# Patient Record
Sex: FEMALE | Race: BLACK OR AFRICAN AMERICAN | NOT HISPANIC OR LATINO | Employment: OTHER | RURAL
[De-identification: names, ages, dates, MRNs, and addresses within clinical notes are randomized per-mention and may not be internally consistent; named-entity substitution may affect disease eponyms.]

---

## 2020-06-06 ENCOUNTER — HISTORICAL (OUTPATIENT)
Dept: ADMINISTRATIVE | Facility: HOSPITAL | Age: 70
End: 2020-06-06

## 2020-11-11 ENCOUNTER — HISTORICAL (OUTPATIENT)
Dept: ADMINISTRATIVE | Facility: HOSPITAL | Age: 70
End: 2020-11-11

## 2020-11-11 LAB
IGA SER QL IFE: ABNORMAL
IGA SERPL-MCNC: 2010 MG/DL (ref 70–400)
IGG SER QL IFE: NORMAL
IGG SERPL-MCNC: 912 MG/DL (ref 700–1600)
IGM SER QL IFE: ABNORMAL
IGM SERPL-MCNC: 26 MG/DL (ref 40–230)
KAPPA LC SER QL ELPH: 227 (ref 170–370)
KAPPA LC SER QL IFE: NORMAL
KAPPA LC/LAMBDA SER: 0.55 % (ref 1.35–2.65)
LAMBDA LC SER QL ELPH: >415 (ref 90–210)
LAMBDA LC SER QL IFE: ABNORMAL
PATH INTERP BLD-IMP: ABNORMAL
PATH INTERP CSF-IMP: ABNORMAL

## 2020-11-16 ENCOUNTER — HISTORICAL (OUTPATIENT)
Dept: ADMINISTRATIVE | Facility: HOSPITAL | Age: 70
End: 2020-11-16

## 2020-12-10 ENCOUNTER — HISTORICAL (OUTPATIENT)
Dept: ADMINISTRATIVE | Facility: HOSPITAL | Age: 70
End: 2020-12-10

## 2021-05-07 RX ORDER — FUROSEMIDE 40 MG/1
40 TABLET ORAL DAILY PRN
Qty: 90 TABLET | Refills: 1 | Status: SHIPPED | OUTPATIENT
Start: 2021-05-07 | End: 2021-12-06

## 2021-05-07 RX ORDER — SAXAGLIPTIN 5 MG/1
5 TABLET, FILM COATED ORAL DAILY
Qty: 90 TABLET | Refills: 1 | Status: SHIPPED | OUTPATIENT
Start: 2021-05-07 | End: 2022-01-26

## 2021-05-21 ENCOUNTER — OFFICE VISIT (OUTPATIENT)
Dept: FAMILY MEDICINE | Facility: CLINIC | Age: 71
End: 2021-05-21
Payer: MEDICARE

## 2021-05-21 VITALS
DIASTOLIC BLOOD PRESSURE: 60 MMHG | BODY MASS INDEX: 26.49 KG/M2 | WEIGHT: 159 LBS | SYSTOLIC BLOOD PRESSURE: 140 MMHG | RESPIRATION RATE: 18 BRPM | HEIGHT: 65 IN | OXYGEN SATURATION: 99 % | HEART RATE: 75 BPM

## 2021-05-21 DIAGNOSIS — D64.9 ANEMIA, UNSPECIFIED TYPE: ICD-10-CM

## 2021-05-21 DIAGNOSIS — E78.5 HYPERLIPIDEMIA, UNSPECIFIED HYPERLIPIDEMIA TYPE: Primary | ICD-10-CM

## 2021-05-21 LAB
CHOLEST SERPL-MCNC: 160 MG/DL (ref 0–200)
CHOLEST/HDLC SERPL: 2.3 {RATIO}
FERRITIN SERPL-MCNC: 130 NG/ML (ref 8–252)
HDLC SERPL-MCNC: 70 MG/DL (ref 40–60)
IRON SATN MFR SERPL: 29 % (ref 14–50)
IRON SERPL-MCNC: 82 ΜG/DL (ref 50–170)
LDLC SERPL CALC-MCNC: 80 MG/DL
LDLC/HDLC SERPL: 1.1 {RATIO}
NONHDLC SERPL-MCNC: 90 MG/DL
TIBC SERPL-MCNC: 278 ΜG/DL (ref 250–450)
TRIGL SERPL-MCNC: 52 MG/DL (ref 35–150)
VLDLC SERPL-MCNC: 10 MG/DL

## 2021-05-21 PROCEDURE — 82728 ASSAY OF FERRITIN: CPT | Mod: ,,, | Performed by: CLINICAL MEDICAL LABORATORY

## 2021-05-21 PROCEDURE — 83540 IRON AND TIBC: ICD-10-PCS | Mod: ,,, | Performed by: CLINICAL MEDICAL LABORATORY

## 2021-05-21 PROCEDURE — 99213 OFFICE O/P EST LOW 20 MIN: CPT | Mod: ,,, | Performed by: FAMILY MEDICINE

## 2021-05-21 PROCEDURE — 83550 IRON AND TIBC: ICD-10-PCS | Mod: ,,, | Performed by: CLINICAL MEDICAL LABORATORY

## 2021-05-21 PROCEDURE — 83540 ASSAY OF IRON: CPT | Mod: ,,, | Performed by: CLINICAL MEDICAL LABORATORY

## 2021-05-21 PROCEDURE — 80061 LIPID PANEL: CPT | Mod: ,,, | Performed by: CLINICAL MEDICAL LABORATORY

## 2021-05-21 PROCEDURE — 82728 FERRITIN: ICD-10-PCS | Mod: ,,, | Performed by: CLINICAL MEDICAL LABORATORY

## 2021-05-21 PROCEDURE — 80061 LIPID PANEL: ICD-10-PCS | Mod: ,,, | Performed by: CLINICAL MEDICAL LABORATORY

## 2021-05-21 PROCEDURE — 99213 PR OFFICE/OUTPT VISIT, EST, LEVL III, 20-29 MIN: ICD-10-PCS | Mod: ,,, | Performed by: FAMILY MEDICINE

## 2021-05-21 PROCEDURE — 83550 IRON BINDING TEST: CPT | Mod: ,,, | Performed by: CLINICAL MEDICAL LABORATORY

## 2021-05-21 RX ORDER — HYDROCODONE BITARTRATE AND ACETAMINOPHEN 10; 325 MG/1; MG/1
1 TABLET ORAL EVERY 8 HOURS
COMMUNITY
End: 2021-06-08 | Stop reason: SDUPTHER

## 2021-05-21 RX ORDER — PANTOPRAZOLE SODIUM 40 MG/1
40 TABLET, DELAYED RELEASE ORAL 2 TIMES DAILY
COMMUNITY
End: 2022-04-07 | Stop reason: ALTCHOICE

## 2021-05-21 RX ORDER — FERROUS SULFATE 325(65) MG
325 TABLET ORAL
COMMUNITY

## 2021-05-21 RX ORDER — CHOLECALCIFEROL (VITAMIN D3) 25 MCG
5000 TABLET ORAL DAILY
COMMUNITY

## 2021-05-21 RX ORDER — FOLIC ACID 1 MG/1
1 TABLET ORAL DAILY
COMMUNITY

## 2021-05-21 RX ORDER — PREDNISONE 5 MG/1
5 TABLET ORAL 3 TIMES DAILY
COMMUNITY
End: 2022-03-03 | Stop reason: ALTCHOICE

## 2021-05-21 RX ORDER — AMLODIPINE BESYLATE 10 MG/1
10 TABLET ORAL DAILY
COMMUNITY
End: 2022-04-07 | Stop reason: SDUPTHER

## 2021-05-21 RX ORDER — TRAVOPROST OPHTHALMIC SOLUTION 0.04 MG/ML
1 SOLUTION OPHTHALMIC NIGHTLY
COMMUNITY
End: 2023-05-09

## 2021-05-21 RX ORDER — CLOPIDOGREL BISULFATE 75 MG/1
75 TABLET ORAL DAILY
COMMUNITY
End: 2022-04-07 | Stop reason: SDUPTHER

## 2021-05-21 RX ORDER — LORATADINE 10 MG/1
10 TABLET ORAL DAILY
COMMUNITY
End: 2022-03-03

## 2021-05-21 RX ORDER — METHOTREXATE 2.5 MG/1
2.5 TABLET ORAL
COMMUNITY

## 2021-05-21 RX ORDER — HYDROCHLOROTHIAZIDE 12.5 MG/1
12.5 TABLET ORAL DAILY
COMMUNITY
End: 2022-04-07 | Stop reason: SDUPTHER

## 2021-05-21 RX ORDER — FLUTICASONE PROPIONATE 50 MCG
2 SPRAY, SUSPENSION (ML) NASAL DAILY
COMMUNITY
End: 2022-04-07 | Stop reason: SDUPTHER

## 2021-05-21 RX ORDER — ASPIRIN 81 MG/1
81 TABLET ORAL DAILY
COMMUNITY

## 2021-05-21 RX ORDER — ROSUVASTATIN CALCIUM 20 MG/1
20 TABLET, COATED ORAL NIGHTLY
COMMUNITY
End: 2022-04-07 | Stop reason: SDUPTHER

## 2021-05-21 RX ORDER — DICLOFENAC SODIUM 10 MG/G
2 GEL TOPICAL 2 TIMES DAILY
COMMUNITY
End: 2021-07-29

## 2021-05-21 RX ORDER — GABAPENTIN 300 MG/1
300 CAPSULE ORAL DAILY
COMMUNITY
End: 2021-12-01 | Stop reason: SDUPTHER

## 2021-05-21 RX ORDER — CETIRIZINE HYDROCHLORIDE 10 MG/1
10 TABLET ORAL DAILY
COMMUNITY
End: 2022-04-07

## 2021-05-24 ENCOUNTER — TELEPHONE (OUTPATIENT)
Dept: FAMILY MEDICINE | Facility: CLINIC | Age: 71
End: 2021-05-24

## 2021-06-08 RX ORDER — HYDROCODONE BITARTRATE AND ACETAMINOPHEN 10; 325 MG/1; MG/1
1 TABLET ORAL EVERY 8 HOURS
COMMUNITY
End: 2021-06-08 | Stop reason: SDUPTHER

## 2021-06-09 ENCOUNTER — OFFICE VISIT (OUTPATIENT)
Dept: PAIN MEDICINE | Facility: CLINIC | Age: 71
End: 2021-06-09
Payer: MEDICARE

## 2021-06-09 VITALS
WEIGHT: 163 LBS | DIASTOLIC BLOOD PRESSURE: 49 MMHG | HEIGHT: 65 IN | HEART RATE: 83 BPM | SYSTOLIC BLOOD PRESSURE: 120 MMHG | BODY MASS INDEX: 27.16 KG/M2

## 2021-06-09 DIAGNOSIS — Z79.899 OTHER LONG TERM (CURRENT) DRUG THERAPY: Primary | ICD-10-CM

## 2021-06-09 DIAGNOSIS — G89.29 CHRONIC LOW BACK PAIN, UNSPECIFIED BACK PAIN LATERALITY, UNSPECIFIED WHETHER SCIATICA PRESENT: Chronic | ICD-10-CM

## 2021-06-09 DIAGNOSIS — M96.1 POSTLAMINECTOMY SYNDROME OF CERVICAL REGION: Chronic | ICD-10-CM

## 2021-06-09 DIAGNOSIS — M54.50 CHRONIC LOW BACK PAIN, UNSPECIFIED BACK PAIN LATERALITY, UNSPECIFIED WHETHER SCIATICA PRESENT: Chronic | ICD-10-CM

## 2021-06-09 DIAGNOSIS — M06.9 RHEUMATOID ARTHRITIS, INVOLVING UNSPECIFIED SITE, UNSPECIFIED WHETHER RHEUMATOID FACTOR PRESENT: Chronic | ICD-10-CM

## 2021-06-09 LAB
CTP QC/QA: YES
POC (AMP) AMPHETAMINE: NEGATIVE
POC (BAR) BARBITURATES: NEGATIVE
POC (BUP) BUPRENORPHINE: NEGATIVE
POC (BZO) BENZODIAZEPINES: NEGATIVE
POC (COC) COCAINE: NEGATIVE
POC (MDMA) METHYLENEDIOXYMETHAMPHETAMINE 3,4: NEGATIVE
POC (MET) METHAMPHETAMINE: NEGATIVE
POC (MOP) OPIATES: ABNORMAL
POC (MTD) METHADONE: NEGATIVE
POC (OXY) OXYCODONE: NEGATIVE
POC (PCP) PHENCYCLIDINE: NEGATIVE
POC (TCA) TRICYCLIC ANTIDEPRESSANTS: NEGATIVE
POC TEMPERATURE (URINE): 92
POC THC: NEGATIVE

## 2021-06-09 PROCEDURE — 99214 PR OFFICE/OUTPT VISIT, EST, LEVL IV, 30-39 MIN: ICD-10-PCS | Mod: S$PBB,,, | Performed by: PAIN MEDICINE

## 2021-06-09 PROCEDURE — 80305 DRUG TEST PRSMV DIR OPT OBS: CPT | Mod: PBBFAC | Performed by: PAIN MEDICINE

## 2021-06-09 PROCEDURE — 99214 OFFICE O/P EST MOD 30 MIN: CPT | Mod: S$PBB,,, | Performed by: PAIN MEDICINE

## 2021-06-09 PROCEDURE — 99215 OFFICE O/P EST HI 40 MIN: CPT | Mod: PBBFAC | Performed by: PAIN MEDICINE

## 2021-06-09 RX ORDER — HYDROCODONE BITARTRATE AND ACETAMINOPHEN 10; 325 MG/1; MG/1
1 TABLET ORAL EVERY 8 HOURS PRN
Qty: 90 TABLET | Refills: 0 | Status: SHIPPED | OUTPATIENT
Start: 2021-06-09 | End: 2021-07-09

## 2021-06-09 RX ORDER — HYDROCODONE BITARTRATE AND ACETAMINOPHEN 10; 325 MG/1; MG/1
1 TABLET ORAL EVERY 8 HOURS PRN
Qty: 90 TABLET | Refills: 0 | Status: SHIPPED | OUTPATIENT
Start: 2021-07-09 | End: 2021-09-20 | Stop reason: SDUPTHER

## 2021-09-13 PROBLEM — M06.9 RHEUMATOID ARTHRITIS: Status: ACTIVE | Noted: 2021-09-13

## 2021-09-13 PROBLEM — M06.9 RHEUMATOID ARTHRITIS: Chronic | Status: ACTIVE | Noted: 2021-09-13

## 2021-09-13 PROBLEM — M96.1 POSTLAMINECTOMY SYNDROME OF CERVICAL REGION: Status: ACTIVE | Noted: 2021-09-13

## 2021-09-13 PROBLEM — M96.1 POSTLAMINECTOMY SYNDROME OF CERVICAL REGION: Chronic | Status: ACTIVE | Noted: 2021-09-13

## 2021-09-21 ENCOUNTER — OFFICE VISIT (OUTPATIENT)
Dept: PAIN MEDICINE | Facility: CLINIC | Age: 71
End: 2021-09-21
Payer: MEDICARE

## 2021-09-21 VITALS
HEIGHT: 65 IN | HEART RATE: 91 BPM | SYSTOLIC BLOOD PRESSURE: 144 MMHG | BODY MASS INDEX: 25.33 KG/M2 | DIASTOLIC BLOOD PRESSURE: 71 MMHG | RESPIRATION RATE: 18 BRPM | WEIGHT: 152 LBS

## 2021-09-21 DIAGNOSIS — M47.817 SPONDYLOSIS OF LUMBOSACRAL REGION WITHOUT MYELOPATHY OR RADICULOPATHY: Primary | Chronic | ICD-10-CM

## 2021-09-21 DIAGNOSIS — M06.9 RHEUMATOID ARTHRITIS INVOLVING MULTIPLE SITES, UNSPECIFIED WHETHER RHEUMATOID FACTOR PRESENT: Chronic | ICD-10-CM

## 2021-09-21 DIAGNOSIS — Z79.899 OTHER LONG TERM (CURRENT) DRUG THERAPY: ICD-10-CM

## 2021-09-21 PROCEDURE — 99215 OFFICE O/P EST HI 40 MIN: CPT | Mod: PBBFAC | Performed by: PHYSICIAN ASSISTANT

## 2021-09-21 PROCEDURE — 99214 PR OFFICE/OUTPT VISIT, EST, LEVL IV, 30-39 MIN: ICD-10-PCS | Mod: S$PBB,,, | Performed by: PHYSICIAN ASSISTANT

## 2021-09-21 PROCEDURE — 99214 OFFICE O/P EST MOD 30 MIN: CPT | Mod: S$PBB,,, | Performed by: PHYSICIAN ASSISTANT

## 2021-09-21 PROCEDURE — 80305 DRUG TEST PRSMV DIR OPT OBS: CPT | Mod: PBBFAC | Performed by: PHYSICIAN ASSISTANT

## 2021-09-21 RX ORDER — HYDROCODONE BITARTRATE AND ACETAMINOPHEN 10; 325 MG/1; MG/1
1 TABLET ORAL EVERY 8 HOURS
Qty: 90 TABLET | Refills: 0 | Status: SHIPPED | OUTPATIENT
Start: 2021-11-20 | End: 2021-12-15 | Stop reason: SDUPTHER

## 2021-09-21 RX ORDER — HYDROCODONE BITARTRATE AND ACETAMINOPHEN 10; 325 MG/1; MG/1
1 TABLET ORAL EVERY 8 HOURS
Qty: 90 TABLET | Refills: 0 | Status: SHIPPED | OUTPATIENT
Start: 2021-09-21 | End: 2021-10-21

## 2021-09-21 RX ORDER — HYDROCODONE BITARTRATE AND ACETAMINOPHEN 10; 325 MG/1; MG/1
1 TABLET ORAL EVERY 8 HOURS
Qty: 90 TABLET | Refills: 0 | Status: SHIPPED | OUTPATIENT
Start: 2021-10-21 | End: 2021-11-20

## 2021-09-21 RX ORDER — HYDROCODONE BITARTRATE AND ACETAMINOPHEN 10; 325 MG/1; MG/1
1 TABLET ORAL EVERY 8 HOURS
COMMUNITY
End: 2021-12-15 | Stop reason: SDUPTHER

## 2021-11-04 ENCOUNTER — CLINICAL SUPPORT (OUTPATIENT)
Dept: FAMILY MEDICINE | Facility: CLINIC | Age: 71
End: 2021-11-04
Payer: MEDICARE

## 2021-11-04 VITALS — TEMPERATURE: 98 F

## 2021-11-04 DIAGNOSIS — Z23 NEED FOR PROPHYLACTIC VACCINATION AND INOCULATION AGAINST INFLUENZA: Primary | ICD-10-CM

## 2021-11-04 PROCEDURE — G0008 ADMIN INFLUENZA VIRUS VAC: HCPCS | Mod: ,,, | Performed by: NURSE PRACTITIONER

## 2021-11-04 PROCEDURE — 90662 FLU VACCINE - QUADRIVALENT - HIGH DOSE (65+) PRESERVATIVE FREE IM: ICD-10-PCS | Mod: ,,, | Performed by: NURSE PRACTITIONER

## 2021-11-04 PROCEDURE — G0008 FLU VACCINE - QUADRIVALENT - HIGH DOSE (65+) PRESERVATIVE FREE IM: ICD-10-PCS | Mod: ,,, | Performed by: NURSE PRACTITIONER

## 2021-11-04 PROCEDURE — 90662 IIV NO PRSV INCREASED AG IM: CPT | Mod: ,,, | Performed by: NURSE PRACTITIONER

## 2021-12-02 RX ORDER — GABAPENTIN 300 MG/1
300 CAPSULE ORAL DAILY
Qty: 90 CAPSULE | Refills: 1 | Status: SHIPPED | OUTPATIENT
Start: 2021-12-02 | End: 2021-12-06 | Stop reason: SDUPTHER

## 2021-12-06 ENCOUNTER — OFFICE VISIT (OUTPATIENT)
Dept: FAMILY MEDICINE | Facility: CLINIC | Age: 71
End: 2021-12-06
Payer: MEDICARE

## 2021-12-06 VITALS
HEIGHT: 65 IN | TEMPERATURE: 99 F | WEIGHT: 152 LBS | DIASTOLIC BLOOD PRESSURE: 58 MMHG | OXYGEN SATURATION: 98 % | BODY MASS INDEX: 25.33 KG/M2 | SYSTOLIC BLOOD PRESSURE: 120 MMHG | RESPIRATION RATE: 18 BRPM | HEART RATE: 80 BPM

## 2021-12-06 DIAGNOSIS — M47.817 SPONDYLOSIS OF LUMBOSACRAL REGION WITHOUT MYELOPATHY OR RADICULOPATHY: Primary | ICD-10-CM

## 2021-12-06 PROCEDURE — 99213 OFFICE O/P EST LOW 20 MIN: CPT | Mod: ,,, | Performed by: FAMILY MEDICINE

## 2021-12-06 PROCEDURE — 99213 PR OFFICE/OUTPT VISIT, EST, LEVL III, 20-29 MIN: ICD-10-PCS | Mod: ,,, | Performed by: FAMILY MEDICINE

## 2021-12-06 RX ORDER — DICLOFENAC SODIUM 10 MG/G
GEL TOPICAL
Qty: 300 G | Refills: 2 | Status: SHIPPED | OUTPATIENT
Start: 2021-12-06 | End: 2023-02-07 | Stop reason: SDUPTHER

## 2021-12-06 RX ORDER — GABAPENTIN 300 MG/1
300 CAPSULE ORAL DAILY
Qty: 90 CAPSULE | Refills: 1 | Status: SHIPPED | OUTPATIENT
Start: 2021-12-06 | End: 2022-06-17 | Stop reason: ALTCHOICE

## 2021-12-06 RX ORDER — OMEPRAZOLE 20 MG/1
1 CAPSULE, DELAYED RELEASE ORAL DAILY
COMMUNITY
Start: 2021-11-08 | End: 2022-05-06 | Stop reason: CLARIF

## 2021-12-15 ENCOUNTER — HOSPITAL ENCOUNTER (OUTPATIENT)
Dept: RADIOLOGY | Facility: HOSPITAL | Age: 71
Discharge: HOME | End: 2021-12-15
Payer: MEDICARE

## 2021-12-15 VITALS — WEIGHT: 152 LBS | BODY MASS INDEX: 25.33 KG/M2 | HEIGHT: 65 IN

## 2021-12-15 DIAGNOSIS — Z12.31 VISIT FOR SCREENING MAMMOGRAM: ICD-10-CM

## 2021-12-15 PROCEDURE — 77067 SCR MAMMO BI INCL CAD: CPT | Mod: TC

## 2021-12-20 ENCOUNTER — OFFICE VISIT (OUTPATIENT)
Dept: PAIN MEDICINE | Facility: CLINIC | Age: 71
End: 2021-12-20
Payer: MEDICARE

## 2021-12-20 VITALS
HEIGHT: 65 IN | SYSTOLIC BLOOD PRESSURE: 150 MMHG | HEART RATE: 91 BPM | WEIGHT: 151 LBS | BODY MASS INDEX: 25.16 KG/M2 | DIASTOLIC BLOOD PRESSURE: 66 MMHG | RESPIRATION RATE: 18 BRPM

## 2021-12-20 DIAGNOSIS — M47.817 SPONDYLOSIS OF LUMBOSACRAL REGION WITHOUT MYELOPATHY OR RADICULOPATHY: Chronic | ICD-10-CM

## 2021-12-20 DIAGNOSIS — M06.9 RHEUMATOID ARTHRITIS INVOLVING MULTIPLE SITES, UNSPECIFIED WHETHER RHEUMATOID FACTOR PRESENT: Primary | Chronic | ICD-10-CM

## 2021-12-20 DIAGNOSIS — Z79.899 OTHER LONG TERM (CURRENT) DRUG THERAPY: ICD-10-CM

## 2021-12-20 PROCEDURE — 99215 OFFICE O/P EST HI 40 MIN: CPT | Mod: PBBFAC | Performed by: PHYSICIAN ASSISTANT

## 2021-12-20 PROCEDURE — 99214 OFFICE O/P EST MOD 30 MIN: CPT | Mod: S$PBB,,, | Performed by: PHYSICIAN ASSISTANT

## 2021-12-20 PROCEDURE — 99214 PR OFFICE/OUTPT VISIT, EST, LEVL IV, 30-39 MIN: ICD-10-PCS | Mod: S$PBB,,, | Performed by: PHYSICIAN ASSISTANT

## 2021-12-20 PROCEDURE — 80305 DRUG TEST PRSMV DIR OPT OBS: CPT | Mod: PBBFAC | Performed by: PHYSICIAN ASSISTANT

## 2021-12-20 RX ORDER — HYDROCODONE BITARTRATE AND ACETAMINOPHEN 10; 325 MG/1; MG/1
1 TABLET ORAL EVERY 8 HOURS
Qty: 90 TABLET | Refills: 0 | Status: SHIPPED | OUTPATIENT
Start: 2021-12-30 | End: 2022-01-29

## 2021-12-20 RX ORDER — HYDROCODONE BITARTRATE AND ACETAMINOPHEN 10; 325 MG/1; MG/1
1 TABLET ORAL EVERY 8 HOURS
Qty: 90 TABLET | Refills: 0 | Status: SHIPPED | OUTPATIENT
Start: 2022-02-28 | End: 2022-02-28 | Stop reason: SDUPTHER

## 2021-12-20 RX ORDER — SULFAMETHOXAZOLE AND TRIMETHOPRIM 800; 160 MG/1; MG/1
1 TABLET ORAL 2 TIMES DAILY
Status: DISCONTINUED | OUTPATIENT
Start: 2021-12-20 | End: 2021-12-20

## 2021-12-20 RX ORDER — HYDROCODONE BITARTRATE AND ACETAMINOPHEN 10; 325 MG/1; MG/1
1 TABLET ORAL EVERY 8 HOURS
Qty: 90 TABLET | Refills: 0 | Status: SHIPPED | OUTPATIENT
Start: 2022-01-29 | End: 2022-02-28 | Stop reason: SDUPTHER

## 2021-12-20 RX ORDER — SULFAMETHOXAZOLE AND TRIMETHOPRIM 800; 160 MG/1; MG/1
1 TABLET ORAL 2 TIMES DAILY
Qty: 6 TABLET | Refills: 0 | Status: SHIPPED | OUTPATIENT
Start: 2021-12-20 | End: 2021-12-23

## 2021-12-22 ENCOUNTER — OFFICE VISIT (OUTPATIENT)
Dept: FAMILY MEDICINE | Facility: CLINIC | Age: 71
End: 2021-12-22
Payer: MEDICARE

## 2021-12-22 VITALS
BODY MASS INDEX: 25.33 KG/M2 | DIASTOLIC BLOOD PRESSURE: 60 MMHG | HEIGHT: 65 IN | OXYGEN SATURATION: 97 % | TEMPERATURE: 98 F | SYSTOLIC BLOOD PRESSURE: 112 MMHG | HEART RATE: 89 BPM | RESPIRATION RATE: 19 BRPM | WEIGHT: 152 LBS

## 2021-12-22 DIAGNOSIS — R39.9 UTI SYMPTOMS: Primary | ICD-10-CM

## 2021-12-22 DIAGNOSIS — M62.838 MUSCLE SPASM: ICD-10-CM

## 2021-12-22 DIAGNOSIS — N30.01 ACUTE CYSTITIS WITH HEMATURIA: ICD-10-CM

## 2021-12-22 LAB
BILIRUB SERPL-MCNC: NEGATIVE MG/DL
BLOOD URINE, POC: ABNORMAL
COLOR, POC UA: YELLOW
GLUCOSE UR QL STRIP: NEGATIVE
KETONES UR QL STRIP: NEGATIVE
LEUKOCYTE ESTERASE URINE, POC: NEGATIVE
NITRITE, POC UA: NEGATIVE
PH, POC UA: 5.5
PROTEIN, POC: NEGATIVE
SPECIFIC GRAVITY, POC UA: 1.01
UROBILINOGEN, POC UA: 0.2

## 2021-12-22 PROCEDURE — 96372 PR INJECTION,THERAP/PROPH/DIAG2ST, IM OR SUBCUT: ICD-10-PCS | Mod: ,,, | Performed by: FAMILY MEDICINE

## 2021-12-22 PROCEDURE — 96372 THER/PROPH/DIAG INJ SC/IM: CPT | Mod: ,,, | Performed by: FAMILY MEDICINE

## 2021-12-22 PROCEDURE — 99203 OFFICE O/P NEW LOW 30 MIN: CPT | Mod: 25,,, | Performed by: FAMILY MEDICINE

## 2021-12-22 PROCEDURE — 81003 URINALYSIS AUTO W/O SCOPE: CPT | Mod: RHCUB | Performed by: FAMILY MEDICINE

## 2021-12-22 PROCEDURE — 99203 PR OFFICE/OUTPT VISIT, NEW, LEVL III, 30-44 MIN: ICD-10-PCS | Mod: 25,,, | Performed by: FAMILY MEDICINE

## 2021-12-22 RX ORDER — IBUPROFEN 600 MG/1
600 TABLET ORAL EVERY 6 HOURS PRN
Qty: 20 TABLET | Refills: 0 | Status: SHIPPED | OUTPATIENT
Start: 2021-12-22 | End: 2022-03-03

## 2021-12-22 RX ORDER — TIZANIDINE 4 MG/1
4 TABLET ORAL 3 TIMES DAILY PRN
Qty: 20 TABLET | Refills: 0 | Status: SHIPPED | OUTPATIENT
Start: 2021-12-22 | End: 2022-01-01

## 2021-12-22 RX ORDER — CIPROFLOXACIN 500 MG/1
500 TABLET ORAL EVERY 12 HOURS
Qty: 14 TABLET | Refills: 0 | Status: SHIPPED | OUTPATIENT
Start: 2021-12-22 | End: 2021-12-29

## 2021-12-22 RX ORDER — KETOROLAC TROMETHAMINE 30 MG/ML
30 INJECTION, SOLUTION INTRAMUSCULAR; INTRAVENOUS
Status: COMPLETED | OUTPATIENT
Start: 2021-12-22 | End: 2021-12-22

## 2021-12-22 RX ADMIN — KETOROLAC TROMETHAMINE 30 MG: 30 INJECTION, SOLUTION INTRAMUSCULAR; INTRAVENOUS at 10:12

## 2022-01-04 ENCOUNTER — OFFICE VISIT (OUTPATIENT)
Dept: PAIN MEDICINE | Facility: CLINIC | Age: 72
End: 2022-01-04
Payer: MEDICARE

## 2022-01-04 VITALS
BODY MASS INDEX: 24.99 KG/M2 | SYSTOLIC BLOOD PRESSURE: 135 MMHG | DIASTOLIC BLOOD PRESSURE: 61 MMHG | HEIGHT: 65 IN | HEART RATE: 71 BPM | WEIGHT: 150 LBS

## 2022-01-04 DIAGNOSIS — M06.9 RHEUMATOID ARTHRITIS INVOLVING MULTIPLE SITES, UNSPECIFIED WHETHER RHEUMATOID FACTOR PRESENT: Primary | Chronic | ICD-10-CM

## 2022-01-04 DIAGNOSIS — M25.551 BILATERAL HIP PAIN: ICD-10-CM

## 2022-01-04 DIAGNOSIS — M25.552 BILATERAL HIP PAIN: ICD-10-CM

## 2022-01-04 DIAGNOSIS — R10.2 PELVIC PAIN IN FEMALE: ICD-10-CM

## 2022-01-04 DIAGNOSIS — M47.817 SPONDYLOSIS OF LUMBOSACRAL REGION WITHOUT MYELOPATHY OR RADICULOPATHY: Chronic | ICD-10-CM

## 2022-01-04 PROCEDURE — 99214 OFFICE O/P EST MOD 30 MIN: CPT | Mod: S$PBB,25,, | Performed by: PHYSICIAN ASSISTANT

## 2022-01-04 PROCEDURE — 96372 THER/PROPH/DIAG INJ SC/IM: CPT | Mod: PBBFAC | Performed by: PHYSICIAN ASSISTANT

## 2022-01-04 PROCEDURE — 99215 OFFICE O/P EST HI 40 MIN: CPT | Mod: PBBFAC | Performed by: PHYSICIAN ASSISTANT

## 2022-01-04 PROCEDURE — 99214 PR OFFICE/OUTPT VISIT, EST, LEVL IV, 30-39 MIN: ICD-10-PCS | Mod: S$PBB,25,, | Performed by: PHYSICIAN ASSISTANT

## 2022-01-04 RX ORDER — HYDROCODONE BITARTRATE AND ACETAMINOPHEN 10; 325 MG/1; MG/1
1 TABLET ORAL EVERY 8 HOURS
Qty: 90 TABLET | Refills: 0 | Status: CANCELLED | OUTPATIENT
Start: 2022-01-04 | End: 2022-02-03

## 2022-01-04 RX ORDER — TIZANIDINE HYDROCHLORIDE 4 MG/1
1 CAPSULE, GELATIN COATED ORAL 2 TIMES DAILY
COMMUNITY
End: 2022-03-03

## 2022-01-04 RX ORDER — KETOROLAC TROMETHAMINE 30 MG/ML
60 INJECTION, SOLUTION INTRAMUSCULAR; INTRAVENOUS
Status: COMPLETED | OUTPATIENT
Start: 2022-01-04 | End: 2022-01-04

## 2022-01-04 RX ADMIN — KETOROLAC TROMETHAMINE 60 MG: 30 INJECTION, SOLUTION INTRAMUSCULAR; INTRAVENOUS at 01:01

## 2022-01-04 NOTE — PATIENT INSTRUCTIONS
Patient Education       Hip Pain Discharge Instructions   About this topic   Your hips are ball and socket joints. Cartilage covers the ends of the bones inside your hip joint and allows them to move smoothly. Ligaments are strong bands of tissue that hold your bones together. Tendons are bands of tissue that attach muscles to bones. A group of muscles surround your hips and attach to bones in your hip, leg, and pelvis. These allow you to bend your hip and move your leg. Nerves and blood vessels travel near your hip as they enter your legs.  Most hip pain is caused by damage to the cartilage or an injury to a ligament, tendon, or muscle. You also have nerves and blood vessels in your hip. However, other more serious problems, such as infection or injury to a nerve or blood vessel, can also cause hip pain.         What care is needed at home?   · Ask your doctor what you need to do when you go home. Make sure you ask questions if you do not understand what the doctor says.  · Rest your hip. If needed, or if you were told to, use a cane, walker, or crutches.  · You may want to take medicines like ibuprofen or naproxen for swelling and pain. These are nonsteroidal anti-inflammatory drugs (NSAIDs).  · Place an ice pack or a bag of frozen vegetables wrapped in a towel over the painful part. Never put ice right on the skin. Do not leave the ice on more than 10 to 15 minutes at a time. Use for the first 24 to 48 hours after an injury.  · Once your pain decreases, talk with your regular doctor or a physical therapist to find out if there are exercises or stretches that may help with your problem.  What follow-up care is needed?   Your doctor may ask you to make visits to the office to check on your progress. Be sure to keep these visits. You doctor may send you to a specialist called an orthopedic doctor. Your doctor may also send you to physical therapy or a chiropractor for treatments and to learn exercises to help lessen  your pain.  What drugs may be needed?   The doctor may order drugs to:  · Help with pain and swelling.  The doctor may give you a shot of an anti-inflammatory drug called a corticosteroid. This will help with pain and swelling. Talk with your doctor about the risks of this shot.  Will physical activity be limited?   You may need to rest your hip for a while. You should not do physical activity that makes your health problem worse. If you run, work out, or play sports, you may not be able to do those things until your health problem gets better.  What problems could happen?   If you need surgery for a bone break or some other problem in your hip, these problems could happen:  · Infection  · Lung infection called pneumonia  · Blood clot or deep vein thrombosis (DVT)  · Osteonecrosis - lack of blood supply to the bone  Talk to your doctor about how to prevent these problems if you have had surgery.  What can be done to prevent this health problem?   · Stay active and work out to keep your muscles strong and flexible.  · Keep a healthy weight so there is not extra stress on your joints. This makes it more likely to be hurt.  · Stand with your weight equal on both legs.  · Bend your knees when you lift to avoid extra pressure on your hips.  · Wear the right equipment when playing sports. This includes protective equipment and padding.  · Warm up slowly and stretch before you work out. Use good ways to train, such as slowly adding to how far you run. Do not work out if you are overly tired. Take extra care if working out in cold weather.  · Wear supportive shoes. If your feet are flat, talk with your doctor about getting inserts or orthotics for your shoes.  · If your legs are different lengths, use a lift in your shoe to make them even.  When do I need to call the doctor?   · Your hip pain becomes severe and you are not able to put weight on the injured leg or move it without making the pain worse.  · You cannot move  your leg.  · You have a fever of 100.4°F (38°C) or higher, swelling, or redness with your hip pain.  · You become unable to control your bowels or bladder.  · You have very bad pain that is not helped by pain medicine.  · Your hip is swollen or bruised.  · Your toes are numb or turn blue, grey, or pale.  · Your leg or foot becomes much weaker than the other.  Helpful tips   If you have hip pain:  · Take breaks often when doing things that use repeated movements.  · Try to limit working in a stooped position.  · Do not sit or lie in one position for a long time. Do not lie on your painful hip. Use a pillow between your legs if you lie on your side.  · Avoid activities that put a lot of stress on your hip joints. These are ones with a lot of twisting, bending, running, and jumping. Also stay away from those with a lot of sudden stopping and starting. Sports like basketball and tennis can put stress on your hip joints. Better sports if you have hip pain are swimming or bicycling.  · Try to go up and down stairs as little as possible.  Teach Back: Helping You Understand   The Teach Back Method helps you understand the information we are giving you. After you talk with the staff, tell them in your own words what you learned. This helps to make sure the staff has described each thing clearly. It also helps to explain things that may have been confusing. Before going home, make sure you can do these:  · I can tell you about my pain.  · I can tell you what may help ease my pain.  · I can tell you what I will do to help prevent pain in the future.  Where can I learn more?   Better Health Channel  https://www.betterhealth.[place].gov.[place]/health/ConditionsAndTreatments/hip-disorders   [facility] Choices  http://www.nhs.uk/conditions/irritable-hip/Pages/Introduction.aspx   Last Reviewed Date   2021-06-16  Consumer Information Use and Disclaimer   This information is not specific medical advice and does not replace information you receive  from your health care provider. This is only a brief summary of general information. It does NOT include all information about conditions, illnesses, injuries, tests, procedures, treatments, therapies, discharge instructions or life-style choices that may apply to you. You must talk with your health care provider for complete information about your health and treatment options. This information should not be used to decide whether or not to accept your health care providers advice, instructions or recommendations. Only your health care provider has the knowledge and training to provide advice that is right for you.  Copyright   Copyright © 2021 [employer] and its affiliates and/or licensors. All rights reserved.

## 2022-01-04 NOTE — PROGRESS NOTES
Disclaimer:  This note has been generated using voice recognition software.  There may be type of graft focal areas that have been missed during a proof reading      Subjective:      Patient ID: Kathryn Marie is a 71 y.o. female.    Chief Complaint: Pelvic Pain (Left/), Joint Pain, and Back Pain      Pain  This is a chronic problem. The current episode started more than 1 year ago. The problem occurs daily. The problem has been unchanged. Associated symptoms include neck pain. Pertinent negatives include no change in bowel habit, chest pain, chills, coughing, fatigue, fever, rash, sore throat, vertigo or vomiting.     Review of Systems   Constitutional: Negative for activity change, appetite change, chills, fatigue, fever and unexpected weight change.   HENT: Negative for drooling, ear discharge, ear pain, facial swelling, nosebleeds, sore throat, trouble swallowing, voice change and goiter.    Eyes: Negative for photophobia, pain, discharge, redness and visual disturbance.   Respiratory: Negative for apnea, cough, choking, chest tightness, shortness of breath, wheezing and stridor.    Cardiovascular: Negative for chest pain, palpitations and leg swelling.   Gastrointestinal: Negative for abdominal distention, change in bowel habit, diarrhea, rectal pain, vomiting, fecal incontinence and change in bowel habit.   Endocrine: Negative for cold intolerance, heat intolerance, polydipsia, polyphagia and polyuria.   Genitourinary: Negative for bladder incontinence, dysuria, flank pain, frequency and hot flashes.   Musculoskeletal: Positive for back pain, leg pain, neck pain and neck stiffness.   Integumentary:  Negative for color change, pallor and rash.   Allergic/Immunologic: Negative for immunocompromised state.   Neurological: Negative for dizziness, vertigo, seizures, syncope, facial asymmetry, speech difficulty, light-headedness, disturbances in coordination, memory loss and coordination difficulties.  "  Hematological: Negative for adenopathy. Does not bruise/bleed easily.   Psychiatric/Behavioral: Negative for agitation, behavioral problems, confusion, decreased concentration, dysphoric mood, hallucinations, self-injury and suicidal ideas. The patient is not nervous/anxious and is not hyperactive.             Objective:  Vitals:    01/04/22 1319   BP: 135/61   Pulse: 71   Weight: 68 kg (150 lb)   Height: 5' 5" (1.651 m)   PainSc:   9   PainLoc: Groin         Physical Exam  Vitals and nursing note reviewed. Exam conducted with a chaperone present.   Constitutional:       General: She is awake.      Appearance: Normal appearance. She is not ill-appearing or diaphoretic.   HENT:      Head: Normocephalic and atraumatic.      Nose: Nose normal.      Mouth/Throat:      Mouth: Mucous membranes are moist.      Pharynx: Oropharynx is clear.   Eyes:      Conjunctiva/sclera: Conjunctivae normal.      Pupils: Pupils are equal, round, and reactive to light.   Cardiovascular:      Rate and Rhythm: Normal rate.   Pulmonary:      Effort: Pulmonary effort is normal. No respiratory distress.   Abdominal:      Palpations: Abdomen is soft.   Musculoskeletal:         General: No signs of injury. Normal range of motion.      Cervical back: Normal range of motion and neck supple.   Skin:     General: Skin is warm and dry.      Coloration: Skin is not jaundiced or pale.   Neurological:      General: No focal deficit present.      Mental Status: She is alert and oriented to person, place, and time. Mental status is at baseline.      Cranial Nerves: Cranial nerves are intact. No cranial nerve deficit (II-XII).   Psychiatric:         Mood and Affect: Mood normal.         Behavior: Behavior normal. Behavior is cooperative.         Thought Content: Thought content normal.           Orders Placed This Encounter   Procedures    X-Ray Hips Bilateral 2 View Inc AP Pelvis     Standing Status:   Future     Standing Expiration Date:   1/4/2023     " Order Specific Question:   Does the patient have a splint or a brace?     Answer:   No     Order Specific Question:   Does the patient have a cast?     Answer:   No     Order Specific Question:   May the Radiologist modify the order per protocol to meet the clinical needs of the patient?     Answer:   Yes     Order Specific Question:   Release to patient     Answer:   Immediate        Mammo Digital Screening Bilat  Narrative: Result:   Mammo Digital Screening Bilat     History:  Patient is 71 y.o. and is seen for a screening mammogram.    Films Compared:  Compared to: 12/10/2020 Mammo Digital Screening Bilat     Findings:     The breasts have scattered areas of fibroglandular density. There is no   evidence of suspicious masses, calcifications, or other abnormal findings.  Impression: Bilateral  There is no mammographic evidence of malignancy.    BI-RADS Category:   Overall: 2 - Benign     Recommendation:  Routine screening mammogram in 1 year is recommended.    Your estimated lifetime risk of breast cancer (to age 85) based on   Tyrer-Cuzick risk assessment model is Tyrer-Cuzick: 2.45 %. According to   the American Cancer Society, patients with a lifetime breast cancer risk   of 20% or higher might benefit from supplemental screening tests.       Office Visit on 12/22/2021   Component Date Value Ref Range Status    Color, UA 12/22/2021 Yellow   Final    Spec Grav UA 12/22/2021 1.015   Final    pH, UA 12/22/2021 5.5   Final    WBC, UA 12/22/2021 Negative   Final    Nitrite, UA 12/22/2021 Negative   Final    Protein, UA 12/22/2021 Negative   Final    Glucose, UA 12/22/2021 Negative   Final    Ketones, UA 12/22/2021 Negative   Final    Bilirubin, UA 12/22/2021 Negative   Final    Urobilinogen, UA 12/22/2021 0.2   Final    Blood, UA 12/22/2021 Trace *   Final   Office Visit on 12/20/2021   Component Date Value Ref Range Status    POC Amphetamines 12/20/2021 Negative  Negative, Inconclusive Final    POC  Barbiturates 12/20/2021 Negative  Negative, Inconclusive Final    POC Benzodiazepines 12/20/2021 Negative  Negative, Inconclusive Final    POC Cocaine 12/20/2021 Negative  Negative, Inconclusive Final    POC THC 12/20/2021 Negative  Negative, Inconclusive Final    POC Methadone 12/20/2021 Negative  Negative, Inconclusive Final    POC Methamphetamine 12/20/2021 Negative  Negative, Inconclusive Final    POC Opiates 12/20/2021 Presumptive Positive* Negative, Inconclusive Final    POC Oxycodone 12/20/2021 Negative  Negative, Inconclusive Final    POC Phencyclidine 12/20/2021 Negative  Negative, Inconclusive Final    POC Methylenedioxymethamphetamine * 12/20/2021 Negative  Negative, Inconclusive Final    POC Tricyclic Antidepressants 12/20/2021 Negative  Negative, Inconclusive Final    POC Buprenorphine 12/20/2021 Negative   Final     Acceptable 12/20/2021 Yes   Final    POC Temperature (Urine) 12/20/2021 92   Final   Lab Visit on 11/04/2021   Component Date Value Ref Range Status    Hemoglobin A1C 11/04/2021 6.6  4.5 - 6.6 % Final    Estimated Average Glucose 11/04/2021 134  mg/dL Final    Sodium 11/04/2021 140  136 - 145 mmol/L Final    Potassium 11/04/2021 5.0  3.5 - 5.1 mmol/L Final    Chloride 11/04/2021 108* 98 - 107 mmol/L Final    CO2 11/04/2021 29  21 - 32 mmol/L Final    Anion Gap 11/04/2021 8  7 - 16 mmol/L Final    Glucose 11/04/2021 84  74 - 106 mg/dL Final    BUN 11/04/2021 14  7 - 18 mg/dL Final    Creatinine 11/04/2021 1.04* 0.55 - 1.02 mg/dL Final    BUN/Creatinine Ratio 11/04/2021 13  6 - 20 Final    Calcium 11/04/2021 8.2* 8.5 - 10.1 mg/dL Final    Total Protein 11/04/2021 8.0  6.4 - 8.2 g/dL Final    Albumin 11/04/2021 3.3* 3.5 - 5.0 g/dL Final    Globulin 11/04/2021 4.7* 2.0 - 4.0 g/dL Final    A/G Ratio 11/04/2021 0.7   Final    Bilirubin, Total 11/04/2021 0.3  0.0 - 1.2 mg/dL Final    Alk Phos 11/04/2021 63  55 - 142 U/L Final    ALT 11/04/2021 32  13  - 56 U/L Final    AST 11/04/2021 34  15 - 37 U/L Final    eGFR  11/04/2021 67  >=60 mL/min/1.73m² Final    Triglycerides 11/04/2021 34* 35 - 150 mg/dL Final    Cholesterol 11/04/2021 139  0 - 200 mg/dL Final    HDL Cholesterol 11/04/2021 66* 40 - 60 mg/dL Final    Cholesterol/HDL Ratio (Risk Factor) 11/04/2021 2.1   Final    Non-HDL 11/04/2021 73  mg/dL Final    LDL Calculated 11/04/2021 66  mg/dL Final    LDL/HDL 11/04/2021 1.0   Final    VLDL 11/04/2021 7  mg/dL Final    WBC 11/04/2021 2.87* 4.50 - 11.00 K/uL Final    RBC 11/04/2021 3.17* 4.20 - 5.40 M/uL Final    Hemoglobin 11/04/2021 10.0* 12.0 - 16.0 g/dL Final    Hematocrit 11/04/2021 30.2* 38.0 - 47.0 % Final    MCV 11/04/2021 95.3  80.0 - 96.0 fL Final    MCH 11/04/2021 31.5* 27.0 - 31.0 pg Final    MCHC 11/04/2021 33.1  32.0 - 36.0 g/dL Final    RDW 11/04/2021 14.8* 11.5 - 14.5 % Final    Platelet Count 11/04/2021 146* 150 - 400 K/uL Final    MPV 11/04/2021 11.2  9.4 - 12.4 fL Final    Neutrophils % 11/04/2021 59.6  53.0 - 65.0 % Final    Lymphocytes % 11/04/2021 29.3  27.0 - 41.0 % Final    Monocytes % 11/04/2021 9.8* 2.0 - 6.0 % Final    Eosinophils % 11/04/2021 0.3* 1.0 - 4.0 % Final    Basophils % 11/04/2021 0.0  0.0 - 1.0 % Final    Immature Granulocytes % 11/04/2021 1.0* 0.0 - 0.4 % Final    nRBC, Auto 11/04/2021 0.0  <=0.0 % Final    Neutrophils, Abs 11/04/2021 1.71* 1.80 - 7.70 K/uL Final    Lymphocytes, Absolute 11/04/2021 0.84* 1.00 - 4.80 K/uL Final    Monocytes, Absolute 11/04/2021 0.28  0.00 - 0.80 K/uL Final    Eosinophils, Absolute 11/04/2021 0.01  0.00 - 0.50 K/uL Final    Basophils, Absolute 11/04/2021 0.00  0.00 - 0.20 K/uL Final    Immature Granulocytes, Absolute 11/04/2021 0.03  0.00 - 0.04 K/uL Final    nRBC, Absolute 11/04/2021 0.00  <=0.00 x10e3/uL Final    Diff Type 11/04/2021 Auto   Final   Office Visit on 09/21/2021   Component Date Value Ref Range Status    POC Amphetamines  09/21/2021 Negative  Negative, Inconclusive Final    POC Barbiturates 09/21/2021 Negative  Negative, Inconclusive Final    POC Benzodiazepines 09/21/2021 Negative  Negative, Inconclusive Final    POC Cocaine 09/21/2021 Negative  Negative, Inconclusive Final    POC THC 09/21/2021 Negative  Negative, Inconclusive Final    POC Methadone 09/21/2021 Negative  Negative, Inconclusive Final    POC Methamphetamine 09/21/2021 Negative  Negative, Inconclusive Final    POC Opiates 09/21/2021 Presumptive Positive* Negative, Inconclusive Final    POC Oxycodone 09/21/2021 Negative  Negative, Inconclusive Final    POC Phencyclidine 09/21/2021 Negative  Negative, Inconclusive Final    POC Methylenedioxymethamphetamine * 09/21/2021 Negative  Negative, Inconclusive Final    POC Tricyclic Antidepressants 09/21/2021 Negative  Negative, Inconclusive Final    POC Buprenorphine 09/21/2021 Negative   Final     Acceptable 09/21/2021 Yes   Final    POC Temperature (Urine) 09/21/2021 92   Final           Assessment:      1. Rheumatoid arthritis involving multiple sites, unspecified whether rheumatoid factor present    2. Spondylosis of lumbosacral region without myelopathy or radiculopathy    3. Bilateral hip pain    4. Pelvic pain in female            A's of Opioid Risk Assessment  Activity:Patient can perform ADL.   Analgesia:Patients pain is partially controlled by current medication. Patient has tried OTC medications such as Tylenol and Ibuprofen with out relief.   Adverse Effects: Patient denies constipation or sedation.  Aberrant Behavior:  reviewed with no aberrant drug seeking/taking behavior.  Overdose reversal drug naloxone discussed    Drug screen reviewed      Requested Prescriptions      No prescriptions requested or ordered in this encounter         Plan:    Rheumatoid arthritis    Complaint bilateral hip pain and pelvic pain history a pelvic fracture several years ago she states she is having  flare-up of her arthritis due to the cold weather she is requesting a Toradol injection    Neurologically intact no deficit noted    Denies loss of bowel or bladder function    Toradol 60 mg IM, tolerated well    X-ray hips and pelvis    Follow-up 3 months sooner if needed    Dr. Piña, June 2022    Bring original prescription medication bottles/container/box with labels to each visit

## 2022-01-05 ENCOUNTER — HOSPITAL ENCOUNTER (OUTPATIENT)
Dept: RADIOLOGY | Facility: HOSPITAL | Age: 72
Discharge: HOME | End: 2022-01-05
Attending: PHYSICIAN ASSISTANT
Payer: MEDICARE

## 2022-01-05 DIAGNOSIS — M25.552 BILATERAL HIP PAIN: ICD-10-CM

## 2022-01-05 DIAGNOSIS — M25.551 BILATERAL HIP PAIN: ICD-10-CM

## 2022-01-05 DIAGNOSIS — R10.2 PELVIC PAIN IN FEMALE: ICD-10-CM

## 2022-01-05 DIAGNOSIS — M47.817 SPONDYLOSIS OF LUMBOSACRAL REGION WITHOUT MYELOPATHY OR RADICULOPATHY: ICD-10-CM

## 2022-01-05 PROCEDURE — 73521 X-RAY EXAM HIPS BI 2 VIEWS: CPT | Mod: TC

## 2022-02-28 NOTE — PROGRESS NOTES
Disclaimer:  This note has been generated using voice recognition software.  There may be type of graft focal areas that have been missed during a proof reading      Subjective:      Patient ID: Kathryn Marie is a 71 y.o. female.    Chief Complaint: Low-back Pain      Pain  This is a chronic problem. The current episode started more than 1 year ago. The problem occurs daily. The problem has been waxing and waning. Associated symptoms include arthralgias and neck pain. Pertinent negatives include no change in bowel habit, chest pain, chills, coughing, fatigue, fever, rash, sore throat, vertigo or vomiting.     Review of Systems   Constitutional: Negative for appetite change, chills, fatigue, fever and unexpected weight change.   HENT: Negative for drooling, ear discharge, ear pain, facial swelling, nosebleeds, sore throat, trouble swallowing, voice change and goiter.    Eyes: Negative for photophobia, pain, discharge, redness and visual disturbance.   Respiratory: Negative for apnea, cough, choking, chest tightness, shortness of breath, wheezing and stridor.    Cardiovascular: Negative for chest pain, palpitations and leg swelling.   Gastrointestinal: Negative for abdominal distention, change in bowel habit, diarrhea, rectal pain, vomiting, fecal incontinence and change in bowel habit.   Endocrine: Negative for cold intolerance, heat intolerance, polydipsia, polyphagia and polyuria.   Genitourinary: Negative for bladder incontinence, dysuria, flank pain, frequency and hot flashes.   Musculoskeletal: Positive for arthralgias, back pain, leg pain, neck pain and neck stiffness.   Integumentary:  Negative for color change, pallor and rash.   Allergic/Immunologic: Negative for immunocompromised state.   Neurological: Negative for dizziness, vertigo, seizures, syncope, facial asymmetry, speech difficulty, light-headedness, disturbances in coordination, memory loss and coordination difficulties.   Hematological: Negative  "for adenopathy. Does not bruise/bleed easily.   Psychiatric/Behavioral: Negative for agitation, behavioral problems, confusion, decreased concentration, dysphoric mood, hallucinations, self-injury and suicidal ideas. The patient is not nervous/anxious and is not hyperactive.             Objective:  Vitals:    03/01/22 1122 03/01/22 1123   BP: (!) 145/50    Pulse: 79    Resp: 18    Weight: 68 kg (150 lb)    Height: 5' 5" (1.651 m)    PainSc:   6   6         Physical Exam  Vitals and nursing note reviewed. Exam conducted with a chaperone present.   Constitutional:       General: She is awake. She is not in acute distress.     Appearance: Normal appearance. She is not diaphoretic.   HENT:      Head: Normocephalic and atraumatic.      Nose: Nose normal.      Mouth/Throat:      Mouth: Mucous membranes are moist.      Pharynx: Oropharynx is clear.   Eyes:      Conjunctiva/sclera: Conjunctivae normal.      Pupils: Pupils are equal, round, and reactive to light.   Cardiovascular:      Rate and Rhythm: Normal rate.   Pulmonary:      Effort: Pulmonary effort is normal. No respiratory distress.   Abdominal:      Palpations: Abdomen is soft.   Musculoskeletal:      Cervical back: Normal range of motion and neck supple. Tenderness present.      Thoracic back: Tenderness present.      Lumbar back: Tenderness present. Decreased range of motion.   Skin:     General: Skin is warm and dry.      Coloration: Skin is not jaundiced or pale.   Neurological:      General: No focal deficit present.      Mental Status: She is alert and oriented to person, place, and time. Mental status is at baseline.      Cranial Nerves: Cranial nerves are intact. No cranial nerve deficit (II-XII).   Psychiatric:         Mood and Affect: Mood normal.         Behavior: Behavior normal. Behavior is cooperative.         Thought Content: Thought content normal.           Orders Placed This Encounter   Procedures    POCT Urine Drug Screen Presump     " Interpretive Information:     Negative:  No drug detected at the cut off level.   Positive:  This result represents presumptive positive for the   tested drug, other substances may yield a positive response other   than the analyte of interest. This result should be utilized for   diagnostic purpose only. Confirmation testing will be performed upon physician request only.           X-Ray Hips Bilateral 2 View Inc AP Pelvis  Narrative: EXAMINATION:  XR HIPS BILATERAL 2 VIEW INCL AP PELVIS    CLINICAL HISTORY:  Spondylosis without myelopathy or radiculopathy, lumbosacral region    COMPARISON:  6 June 2020    FINDINGS:  No evidence of acute fracture seen.  The alignment of the joints appears normal.  Mild bilateral hip degenerative change is present.  No soft tissue abnormality is seen.  Impression: Mild bilateral hip osteoarthrosis.    Electronically signed by: Ron Valentin  Date:    01/05/2022  Time:    10:46       Office Visit on 12/22/2021   Component Date Value Ref Range Status    Color, UA 12/22/2021 Yellow   Final    Spec Grav UA 12/22/2021 1.015   Final    pH, UA 12/22/2021 5.5   Final    WBC, UA 12/22/2021 Negative   Final    Nitrite, UA 12/22/2021 Negative   Final    Protein, POC 12/22/2021 Negative   Final    Glucose, UA 12/22/2021 Negative   Final    Ketones, UA 12/22/2021 Negative   Final    Bilirubin, POC 12/22/2021 Negative   Final    Urobilinogen, UA 12/22/2021 0.2   Final    Blood, UA 12/22/2021 Trace *   Final   Office Visit on 12/20/2021   Component Date Value Ref Range Status    POC Amphetamines 12/20/2021 Negative  Negative, Inconclusive Final    POC Barbiturates 12/20/2021 Negative  Negative, Inconclusive Final    POC Benzodiazepines 12/20/2021 Negative  Negative, Inconclusive Final    POC Cocaine 12/20/2021 Negative  Negative, Inconclusive Final    POC THC 12/20/2021 Negative  Negative, Inconclusive Final    POC Methadone 12/20/2021 Negative  Negative, Inconclusive Final     POC Methamphetamine 12/20/2021 Negative  Negative, Inconclusive Final    POC Opiates 12/20/2021 Presumptive Positive (A) Negative, Inconclusive Final    POC Oxycodone 12/20/2021 Negative  Negative, Inconclusive Final    POC Phencyclidine 12/20/2021 Negative  Negative, Inconclusive Final    POC Methylenedioxymethamphetamine * 12/20/2021 Negative  Negative, Inconclusive Final    POC Tricyclic Antidepressants 12/20/2021 Negative  Negative, Inconclusive Final    POC Buprenorphine 12/20/2021 Negative   Final     Acceptable 12/20/2021 Yes   Final    POC Temperature (Urine) 12/20/2021 92   Final   Lab Visit on 11/04/2021   Component Date Value Ref Range Status    Hemoglobin A1C 11/04/2021 6.6  4.5 - 6.6 % Final    Estimated Average Glucose 11/04/2021 134  mg/dL Final    Sodium 11/04/2021 140  136 - 145 mmol/L Final    Potassium 11/04/2021 5.0  3.5 - 5.1 mmol/L Final    Chloride 11/04/2021 108 (A) 98 - 107 mmol/L Final    CO2 11/04/2021 29  21 - 32 mmol/L Final    Anion Gap 11/04/2021 8  7 - 16 mmol/L Final    Glucose 11/04/2021 84  74 - 106 mg/dL Final    BUN 11/04/2021 14  7 - 18 mg/dL Final    Creatinine 11/04/2021 1.04 (A) 0.55 - 1.02 mg/dL Final    BUN/Creatinine Ratio 11/04/2021 13  6 - 20 Final    Calcium 11/04/2021 8.2 (A) 8.5 - 10.1 mg/dL Final    Total Protein 11/04/2021 8.0  6.4 - 8.2 g/dL Final    Albumin 11/04/2021 3.3 (A) 3.5 - 5.0 g/dL Final    Globulin 11/04/2021 4.7 (A) 2.0 - 4.0 g/dL Final    A/G Ratio 11/04/2021 0.7   Final    Bilirubin, Total 11/04/2021 0.3  0.0 - 1.2 mg/dL Final    Alk Phos 11/04/2021 63  55 - 142 U/L Final    ALT 11/04/2021 32  13 - 56 U/L Final    AST 11/04/2021 34  15 - 37 U/L Final    eGFR  11/04/2021 67  >=60 mL/min/1.73m² Final    Triglycerides 11/04/2021 34 (A) 35 - 150 mg/dL Final    Cholesterol 11/04/2021 139  0 - 200 mg/dL Final    HDL Cholesterol 11/04/2021 66 (A) 40 - 60 mg/dL Final    Cholesterol/HDL Ratio  (Risk Factor) 11/04/2021 2.1   Final    Non-HDL 11/04/2021 73  mg/dL Final    LDL Calculated 11/04/2021 66  mg/dL Final    LDL/HDL 11/04/2021 1.0   Final    VLDL 11/04/2021 7  mg/dL Final    WBC 11/04/2021 2.87 (A) 4.50 - 11.00 K/uL Final    RBC 11/04/2021 3.17 (A) 4.20 - 5.40 M/uL Final    Hemoglobin 11/04/2021 10.0 (A) 12.0 - 16.0 g/dL Final    Hematocrit 11/04/2021 30.2 (A) 38.0 - 47.0 % Final    MCV 11/04/2021 95.3  80.0 - 96.0 fL Final    MCH 11/04/2021 31.5 (A) 27.0 - 31.0 pg Final    MCHC 11/04/2021 33.1  32.0 - 36.0 g/dL Final    RDW 11/04/2021 14.8 (A) 11.5 - 14.5 % Final    Platelet Count 11/04/2021 146 (A) 150 - 400 K/uL Final    MPV 11/04/2021 11.2  9.4 - 12.4 fL Final    Neutrophils % 11/04/2021 59.6  53.0 - 65.0 % Final    Lymphocytes % 11/04/2021 29.3  27.0 - 41.0 % Final    Monocytes % 11/04/2021 9.8 (A) 2.0 - 6.0 % Final    Eosinophils % 11/04/2021 0.3 (A) 1.0 - 4.0 % Final    Basophils % 11/04/2021 0.0  0.0 - 1.0 % Final    Immature Granulocytes % 11/04/2021 1.0 (A) 0.0 - 0.4 % Final    nRBC, Auto 11/04/2021 0.0  <=0.0 % Final    Neutrophils, Abs 11/04/2021 1.71 (A) 1.80 - 7.70 K/uL Final    Lymphocytes, Absolute 11/04/2021 0.84 (A) 1.00 - 4.80 K/uL Final    Monocytes, Absolute 11/04/2021 0.28  0.00 - 0.80 K/uL Final    Eosinophils, Absolute 11/04/2021 0.01  0.00 - 0.50 K/uL Final    Basophils, Absolute 11/04/2021 0.00  0.00 - 0.20 K/uL Final    Immature Granulocytes, Absolute 11/04/2021 0.03  0.00 - 0.04 K/uL Final    nRBC, Absolute 11/04/2021 0.00  <=0.00 x10e3/uL Final    Diff Type 11/04/2021 Auto   Final   Office Visit on 09/21/2021   Component Date Value Ref Range Status    POC Amphetamines 09/21/2021 Negative  Negative, Inconclusive Final    POC Barbiturates 09/21/2021 Negative  Negative, Inconclusive Final    POC Benzodiazepines 09/21/2021 Negative  Negative, Inconclusive Final    POC Cocaine 09/21/2021 Negative  Negative, Inconclusive Final    POC THC  09/21/2021 Negative  Negative, Inconclusive Final    POC Methadone 09/21/2021 Negative  Negative, Inconclusive Final    POC Methamphetamine 09/21/2021 Negative  Negative, Inconclusive Final    POC Opiates 09/21/2021 Presumptive Positive (A) Negative, Inconclusive Final    POC Oxycodone 09/21/2021 Negative  Negative, Inconclusive Final    POC Phencyclidine 09/21/2021 Negative  Negative, Inconclusive Final    POC Methylenedioxymethamphetamine * 09/21/2021 Negative  Negative, Inconclusive Final    POC Tricyclic Antidepressants 09/21/2021 Negative  Negative, Inconclusive Final    POC Buprenorphine 09/21/2021 Negative   Final     Acceptable 09/21/2021 Yes   Final    POC Temperature (Urine) 09/21/2021 92   Final           Assessment:      1. Rheumatoid arthritis involving multiple sites, unspecified whether rheumatoid factor present    2. Spondylosis of lumbosacral region without myelopathy or radiculopathy    3. Other long term (current) drug therapy            A's of Opioid Risk Assessment  Activity:Patient can perform ADL.   Analgesia:Patients pain is partially controlled by current medication. Patient has tried OTC medications such as Tylenol and Ibuprofen with out relief.   Adverse Effects: Patient denies constipation or sedation.  Aberrant Behavior:  reviewed with no aberrant drug seeking/taking behavior.  Overdose reversal drug naloxone discussed    Drug screen reviewed      Requested Prescriptions     Signed Prescriptions Disp Refills    HYDROcodone-acetaminophen (NORCO)  mg per tablet 90 tablet 0     Sig: Take 1 tablet by mouth every 8 (eight) hours.    HYDROcodone-acetaminophen (NORCO)  mg per tablet 90 tablet 0     Sig: Take 1 tablet by mouth every 8 (eight) hours.    HYDROcodone-acetaminophen (NORCO)  mg per tablet 90 tablet 0     Sig: Take 1 tablet by mouth every 8 (eight) hours.         Plan:    Rheumatoid arthritis    No new complaints today    She states  current medications helping control her discomfort    She would like to continue with current medication conservative management    Continue home exercise program as directed    Follow-up 3 months     Dr. Piña, June 2022    Bring original prescription medication bottles/container/box with labels to each visit

## 2022-03-01 ENCOUNTER — OFFICE VISIT (OUTPATIENT)
Dept: NEUROLOGY | Facility: CLINIC | Age: 72
End: 2022-03-01
Payer: MEDICARE

## 2022-03-01 ENCOUNTER — OFFICE VISIT (OUTPATIENT)
Dept: PAIN MEDICINE | Facility: CLINIC | Age: 72
End: 2022-03-01
Payer: MEDICARE

## 2022-03-01 VITALS
HEIGHT: 65 IN | OXYGEN SATURATION: 99 % | DIASTOLIC BLOOD PRESSURE: 68 MMHG | BODY MASS INDEX: 24.99 KG/M2 | HEART RATE: 95 BPM | RESPIRATION RATE: 18 BRPM | WEIGHT: 150 LBS | SYSTOLIC BLOOD PRESSURE: 132 MMHG

## 2022-03-01 VITALS
BODY MASS INDEX: 24.99 KG/M2 | HEIGHT: 65 IN | HEART RATE: 79 BPM | SYSTOLIC BLOOD PRESSURE: 145 MMHG | DIASTOLIC BLOOD PRESSURE: 50 MMHG | WEIGHT: 150 LBS | RESPIRATION RATE: 18 BRPM

## 2022-03-01 DIAGNOSIS — M06.9 RHEUMATOID ARTHRITIS INVOLVING MULTIPLE SITES, UNSPECIFIED WHETHER RHEUMATOID FACTOR PRESENT: Primary | Chronic | ICD-10-CM

## 2022-03-01 DIAGNOSIS — Z79.899 OTHER LONG TERM (CURRENT) DRUG THERAPY: ICD-10-CM

## 2022-03-01 DIAGNOSIS — M54.12 CERVICAL RADICULOPATHY: Chronic | ICD-10-CM

## 2022-03-01 DIAGNOSIS — M47.817 SPONDYLOSIS OF LUMBOSACRAL REGION WITHOUT MYELOPATHY OR RADICULOPATHY: Chronic | ICD-10-CM

## 2022-03-01 DIAGNOSIS — I63.9 CEREBROVASCULAR ACCIDENT (CVA), UNSPECIFIED MECHANISM: Primary | ICD-10-CM

## 2022-03-01 LAB
CTP QC/QA: YES
POC (AMP) AMPHETAMINE: NEGATIVE
POC (BAR) BARBITURATES: NEGATIVE
POC (BUP) BUPRENORPHINE: NEGATIVE
POC (BZO) BENZODIAZEPINES: NEGATIVE
POC (COC) COCAINE: NEGATIVE
POC (MDMA) METHYLENEDIOXYMETHAMPHETAMINE 3,4: NEGATIVE
POC (MET) METHAMPHETAMINE: NEGATIVE
POC (MOP) OPIATES: ABNORMAL
POC (MTD) METHADONE: NEGATIVE
POC (OXY) OXYCODONE: NEGATIVE
POC (PCP) PHENCYCLIDINE: NEGATIVE
POC (TCA) TRICYCLIC ANTIDEPRESSANTS: NEGATIVE
POC TEMPERATURE (URINE): 90
POC THC: NEGATIVE

## 2022-03-01 PROCEDURE — 80305 DRUG TEST PRSMV DIR OPT OBS: CPT | Mod: PBBFAC | Performed by: PHYSICIAN ASSISTANT

## 2022-03-01 PROCEDURE — 99214 PR OFFICE/OUTPT VISIT, EST, LEVL IV, 30-39 MIN: ICD-10-PCS | Mod: S$PBB,,, | Performed by: PHYSICIAN ASSISTANT

## 2022-03-01 PROCEDURE — 99215 OFFICE O/P EST HI 40 MIN: CPT | Mod: PBBFAC | Performed by: PHYSICIAN ASSISTANT

## 2022-03-01 PROCEDURE — 99214 OFFICE O/P EST MOD 30 MIN: CPT | Mod: S$PBB,,, | Performed by: PHYSICIAN ASSISTANT

## 2022-03-01 PROCEDURE — 99215 OFFICE O/P EST HI 40 MIN: CPT | Mod: PBBFAC | Performed by: NURSE PRACTITIONER

## 2022-03-01 PROCEDURE — 99213 PR OFFICE/OUTPT VISIT, EST, LEVL III, 20-29 MIN: ICD-10-PCS | Mod: S$PBB,,, | Performed by: NURSE PRACTITIONER

## 2022-03-01 PROCEDURE — 99213 OFFICE O/P EST LOW 20 MIN: CPT | Mod: S$PBB,,, | Performed by: NURSE PRACTITIONER

## 2022-03-01 RX ORDER — HYDROCODONE BITARTRATE AND ACETAMINOPHEN 10; 325 MG/1; MG/1
1 TABLET ORAL EVERY 8 HOURS
Qty: 90 TABLET | Refills: 0 | Status: SHIPPED | OUTPATIENT
Start: 2022-03-25 | End: 2022-04-24

## 2022-03-01 RX ORDER — HYDROCODONE BITARTRATE AND ACETAMINOPHEN 10; 325 MG/1; MG/1
1 TABLET ORAL EVERY 8 HOURS
Qty: 90 TABLET | Refills: 0 | Status: SHIPPED | OUTPATIENT
Start: 2022-05-24 | End: 2022-03-03 | Stop reason: SDUPTHER

## 2022-03-01 RX ORDER — HYDROCODONE BITARTRATE AND ACETAMINOPHEN 10; 325 MG/1; MG/1
1 TABLET ORAL EVERY 8 HOURS
Qty: 90 TABLET | Refills: 0 | Status: SHIPPED | OUTPATIENT
Start: 2022-04-23 | End: 2022-03-03 | Stop reason: SDUPTHER

## 2022-03-01 NOTE — PROGRESS NOTES
Subjective:       Patient ID: Kathryn Marie is a 71 y.o. female     Chief Complaint:    Chief Complaint   Patient presents with    Follow-up    Stroke        Allergies:  Patient has no known allergies.    Current Medications:    Outpatient Encounter Medications as of 3/1/2022   Medication Sig Dispense Refill    amLODIPine (NORVASC) 10 MG tablet Take 10 mg by mouth once daily.      aspirin (ECOTRIN) 81 MG EC tablet Take 81 mg by mouth once daily.      blood sugar diagnostic (ONETOUCH VERIO TEST STRIPS MISC) by Misc.(Non-Drug; Combo Route) route. Use 1 strip to check blood glucose every morning, fasting for E11.9      blood-glucose meter (ONETOUCH VERIO METER MISC) by Misc.(Non-Drug; Combo Route) route. Use for glucose checks once daily, E11.9      cetirizine (ZYRTEC) 10 MG tablet Take 10 mg by mouth once daily.      clopidogreL (PLAVIX) 75 mg tablet Take 75 mg by mouth once daily.      diclofenac sodium (VOLTAREN) 1 % Gel APPLY 1 APPLICATION TO  AFFECTED AREA(S) TOPICALLY  TWICE DAILY 300 g 2    ferrous sulfate (FEOSOL) 325 mg (65 mg iron) Tab tablet Take 325 mg by mouth daily with breakfast.      fluticasone propionate (FLONASE) 50 mcg/actuation nasal spray 2 sprays by Each Nostril route once daily.      folic acid (FOLVITE) 1 MG tablet Take 1 mg by mouth once daily.      gabapentin (NEURONTIN) 300 MG capsule Take 1 capsule (300 mg total) by mouth once daily. 90 capsule 1    hydroCHLOROthiazide (HYDRODIURIL) 12.5 MG Tab Take 12.5 mg by mouth once daily.      [START ON 3/25/2022] HYDROcodone-acetaminophen (NORCO)  mg per tablet Take 1 tablet by mouth every 8 (eight) hours. 90 tablet 0    ibuprofen (ADVIL,MOTRIN) 600 MG tablet Take 1 tablet (600 mg total) by mouth every 6 (six) hours as needed for Pain. 20 tablet 0    loratadine (CLARITIN) 10 mg tablet Take 10 mg by mouth once daily.      methotrexate 2.5 MG Tab Take by mouth. Take 4 tablets by mouth every week      omeprazole (PRILOSEC)  20 MG capsule Take 1 capsule by mouth once daily.      ONGLYZA 5 mg Tab tablet TAKE 1 TABLET BY MOUTH  DAILY 90 tablet 3    pantoprazole (PROTONIX) 40 MG tablet Take 40 mg by mouth 2 (two) times daily.      predniSONE (DELTASONE) 5 MG tablet Take 5 mg by mouth 3 (three) times daily.      rosuvastatin (CRESTOR) 20 MG tablet Take 20 mg by mouth every evening.      tiZANidine 4 mg Cap Take 1 capsule by mouth 2 (two) times a day.      travoprost (TRAVATAN Z) 0.004 % ophthalmic solution 1 drop every evening.      vitamin D (VITAMIN D3) 1000 units Tab Take 1,000 Units by mouth once daily.      [START ON 4/23/2022] HYDROcodone-acetaminophen (NORCO)  mg per tablet Take 1 tablet by mouth every 8 (eight) hours. 90 tablet 0    [START ON 5/24/2022] HYDROcodone-acetaminophen (NORCO)  mg per tablet Take 1 tablet by mouth every 8 (eight) hours. 90 tablet 0    [DISCONTINUED] HYDROcodone-acetaminophen (NORCO)  mg per tablet Take 1 tablet by mouth every 8 (eight) hours. 90 tablet 0    [DISCONTINUED] HYDROcodone-acetaminophen (NORCO)  mg per tablet Take 1 tablet by mouth every 8 (eight) hours. 90 tablet 0     No facility-administered encounter medications on file as of 3/1/2022.       History of Present Illness  72 y/o AAF following in clinic for history of CVA and cervical radiculopathy.  Last seen in neurology in July of 2019.    She denies any new CVA symptoms since last visit.    She had cervical decompression per Dr. Contreras in January and reports neck pain is improved since that time.  She follows with Dr. Piña in pain treatment currently.    Denies any new neurological symptoms    She follows with DR[facility] Leonardo at [facility] for history of CAD         Review of Systems  Review of Systems   Constitutional: Negative for diaphoresis and fever.   HENT: Negative for congestion, hearing loss and tinnitus.    Eyes: Negative for blurred vision, double vision, photophobia, discharge and redness.    Respiratory: Negative for cough and shortness of breath.    Cardiovascular: Negative for chest pain.   Gastrointestinal: Negative for abdominal pain, nausea and vomiting.   Musculoskeletal: Positive for joint pain, myalgias and neck pain. Negative for back pain.   Skin: Negative for itching and rash.   Neurological: Negative for dizziness, tremors, sensory change, speech change, focal weakness, seizures, loss of consciousness, weakness and headaches.   Psychiatric/Behavioral: Negative for depression, hallucinations and memory loss. The patient does not have insomnia.    All other systems reviewed and are negative.     Objective:     NEUROLOGICAL EXAMINATION:     MENTAL STATUS   Oriented to person, place, and time.   Attention: normal. Concentration: normal.   Speech: speech is normal   Level of consciousness: alert  Knowledge: good and consistent with education.   Normal comprehension.     CRANIAL NERVES     CN II   Visual fields full to confrontation.   Visual acuity: normal  Right visual field deficit: none  Left visual field deficit: none     CN III, IV, VI   Pupils are equal, round, and reactive to light.  Extraocular motions are normal.   Right pupil: Size: 3 mm. Shape: regular. Reactivity: brisk. Consensual response: intact. Accommodation: intact.   Left pupil: Size: 3 mm. Shape: regular. Reactivity: brisk. Consensual response: intact. Accommodation: intact.   CN III: no CN III palsy  CN VI: no CN VI palsy  Nystagmus: none   Diplopia: none  Upgaze: normal  Downgaze: normal  Conjugate gaze: present  Vestibulo-ocular reflex: present    CN V   Facial sensation intact.   Right facial sensation deficit: none  Left facial sensation deficit: none  Right corneal reflex: normal  Left corneal reflex: normal    CN VII   Facial expression full, symmetric.   Right facial weakness: none  Left facial weakness: none  Right taste: normal  Left taste: normal    CN VIII   CN VIII normal.   Hearing: intact    CN IX, X   CN IX  normal.   CN X normal.   Palate: symmetric    CN XI   CN XI normal.   Right sternocleidomastoid strength: normal  Left sternocleidomastoid strength: normal  Right trapezius strength: normal  Left trapezius strength: normal    CN XII   CN XII normal.   Tongue: not atrophic  Fasciculations: absent  Tongue deviation: none    MOTOR EXAM   Muscle bulk: normal  Overall muscle tone: normal  Right arm tone: normal  Left arm tone: normal  Right arm pronator drift: absent  Left arm pronator drift: absent  Right leg tone: normal  Left leg tone: normal    Strength   Right neck flexion: 5/5  Left neck flexion: 5/5  Right neck extension: 5/5  Left neck extension: 5/5  Right deltoid: 5/5  Left deltoid: 5/5  Right biceps: 5/5  Left biceps: 5/5  Right triceps: 5/5  Left triceps: 5/5  Right wrist flexion: 5/5  Left wrist flexion: 5/5  Right wrist extension: 5/5  Left wrist extension: 5/5  Right interossei: 5/5  Left interossei: 5/5  Right iliopsoas: 5/5  Left iliopsoas: 5/5  Right quadriceps: 5/5  Left quadriceps: 5/5  Right hamstrin/5  Left hamstrin/5  Right anterior tibial: 5/5  Left anterior tibial: 5/5  Right posterior tibial: 5/5  Left posterior tibial: 5/5  Right gastroc: 5/5  Left gastroc: 5/5    REFLEXES     Reflexes   Right brachioradialis: 2+  Left brachioradialis: 2+  Right biceps: 2+  Left biceps: 2+  Right triceps: 2+  Left triceps: 2+  Right patellar: 2+  Left patellar: 2+  Right achilles: 2+  Left achilles: 2+  Right : 2+  Left : 2+    SENSORY EXAM   Light touch normal.   Right arm light touch: normal  Left arm light touch: normal  Right leg light touch: normal  Left leg light touch: normal  Vibration normal.   Right arm vibration: normal  Left arm vibration: normal  Right leg vibration: normal  Left leg vibration: normal  Proprioception normal.   Right arm proprioception: normal  Left arm proprioception: normal  Right leg proprioception: normal  Left leg proprioception: normal  Pinprick normal.   Right  arm pinprick: normal  Left arm pinprick: normal  Right leg pinprick: normal  Left leg pinprick: normal    GAIT AND COORDINATION     Gait  Gait: wide-based     Coordination   Finger to nose coordination: normal  Heel to shin coordination: abnormal  Tandem walking coordination: abnormal    Tremor   Resting tremor: absent  Intention tremor: absent  Action tremor: absent       Uses cane for ambulation in the clinic        Physical Exam  Eyes:      Extraocular Movements: EOM normal.      Pupils: Pupils are equal, round, and reactive to light.   Neurological:      Mental Status: She is oriented to person, place, and time.      Coordination: Heel to Shin Test abnormal. Finger-Nose-Finger Test normal.      Gait: Tandem walk abnormal.      Deep Tendon Reflexes:      Reflex Scores:       Tricep reflexes are 2+ on the right side and 2+ on the left side.       Bicep reflexes are 2+ on the right side and 2+ on the left side.       Brachioradialis reflexes are 2+ on the right side and 2+ on the left side.       Patellar reflexes are 2+ on the right side and 2+ on the left side.       Achilles reflexes are 2+ on the right side and 2+ on the left side.  Psychiatric:         Speech: Speech normal.          Assessment:     Problem List Items Addressed This Visit        Neuro    Cervical radiculopathy (Chronic)    Cerebrovascular accident (CVA) - Primary           Primary Diagnosis and ICD10  Cerebrovascular accident (CVA), unspecified mechanism [I63.9]    Plan:     There are no Patient Instructions on file for this visit.    There are no discontinued medications.    Requested Prescriptions      No prescriptions requested or ordered in this encounter       No orders of the defined types were placed in this encounter.

## 2022-03-03 ENCOUNTER — OFFICE VISIT (OUTPATIENT)
Dept: FAMILY MEDICINE | Facility: CLINIC | Age: 72
End: 2022-03-03
Payer: MEDICARE

## 2022-03-03 VITALS
WEIGHT: 150 LBS | HEART RATE: 56 BPM | OXYGEN SATURATION: 96 % | DIASTOLIC BLOOD PRESSURE: 62 MMHG | RESPIRATION RATE: 18 BRPM | BODY MASS INDEX: 24.99 KG/M2 | HEIGHT: 65 IN | SYSTOLIC BLOOD PRESSURE: 128 MMHG

## 2022-03-03 DIAGNOSIS — I10 PRIMARY HYPERTENSION: ICD-10-CM

## 2022-03-03 DIAGNOSIS — E11.9 TYPE 2 DIABETES MELLITUS WITHOUT COMPLICATION, WITHOUT LONG-TERM CURRENT USE OF INSULIN: Primary | ICD-10-CM

## 2022-03-03 DIAGNOSIS — Z78.0 ASYMPTOMATIC MENOPAUSE: ICD-10-CM

## 2022-03-03 DIAGNOSIS — E78.5 HYPERLIPIDEMIA, UNSPECIFIED HYPERLIPIDEMIA TYPE: ICD-10-CM

## 2022-03-03 PROCEDURE — G0439 PR MEDICARE ANNUAL WELLNESS SUBSEQUENT VISIT: ICD-10-PCS | Mod: ,,, | Performed by: NURSE PRACTITIONER

## 2022-03-03 PROCEDURE — G0439 PPPS, SUBSEQ VISIT: HCPCS | Mod: ,,, | Performed by: NURSE PRACTITIONER

## 2022-03-03 NOTE — PATIENT INSTRUCTIONS
Counseling and Referral of Other Preventative  (Italic type indicates deductible and co-insurance are waived)    Patient Name: Kathryn Marie  Today's Date: 3/3/2022    Health Maintenance         Date Due Completion Date    Diabetes Urine Screening Never done ---    Foot Exam Never done ---    Sign Pain Contract Never done ---    Hepatitis C Screening 03/03/2022 (Originally 1950) ---    DEXA Scan 03/03/2022 (Originally 9/11/1988) ---    Eye Exam 03/10/2022 (Originally 9/11/1960) ---    TETANUS VACCINE 03/03/2023 (Originally 9/11/1966) ---    Shingles Vaccine (2 of 2) 03/03/2023 (Originally 9/24/2013) 7/30/2013    Colorectal Cancer Screening 03/03/2023 (Originally 1950) ---    Hemoglobin A1c 05/04/2022 11/4/2021    Lipid Panel 11/04/2022 11/4/2021    Mammogram 12/15/2022 12/15/2021    Low Dose Statin 01/04/2023 1/4/2022          Orders Placed This Encounter   Procedures    Microalbumin/Creatinine Ratio, Urine

## 2022-03-03 NOTE — PROGRESS NOTES
[facility]   [facility]      PATIENT NAME: Kathryn Marie   : 1950    AGE: 71 y.o. DATE: 2022   MRN: [id number]        Reason for Visit / Chief Complaint: Medicare AWV (MEDICARE AWV SUB )        Kathryn Marie presents for an  Medicare AWV today.     The following components were reviewed and updated:    Medical/Social/Family History:  Past Medical History:   Diagnosis Date    Anemia     Atherosclerotic heart disease of native coronary artery without angina pectoris     Carotid artery stenosis 01/15/2014    CHARO  LICA stenosis    Causalgia of lower limb     Cervical radiculopathy     Chronic low back pain     Chronic pain syndrome     CVA (cerebral vascular accident)     right cerebellar    Degenerative joint disease involving multiple joints     Disorder of parathyroid gland, unspecified     Disorder of sacrum     Essential (primary) hypertension     Gammopathy     GERD (gastroesophageal reflux disease)     Heart murmur     Hyperlipidemia     Hyperparathyroidism     Lumbosacral radiculopathy     Lumbosacral spondylosis     Other long term (current) drug therapy     Pernicious anemia     Sciatica     Spinal stenosis of lumbar region     L4-5    Type 2 diabetes mellitus         Family History   Problem Relation Age of Onset    Diabetes Mother     Heart disease Mother     Hypertension Mother     Heart attack Mother     Hypertension Father     Stroke Father     Stroke Sister     Hypertension Sister     Cancer Brother         Social History     Tobacco Use   Smoking Status Never Smoker   Smokeless Tobacco Never Used      Social History     Substance and Sexual Activity   Alcohol Use Not Currently       Family History   Problem Relation Age of Onset    Diabetes Mother     Heart disease Mother     Hypertension Mother     Heart attack Mother     Hypertension Father     Stroke Father     Stroke Sister     Hypertension Sister     Cancer Brother         Past Surgical History:   Procedure Laterality Date    CARPAL TUNNEL RELEASE Right     caudal MOIZ  07/03/2018    CHOLECYSTECTOMY  1975    CORONARY ARTERY BYPASS GRAFT      CORONARY ARTERY BYPASS GRAFT (CABG)      triple    HIP SURGERY Right     L4-S1 Caudal cath guided MOIZ  07/03/2018    Dr Orta    L5-S1 Caudal cath guided MOIZ  09/26/2014 6/26/2014 4/11/2014    Dr Orta    left shoulder repair Left     NECK SURGERY  2018    does not know what kind    right sacral RF Right 12/26/2013 1/24/2012    Dr Orta    right SIJI Right 10/24/2013    Dr Orta         · Allergies and Current Medications   Review of patient's allergies indicates:  No Known Allergies    Current Outpatient Medications:     amLODIPine (NORVASC) 10 MG tablet, Take 10 mg by mouth once daily., Disp: , Rfl:     aspirin (ECOTRIN) 81 MG EC tablet, Take 81 mg by mouth once daily., Disp: , Rfl:     blood sugar diagnostic (ONETOUCH VERIO TEST STRIPS MISC), by Misc.(Non-Drug; Combo Route) route. Use 1 strip to check blood glucose every morning, fasting for E11.9, Disp: , Rfl:     blood-glucose meter (ONETOUCH VERIO METER MISC), by Misc.(Non-Drug; Combo Route) route. Use for glucose checks once daily, E11.9, Disp: , Rfl:     cetirizine (ZYRTEC) 10 MG tablet, Take 10 mg by mouth once daily., Disp: , Rfl:     clopidogreL (PLAVIX) 75 mg tablet, Take 75 mg by mouth once daily., Disp: , Rfl:     diclofenac sodium (VOLTAREN) 1 % Gel, APPLY 1 APPLICATION TO  AFFECTED AREA(S) TOPICALLY  TWICE DAILY, Disp: 300 g, Rfl: 2    ferrous sulfate (FEOSOL) 325 mg (65 mg iron) Tab tablet, Take 325 mg by mouth daily with breakfast., Disp: , Rfl:     folic acid (FOLVITE) 1 MG tablet, Take 1 mg by mouth once daily., Disp: , Rfl:     gabapentin (NEURONTIN) 300 MG capsule, Take 1 capsule (300 mg total) by mouth once daily., Disp: 90 capsule, Rfl: 1    hydroCHLOROthiazide (HYDRODIURIL) 12.5 MG Tab, Take 12.5 mg by mouth once daily., Disp: , Rfl:     [START ON  3/25/2022] HYDROcodone-acetaminophen (NORCO)  mg per tablet, Take 1 tablet by mouth every 8 (eight) hours., Disp: 90 tablet, Rfl: 0    methotrexate 2.5 MG Tab, Take by mouth. Take 4 tablets by mouth every week, Disp: , Rfl:     omeprazole (PRILOSEC) 20 MG capsule, Take 1 capsule by mouth once daily., Disp: , Rfl:     ONGLYZA 5 mg Tab tablet, TAKE 1 TABLET BY MOUTH  DAILY, Disp: 90 tablet, Rfl: 3    pantoprazole (PROTONIX) 40 MG tablet, Take 40 mg by mouth 2 (two) times daily., Disp: , Rfl:     rosuvastatin (CRESTOR) 20 MG tablet, Take 20 mg by mouth every evening., Disp: , Rfl:     travoprost (TRAVATAN Z) 0.004 % ophthalmic solution, 1 drop every evening., Disp: , Rfl:     vitamin D (VITAMIN D3) 1000 units Tab, Take 1,000 Units by mouth once daily., Disp: , Rfl:     fluticasone propionate (FLONASE) 50 mcg/actuation nasal spray, 2 sprays by Each Nostril route once daily., Disp: , Rfl:     · Health Risk Assessment   Fall Risk: No   Advance Directive: Does not have an advanced directive. Verbal education and written education included in today's AVS.   Depression: PHQ9-0    HTN:  DASH diet, exercise, weight management, med compliance, home BP monitoring, and follow-up discussed.   T2DM:  diabetic diet, glucose monitoring, activity level, weight management, med compliance, and follow-up discussed.   Tobacco use: Denies tobacco use  STI: NA    Alcohol misuse: Denies alcohol use   Statin Use: Yes, encouraged patient to continue taking prescribed medication as directed      · Health Risk Assessment  [age] 70-79  Are you male or female?: Female  During the past four weeks, how much have you been bothered by emotional problems such as feeling anxious, depressed, irritable, sad, or downhearted and blue?: Not at all  During the past five weeks, has your physical and/or emotional health limited your social activities with family, friends, neighbors, or groups?: Not at all  During the past four weeks,  how much bodily pain have you generally had?: Severe pain  During the past four weeks, was someone available to help if you needed and wanted help?: Yes, quite a bit  During the past four weeks, what was the hardest physical activity you could do for at least two minutes?: Moderate  Can you get to places out of walking distance without help?  (For example, can you travel alone on buses or taxis, or drive your own car?): Yes  Can you go shopping for groceries or clothes without someone's help?: Yes  Can you prepare your own meals?: Yes  Can you do your own housework without help?: Yes  Because of any health problems, do you need the help of another person with your personal care needs such as eating, bathing, dressing, or getting around the house?: No  Can you handle your own money without help?: Yes  During the past four weeks, how would you rate your health in general?: Poor  How have things been going for you during the past four weeks?: Pretty well  Are you having difficulties driving your car?: No  Do you always fasten your seat belt when you are in a car?: Yes, usually  How often in the past four weeks have you been bothered by falling or dizzy when standing up?: Never  How often in the past four weeks have you been bothered by sexual problems?: Never  How often in the past four weeks have you been bothered by trouble eating well?: Never  How often in the past four weeks have you been bothered by teeth or denture problems?: Never  How often in the past four weeks have you been bothered with problems using the telephone?: Never  How often in the past four weeks have you been bothered by tiredness or fatigue?: Never  Have you fallen two or more times in the past year?: No  Are you afraid of falling?: Yes  Are you a smoker?: No  During the past four weeks, how many drinks of wine, beer, or other alcoholic beverages did you have?: No alcohol at all  Do you exercise for about 20 minutes three or more days a week?:  No, I usually do not exercise this much  Have you been given any information to help you with hazards in your house that might hurt you?: Yes  Have you been given any information to help you with keeping track of your medications?: Yes  How often do you have trouble taking medicines the way you've been told to take them?: Sometimes I take them as prescribed  How confident are you that you can control and manage most of your health problems?: Somewhat confident  What is your race? (Check all that apply.):     · Health Maintenance       Health Maintenance Topics with due status: Not Due       Topic Last Completion Date    Lipid Panel 11/04/2021    Hemoglobin A1c 11/04/2021    Mammogram 12/15/2021    Low Dose Statin 01/04/2022     Health Maintenance Due   Topic Date Due    Diabetes Urine Screening  Never done    Foot Exam  Never done    Sign Pain Contract  Never done        Incontinence  Bowel: No  Bladder: No    Lab results available in Epic or see dates from [facility] above:   Lab Results   Component Value Date    CHOL 139 11/04/2021    CHOL 160 05/21/2021     Lab Results   Component Value Date    HDL 66 (H) 11/04/2021    HDL 70 (H) 05/21/2021     Lab Results   Component Value Date    LDLCALC 66 11/04/2021    LDLCALC 80 05/21/2021     Lab Results   Component Value Date    TRIG 34 (L) 11/04/2021    TRIG 52 05/21/2021     Lab Results   Component Value Date    CHOLHDL 2.1 11/04/2021    CHOLHDL 2.3 05/21/2021       Lab Results   Component Value Date    HGBA1C 6.6 11/04/2021       Sodium   Date Value Ref Range Status   11/04/2021 140 136 - 145 mmol/L Final     Potassium   Date Value Ref Range Status   11/04/2021 5.0 3.5 - 5.1 mmol/L Final     Chloride   Date Value Ref Range Status   11/04/2021 108 (H) 98 - 107 mmol/L Final     CO2   Date Value Ref Range Status   11/04/2021 29 21 - 32 mmol/L Final     Glucose   Date Value Ref Range Status   11/04/2021 84 74 - 106 mg/dL Final     BUN   Date Value Ref Range  "Status   11/04/2021 14 7 - 18 mg/dL Final     Creatinine   Date Value Ref Range Status   11/04/2021 1.04 (H) 0.55 - 1.02 mg/dL Final     Calcium   Date Value Ref Range Status   11/04/2021 8.2 (L) 8.5 - 10.1 mg/dL Final     Total Protein   Date Value Ref Range Status   11/04/2021 8.0 6.4 - 8.2 g/dL Final     Albumin   Date Value Ref Range Status   11/04/2021 3.3 (L) 3.5 - 5.0 g/dL Final     Bilirubin, Total   Date Value Ref Range Status   11/04/2021 0.3 0.0 - 1.2 mg/dL Final     Alk Phos   Date Value Ref Range Status   11/04/2021 63 55 - 142 U/L Final     AST   Date Value Ref Range Status   11/04/2021 34 15 - 37 U/L Final     ALT   Date Value Ref Range Status   11/04/2021 32 13 - 56 U/L Final     Anion Gap   Date Value Ref Range Status   11/04/2021 8 7 - 16 mmol/L Final     eGFR    Date Value Ref Range Status   11/04/2021 67 >=60 mL/min/1.73m² Final          **See Completed Assessments for Annual Wellness visit within the encounter summary    The following assessments were completed & reviewed:  · Depression Screening  · Cognitive function Screening  · Timed Get Up Test  · Whisper Test  · Vision Screen  · Health Risk Assessment  · Checklist of ADLs and IADLs      Objective  Vitals:    03/03/22 1343   BP: 128/62   Pulse: (!) 56   Resp: 18   SpO2: 96%   Weight: 68 kg (150 lb)   Height: 5' 5" (1.651 m)   PainSc: 0-No pain      Body mass index is 24.96 kg/m².  Ideal body weight: 57 kg (125 lb 10.6 oz)         Assessment:     1. Type 2 diabetes mellitus without complication, without long-term current use of insulin  - Microalbumin/Creatinine Ratio, Urine; Future  -controlled, continue meds, reviewed diabetic diet, exercise.    2. Asymptomatic menopause  - DXA Bone Density Spine And Hip; Future    3. Hyperlipidemia, unspecified hyperlipidemia type  -controlled, continue medications, reviewed low fat/low chol diet.    4. Primary hypertension  -controlled, continue meds, reviewed DASH diet. "       Plan:    Referrals:   NA     Advised to call office if does not hear from anyone with referral appt within 2-3 weeks to check on status of referral. Voiced understanding.        Discussed and provided with a screening schedule and personal prevention plan in accordance with USPSTF age appropriate recommendations and Medicare screening guidelines.   Education, counseling, and referrals were provided as needed.  After Visit Summary printed and given to patient which includes written education and a list of any referrals if indicated.     F/u plan for yearly AWV.    Signature: KANDY Maki

## 2022-03-11 DIAGNOSIS — Z71.89 COMPLEX CARE COORDINATION: ICD-10-CM

## 2022-03-17 ENCOUNTER — TELEPHONE (OUTPATIENT)
Dept: FAMILY MEDICINE | Facility: CLINIC | Age: 72
End: 2022-03-17
Payer: MEDICARE

## 2022-03-17 NOTE — TELEPHONE ENCOUNTER
----- Message from Betty Ji sent at 3/17/2022  8:06 AM CDT -----  PATIENT HAS QUESTIONS ABOUT HER MEDICATION.  PHONE NUMBER    [phone]

## 2022-04-01 DIAGNOSIS — M35.3 POLYMYALGIA RHEUMATICA: Primary | ICD-10-CM

## 2022-04-04 ENCOUNTER — OFFICE VISIT (OUTPATIENT)
Dept: FAMILY MEDICINE | Facility: CLINIC | Age: 72
End: 2022-04-04
Payer: MEDICARE

## 2022-04-04 VITALS
OXYGEN SATURATION: 99 % | BODY MASS INDEX: 23.99 KG/M2 | SYSTOLIC BLOOD PRESSURE: 130 MMHG | DIASTOLIC BLOOD PRESSURE: 58 MMHG | RESPIRATION RATE: 18 BRPM | HEART RATE: 66 BPM | WEIGHT: 144 LBS | TEMPERATURE: 98 F | HEIGHT: 65 IN

## 2022-04-04 DIAGNOSIS — M25.552 LEFT HIP PAIN: Primary | ICD-10-CM

## 2022-04-04 DIAGNOSIS — M54.40 CHRONIC LOW BACK PAIN WITH SCIATICA, SCIATICA LATERALITY UNSPECIFIED, UNSPECIFIED BACK PAIN LATERALITY: ICD-10-CM

## 2022-04-04 DIAGNOSIS — G89.29 CHRONIC LOW BACK PAIN WITH SCIATICA, SCIATICA LATERALITY UNSPECIFIED, UNSPECIFIED BACK PAIN LATERALITY: ICD-10-CM

## 2022-04-04 PROCEDURE — 96372 PR INJECTION,THERAP/PROPH/DIAG2ST, IM OR SUBCUT: ICD-10-PCS | Mod: ,,, | Performed by: NURSE PRACTITIONER

## 2022-04-04 PROCEDURE — 99213 PR OFFICE/OUTPT VISIT, EST, LEVL III, 20-29 MIN: ICD-10-PCS | Mod: ,,, | Performed by: NURSE PRACTITIONER

## 2022-04-04 PROCEDURE — 99213 OFFICE O/P EST LOW 20 MIN: CPT | Mod: ,,, | Performed by: NURSE PRACTITIONER

## 2022-04-04 PROCEDURE — 96372 THER/PROPH/DIAG INJ SC/IM: CPT | Mod: ,,, | Performed by: NURSE PRACTITIONER

## 2022-04-04 RX ORDER — KETOROLAC TROMETHAMINE 30 MG/ML
30 INJECTION, SOLUTION INTRAMUSCULAR; INTRAVENOUS
Status: COMPLETED | OUTPATIENT
Start: 2022-04-04 | End: 2022-04-04

## 2022-04-04 RX ADMIN — KETOROLAC TROMETHAMINE 30 MG: 30 INJECTION, SOLUTION INTRAMUSCULAR; INTRAVENOUS at 10:04

## 2022-04-04 NOTE — PROGRESS NOTES
New clinic note    Kathryn Marie is a 71 y.o. female     Chief Complaint:   Chief Complaint   Patient presents with    Arthritis     Pain in left hip, back and left leg x 3 days         Subjective:    Patient complains of pain to left lower back, left hip, and left leg. Pain radiates down to knee. Pain X 3 days. Denies a direct injury. Patient does admit to helping someone clean end of last week. Patient states she has had chronic pain for years. Pain states she goes to pain treatment and rheumatologist. Patient reports she had xray of hips per pain treatment not too long ago. Patient states she takes norco but it isn't helping the pain today.          Current Outpatient Medications:     amLODIPine (NORVASC) 10 MG tablet, Take 10 mg by mouth once daily., Disp: , Rfl:     aspirin (ECOTRIN) 81 MG EC tablet, Take 81 mg by mouth once daily., Disp: , Rfl:     blood sugar diagnostic (ONETOUCH VERIO TEST STRIPS MISC), by Misc.(Non-Drug; Combo Route) route. Use 1 strip to check blood glucose every morning, fasting for E11.9, Disp: , Rfl:     blood-glucose meter (ONETOUCH VERIO METER MISC), by Misc.(Non-Drug; Combo Route) route. Use for glucose checks once daily, E11.9, Disp: , Rfl:     cetirizine (ZYRTEC) 10 MG tablet, Take 10 mg by mouth once daily., Disp: , Rfl:     clopidogreL (PLAVIX) 75 mg tablet, Take 75 mg by mouth once daily., Disp: , Rfl:     diclofenac sodium (VOLTAREN) 1 % Gel, APPLY 1 APPLICATION TO  AFFECTED AREA(S) TOPICALLY  TWICE DAILY, Disp: 300 g, Rfl: 2    ferrous sulfate (FEOSOL) 325 mg (65 mg iron) Tab tablet, Take 325 mg by mouth daily with breakfast., Disp: , Rfl:     fluticasone propionate (FLONASE) 50 mcg/actuation nasal spray, 2 sprays by Each Nostril route once daily., Disp: , Rfl:     folic acid (FOLVITE) 1 MG tablet, Take 1 mg by mouth once daily., Disp: , Rfl:     gabapentin (NEURONTIN) 300 MG capsule, Take 1 capsule (300 mg total) by mouth once daily., Disp: 90 capsule, Rfl: 1     hydroCHLOROthiazide (HYDRODIURIL) 12.5 MG Tab, Take 12.5 mg by mouth once daily., Disp: , Rfl:     HYDROcodone-acetaminophen (NORCO)  mg per tablet, Take 1 tablet by mouth every 8 (eight) hours., Disp: 90 tablet, Rfl: 0    methotrexate 2.5 MG Tab, Take by mouth. Take 4 tablets by mouth every week, Disp: , Rfl:     omeprazole (PRILOSEC) 20 MG capsule, Take 1 capsule by mouth once daily., Disp: , Rfl:     ONGLYZA 5 mg Tab tablet, TAKE 1 TABLET BY MOUTH  DAILY, Disp: 90 tablet, Rfl: 3    pantoprazole (PROTONIX) 40 MG tablet, Take 40 mg by mouth 2 (two) times daily., Disp: , Rfl:     rosuvastatin (CRESTOR) 20 MG tablet, Take 20 mg by mouth every evening., Disp: , Rfl:     travoprost (TRAVATAN Z) 0.004 % ophthalmic solution, 1 drop every evening., Disp: , Rfl:     vitamin D (VITAMIN D3) 1000 units Tab, Take 1,000 Units by mouth once daily., Disp: , Rfl:   No current facility-administered medications for this visit.   Past Medical History:   Diagnosis Date    Anemia     Atherosclerotic heart disease of native coronary artery without angina pectoris     Carotid artery stenosis 01/15/2014    CHARO  LICA stenosis    Causalgia of lower limb     Cervical radiculopathy     Chronic low back pain     Chronic pain syndrome     CVA (cerebral vascular accident)     right cerebellar    Degenerative joint disease involving multiple joints     Disorder of parathyroid gland, unspecified     Disorder of sacrum     Essential (primary) hypertension     Gammopathy     GERD (gastroesophageal reflux disease)     Heart murmur     Hyperlipidemia     Hyperparathyroidism     Lumbosacral radiculopathy     Lumbosacral spondylosis     Other long term (current) drug therapy     Pernicious anemia     Sciatica     Spinal stenosis of lumbar region     L4-5    Type 2 diabetes mellitus           Review of Systems   Constitutional: Negative for fever.   Musculoskeletal: Positive for arthralgias, back pain, leg pain  "and myalgias.        Objective:    BP (!) 130/58 (BP Location: Left arm, Patient Position: Sitting, BP Method: Large (Manual))   Pulse 66   Temp 97.5 °F (36.4 °C) (Oral)   Resp 18   Ht 5' 5" (1.651 m)   Wt 65.3 kg (144 lb)   SpO2 99%   BMI 23.96 kg/m²      Physical Exam  Constitutional:       Appearance: Normal appearance.   Cardiovascular:      Rate and Rhythm: Normal rate and regular rhythm.      Pulses: Normal pulses.      Heart sounds: Normal heart sounds.   Pulmonary:      Effort: Pulmonary effort is normal.      Breath sounds: Normal breath sounds.   Musculoskeletal:      Lumbar back: Tenderness present. Decreased range of motion.      Left hip: Tenderness present. Decreased range of motion.      Right lower leg: No edema.      Left lower leg: No edema.      Comments: cane   Neurological:      Mental Status: She is alert and oriented to person, place, and time.      Gait: Gait abnormal.          Assessment and Plan:  1. Left hip pain    2. Chronic low back pain with sciatica, sciatica laterality unspecified, unspecified back pain laterality         Problem List Items Addressed This Visit    None     Visit Diagnoses     Left hip pain    -  Primary    Relevant Medications    ketorolac injection 30 mg (Completed)    Chronic low back pain with sciatica, sciatica laterality unspecified, unspecified back pain laterality        Relevant Medications    ketorolac injection 30 mg (Completed)       left hip/back pain--toradol injection given. May alternate heat and ice therapy prn. Continue meds as directed. Follow up with pain management.     There are no Patient Instructions on file for this visit.   Follow up if symptoms worsen or fail to improve.     "

## 2022-04-07 ENCOUNTER — OFFICE VISIT (OUTPATIENT)
Dept: FAMILY MEDICINE | Facility: CLINIC | Age: 72
End: 2022-04-07
Payer: MEDICARE

## 2022-04-07 VITALS
RESPIRATION RATE: 18 BRPM | HEIGHT: 65 IN | OXYGEN SATURATION: 98 % | BODY MASS INDEX: 23.49 KG/M2 | DIASTOLIC BLOOD PRESSURE: 48 MMHG | WEIGHT: 141 LBS | SYSTOLIC BLOOD PRESSURE: 124 MMHG | HEART RATE: 70 BPM | TEMPERATURE: 99 F

## 2022-04-07 DIAGNOSIS — J06.9 UPPER RESPIRATORY TRACT INFECTION, UNSPECIFIED TYPE: ICD-10-CM

## 2022-04-07 DIAGNOSIS — E78.5 HYPERLIPIDEMIA, UNSPECIFIED HYPERLIPIDEMIA TYPE: Primary | ICD-10-CM

## 2022-04-07 DIAGNOSIS — J30.9 ALLERGIC RHINITIS, UNSPECIFIED SEASONALITY, UNSPECIFIED TRIGGER: ICD-10-CM

## 2022-04-07 DIAGNOSIS — I10 HYPERTENSION, UNSPECIFIED TYPE: ICD-10-CM

## 2022-04-07 DIAGNOSIS — E11.9 DIABETES MELLITUS WITHOUT COMPLICATION: ICD-10-CM

## 2022-04-07 LAB
ALBUMIN SERPL BCP-MCNC: 3.4 G/DL (ref 3.5–5)
ALBUMIN/GLOB SERPL: 0.9 {RATIO}
ALP SERPL-CCNC: 55 U/L (ref 55–142)
ALT SERPL W P-5'-P-CCNC: 36 U/L (ref 13–56)
ANION GAP SERPL CALCULATED.3IONS-SCNC: 11 MMOL/L (ref 7–16)
ANISOCYTOSIS BLD QL SMEAR: ABNORMAL
AST SERPL W P-5'-P-CCNC: 34 U/L (ref 15–37)
ATYPICAL LYMPHOCYTES: ABNORMAL
BASOPHILS # BLD AUTO: 0 K/UL (ref 0–0.2)
BASOPHILS NFR BLD AUTO: 0 % (ref 0–1)
BILIRUB SERPL-MCNC: 0.4 MG/DL (ref 0–1.2)
BUN SERPL-MCNC: 16 MG/DL (ref 7–18)
BUN/CREAT SERPL: 13 (ref 6–20)
CALCIUM SERPL-MCNC: 8.9 MG/DL (ref 8.5–10.1)
CHLORIDE SERPL-SCNC: 108 MMOL/L (ref 98–107)
CO2 SERPL-SCNC: 25 MMOL/L (ref 21–32)
CREAT SERPL-MCNC: 1.25 MG/DL (ref 0.55–1.02)
DIFFERENTIAL METHOD BLD: ABNORMAL
EOSINOPHIL # BLD AUTO: 0.01 K/UL (ref 0–0.5)
EOSINOPHIL NFR BLD AUTO: 0.3 % (ref 1–4)
ERYTHROCYTE [DISTWIDTH] IN BLOOD BY AUTOMATED COUNT: 14.2 % (ref 11.5–14.5)
EST. AVERAGE GLUCOSE BLD GHB EST-MCNC: 120 MG/DL
GLOBULIN SER-MCNC: 3.7 G/DL (ref 2–4)
GLUCOSE SERPL-MCNC: 133 MG/DL (ref 74–106)
HBA1C MFR BLD HPLC: 6.2 % (ref 4.5–6.6)
HCT VFR BLD AUTO: 29 % (ref 38–47)
HGB BLD-MCNC: 9.4 G/DL (ref 12–16)
HYPOCHROMIA BLD QL SMEAR: ABNORMAL
IMM GRANULOCYTES # BLD AUTO: 0.08 K/UL (ref 0–0.04)
IMM GRANULOCYTES NFR BLD: 2.6 % (ref 0–0.4)
LYMPHOCYTES # BLD AUTO: 0.62 K/UL (ref 1–4.8)
LYMPHOCYTES NFR BLD AUTO: 19.8 % (ref 27–41)
LYMPHOCYTES NFR BLD MANUAL: 22 % (ref 27–41)
MCH RBC QN AUTO: 31.1 PG (ref 27–31)
MCHC RBC AUTO-ENTMCNC: 32.4 G/DL (ref 32–36)
MCV RBC AUTO: 96 FL (ref 80–96)
MONOCYTES # BLD AUTO: 0.2 K/UL (ref 0–0.8)
MONOCYTES NFR BLD AUTO: 6.4 % (ref 2–6)
MONOCYTES NFR BLD MANUAL: 7 % (ref 2–6)
MPC BLD CALC-MCNC: 11.4 FL (ref 9.4–12.4)
NEUTROPHILS # BLD AUTO: 2.22 K/UL (ref 1.8–7.7)
NEUTROPHILS NFR BLD AUTO: 70.9 % (ref 53–65)
NEUTS SEG NFR BLD MANUAL: 71 % (ref 50–62)
NRBC # BLD AUTO: 0 X10E3/UL
NRBC, AUTO (.00): 0 %
OVALOCYTES BLD QL SMEAR: ABNORMAL
PLATELET # BLD AUTO: 144 K/UL (ref 150–400)
PLATELET MORPHOLOGY: ABNORMAL
POTASSIUM SERPL-SCNC: 4 MMOL/L (ref 3.5–5.1)
PROT SERPL-MCNC: 7.1 G/DL (ref 6.4–8.2)
RBC # BLD AUTO: 3.02 M/UL (ref 4.2–5.4)
SODIUM SERPL-SCNC: 140 MMOL/L (ref 136–145)
TARGETS BLD QL SMEAR: ABNORMAL
WBC # BLD AUTO: 3.13 K/UL (ref 4.5–11)

## 2022-04-07 PROCEDURE — 99214 OFFICE O/P EST MOD 30 MIN: CPT | Mod: ,,, | Performed by: FAMILY MEDICINE

## 2022-04-07 PROCEDURE — 85025 CBC WITH DIFFERENTIAL: ICD-10-PCS | Mod: ,,, | Performed by: CLINICAL MEDICAL LABORATORY

## 2022-04-07 PROCEDURE — 85025 COMPLETE CBC W/AUTO DIFF WBC: CPT | Mod: ,,, | Performed by: CLINICAL MEDICAL LABORATORY

## 2022-04-07 PROCEDURE — 80053 COMPREHENSIVE METABOLIC PANEL: ICD-10-PCS | Mod: ,,, | Performed by: CLINICAL MEDICAL LABORATORY

## 2022-04-07 PROCEDURE — 96372 THER/PROPH/DIAG INJ SC/IM: CPT | Mod: ,,, | Performed by: FAMILY MEDICINE

## 2022-04-07 PROCEDURE — 99214 PR OFFICE/OUTPT VISIT, EST, LEVL IV, 30-39 MIN: ICD-10-PCS | Mod: ,,, | Performed by: FAMILY MEDICINE

## 2022-04-07 PROCEDURE — 96372 PR INJECTION,THERAP/PROPH/DIAG2ST, IM OR SUBCUT: ICD-10-PCS | Mod: ,,, | Performed by: FAMILY MEDICINE

## 2022-04-07 PROCEDURE — 80053 COMPREHEN METABOLIC PANEL: CPT | Mod: ,,, | Performed by: CLINICAL MEDICAL LABORATORY

## 2022-04-07 PROCEDURE — 83036 HEMOGLOBIN GLYCOSYLATED A1C: CPT | Mod: ,,, | Performed by: CLINICAL MEDICAL LABORATORY

## 2022-04-07 PROCEDURE — 83036 HEMOGLOBIN A1C: ICD-10-PCS | Mod: ,,, | Performed by: CLINICAL MEDICAL LABORATORY

## 2022-04-07 RX ORDER — CLOPIDOGREL BISULFATE 75 MG/1
75 TABLET ORAL DAILY
Qty: 90 TABLET | Refills: 1 | Status: SHIPPED | OUTPATIENT
Start: 2022-04-07 | End: 2022-11-21

## 2022-04-07 RX ORDER — FLUTICASONE PROPIONATE 50 MCG
2 SPRAY, SUSPENSION (ML) NASAL DAILY
Qty: 48 G | Refills: 1 | Status: SHIPPED | OUTPATIENT
Start: 2022-04-07

## 2022-04-07 RX ORDER — AMLODIPINE BESYLATE 10 MG/1
10 TABLET ORAL DAILY
Qty: 90 TABLET | Refills: 1 | Status: SHIPPED | OUTPATIENT
Start: 2022-04-07 | End: 2022-11-21

## 2022-04-07 RX ORDER — CEFUROXIME AXETIL 500 MG/1
500 TABLET ORAL EVERY 12 HOURS
Qty: 20 TABLET | Refills: 0 | Status: SHIPPED | OUTPATIENT
Start: 2022-04-07 | End: 2022-05-05

## 2022-04-07 RX ORDER — ROSUVASTATIN CALCIUM 20 MG/1
20 TABLET, COATED ORAL NIGHTLY
Qty: 90 TABLET | Refills: 1 | Status: SHIPPED | OUTPATIENT
Start: 2022-04-07 | End: 2023-09-06 | Stop reason: SDUPTHER

## 2022-04-07 RX ORDER — HYDROCHLOROTHIAZIDE 12.5 MG/1
12.5 TABLET ORAL DAILY
Qty: 90 TABLET | Refills: 1 | Status: SHIPPED | OUTPATIENT
Start: 2022-04-07 | End: 2022-06-17

## 2022-04-07 RX ORDER — CEFTRIAXONE 1 G/1
1 INJECTION, POWDER, FOR SOLUTION INTRAMUSCULAR; INTRAVENOUS
Status: COMPLETED | OUTPATIENT
Start: 2022-04-07 | End: 2022-04-07

## 2022-04-07 RX ADMIN — CEFTRIAXONE 1 G: 1 INJECTION, POWDER, FOR SOLUTION INTRAMUSCULAR; INTRAVENOUS at 03:04

## 2022-04-07 NOTE — PROGRESS NOTES
Kathryn Marie is a 71 y.o. female seen today for follow-up on her diabetes.  He is complaining of a one-week history of worsening nasal congestion postnasal drainage and sinus pain and pressure.  She has not been using her Flonase or her antihistamine.  Her lipids were checked in November and they were excellent.  She is up-to-date on her diabetic eye exam and mammogram.  Patient defers a DEXA scan at this time.      Past Medical History:   Diagnosis Date    Anemia     Atherosclerotic heart disease of native coronary artery without angina pectoris     Carotid artery stenosis 01/15/2014    CHARO  LICA stenosis    Causalgia of lower limb     Cervical radiculopathy     Chronic low back pain     Chronic pain syndrome     CVA (cerebral vascular accident)     right cerebellar    Degenerative joint disease involving multiple joints     Disorder of parathyroid gland, unspecified     Disorder of sacrum     Essential (primary) hypertension     Gammopathy     GERD (gastroesophageal reflux disease)     Heart murmur     Hyperlipidemia     Hyperparathyroidism     Lumbosacral radiculopathy     Lumbosacral spondylosis     Other long term (current) drug therapy     Pernicious anemia     Sciatica     Spinal stenosis of lumbar region     L4-5    Type 2 diabetes mellitus      Family History   Problem Relation Age of Onset    Diabetes Mother     Heart disease Mother     Hypertension Mother     Heart attack Mother     Hypertension Father     Stroke Father     Stroke Sister     Hypertension Sister     Cancer Brother      Current Outpatient Medications on File Prior to Visit   Medication Sig Dispense Refill    aspirin (ECOTRIN) 81 MG EC tablet Take 81 mg by mouth once daily.      blood sugar diagnostic (ONETOUCH VERIO TEST STRIPS MISC) by Misc.(Non-Drug; Combo Route) route. Use 1 strip to check blood glucose every morning, fasting for E11.9      blood-glucose meter (ONETOUCH VERIO METER MISC) by  Misc.(Non-Drug; Combo Route) route. Use for glucose checks once daily, E11.9      diclofenac sodium (VOLTAREN) 1 % Gel APPLY 1 APPLICATION TO  AFFECTED AREA(S) TOPICALLY  TWICE DAILY 300 g 2    ferrous sulfate (FEOSOL) 325 mg (65 mg iron) Tab tablet Take 325 mg by mouth daily with breakfast.      folic acid (FOLVITE) 1 MG tablet Take 1 mg by mouth once daily.      gabapentin (NEURONTIN) 300 MG capsule Take 1 capsule (300 mg total) by mouth once daily. 90 capsule 1    HYDROcodone-acetaminophen (NORCO)  mg per tablet Take 1 tablet by mouth every 8 (eight) hours. 90 tablet 0    methotrexate 2.5 MG Tab Take by mouth. Take 4 tablets by mouth every week      omeprazole (PRILOSEC) 20 MG capsule Take 1 capsule by mouth once daily.      ONGLYZA 5 mg Tab tablet TAKE 1 TABLET BY MOUTH  DAILY 90 tablet 3    travoprost (TRAVATAN Z) 0.004 % ophthalmic solution 1 drop every evening.      vitamin D (VITAMIN D3) 1000 units Tab Take 1,000 Units by mouth once daily.      [DISCONTINUED] amLODIPine (NORVASC) 10 MG tablet Take 10 mg by mouth once daily.      [DISCONTINUED] clopidogreL (PLAVIX) 75 mg tablet Take 75 mg by mouth once daily.      [DISCONTINUED] fluticasone propionate (FLONASE) 50 mcg/actuation nasal spray 2 sprays by Each Nostril route once daily.      [DISCONTINUED] hydroCHLOROthiazide (HYDRODIURIL) 12.5 MG Tab Take 12.5 mg by mouth once daily.      [DISCONTINUED] rosuvastatin (CRESTOR) 20 MG tablet Take 20 mg by mouth every evening.      [DISCONTINUED] cetirizine (ZYRTEC) 10 MG tablet Take 10 mg by mouth once daily.      [DISCONTINUED] pantoprazole (PROTONIX) 40 MG tablet Take 40 mg by mouth 2 (two) times daily.       No current facility-administered medications on file prior to visit.     Immunization History   Administered Date(s) Administered    COVID-19, MRNA, LN-S, PF (Pfizer) (Purple Cap) 01/28/2021, 02/27/2021, 08/27/2021    Influenza - Quadrivalent - High Dose - PF (65 years and older)  11/04/2021    Influenza - Trivalent (ADULT) 02/09/2016    Pneumococcal Conjugate - 13 Valent 11/13/2017    Pneumococcal Polysaccharide - 23 Valent 12/01/2014, 02/09/2016    Zoster Recombinant 07/30/2013       Review of Systems   Constitutional: Negative for fever, malaise/fatigue and weight loss.   HENT: Positive for congestion.    Respiratory: Negative for shortness of breath.    Cardiovascular: Negative for chest pain and palpitations.   Gastrointestinal: Negative for nausea and vomiting.   Musculoskeletal: Positive for back pain, joint pain and myalgias. Negative for falls.   Psychiatric/Behavioral: Negative for depression.        Vitals:    04/07/22 1327   BP: (!) 124/48   Pulse: 70   Resp: 18   Temp: 98.9 °F (37.2 °C)       Physical Exam  Vitals reviewed.   Constitutional:       Appearance: Normal appearance.   HENT:      Head: Normocephalic.      Nose:      Right Turbinates: Swollen.      Left Turbinates: Swollen.      Comments: She has thick copious postnasal drainage.  Eyes:      Extraocular Movements: Extraocular movements intact.      Conjunctiva/sclera: Conjunctivae normal.      Pupils: Pupils are equal, round, and reactive to light.   Neck:      Thyroid: No thyroid mass or thyromegaly.   Cardiovascular:      Rate and Rhythm: Normal rate and regular rhythm.      Heart sounds: Murmur heard.     No gallop.   Pulmonary:      Effort: Pulmonary effort is normal. No respiratory distress.      Breath sounds: Normal breath sounds. No wheezing or rales.   Skin:     General: Skin is warm and dry.      Coloration: Skin is not jaundiced or pale.   Neurological:      Mental Status: She is alert.   Psychiatric:         Mood and Affect: Mood normal.         Behavior: Behavior normal.         Thought Content: Thought content normal.         Judgment: Judgment normal.          Assessment and Plan  Hyperlipidemia, unspecified hyperlipidemia type  -     rosuvastatin (CRESTOR) 20 MG tablet; Take 1 tablet (20 mg total) by  mouth every evening.  Dispense: 90 tablet; Refill: 1    Hypertension, unspecified type  -     hydroCHLOROthiazide (HYDRODIURIL) 12.5 MG Tab; Take 1 tablet (12.5 mg total) by mouth once daily.  Dispense: 90 tablet; Refill: 1  -     amLODIPine (NORVASC) 10 MG tablet; Take 1 tablet (10 mg total) by mouth once daily.  Dispense: 90 tablet; Refill: 1  -     CBC Auto Differential; Future; Expected date: 04/07/2022  -     Comprehensive Metabolic Panel; Future; Expected date: 04/07/2022    Allergic rhinitis, unspecified seasonality, unspecified trigger  -     fluticasone propionate (FLONASE) 50 mcg/actuation nasal spray; 2 sprays (100 mcg total) by Each Nostril route once daily.  Dispense: 48 g; Refill: 1    Upper respiratory tract infection, unspecified type  -     cefUROXime (CEFTIN) 500 MG tablet; Take 1 tablet (500 mg total) by mouth every 12 (twelve) hours.  Dispense: 20 tablet; Refill: 0    Diabetes mellitus without complication  -     Hemoglobin A1C; Future; Expected date: 04/07/2022    Other orders  -     clopidogreL (PLAVIX) 75 mg tablet; Take 1 tablet (75 mg total) by mouth once daily.  Dispense: 90 tablet; Refill: 1  -     cefTRIAXone injection 1 g            Return to clinic in 3 months or as needed once her lab work is in.    Health Maintenance Topics with due status: Not Due       Topic Last Completion Date    Lipid Panel 11/04/2021    Hemoglobin A1c 11/04/2021    Mammogram 12/15/2021    High Dose Statin 04/07/2022

## 2022-04-11 DIAGNOSIS — M35.3 POLYMYALGIA RHEUMATICA: Primary | ICD-10-CM

## 2022-04-12 ENCOUNTER — CLINICAL SUPPORT (OUTPATIENT)
Dept: REHABILITATION | Facility: HOSPITAL | Age: 72
End: 2022-04-12
Payer: MEDICARE

## 2022-04-12 ENCOUNTER — TELEPHONE (OUTPATIENT)
Dept: FAMILY MEDICINE | Facility: CLINIC | Age: 72
End: 2022-04-12
Payer: MEDICARE

## 2022-04-12 DIAGNOSIS — M35.3 POLYMYALGIA RHEUMATICA: Primary | ICD-10-CM

## 2022-04-12 DIAGNOSIS — D64.9 ANEMIA, UNSPECIFIED TYPE: Primary | ICD-10-CM

## 2022-04-12 DIAGNOSIS — M54.2 NECK PAIN: ICD-10-CM

## 2022-04-12 PROCEDURE — 97162 PT EVAL MOD COMPLEX 30 MIN: CPT

## 2022-04-12 NOTE — PLAN OF CARE
[facility]  Physical Therapy Initial Evaluation    Name: Kathryn Marie  Clinic Number: [id number]    Therapy Diagnosis:   Encounter Diagnoses   Name Primary?    Polymyalgia rheumatica Yes    Neck pain      Physician: Sera Dow MD    Physician Orders: PT Eval and Treat   Medical Diagnosis from Referral: Polymyalgia rheumatica  Evaluation Date: 4/12/2022  Authorization Period Expiration: 5/31/22  Plan of Care Expiration: 5/31/22  Visit # / Visits authorized: 1/ 12    Time In: 1055  Time Out: 1125  Total Appointment Time (timed & untimed codes): 30 minutes    Precautions: Standard    Subjective   Date of onset: Unknown  History of current condition - Kathryn reports: right side neck pain that radiates to right shoulder. Symptoms have been occurring for about 3 to 4 months and seem to be getting worse. Pt reports difficulty lifting right arm.     Medical History:   Past Medical History:   Diagnosis Date    Anemia     Atherosclerotic heart disease of native coronary artery without angina pectoris     Carotid artery stenosis 01/15/2014    CHARO  LICA stenosis    Causalgia of lower limb     Cervical radiculopathy     Chronic low back pain     Chronic pain syndrome     CVA (cerebral vascular accident)     right cerebellar    Degenerative joint disease involving multiple joints     Disorder of parathyroid gland, unspecified     Disorder of sacrum     Essential (primary) hypertension     Gammopathy     GERD (gastroesophageal reflux disease)     Heart murmur     Hyperlipidemia     Hyperparathyroidism     Lumbosacral radiculopathy     Lumbosacral spondylosis     Other long term (current) drug therapy     Pernicious anemia     Sciatica     Spinal stenosis of lumbar region     L4-5    Type 2 diabetes mellitus        Surgical History:   Kathryn Marie  has a past surgical history that includes Carpal tunnel release (Right); caudal MOIZ (07/03/2018); left shoulder repair (Left); L4-S1 Caudal  cath guided MOIZ (07/03/2018); L5-S1 Caudal cath guided MOIZ (09/26/2014 6/26/2014 4/11/2014); right sacral RF (Right, 12/26/2013 1/24/2012); right SIJI (Right, 10/24/2013); Hip surgery (Right); Cholecystectomy (1975); Coronary Artery Bypass Graft (CABG); Neck surgery (2018); and Coronary artery bypass graft.    Medications:   Kathryn has a current medication list which includes the following prescription(s): amlodipine, aspirin, blood sugar diagnostic, blood-glucose meter, cefuroxime, clopidogrel, diclofenac sodium, ferrous sulfate, fluticasone propionate, folic acid, gabapentin, hydrochlorothiazide, hydrocodone-acetaminophen, methotrexate, omeprazole, onglyza, rosuvastatin, travoprost, and vitamin d.    Allergies:   Review of patient's allergies indicates:  No Known Allergies     Imaging, : none availabe    Prior Therapy: yes  Social History:  lives with their family  Occupation: retired  Prior Level of Function: independent  Current Level of Function: independent    Pain:  Current 7/10, worst 10/10, best 3/10   Location: right neck   Description: Dull, Grabbing and Sharp  Aggravating Factors: Standing, Night Time and Lifting  Easing Factors: pain medication and heating pad    Pts goals: to improve neck and shoulder motion and decrease pain.    Objective     Posture:   Forward head: increased  Thoracic curve: WFL  Cervical curve: decreased  Laterally flexed: NO  Rotated: left  Rounded shoulders: yes  Scoliosis: no  Shoulder height: right decreased  Clavical height: right decreased  Comments:    Cervical AROM:  FDB: 32  BB: 25  SBR: 7  SBL: 18  RR: 35  LR: 37    MMT Right  Left    C1-C2 Chin up MMT strength: 3-/5 MMT strength: 3-/5   C1-C2 Chin in MMT shoulder: 3-/5 MMT strength: 3-/5   C3 Lateral flexion MMT strength: 2+/5 MMT strength: 2+/5   C4 Shoulder Shrug MMT strength: 3-/5 MMT strength: 3-/5   C5 Biceps MMT strength: 3-/5 MMT strength: 3-/5   C6 Wrist extension MMT strength: 3-/5 MMT strength: 3-/5   C7 Triceps  MMT strength: 3-/5 MMT strength: 3-/5   Other RIGHT  10 LB LEFT  40     Shoulder AROM/MMT Right  Left    Flexion (180) 75/2+ 80/2+   Internal rotation (45) 35/2+ 70/3-   External rotation (45) 27/2+ 45/3-   Abduction (180) 65/2+ 60/2+     Segment/Mobility:     Special test:    Compression test Negative   Thoracic outlet  Negative   Distraction Positive   Vertebral artery Negative     Palpation:    Other tests/information:          Assessment     Therapy classification:  Pt prognosis is Good.   Pt will benefit from skilled outpatient Physical Therapy to address the deficits stated above and in the chart below, provide pt/family education, and to maximize pt's level of independence.     Plan of care discussed with patient: Yes  Pt's spiritual, cultural and educational needs considered and patient is agreeable to the plan of care and goals as stated below:     Anticipated Barriers for therapy: none    Goals:  STGs  1. Patient will demonstrate independent performance of home exercise program.  2. Patient will report decreased neck pain from 7 to 4 with activity or work to improve quality of life.  3. Patient will increase AROM Cervical spine by 10 degrees to improve functional mobility.  4. Patient will increase cervical strength to 3+/5 to improve functional activity.    LTGs  1. Patient will increase bilateral shoulder strength to 3-/5 to improve functional activity.  2. Patient will increase right  strength to 20 lb or greater to improve functional activity.  3. Patient will increase bilateral shoulder flexion and abduction by 10 degrees to improve functional mobility.    Plan   Plan of care Certification: 4/12/2022 to 5/31/22.    Outpatient Physical Therapy 2 times weekly for 6 weeks to include the following interventions: Electrical Stimulation IFC, Manual Therapy, Moist Heat/ Ice, Patient Education, Therapeutic Exercise and Ultrasound.     Supervised visit: Case conference with Taina Swain PTA and POC  reviewed.    Cachorro P Syner, PT      Physician Signature:________________________________________________      Date:____________________________________________________________

## 2022-04-19 ENCOUNTER — CLINICAL SUPPORT (OUTPATIENT)
Dept: REHABILITATION | Facility: HOSPITAL | Age: 72
End: 2022-04-19
Payer: MEDICARE

## 2022-04-19 DIAGNOSIS — M54.2 NECK PAIN: Primary | ICD-10-CM

## 2022-04-19 DIAGNOSIS — M35.3 POLYMYALGIA RHEUMATICA: ICD-10-CM

## 2022-04-19 PROCEDURE — 97035 APP MDLTY 1+ULTRASOUND EA 15: CPT

## 2022-04-19 PROCEDURE — 97110 THERAPEUTIC EXERCISES: CPT

## 2022-04-19 NOTE — PROGRESS NOTES
Physical Therapy Daily Note     Name: Kathryn Marie  Clinic Number: [id number]  Diagnosis:   Encounter Diagnoses   Name Primary?    Neck pain Yes    Polymyalgia rheumatica      Physician: Sera Dow MD  Precautions: standard  Visit #: 2 of 12  PTA Visit #:   Time In: 1436  Time Out: 1515    Subjective     Pt reports: bilateral neck pain.  Pain Scale: Kathryn rates pain on a scale of 0-10 to be 8 currently.    Objective     Kathryn received individual therapeutic exercises to develop strength, ROM and posture for 30 minutes including:  Supine AAROM cervical rotation and flexion 2 x 10 each.  Seated scapular retraction 2 x 10  Seated shoulder shrugs 2 x 10        Written Home Exercises Provided: yes with printed handout.  Pt demo good understanding of the education provided. Kathryn demonstrated good return demonstration of activities.     Education provided re:  Kathryn verbalized good understanding of education provided.   No spiritual or educational barriers to learning provided    Assessment     Patient tolerated Fair, has chronic neck pain during therapy session.    This is a 71 y.o. female referred to outpatient physical therapy and presents with a medical diagnosis of Polymyalgia rheumatica with neck pain and demonstrates limitations as described in the problem list. Pt prognosis is Good. Pt will continue to benefit from skilled outpatient physical therapy to address the deficits listed in the problem list, provide pt/family education and to maximize pt's level of independence in the home and community environment.     Goals as follows:  STGs  1. Patient will demonstrate independent performance of home exercise program.  2. Patient will report decreased neck pain from 7 to 4 with activity or work to improve quality of life.  3. Patient will increase AROM Cervical spine by 10 degrees to improve functional mobility.  4. Patient will increase cervical strength to 3+/5 to  improve functional activity.     LTGs  1. Patient will increase bilateral shoulder strength to 3-/5 to improve functional activity.  2. Patient will increase right  strength to 20 lb or greater to improve functional activity.  3. Patient will increase bilateral shoulder flexion and abduction by 10 degrees to improve functional mobility.          Plan     Continue with established Plan of Care towards PT goals.    Therapist: Cachorro Bowser, PT  4/19/2022

## 2022-04-21 ENCOUNTER — CLINICAL SUPPORT (OUTPATIENT)
Dept: REHABILITATION | Facility: HOSPITAL | Age: 72
End: 2022-04-21
Payer: MEDICARE

## 2022-04-21 DIAGNOSIS — M54.2 NECK PAIN: Primary | ICD-10-CM

## 2022-04-21 PROCEDURE — 97110 THERAPEUTIC EXERCISES: CPT | Mod: CQ

## 2022-04-21 PROCEDURE — 97140 MANUAL THERAPY 1/> REGIONS: CPT | Mod: CQ

## 2022-04-21 NOTE — PROGRESS NOTES
Physical Therapy Daily Note     Name: Kathryn Marie  Clinic Number: [id number]  Diagnosis:   Encounter Diagnosis   Name Primary?    Neck pain Yes     Physician: Sera Dow MD  Precautions: pt has a pacemaker  Visit #: 3 of 12  PTA Visit #:   Time In: 1449  Time Out: 1530    Subjective     Pt reports: bilateral neck pain.  Pain Scale: Kathryn rates pain on a scale of 0-10 to be 8 currently.    Objective     Kathryn received individual therapeutic exercises to develop strength, ROM and posture for 33 minutes including:  UBE bike 5 min at 4#  Supine AAROM cervical rotation and flexion 2 x 10 each.  Seated scapular retraction 2 x 10  Seated shoulder shrugs 2 x 10  Supine cervical /upper trap stretch   side bending 1x10    Manual: 8 min  Gentle cervical stretch and massage performed to Bilateral cervical musculature and upper trap for decreased pain.  Pt in seated positioning.          Written Home Exercises Provided: yes with printed handout.  Pt demo good understanding of the education provided. Kathryn demonstrated good return demonstration of activities.     Education provided re:  Kathryn verbalized good understanding of education provided.   No spiritual or educational barriers to learning provided    Assessment     Patient tolerated Fair, has chronic neck pain during therapy session. Therapist provided cues for proper posture and positioning with all Tx.     This is a 71 y.o. female referred to outpatient physical therapy and presents with a medical diagnosis of Polymyalgia rheumatica with neck pain and demonstrates limitations as described in the problem list. Pt prognosis is Good. Pt will continue to benefit from skilled outpatient physical therapy to address the deficits listed in the problem list, provide pt/family education and to maximize pt's level of independence in the home and community environment.     Goals as follows:  STGs  1. Patient will demonstrate  independent performance of home exercise program.  2. Patient will report decreased neck pain from 7 to 4 with activity or work to improve quality of life.  3. Patient will increase AROM Cervical spine by 10 degrees to improve functional mobility.  4. Patient will increase cervical strength to 3+/5 to improve functional activity.     LTGs  1. Patient will increase bilateral shoulder strength to 3-/5 to improve functional activity.  2. Patient will increase right  strength to 20 lb or greater to improve functional activity.  3. Patient will increase bilateral shoulder flexion and abduction by 10 degrees to improve functional mobility.          Plan     Continue with established Plan of Care towards PT goals.    Therapist: Taina Swain, PTA  4/21/2022

## 2022-04-26 ENCOUNTER — CLINICAL SUPPORT (OUTPATIENT)
Dept: REHABILITATION | Facility: HOSPITAL | Age: 72
End: 2022-04-26
Payer: MEDICARE

## 2022-04-26 DIAGNOSIS — M35.3 POLYMYALGIA RHEUMATICA: ICD-10-CM

## 2022-04-26 DIAGNOSIS — M54.2 NECK PAIN: Primary | ICD-10-CM

## 2022-04-26 PROCEDURE — 97110 THERAPEUTIC EXERCISES: CPT

## 2022-04-26 PROCEDURE — 97140 MANUAL THERAPY 1/> REGIONS: CPT

## 2022-04-26 NOTE — PROGRESS NOTES
Physical Therapy Daily Note     Name: Kathryn Marie  Clinic Number: [id number]  Diagnosis:   Encounter Diagnoses   Name Primary?    Neck pain Yes    Polymyalgia rheumatica      Physician: Sera Dow MD  Precautions: pt has a pacemaker  Visit #: 4 of 12  PTA Visit #:   Time In: 1442  Time Out: 1522    Subjective     Pt reports: neck is a little better.  Pain Scale: Kathryn rates pain on a scale of 0-10 to be 7 currently.    Objective     Kathryn received individual therapeutic exercises to develop strength, ROM and posture for 30 minutes including:  UBE bike 5 min at 4#  Supine AROM cervical rotation and flexion 2 x 10 each.  Seated cervical /upper trap stretch  Standing scapular retraction arms straight and bent red TB 2 x 10  Standing shoulder shrugs 2 lb DB 2 x 10  Standing shoulder flexion red TB with assist 1 x 10      Manual:   Suboccipital release x 5 minutes; cervical rotation and lateral flexion manual stretches x 5 minutes.    Written Home Exercises Provided: completed.    Education provided re:  Kathryn received verbal and tactile cues to facilitate proper execution of exercises and body mechanics.  No spiritual or educational barriers to learning.    Assessment     Patient tolerated well. Neck pain has improved from 8 to 7/10. Therapist added shoulder flexion exercises to protocol and for HEP.    This is a 71 y.o. female referred to outpatient physical therapy and presents with a medical diagnosis of Polymyalgia rheumatica with neck pain and demonstrates limitations as described in the problem list. Pt prognosis is Good. Pt will continue to benefit from skilled outpatient physical therapy to address the deficits listed in the problem list, provide pt/family education and to maximize pt's level of independence in the home and community environment.     Goals as follows:  STGs  1. Patient will demonstrate independent performance of home exercise program. Goal  Met.  2. Patient will report decreased neck pain from 7 to 4 with activity or work to improve quality of life.  3. Patient will increase AROM Cervical spine by 10 degrees to improve functional mobility.  4. Patient will increase cervical strength to 3+/5 to improve functional activity.     LTGs  1. Patient will increase bilateral shoulder strength to 3-/5 to improve functional activity.  2. Patient will increase right  strength to 20 lb or greater to improve functional activity.  3. Patient will increase bilateral shoulder flexion and abduction by 10 degrees to improve functional mobility.          Plan     Continue with established Plan of Care towards PT goals.    Therapist: Cachorro Bowser, PT  4/26/2022

## 2022-04-28 ENCOUNTER — CLINICAL SUPPORT (OUTPATIENT)
Dept: REHABILITATION | Facility: HOSPITAL | Age: 72
End: 2022-04-28
Payer: MEDICARE

## 2022-04-28 DIAGNOSIS — M54.2 NECK PAIN: Primary | ICD-10-CM

## 2022-04-28 DIAGNOSIS — M35.3 POLYMYALGIA RHEUMATICA: ICD-10-CM

## 2022-04-28 PROCEDURE — 97110 THERAPEUTIC EXERCISES: CPT

## 2022-04-28 PROCEDURE — 97140 MANUAL THERAPY 1/> REGIONS: CPT

## 2022-04-28 NOTE — PROGRESS NOTES
Physical Therapy Daily Note     Name: Kathryn Marie  Clinic Number: [id number]  Diagnosis:   Encounter Diagnoses   Name Primary?    Neck pain Yes    Polymyalgia rheumatica      Physician: Sera Dow MD  Precautions: pt has a pacemaker  Visit #: 5 of 12  PTA Visit #:   Time In: 1445  Time Out: 1525    Subjective     Pt reports: neck is a little better.  Pain Scale: Kathryn rates pain on a scale of 0-10 to be 7 currently.    Objective     Kathryn received individual therapeutic exercises to develop strength, ROM and posture for 30 minutes including:    Supine AROM cervical rotation and flexion 2 x 10 each.  Seated cervical /upper trap stretch  Standing scapular retraction arms straight and bent red TB 2 x 10  Standing shoulder shrugs 2 lb DB 2 x 10  Standing shoulder flexion red TB with assist 1 x 10      Manual:   Suboccipital release x 5 minutes; cervical rotation and lateral flexion manual stretches x 5 minutes.    Cervical AROM:  FDB: 40  BB: 25  SBR: 15  SBL: 20  RR: 39  LR: 40    Written Home Exercises Provided: completed.    Education provided re:  Kathryn received verbal and tactile cues to facilitate proper execution of exercises and body mechanics.  No spiritual or educational barriers to learning.    Assessment     Patient tolerated treatment well and able to demonstrate improved cervical AROM.    This is a 71 y.o. female referred to outpatient physical therapy and presents with a medical diagnosis of Polymyalgia rheumatica with neck pain and demonstrates limitations as described in the problem list. Pt prognosis is Good. Pt will continue to benefit from skilled outpatient physical therapy to address the deficits listed in the problem list, provide pt/family education and to maximize pt's level of independence in the home and community environment.     Goals as follows:  STGs  1. Patient will demonstrate independent performance of home exercise program. Goal  Met.  2. Patient will report decreased neck pain from 7 to 4 with activity or work to improve quality of life.  3. Patient will increase AROM Cervical spine by 10 degrees to improve functional mobility. Goal Met.  4. Patient will increase cervical strength to 3+/5 to improve functional activity.     LTGs  1. Patient will increase bilateral shoulder strength to 3-/5 to improve functional activity.  2. Patient will increase right  strength to 20 lb or greater to improve functional activity.  3. Patient will increase bilateral shoulder flexion and abduction by 10 degrees to improve functional mobility.          Plan     Continue with established Plan of Care towards PT goals.    Therapist: Cachorro Bowser, PT  4/28/2022

## 2022-05-03 ENCOUNTER — CLINICAL SUPPORT (OUTPATIENT)
Dept: REHABILITATION | Facility: HOSPITAL | Age: 72
End: 2022-05-03
Payer: MEDICARE

## 2022-05-03 DIAGNOSIS — M35.3 POLYMYALGIA RHEUMATICA: ICD-10-CM

## 2022-05-03 DIAGNOSIS — M54.2 NECK PAIN: Primary | ICD-10-CM

## 2022-05-03 PROCEDURE — 97110 THERAPEUTIC EXERCISES: CPT

## 2022-05-03 NOTE — PROGRESS NOTES
Physical Therapy Daily Note     Name: Kathryn Marie  Clinic Number: [id number]  Diagnosis:   Encounter Diagnoses   Name Primary?    Neck pain Yes    Polymyalgia rheumatica      Physician: Sera Dow MD  Precautions: pt has a pacemaker  Visit #: 6 of 12  PTA Visit #:   Time In: 1445  Time Out: 1520    Subjective     Pt reports: neck doesn't hurt right now but my right shoulder does.  Pain Scale: Kathryn rates pain on a scale of 0-10 to be 5 currently.    Objective     Kathryn received individual therapeutic exercises to develop strength, ROM and posture for 35 minutes including:    UE bike x 5 minutes  Seated cervical rotation, flexion and extension 2 x 10 each.  Standing scapular retraction arms bent red TB 3 x 10  Standing shoulder shrugs 2 lb DB 3 x 10  Standing shoulder flexion and abduction 1 lb 2 x 10      Cervical AROM:  FDB: 40  BB: 25  SBR: 15  SBL: 20  RR: 39  LR: 40    Written Home Exercises Provided: completed.    Education provided re:  Kathryn received verbal and tactile cues to facilitate proper execution of exercises and body mechanics.  No spiritual or educational barriers to learning.    Assessment     Patient tolerated treatment well with decreased neck pain. Pt does have pain in right shoulder but able to complete therapeutic exercises.    This is a 71 y.o. female referred to outpatient physical therapy and presents with a medical diagnosis of Polymyalgia rheumatica with neck pain and demonstrates limitations as described in the problem list. Pt prognosis is Good. Pt will continue to benefit from skilled outpatient physical therapy to address the deficits listed in the problem list, provide pt/family education and to maximize pt's level of independence in the home and community environment.     Goals as follows:  STGs  1. Patient will demonstrate independent performance of home exercise program. Goal Met.  2. Patient will report decreased neck pain from  7 to 4 with activity or work to improve quality of life.  3. Patient will increase AROM Cervical spine by 10 degrees to improve functional mobility. Goal Met.  4. Patient will increase cervical strength to 3+/5 to improve functional activity.     LTGs  1. Patient will increase bilateral shoulder strength to 3-/5 to improve functional activity.  2. Patient will increase right  strength to 20 lb or greater to improve functional activity.  3. Patient will increase bilateral shoulder flexion and abduction by 10 degrees to improve functional mobility.          Plan     Continue with established Plan of Care towards PT goals.    Therapist: Cachorro Bowser, PT  5/3/2022

## 2022-05-05 ENCOUNTER — OFFICE VISIT (OUTPATIENT)
Dept: FAMILY MEDICINE | Facility: CLINIC | Age: 72
End: 2022-05-05
Payer: MEDICARE

## 2022-05-05 VITALS
WEIGHT: 149.38 LBS | DIASTOLIC BLOOD PRESSURE: 70 MMHG | HEART RATE: 61 BPM | OXYGEN SATURATION: 97 % | BODY MASS INDEX: 24.89 KG/M2 | TEMPERATURE: 97 F | HEIGHT: 65 IN | RESPIRATION RATE: 20 BRPM | SYSTOLIC BLOOD PRESSURE: 130 MMHG

## 2022-05-05 DIAGNOSIS — G89.29 CHRONIC LOW BACK PAIN WITH SCIATICA, SCIATICA LATERALITY UNSPECIFIED, UNSPECIFIED BACK PAIN LATERALITY: Primary | ICD-10-CM

## 2022-05-05 DIAGNOSIS — D64.9 ANEMIA, UNSPECIFIED TYPE: ICD-10-CM

## 2022-05-05 DIAGNOSIS — M54.40 CHRONIC LOW BACK PAIN WITH SCIATICA, SCIATICA LATERALITY UNSPECIFIED, UNSPECIFIED BACK PAIN LATERALITY: Primary | ICD-10-CM

## 2022-05-05 DIAGNOSIS — M19.90 ARTHRITIS: ICD-10-CM

## 2022-05-05 LAB
FERRITIN SERPL-MCNC: 164 NG/ML (ref 8–252)
IRON SATN MFR SERPL: 14 % (ref 14–50)
IRON SERPL-MCNC: 35 ΜG/DL (ref 50–170)
TIBC SERPL-MCNC: 248 ΜG/DL (ref 250–450)
URATE SERPL-MCNC: 5.2 MG/DL (ref 2.6–6)

## 2022-05-05 PROCEDURE — 84550 ASSAY OF BLOOD/URIC ACID: CPT | Mod: ,,, | Performed by: CLINICAL MEDICAL LABORATORY

## 2022-05-05 PROCEDURE — 3078F DIAST BP <80 MM HG: CPT | Mod: ,,, | Performed by: NURSE PRACTITIONER

## 2022-05-05 PROCEDURE — 99213 PR OFFICE/OUTPT VISIT, EST, LEVL III, 20-29 MIN: ICD-10-PCS | Mod: 25,,, | Performed by: NURSE PRACTITIONER

## 2022-05-05 PROCEDURE — 96372 THER/PROPH/DIAG INJ SC/IM: CPT | Mod: ,,, | Performed by: NURSE PRACTITIONER

## 2022-05-05 PROCEDURE — 3008F BODY MASS INDEX DOCD: CPT | Mod: ,,, | Performed by: NURSE PRACTITIONER

## 2022-05-05 PROCEDURE — 1160F RVW MEDS BY RX/DR IN RCRD: CPT | Mod: ,,, | Performed by: NURSE PRACTITIONER

## 2022-05-05 PROCEDURE — 84550 URIC ACID: ICD-10-PCS | Mod: ,,, | Performed by: CLINICAL MEDICAL LABORATORY

## 2022-05-05 PROCEDURE — 83550 IRON AND TIBC: ICD-10-PCS | Mod: ,,, | Performed by: CLINICAL MEDICAL LABORATORY

## 2022-05-05 PROCEDURE — 3008F PR BODY MASS INDEX (BMI) DOCUMENTED: ICD-10-PCS | Mod: ,,, | Performed by: NURSE PRACTITIONER

## 2022-05-05 PROCEDURE — 1159F MED LIST DOCD IN RCRD: CPT | Mod: ,,, | Performed by: NURSE PRACTITIONER

## 2022-05-05 PROCEDURE — 3044F PR MOST RECENT HEMOGLOBIN A1C LEVEL <7.0%: ICD-10-PCS | Mod: ,,, | Performed by: NURSE PRACTITIONER

## 2022-05-05 PROCEDURE — 3288F PR FALLS RISK ASSESSMENT DOCUMENTED: ICD-10-PCS | Mod: ,,, | Performed by: NURSE PRACTITIONER

## 2022-05-05 PROCEDURE — 3078F PR MOST RECENT DIASTOLIC BLOOD PRESSURE < 80 MM HG: ICD-10-PCS | Mod: ,,, | Performed by: NURSE PRACTITIONER

## 2022-05-05 PROCEDURE — 1101F PR PT FALLS ASSESS DOC 0-1 FALLS W/OUT INJ PAST YR: ICD-10-PCS | Mod: ,,, | Performed by: NURSE PRACTITIONER

## 2022-05-05 PROCEDURE — 96372 PR INJECTION,THERAP/PROPH/DIAG2ST, IM OR SUBCUT: ICD-10-PCS | Mod: ,,, | Performed by: NURSE PRACTITIONER

## 2022-05-05 PROCEDURE — 83540 IRON AND TIBC: ICD-10-PCS | Mod: ,,, | Performed by: CLINICAL MEDICAL LABORATORY

## 2022-05-05 PROCEDURE — 83550 IRON BINDING TEST: CPT | Mod: ,,, | Performed by: CLINICAL MEDICAL LABORATORY

## 2022-05-05 PROCEDURE — 1160F PR REVIEW ALL MEDS BY PRESCRIBER/CLIN PHARMACIST DOCUMENTED: ICD-10-PCS | Mod: ,,, | Performed by: NURSE PRACTITIONER

## 2022-05-05 PROCEDURE — 3044F HG A1C LEVEL LT 7.0%: CPT | Mod: ,,, | Performed by: NURSE PRACTITIONER

## 2022-05-05 PROCEDURE — 1125F AMNT PAIN NOTED PAIN PRSNT: CPT | Mod: ,,, | Performed by: NURSE PRACTITIONER

## 2022-05-05 PROCEDURE — 3288F FALL RISK ASSESSMENT DOCD: CPT | Mod: ,,, | Performed by: NURSE PRACTITIONER

## 2022-05-05 PROCEDURE — 3075F PR MOST RECENT SYSTOLIC BLOOD PRESS GE 130-139MM HG: ICD-10-PCS | Mod: ,,, | Performed by: NURSE PRACTITIONER

## 2022-05-05 PROCEDURE — 1125F PR PAIN SEVERITY QUANTIFIED, PAIN PRESENT: ICD-10-PCS | Mod: ,,, | Performed by: NURSE PRACTITIONER

## 2022-05-05 PROCEDURE — 3075F SYST BP GE 130 - 139MM HG: CPT | Mod: ,,, | Performed by: NURSE PRACTITIONER

## 2022-05-05 PROCEDURE — 99213 OFFICE O/P EST LOW 20 MIN: CPT | Mod: 25,,, | Performed by: NURSE PRACTITIONER

## 2022-05-05 PROCEDURE — 82728 ASSAY OF FERRITIN: CPT | Mod: ,,, | Performed by: CLINICAL MEDICAL LABORATORY

## 2022-05-05 PROCEDURE — 82728 FERRITIN: ICD-10-PCS | Mod: ,,, | Performed by: CLINICAL MEDICAL LABORATORY

## 2022-05-05 PROCEDURE — 83540 ASSAY OF IRON: CPT | Mod: ,,, | Performed by: CLINICAL MEDICAL LABORATORY

## 2022-05-05 PROCEDURE — 1101F PT FALLS ASSESS-DOCD LE1/YR: CPT | Mod: ,,, | Performed by: NURSE PRACTITIONER

## 2022-05-05 PROCEDURE — 1159F PR MEDICATION LIST DOCUMENTED IN MEDICAL RECORD: ICD-10-PCS | Mod: ,,, | Performed by: NURSE PRACTITIONER

## 2022-05-05 RX ORDER — HYDROCODONE BITARTRATE AND ACETAMINOPHEN 10; 325 MG/1; MG/1
1 TABLET ORAL EVERY 8 HOURS PRN
COMMUNITY
End: 2022-05-31 | Stop reason: SDUPTHER

## 2022-05-05 RX ORDER — PANTOPRAZOLE SODIUM 40 MG/1
1 TABLET, DELAYED RELEASE ORAL DAILY
COMMUNITY
Start: 2022-04-22 | End: 2023-04-10 | Stop reason: SDUPTHER

## 2022-05-05 RX ORDER — KETOROLAC TROMETHAMINE 30 MG/ML
30 INJECTION, SOLUTION INTRAMUSCULAR; INTRAVENOUS
Status: COMPLETED | OUTPATIENT
Start: 2022-05-05 | End: 2022-05-05

## 2022-05-05 RX ADMIN — KETOROLAC TROMETHAMINE 30 MG: 30 INJECTION, SOLUTION INTRAMUSCULAR; INTRAVENOUS at 02:05

## 2022-05-05 NOTE — PROGRESS NOTES
New clinic note    Kathryn Marie is a 71 y.o. female     Chief Complaint:   Chief Complaint   Patient presents with    Leg Pain     Starting on right side in her big toe going up her leg and hip. Started hurting bad on Monday        Subjective:    Patient complains of arthritis pain. Pain to neck, legs, and back. Denies injury. Patient reports she has been going to rehab therapy for 2-3 weeks. Pain worse past 4 days. Patient states she goes to rheumatologist and pain treatment for chronic pain and arthritis. Denies known hx of gout but states she has had pain in big toe.          Allergies:   Review of patient's allergies indicates:  No Known Allergies     Past Medical History:  Past Medical History:   Diagnosis Date    Anemia     Atherosclerotic heart disease of native coronary artery without angina pectoris     Carotid artery stenosis 01/15/2014    CHARO  LICA stenosis    Causalgia of lower limb     Cervical radiculopathy     Chronic low back pain     Chronic pain syndrome     CVA (cerebral vascular accident)     right cerebellar    Degenerative joint disease involving multiple joints     Disorder of parathyroid gland, unspecified     Disorder of sacrum     Essential (primary) hypertension     Gammopathy     GERD (gastroesophageal reflux disease)     Heart murmur     Hyperlipidemia     Hyperparathyroidism     Lumbosacral radiculopathy     Lumbosacral spondylosis     Other long term (current) drug therapy     Pernicious anemia     Sciatica     Spinal stenosis of lumbar region     L4-5    Type 2 diabetes mellitus         Current Medications:    Current Outpatient Medications:     amLODIPine (NORVASC) 10 MG tablet, Take 1 tablet (10 mg total) by mouth once daily., Disp: 90 tablet, Rfl: 1    aspirin (ECOTRIN) 81 MG EC tablet, Take 81 mg by mouth once daily., Disp: , Rfl:     blood sugar diagnostic (ONETOUCH VERIO TEST STRIPS MISC), by Misc.(Non-Drug; Combo Route) route. Use 1 strip to  check blood glucose every morning, fasting for E11.9, Disp: , Rfl:     blood-glucose meter (ONETOUCH VERIO METER MISC), by Misc.(Non-Drug; Combo Route) route. Use for glucose checks once daily, E11.9, Disp: , Rfl:     clopidogreL (PLAVIX) 75 mg tablet, Take 1 tablet (75 mg total) by mouth once daily., Disp: 90 tablet, Rfl: 1    diclofenac sodium (VOLTAREN) 1 % Gel, APPLY 1 APPLICATION TO  AFFECTED AREA(S) TOPICALLY  TWICE DAILY, Disp: 300 g, Rfl: 2    ferrous sulfate (FEOSOL) 325 mg (65 mg iron) Tab tablet, Take 325 mg by mouth daily with breakfast., Disp: , Rfl:     fluticasone propionate (FLONASE) 50 mcg/actuation nasal spray, 2 sprays (100 mcg total) by Each Nostril route once daily., Disp: 48 g, Rfl: 1    folic acid (FOLVITE) 1 MG tablet, Take 1 mg by mouth once daily., Disp: , Rfl:     gabapentin (NEURONTIN) 300 MG capsule, Take 1 capsule (300 mg total) by mouth once daily., Disp: 90 capsule, Rfl: 1    hydroCHLOROthiazide (HYDRODIURIL) 12.5 MG Tab, Take 1 tablet (12.5 mg total) by mouth once daily., Disp: 90 tablet, Rfl: 1    HYDROcodone-acetaminophen (NORCO)  mg per tablet, Take 1 tablet by mouth every 8 (eight) hours as needed., Disp: , Rfl:     methotrexate 2.5 MG Tab, Take by mouth. Take 4 tablets by mouth every week, Disp: , Rfl:     omeprazole (PRILOSEC) 20 MG capsule, Take 1 capsule by mouth once daily., Disp: , Rfl:     pantoprazole (PROTONIX) 40 MG tablet, Take 1 tablet by mouth once daily at 6am., Disp: , Rfl:     rosuvastatin (CRESTOR) 20 MG tablet, Take 1 tablet (20 mg total) by mouth every evening., Disp: 90 tablet, Rfl: 1    travoprost (TRAVATAN Z) 0.004 % ophthalmic solution, 1 drop every evening., Disp: , Rfl:     vitamin D (VITAMIN D3) 1000 units Tab, Take 1,000 Units by mouth once daily., Disp: , Rfl:   No current facility-administered medications for this visit.       Review of Systems   Constitutional: Negative for fever.   Musculoskeletal: Positive for arthralgias, back  "pain and neck pain.          Objective:    /70 (BP Location: Left arm, Patient Position: Sitting, BP Method: Large (Manual))   Pulse 61   Temp 97 °F (36.1 °C) (Skin)   Resp 20   Ht 5' 5" (1.651 m)   Wt 67.8 kg (149 lb 6.4 oz)   SpO2 97%   BMI 24.86 kg/m²      Physical Exam  Constitutional:       Appearance: Normal appearance.   Eyes:      Extraocular Movements: Extraocular movements intact.   Cardiovascular:      Rate and Rhythm: Normal rate and regular rhythm.      Pulses: Normal pulses.      Heart sounds: Normal heart sounds.   Pulmonary:      Effort: Pulmonary effort is normal.      Breath sounds: Normal breath sounds.   Musculoskeletal:      Right lower leg: No edema.      Left lower leg: No edema.   Neurological:      Mental Status: She is alert and oriented to person, place, and time.          Assessment and Plan:    1. Chronic low back pain with sciatica, sciatica laterality unspecified, unspecified back pain laterality    2. Arthritis    3. Anemia, unspecified type         Chronic low back pain with sciatica, sciatica laterality unspecified, unspecified back pain laterality  -     ketorolac injection 30 mg    Arthritis  -     ketorolac injection 30 mg  -     Uric Acid; Future; Expected date: 05/05/2022    Anemia, unspecified type  -     Iron and TIBC  -     Ferritin       Arthritis/chronic pain--toradol 30mg IM. Discussed side effects and precaution. Instructed patient to follow up with specialists and make sure they are aware of break through pain. Could not continue toradol. Patient states glucose not well controlled to try steroid treatment. Uric acid to r/o gout    I did not order iron, tibc, or ferritin. This was encounter in error into my encounter by another facility.     There are no Patient Instructions on file for this visit.   Follow up if symptoms worsen or fail to improve.     "

## 2022-05-06 ENCOUNTER — OFFICE VISIT (OUTPATIENT)
Dept: FAMILY MEDICINE | Facility: CLINIC | Age: 72
End: 2022-05-06
Payer: MEDICARE

## 2022-05-06 VITALS
BODY MASS INDEX: 24.83 KG/M2 | HEIGHT: 65 IN | WEIGHT: 149 LBS | TEMPERATURE: 99 F | SYSTOLIC BLOOD PRESSURE: 128 MMHG | RESPIRATION RATE: 18 BRPM | OXYGEN SATURATION: 98 % | HEART RATE: 65 BPM | DIASTOLIC BLOOD PRESSURE: 40 MMHG

## 2022-05-06 DIAGNOSIS — Z11.59 NEED FOR HEPATITIS C SCREENING TEST: Primary | ICD-10-CM

## 2022-05-06 DIAGNOSIS — R60.9 EDEMA, UNSPECIFIED TYPE: ICD-10-CM

## 2022-05-06 PROBLEM — G89.29 CHRONIC LOW BACK PAIN WITH SCIATICA: Status: ACTIVE | Noted: 2022-05-06

## 2022-05-06 PROBLEM — M19.90 ARTHRITIS: Status: ACTIVE | Noted: 2021-09-13

## 2022-05-06 PROBLEM — M54.40 CHRONIC LOW BACK PAIN WITH SCIATICA: Status: ACTIVE | Noted: 2022-05-06

## 2022-05-06 LAB
EKG 12-LEAD: ABNORMAL
PR INTERVAL: ABNORMAL
PRT AXES: ABNORMAL
QRS DURATION: ABNORMAL
QT/QTC: ABNORMAL
VENTRICULAR RATE: ABNORMAL

## 2022-05-06 PROCEDURE — 85025 CBC WITH DIFFERENTIAL: ICD-10-PCS | Mod: ,,, | Performed by: CLINICAL MEDICAL LABORATORY

## 2022-05-06 PROCEDURE — 84443 ASSAY THYROID STIM HORMONE: CPT | Mod: ,,, | Performed by: CLINICAL MEDICAL LABORATORY

## 2022-05-06 PROCEDURE — 86803 HEPATITIS C AB TEST: CPT | Mod: ,,, | Performed by: CLINICAL MEDICAL LABORATORY

## 2022-05-06 PROCEDURE — 85025 COMPLETE CBC W/AUTO DIFF WBC: CPT | Mod: ,,, | Performed by: CLINICAL MEDICAL LABORATORY

## 2022-05-06 PROCEDURE — 93005 ELECTROCARDIOGRAM TRACING: CPT | Mod: RHCUB | Performed by: FAMILY MEDICINE

## 2022-05-06 PROCEDURE — 84439 T4, FREE: ICD-10-PCS | Mod: ,,, | Performed by: CLINICAL MEDICAL LABORATORY

## 2022-05-06 PROCEDURE — 99213 PR OFFICE/OUTPT VISIT, EST, LEVL III, 20-29 MIN: ICD-10-PCS | Mod: ,,, | Performed by: FAMILY MEDICINE

## 2022-05-06 PROCEDURE — 93010 ELECTROCARDIOGRAM REPORT: CPT | Mod: ,,, | Performed by: FAMILY MEDICINE

## 2022-05-06 PROCEDURE — 93010 PR ELECTROCARDIOGRAM REPORT: ICD-10-PCS | Mod: ,,, | Performed by: FAMILY MEDICINE

## 2022-05-06 PROCEDURE — 84439 ASSAY OF FREE THYROXINE: CPT | Mod: ,,, | Performed by: CLINICAL MEDICAL LABORATORY

## 2022-05-06 PROCEDURE — 84443 TSH: ICD-10-PCS | Mod: ,,, | Performed by: CLINICAL MEDICAL LABORATORY

## 2022-05-06 PROCEDURE — 99213 OFFICE O/P EST LOW 20 MIN: CPT | Mod: ,,, | Performed by: FAMILY MEDICINE

## 2022-05-06 PROCEDURE — 86803 HEPATITIS C ANTIBODY: ICD-10-PCS | Mod: ,,, | Performed by: CLINICAL MEDICAL LABORATORY

## 2022-05-06 NOTE — PROGRESS NOTES
Kathryn Marie is a 71 y.o. female seen today for symptoms of increased peripheral edema over the last 2 weeks.  She denies any chest pain or palpitations or worsening shortness of breath.  She reports her symptoms began in the left lower extremity initially and now involve the right lower extremity.      Past Medical History:   Diagnosis Date    Anemia     Atherosclerotic heart disease of native coronary artery without angina pectoris     Carotid artery stenosis 01/15/2014    CHARO  LICA stenosis    Causalgia of lower limb     Cervical radiculopathy     Chronic low back pain     Chronic pain syndrome     CVA (cerebral vascular accident)     right cerebellar    Degenerative joint disease involving multiple joints     Disorder of parathyroid gland, unspecified     Disorder of sacrum     Essential (primary) hypertension     Gammopathy     GERD (gastroesophageal reflux disease)     Heart murmur     Hyperlipidemia     Hyperparathyroidism     Lumbosacral radiculopathy     Lumbosacral spondylosis     Other long term (current) drug therapy     Pernicious anemia     Sciatica     Spinal stenosis of lumbar region     L4-5    Type 2 diabetes mellitus      Family History   Problem Relation Age of Onset    Diabetes Mother     Heart disease Mother     Hypertension Mother     Heart attack Mother     Hypertension Father     Stroke Father     Stroke Sister     Hypertension Sister     Cancer Brother      Current Outpatient Medications on File Prior to Visit   Medication Sig Dispense Refill    amLODIPine (NORVASC) 10 MG tablet Take 1 tablet (10 mg total) by mouth once daily. 90 tablet 1    aspirin (ECOTRIN) 81 MG EC tablet Take 81 mg by mouth once daily.      blood sugar diagnostic (ONETOUCH VERIO TEST STRIPS MISC) by Misc.(Non-Drug; Combo Route) route. Use 1 strip to check blood glucose every morning, fasting for E11.9      blood-glucose meter (ONETOUCH VERIO METER MISC) by Misc.(Non-Drug; Combo  Route) route. Use for glucose checks once daily, E11.9      clopidogreL (PLAVIX) 75 mg tablet Take 1 tablet (75 mg total) by mouth once daily. 90 tablet 1    diclofenac sodium (VOLTAREN) 1 % Gel APPLY 1 APPLICATION TO  AFFECTED AREA(S) TOPICALLY  TWICE DAILY 300 g 2    ferrous sulfate (FEOSOL) 325 mg (65 mg iron) Tab tablet Take 325 mg by mouth daily with breakfast.      fluticasone propionate (FLONASE) 50 mcg/actuation nasal spray 2 sprays (100 mcg total) by Each Nostril route once daily. 48 g 1    folic acid (FOLVITE) 1 MG tablet Take 1 mg by mouth once daily.      gabapentin (NEURONTIN) 300 MG capsule Take 1 capsule (300 mg total) by mouth once daily. 90 capsule 1    hydroCHLOROthiazide (HYDRODIURIL) 12.5 MG Tab Take 1 tablet (12.5 mg total) by mouth once daily. 90 tablet 1    HYDROcodone-acetaminophen (NORCO)  mg per tablet Take 1 tablet by mouth every 8 (eight) hours as needed.      methotrexate 2.5 MG Tab Take by mouth. Take 4 tablets by mouth every week      pantoprazole (PROTONIX) 40 MG tablet Take 1 tablet by mouth once daily at 6am.      rosuvastatin (CRESTOR) 20 MG tablet Take 1 tablet (20 mg total) by mouth every evening. 90 tablet 1    travoprost (TRAVATAN Z) 0.004 % ophthalmic solution 1 drop every evening.      vitamin D (VITAMIN D3) 1000 units Tab Take 1,000 Units by mouth once daily.      [DISCONTINUED] omeprazole (PRILOSEC) 20 MG capsule Take 1 capsule by mouth once daily.       No current facility-administered medications on file prior to visit.     Immunization History   Administered Date(s) Administered    COVID-19, MRNA, LN-S, PF (Pfizer) (Purple Cap) 01/28/2021, 02/27/2021, 08/27/2021, 04/22/2022    Influenza - Quadrivalent - High Dose - PF (65 years and older) 11/04/2021    Influenza - Trivalent (ADULT) 02/09/2016    Pneumococcal Conjugate - 13 Valent 11/13/2017    Pneumococcal Polysaccharide - 23 Valent 12/01/2014, 02/09/2016    Zoster Recombinant 07/30/2013        Review of Systems   Constitutional: Negative for fever, malaise/fatigue and weight loss.   Respiratory: Negative for shortness of breath.    Cardiovascular: Positive for leg swelling. Negative for chest pain and palpitations.   Gastrointestinal: Negative for nausea and vomiting.   Psychiatric/Behavioral: Negative for depression.        Vitals:    05/06/22 1419   BP: (!) 128/40   Pulse: 65   Resp: 18   Temp: 98.7 °F (37.1 °C)       Physical Exam  Vitals reviewed.   Constitutional:       Appearance: Normal appearance.   HENT:      Head: Normocephalic.   Eyes:      Extraocular Movements: Extraocular movements intact.      Conjunctiva/sclera: Conjunctivae normal.      Pupils: Pupils are equal, round, and reactive to light.   Neck:      Thyroid: No thyroid mass or thyromegaly.   Cardiovascular:      Rate and Rhythm: Normal rate and regular rhythm.      Heart sounds: Murmur heard.    Crescendo systolic murmur is present with a grade of 4/6.    No gallop.   Pulmonary:      Effort: Pulmonary effort is normal. No respiratory distress.      Breath sounds: Normal breath sounds. No wheezing or rales.   Musculoskeletal:      Right lower leg: 3+ Edema present.      Left lower leg: 3+ Edema present.   Skin:     General: Skin is warm and dry.      Coloration: Skin is not jaundiced or pale.   Neurological:      Mental Status: She is alert.   Psychiatric:         Mood and Affect: Mood normal.         Behavior: Behavior normal.         Thought Content: Thought content normal.         Judgment: Judgment normal.          Assessment and Plan  Need for hepatitis C screening test  -     Hepatitis C Antibody; Future; Expected date: 05/06/2022    Edema, unspecified type  -     POCT EKG 12-LEAD (NOT FOR [facility] USE)  -     TSH; Future; Expected date: 05/06/2022  -     T4, Free; Future; Expected date: 05/06/2022  -     CBC Auto Differential; Future; Expected date: 05/06/2022  -     Comprehensive Metabolic Panel; Future; Expected date:  05/06/2022            Return to clinic in 6 weeks or as needed once her blood work is in patient is instructed in the meantime to her lower extremities elevated and if she develops any chest pain, chest discomfort worsening shortness of breath to call [phone] for ER evaluation.  Her EKG here in the office today was abnormal with evidence of hypertrophy but no acute ST changes.  Patient will be set up for follow-up with her cardiologist as soon as possible.    Health Maintenance Topics with due status: Not Due       Topic Last Completion Date    Lipid Panel 11/04/2021    Mammogram 12/15/2021    Eye Exam 03/07/2022    Hemoglobin A1c 04/07/2022    High Dose Statin 05/06/2022

## 2022-05-09 LAB
ANISOCYTOSIS BLD QL SMEAR: ABNORMAL
ATYPICAL LYMPHOCYTES: ABNORMAL
BASOPHILS # BLD AUTO: 0.01 K/UL (ref 0–0.2)
BASOPHILS NFR BLD AUTO: 0.3 % (ref 0–1)
BASOPHILS NFR BLD MANUAL: 1 % (ref 0–1)
CRENATED CELLS: ABNORMAL
DIFFERENTIAL METHOD BLD: ABNORMAL
EOSINOPHIL # BLD AUTO: 0.04 K/UL (ref 0–0.5)
EOSINOPHIL NFR BLD AUTO: 1.3 % (ref 1–4)
EOSINOPHIL NFR BLD MANUAL: 4 % (ref 1–4)
ERYTHROCYTE [DISTWIDTH] IN BLOOD BY AUTOMATED COUNT: 14.5 % (ref 11.5–14.5)
HCT VFR BLD AUTO: 29.9 % (ref 38–47)
HCV AB SER QL: NORMAL
HGB BLD-MCNC: 9.7 G/DL (ref 12–16)
HYPOCHROMIA BLD QL SMEAR: ABNORMAL
IMM GRANULOCYTES # BLD AUTO: 0.03 K/UL (ref 0–0.04)
IMM GRANULOCYTES NFR BLD: 1 % (ref 0–0.4)
LYMPHOCYTES # BLD AUTO: 1.04 K/UL (ref 1–4.8)
LYMPHOCYTES NFR BLD AUTO: 34.7 % (ref 27–41)
LYMPHOCYTES NFR BLD MANUAL: 38 % (ref 27–41)
MCH RBC QN AUTO: 31.4 PG (ref 27–31)
MCHC RBC AUTO-ENTMCNC: 32.4 G/DL (ref 32–36)
MCV RBC AUTO: 96.8 FL (ref 80–96)
MONOCYTES # BLD AUTO: 0.33 K/UL (ref 0–0.8)
MONOCYTES NFR BLD AUTO: 11 % (ref 2–6)
MONOCYTES NFR BLD MANUAL: 13 % (ref 2–6)
MPC BLD CALC-MCNC: 12.1 FL (ref 9.4–12.4)
NEUTROPHILS # BLD AUTO: 1.55 K/UL (ref 1.8–7.7)
NEUTROPHILS NFR BLD AUTO: 51.7 % (ref 53–65)
NEUTS SEG NFR BLD MANUAL: 44 % (ref 50–62)
NRBC # BLD AUTO: 0 X10E3/UL
NRBC, AUTO (.00): 0 %
OVALOCYTES BLD QL SMEAR: ABNORMAL
PLATELET # BLD AUTO: 142 K/UL (ref 150–400)
PLATELET MORPHOLOGY: ABNORMAL
POLYCHROMASIA BLD QL SMEAR: ABNORMAL
RBC # BLD AUTO: 3.09 M/UL (ref 4.2–5.4)
T4 FREE SERPL-MCNC: 1.37 NG/DL (ref 0.76–1.46)
TARGETS BLD QL SMEAR: ABNORMAL
TSH SERPL DL<=0.005 MIU/L-ACNC: 1.15 UIU/ML (ref 0.36–3.74)
WBC # BLD AUTO: 3 K/UL (ref 4.5–11)

## 2022-05-10 ENCOUNTER — TELEPHONE (OUTPATIENT)
Dept: FAMILY MEDICINE | Facility: CLINIC | Age: 72
End: 2022-05-10
Payer: MEDICARE

## 2022-05-10 ENCOUNTER — CLINICAL SUPPORT (OUTPATIENT)
Dept: REHABILITATION | Facility: HOSPITAL | Age: 72
End: 2022-05-10
Payer: MEDICARE

## 2022-05-10 DIAGNOSIS — M35.3 POLYMYALGIA RHEUMATICA: ICD-10-CM

## 2022-05-10 DIAGNOSIS — M54.2 NECK PAIN: Primary | ICD-10-CM

## 2022-05-10 PROCEDURE — 97110 THERAPEUTIC EXERCISES: CPT

## 2022-05-10 NOTE — TELEPHONE ENCOUNTER
Attempted to reach pt about getting labs redrawn. Pt Potassium level was high and needed to be redrawn . Pt did not answer left voicemail to return call.

## 2022-05-10 NOTE — PROGRESS NOTES
Physical Therapy Daily Note     Name: Kathryn Marie  Clinic Number: [id number]  Diagnosis:   Encounter Diagnoses   Name Primary?    Neck pain Yes    Polymyalgia rheumatica      Physician: Sera Dow MD  Precautions: pt has a pacemaker  Visit #: 7 of 12  PTA Visit #:   Time In: 1428  Time Out: 1500    Subjective     Pt reports: I took my pain pills today so I'm not hurting right now..  Pain Scale: Ktahryn rates pain on a scale of 0-10 to be 0 currently.    Objective     Kathryn received individual therapeutic exercises to develop strength, ROM and posture for 30 minutes including:    UE bike x 6 minutes  Seated cervical rotation, flexion and extension 2 x 10 each.  Standing scapular retraction arms straight red TB 3 x 10  Standing shoulder shrugs 2 lb DB 3 x 10  Standing shoulder flexion and abduction 1 lb 2 x 10  Seated dumbbell rows 2 x 10      Cervical AROM:  FDB: 40  BB: 25  SBR: 15  SBL: 20  RR: 39  LR: 40    Cervical Strength: 3-/5      Shoulder AROM/MMT Right  Left    Flexion (180) 70/2+ 85/2+   Internal rotation (45) 35/2+ 70/3-   External rotation (45) 27/2+ 45/3-   Abduction (180) 50/2+ 55/2+          Written Home Exercises Provided: completed.    Education provided re:  Kathryn received verbal and tactile cues to facilitate proper execution of exercises and body mechanics.  No spiritual or educational barriers to learning.    Assessment     Patient tolerated treatment fair but having difficulty improving strength and ROM of shoulders due to polymyalgia rheumatica and arthritis.    This is a 71 y.o. female referred to outpatient physical therapy and presents with a medical diagnosis of Polymyalgia rheumatica with neck pain and demonstrates limitations as described in the problem list. Pt prognosis is Good. Pt will continue to benefit from skilled outpatient physical therapy to address the deficits listed in the problem list, provide pt/family education and to  maximize pt's level of independence in the home and community environment.     Goals as follows:  STGs  1. Patient will demonstrate independent performance of home exercise program. Goal Met.  2. Patient will report decreased neck pain from 7 to 4 with activity or work to improve quality of life.  3. Patient will increase AROM Cervical spine by 10 degrees to improve functional mobility. Goal Met.  4. Patient will increase cervical strength to 3+/5 to improve functional activity.     LTGs  1. Patient will increase bilateral shoulder strength to 3-/5 to improve functional activity.  2. Patient will increase right  strength to 20 lb or greater to improve functional activity.  3. Patient will increase bilateral shoulder flexion and abduction by 10 degrees to improve functional mobility.          Plan     Continue with established Plan of Care towards PT goals.    Therapist: Cachorro Bowser, PT  5/10/2022

## 2022-05-12 ENCOUNTER — CLINICAL SUPPORT (OUTPATIENT)
Dept: REHABILITATION | Facility: HOSPITAL | Age: 72
End: 2022-05-12
Payer: MEDICARE

## 2022-05-12 ENCOUNTER — TELEPHONE (OUTPATIENT)
Dept: FAMILY MEDICINE | Facility: CLINIC | Age: 72
End: 2022-05-12
Payer: MEDICARE

## 2022-05-12 DIAGNOSIS — M54.2 NECK PAIN: Primary | ICD-10-CM

## 2022-05-12 DIAGNOSIS — D63.8 ANEMIA OF CHRONIC DISEASE: Primary | ICD-10-CM

## 2022-05-12 DIAGNOSIS — M35.3 POLYMYALGIA RHEUMATICA: ICD-10-CM

## 2022-05-12 PROCEDURE — 97110 THERAPEUTIC EXERCISES: CPT

## 2022-05-12 NOTE — PROGRESS NOTES
Physical Therapy Daily Note     Name: Kathryn Marie  Clinic Number: [id number]  Diagnosis:   Encounter Diagnoses   Name Primary?    Neck pain Yes    Polymyalgia rheumatica      Physician: Sera Dow MD  Precautions: pt has a pacemaker  Visit #: 8 of 12  PTA Visit #:   Time In: 1414  Time Out: 1545    Subjective     Pt reports: no neck pain at this time.  Pain Scale: Kathryn rates pain on a scale of 0-10 to be 0 currently.    Objective     Kathryn received individual therapeutic exercises to develop strength, ROM and posture for 30 minutes including:    UE bike x 6 minutes  Seated cervical rotation, flexion and extenasion 2 x 10 each.  Standing scapular retraction arms straight red TB 3 x 10  Standing shoulder shrugs 2 lb DB 3 x 10  Standing shoulder flexion and abduction 1 lb 2 x 10  Seated dumbbell rows 2 x 10 2 lbs    Objective Measurements:    Cervical AROM:  FDB: 40  BB: 25  SBR: 15  SBL: 20  RR: 39  LR: 40      Cervical Strength: 3-/5 to 3+/5      Shoulder AROM/MMT Right  Left    Flexion (180) 70/2+ 85/2+   Internal rotation (45) 35/2+ 70/3-   External rotation (45) 27/2+ 45/3-   Abduction (180) 50/2+ 55/2+          Written Home Exercises Provided: completed.    Education provided re:  Kathryn received verbal and tactile cues to facilitate proper execution of exercises and body mechanics.  No spiritual or educational barriers to learning.    Assessment     Patient tolerated treatment well. Progressing with STG's and has met 3 of 4 goals.    This is a 71 y.o. female referred to outpatient physical therapy and presents with a medical diagnosis of Polymyalgia rheumatica with neck pain and demonstrates limitations as described in the problem list. Pt prognosis is Good. Pt will continue to benefit from skilled outpatient physical therapy to address the deficits listed in the problem list, provide pt/family education and to maximize pt's level of independence in the home  and community environment.     Goals as follows:  STGs  1. Patient will demonstrate independent performance of home exercise program. Goal Met.  2. Patient will report decreased neck pain from 7 to 4 with activity or work to improve quality of life. Goal Met.  3. Patient will increase AROM Cervical spine by 10 degrees to improve functional mobility. Goal Met.  4. Patient will increase cervical strength to 3+/5 to improve functional activity.     LTGs  1. Patient will increase bilateral shoulder strength to 3-/5 to improve functional activity.  2. Patient will increase right  strength to 20 lb or greater to improve functional activity.  3. Patient will increase bilateral shoulder flexion and abduction by 10 degrees to improve functional mobility.          Plan     Continue with established Plan of Care towards PT goals.    Therapist: Cachorro Bowser, PT  5/12/2022

## 2022-05-12 NOTE — TELEPHONE ENCOUNTER
Attempted to reach pt about getting potassium labs redrawn pt did not answer phone left voicemail to return call

## 2022-05-12 NOTE — TELEPHONE ENCOUNTER
----- Message from Dequan Bar MD sent at 5/6/2022  7:58 AM CDT -----  This appears to be anemia of chronic disease.

## 2022-05-12 NOTE — PLAN OF CARE
Name: Kathryn Marie  Clinic Number: [id number]  Diagnosis:   Encounter Diagnoses   Name Primary?    Neck pain Yes    Polymyalgia rheumatica      Physician: Sera Dow MD  Precautions: pt has a pacemaker  Visit #: 8 of 12      Subjective: Kathryn is participating well in therapy and receiving therapeutic exercise to cervical and bilateral shoulders.      Objective Measurements:    Cervical AROM:  FDB: 40  BB: 25  SBR: 15  SBL: 20  RR: 39  LR: 40      Cervical Strength: 3-/5 to 3+/5      Shoulder AROM/MMT Right  Left    Flexion (180) 70/2+ 85/2+   Internal rotation (45) 35/2+ 70/3-   External rotation (45) 27/2+ 45/3-   Abduction (180) 50/2+ 55/2+        Assessment:  Patient tolerated treatment well. Progressing with STG's and has met 3 of 4 goals.      Goals  Goals as follows:  STGs  1. Patient will demonstrate independent performance of home exercise program. Goal Met.  2. Patient will report decreased neck pain from 7 to 4 with activity or work to improve quality of life. Goal Met.  3. Patient will increase AROM Cervical spine by 10 degrees to improve functional mobility. Goal Met.  4. Patient will increase cervical strength to 3+/5 to improve functional activity.     LTGs  1. Patient will increase bilateral shoulder strength to 3-/5 to improve functional activity.  2. Patient will increase right  strength to 20 lb or greater to improve functional activity.  3. Patient will increase bilateral shoulder flexion and abduction by 10 degrees to improve functional mobility.    Plan: Continue with present POC for 2 more weeks then discharge to home program.    Cachorro Bowser, PT

## 2022-05-19 ENCOUNTER — CLINICAL SUPPORT (OUTPATIENT)
Dept: REHABILITATION | Facility: HOSPITAL | Age: 72
End: 2022-05-19
Payer: MEDICARE

## 2022-05-19 DIAGNOSIS — M54.2 NECK PAIN: Primary | ICD-10-CM

## 2022-05-19 DIAGNOSIS — M35.3 POLYMYALGIA RHEUMATICA: ICD-10-CM

## 2022-05-19 PROCEDURE — 97110 THERAPEUTIC EXERCISES: CPT

## 2022-05-19 NOTE — PROGRESS NOTES
Physical Therapy Daily Note     Name: Kathryn Marie  Clinic Number: [id number]  Diagnosis:   Encounter Diagnoses   Name Primary?    Neck pain Yes    Polymyalgia rheumatica      Physician: Sera Dow MD  Precautions: pt has a pacemaker  Visit #: 9 of 12  PTA Visit #:   Time In: 1432  Time Out: 1502    Subjective     Pt reports: no complaints at this time.  Pain Scale: Kathryn rates pain on a scale of 0-10 to be 0 currently.    Objective     Kathryn received individual therapeutic exercises to develop strength, ROM and posture for 30 minutes including:    UE bike x 6 minutes  Seated cervical rotation, flexion and extenasion 3 x 10 each.  Seated scapular retraction elbows bent green TB 3 x 10  Standing shoulder shrugs 3 lb DB 3 x 10   Standing shoulder flexion and abduction 1 lb 3 x 10  Seated dumbbell rows 2 x 10 2 lbs    Objective Measurements:    Cervical AROM:  FDB: 40  BB: 25  SBR: 15  SBL: 20  RR: 39  LR: 40      Cervical Strength: 3-/5 to 3+/5      Shoulder AROM/MMT Right  Left    Flexion (180) 70/2+ 85/2+   Internal rotation (45) 35/2+ 70/3-   External rotation (45) 27/2+ 45/3-   Abduction (180) 50/2+ 55/2+          Written Home Exercises Provided: completed.    Education provided re:  Kathryn received verbal and tactile cues to facilitate proper execution of exercises and body mechanics.  No spiritual or educational barriers to learning.    Assessment     Patient tolerated treatment well. Pt able to progress with increased weights for strengthening exercises.    This is a 71 y.o. female referred to outpatient physical therapy and presents with a medical diagnosis of Polymyalgia rheumatica with neck pain and demonstrates limitations as described in the problem list. Pt prognosis is Good. Pt will continue to benefit from skilled outpatient physical therapy to address the deficits listed in the problem list, provide pt/family education and to maximize pt's level of  independence in the home and community environment.     Goals as follows:  STGs  1. Patient will demonstrate independent performance of home exercise program. Goal Met.  2. Patient will report decreased neck pain from 7 to 4 with activity or work to improve quality of life. Goal Met.  3. Patient will increase AROM Cervical spine by 10 degrees to improve functional mobility. Goal Met.  4. Patient will increase cervical strength to 3+/5 to improve functional activity.     LTGs  1. Patient will increase bilateral shoulder strength to 3-/5 to improve functional activity.  2. Patient will increase right  strength to 20 lb or greater to improve functional activity.  3. Patient will increase bilateral shoulder flexion and abduction by 10 degrees to improve functional mobility.          Plan     Continue with established Plan of Care towards PT goals.    Therapist: Cachorro Bowser, PT  5/19/2022

## 2022-05-24 ENCOUNTER — CLINICAL SUPPORT (OUTPATIENT)
Dept: REHABILITATION | Facility: HOSPITAL | Age: 72
End: 2022-05-24
Payer: MEDICARE

## 2022-05-24 DIAGNOSIS — M54.2 NECK PAIN: Primary | ICD-10-CM

## 2022-05-24 PROCEDURE — 97110 THERAPEUTIC EXERCISES: CPT | Mod: CQ

## 2022-05-24 NOTE — PROGRESS NOTES
"                                                    Physical Therapy Daily Note     Name: Kathryn Marie  Clinic Number: [id number]  Diagnosis:   Encounter Diagnosis   Name Primary?    Neck pain Yes     Physician: Sera Dow MD  Precautions: pt has a pacemaker  Visit #: 10 of 12  PTA Visit #: 1  Time In: 1448  Time Out: 1515    Subjective     Pt reports: "its cloudy outside" my pain is about an 8.    Pain Scale: Kathryn rates pain on a scale of 0-10 to be 8 currently.    Objective     Kathryn received individual therapeutic exercises to develop strength, ROM and posture for 27 minutes including:    UE bike x 6 minutes  Seated cervical rotation, flexion and extension 3 x 10 each   Seated scapular retraction elbows bent green TB 3 x 10  Standing shoulder shrugs 3 lb DB 3 x 10   Standing shoulder flexion and abduction 1 lb 3 x 10  Seated dumbbell rows 3 x 10 2 lbs    Objective Measurements:    Cervical AROM:  FDB: 40  BB: 25  SBR: 15  SBL: 20  RR: 39  LR: 40      Cervical Strength: 3-/5 to 3+/5      Shoulder AROM/MMT Right  Left    Flexion (180) 70/2+ 85/2+   Internal rotation (45) 35/2+ 70/3-   External rotation (45) 27/2+ 45/3-   Abduction (180) 50/2+ 55/2+          Written Home Exercises Provided: completed.    Education provided re:  Kathryn received verbal and tactile cues to facilitate proper execution of exercises and body mechanics.  No spiritual or educational barriers to learning.    Assessment     Patient tolerated treatment well.  Therapist provided verbal and tactile cues for proper posture and positioning with all exercises.     This is a 71 y.o. female referred to outpatient physical therapy and presents with a medical diagnosis of Polymyalgia rheumatica with neck pain and demonstrates limitations as described in the problem list. Pt prognosis is Good. Pt will continue to benefit from skilled outpatient physical therapy to address the deficits listed in the problem list, provide pt/family education and to " maximize pt's level of independence in the home and community environment.     Goals as follows:  STGs  1. Patient will demonstrate independent performance of home exercise program. Goal Met.  2. Patient will report decreased neck pain from 7 to 4 with activity or work to improve quality of life. Goal Met.  3. Patient will increase AROM Cervical spine by 10 degrees to improve functional mobility. Goal Met.  4. Patient will increase cervical strength to 3+/5 to improve functional activity.     LTGs  1. Patient will increase bilateral shoulder strength to 3-/5 to improve functional activity.  2. Patient will increase right  strength to 20 lb or greater to improve functional activity.  3. Patient will increase bilateral shoulder flexion and abduction by 10 degrees to improve functional mobility.          Plan     Continue with established Plan of Care towards PT goals.    Therapist: Taina Swain, NICOLE  5/24/2022

## 2022-05-29 ENCOUNTER — HOSPITAL ENCOUNTER (EMERGENCY)
Facility: HOSPITAL | Age: 72
Discharge: HOME | End: 2022-05-29
Payer: MEDICARE

## 2022-05-29 VITALS
TEMPERATURE: 99 F | SYSTOLIC BLOOD PRESSURE: 150 MMHG | RESPIRATION RATE: 18 BRPM | OXYGEN SATURATION: 98 % | WEIGHT: 134 LBS | HEART RATE: 78 BPM | HEIGHT: 65 IN | DIASTOLIC BLOOD PRESSURE: 56 MMHG | BODY MASS INDEX: 22.33 KG/M2

## 2022-05-29 DIAGNOSIS — M16.12 ARTHRITIS OF LEFT HIP: Primary | ICD-10-CM

## 2022-05-29 PROCEDURE — 63600175 PHARM REV CODE 636 W HCPCS: Performed by: NURSE PRACTITIONER

## 2022-05-29 PROCEDURE — 99283 EMERGENCY DEPT VISIT LOW MDM: CPT | Mod: GF | Performed by: NURSE PRACTITIONER

## 2022-05-29 PROCEDURE — 96372 THER/PROPH/DIAG INJ SC/IM: CPT

## 2022-05-29 PROCEDURE — 99284 EMERGENCY DEPT VISIT MOD MDM: CPT

## 2022-05-29 RX ORDER — KETOROLAC TROMETHAMINE 30 MG/ML
60 INJECTION, SOLUTION INTRAMUSCULAR; INTRAVENOUS
Status: COMPLETED | OUTPATIENT
Start: 2022-05-29 | End: 2022-05-29

## 2022-05-29 RX ADMIN — KETOROLAC TROMETHAMINE 60 MG: 30 INJECTION, SOLUTION INTRAMUSCULAR at 01:05

## 2022-05-29 NOTE — ED TRIAGE NOTES
Patient reports left hip pain that has been going on for a long time. Patient sees pain treatment specialist for management of chronic arthritis. Hx of HTN, CAD CABG, pacemaker placement. Patient further reports that she saw multiple providers last week in attempt to resolve left hip pain with no improvement. Patient ambulatory to room with a cane. Left hip guarding noted.

## 2022-05-29 NOTE — DISCHARGE INSTRUCTIONS
Follow up with PCP next week and ask for a left hip joint injection. Take home medication as prescribed. Return to the ED for any increase in symptoms or any worrisome of symptoms.

## 2022-05-29 NOTE — ED PROVIDER NOTES
Encounter Date: 5/29/2022       History     Chief Complaint   Patient presents with    Left hip pain     72 y/o BF with a PMH of arthritis presents to the ED with complaints of chronic left hip pain. Pt states she has trouble for a long time with this hip and actually sees pain management and get medication for her pain. Pt states her pain medicine has not been helping and she has sought help from other places over the last couple if days for her hip pain. Pt denies any recent injury.         Review of patient's allergies indicates:  No Known Allergies  Past Medical History:   Diagnosis Date    Anemia     Atherosclerotic heart disease of native coronary artery without angina pectoris     Carotid artery stenosis 01/15/2014    CHARO  LICA stenosis    Causalgia of lower limb     Cervical radiculopathy     Chronic low back pain     Chronic pain syndrome     CVA (cerebral vascular accident)     right cerebellar    Degenerative joint disease involving multiple joints     Disorder of parathyroid gland, unspecified     Disorder of sacrum     Essential (primary) hypertension     Gammopathy     GERD (gastroesophageal reflux disease)     Heart murmur     Hyperlipidemia     Hyperparathyroidism     Lumbosacral radiculopathy     Lumbosacral spondylosis     Other long term (current) drug therapy     Pernicious anemia     Sciatica     Spinal stenosis of lumbar region     L4-5    Type 2 diabetes mellitus      Past Surgical History:   Procedure Laterality Date    CARPAL TUNNEL RELEASE Right     caudal MOIZ  07/03/2018    CHOLECYSTECTOMY  1975    CORONARY ARTERY BYPASS GRAFT      CORONARY ARTERY BYPASS GRAFT (CABG)      triple    HIP SURGERY Right     L4-S1 Caudal cath guided MOIZ  07/03/2018    Dr Orta    L5-S1 Caudal cath guided MOIZ  09/26/2014 6/26/2014 4/11/2014    Dr Orta    left shoulder repair Left     NECK SURGERY  2018    does not know what kind    right sacral RF Right 12/26/2013 1/24/2012     Dr Orta    right FAITH Right 10/24/2013    Dr Orta     Family History   Problem Relation Age of Onset    Diabetes Mother     Heart disease Mother     Hypertension Mother     Heart attack Mother     Hypertension Father     Stroke Father     Stroke Sister     Hypertension Sister     Cancer Brother      Social History     Tobacco Use    Smoking status: Never Smoker    Smokeless tobacco: Never Used   Substance Use Topics    Alcohol use: Not Currently    Drug use: Yes     Types: Hydrocodone     Comment: pain medication with pain treatment      Review of Systems   Constitutional: Negative.    HENT: Negative.    Eyes: Negative.    Respiratory: Negative.  Negative for shortness of breath.    Cardiovascular: Negative.  Negative for chest pain, palpitations and leg swelling.   Gastrointestinal: Negative.  Negative for diarrhea, nausea and vomiting.   Endocrine: Negative.    Genitourinary: Negative.    Musculoskeletal: Positive for arthralgias (left hip pain). Negative for back pain and neck pain.   Skin: Negative.    Neurological: Negative.    Psychiatric/Behavioral: Negative.    All other systems reviewed and are negative.      Physical Exam     Initial Vitals [05/29/22 1258]   BP Pulse Resp Temp SpO2   (!) 150/56 78 17 98.6 °F (37 °C) 98 %      MAP       --         Physical Exam    Constitutional: Vital signs are normal. She appears well-developed and well-nourished.   HENT:   Head: Normocephalic and atraumatic.   Eyes: Conjunctivae, EOM and lids are normal. Pupils are equal, round, and reactive to light.   Neck: Trachea normal. Neck supple.   Normal range of motion.  Cardiovascular: Normal rate, regular rhythm, S1 normal, S2 normal and normal heart sounds.   Pulmonary/Chest: Breath sounds normal. No respiratory distress. She has no wheezes. She has no rales.   Abdominal: Abdomen is soft. Bowel sounds are normal.   Musculoskeletal:         General: Tenderness (left hip) present. Normal range of motion.       Cervical back: Normal range of motion and neck supple.      Comments: FROM     Neurological: She is alert and oriented to person, place, and time. She has normal strength.   Skin: Skin is warm and dry.   Psychiatric: She has a normal mood and affect.         Medical Screening Exam   See Full Note    ED Course   Procedures  Labs Reviewed - No data to display       Imaging Results    None          Medications   ketorolac injection 60 mg (has no administration in time range)                       Clinical Impression:   Final diagnoses:  [M16.12] Arthritis of left hip (Primary)          ED Disposition Condition    Discharge Stable        ED Prescriptions     None        Follow-up Information     Follow up With Specialties Details Why Contact Info    Dequan Bar MD Family Medicine In 2 days  [address] [facility].  [facility] [address] [phone]             KANDY Villela  05/29/22 1322

## 2022-05-30 ENCOUNTER — TELEPHONE (OUTPATIENT)
Dept: EMERGENCY MEDICINE | Facility: HOSPITAL | Age: 72
End: 2022-05-30
Payer: MEDICARE

## 2022-05-31 ENCOUNTER — OFFICE VISIT (OUTPATIENT)
Dept: PAIN MEDICINE | Facility: CLINIC | Age: 72
End: 2022-05-31
Payer: MEDICARE

## 2022-05-31 ENCOUNTER — EXTERNAL CHRONIC CARE MANAGEMENT (OUTPATIENT)
Dept: FAMILY MEDICINE | Facility: CLINIC | Age: 72
End: 2022-05-31
Payer: MEDICARE

## 2022-05-31 VITALS
WEIGHT: 138 LBS | HEART RATE: 61 BPM | DIASTOLIC BLOOD PRESSURE: 58 MMHG | HEIGHT: 65 IN | SYSTOLIC BLOOD PRESSURE: 155 MMHG | BODY MASS INDEX: 22.99 KG/M2

## 2022-05-31 DIAGNOSIS — M53.3 DISORDER OF SACRUM: Chronic | ICD-10-CM

## 2022-05-31 DIAGNOSIS — M96.1 POSTLAMINECTOMY SYNDROME OF CERVICAL REGION: Chronic | ICD-10-CM

## 2022-05-31 DIAGNOSIS — M35.3 POLYMYALGIA RHEUMATICA: Chronic | ICD-10-CM

## 2022-05-31 DIAGNOSIS — M47.817 LUMBOSACRAL SPONDYLOSIS WITHOUT MYELOPATHY: Chronic | ICD-10-CM

## 2022-05-31 DIAGNOSIS — M70.62 TROCHANTERIC BURSITIS, LEFT HIP: Primary | Chronic | ICD-10-CM

## 2022-05-31 PROCEDURE — G0511 CCM/BHI BY RHC/FQHC 20MIN MO: HCPCS | Mod: ,,, | Performed by: FAMILY MEDICINE

## 2022-05-31 PROCEDURE — G0511 PR CHRONIC CARE MGMT, RHC OR FQHC ONLY, 20 MINS OR MORE: ICD-10-PCS | Mod: ,,, | Performed by: FAMILY MEDICINE

## 2022-05-31 PROCEDURE — 20610 DRAIN/INJ JOINT/BURSA W/O US: CPT | Mod: S$PBB,LT,, | Performed by: PHYSICIAN ASSISTANT

## 2022-05-31 PROCEDURE — 20610 DRAIN/INJ JOINT/BURSA W/O US: CPT | Mod: PBBFAC | Performed by: PHYSICIAN ASSISTANT

## 2022-05-31 PROCEDURE — 20610 PR DRAIN/INJECT LARGE JOINT/BURSA: ICD-10-PCS | Mod: S$PBB,LT,, | Performed by: PHYSICIAN ASSISTANT

## 2022-05-31 PROCEDURE — 99214 OFFICE O/P EST MOD 30 MIN: CPT | Mod: 25,S$PBB,, | Performed by: PHYSICIAN ASSISTANT

## 2022-05-31 PROCEDURE — 99214 PR OFFICE/OUTPT VISIT, EST, LEVL IV, 30-39 MIN: ICD-10-PCS | Mod: 25,S$PBB,, | Performed by: PHYSICIAN ASSISTANT

## 2022-05-31 PROCEDURE — 99215 OFFICE O/P EST HI 40 MIN: CPT | Mod: PBBFAC | Performed by: PHYSICIAN ASSISTANT

## 2022-05-31 RX ORDER — HYDROCODONE BITARTRATE AND ACETAMINOPHEN 10; 325 MG/1; MG/1
1 TABLET ORAL EVERY 8 HOURS PRN
Qty: 90 TABLET | Refills: 0 | Status: SHIPPED | OUTPATIENT
Start: 2022-06-03 | End: 2022-07-03

## 2022-05-31 RX ORDER — BUPIVACAINE HYDROCHLORIDE 2.5 MG/ML
2 INJECTION, SOLUTION EPIDURAL; INFILTRATION; INTRACAUDAL
Status: DISCONTINUED | OUTPATIENT
Start: 2022-05-31 | End: 2022-05-31 | Stop reason: HOSPADM

## 2022-05-31 RX ORDER — HYDROCODONE BITARTRATE AND ACETAMINOPHEN 10; 325 MG/1; MG/1
1 TABLET ORAL EVERY 8 HOURS PRN
Qty: 90 TABLET | Refills: 0 | Status: SHIPPED | OUTPATIENT
Start: 2022-08-02 | End: 2022-09-01

## 2022-05-31 RX ORDER — HYDROCODONE BITARTRATE AND ACETAMINOPHEN 10; 325 MG/1; MG/1
1 TABLET ORAL EVERY 8 HOURS PRN
Qty: 90 TABLET | Refills: 0 | Status: SHIPPED | OUTPATIENT
Start: 2022-07-02 | End: 2022-08-01

## 2022-05-31 RX ORDER — TRIAMCINOLONE ACETONIDE 40 MG/ML
40 INJECTION, SUSPENSION INTRA-ARTICULAR; INTRAMUSCULAR
Status: DISCONTINUED | OUTPATIENT
Start: 2022-05-31 | End: 2022-05-31 | Stop reason: HOSPADM

## 2022-05-31 RX ADMIN — BUPIVACAINE HYDROCHLORIDE 2 ML: 2.5 INJECTION, SOLUTION EPIDURAL; INFILTRATION; INTRACAUDAL; PERINEURAL at 12:05

## 2022-05-31 RX ADMIN — TRIAMCINOLONE ACETONIDE 40 MG: 40 INJECTION, SUSPENSION INTRA-ARTICULAR; INTRAMUSCULAR at 12:05

## 2022-05-31 NOTE — PROCEDURES
Large Joint Aspiration/Injection: L greater trochanteric bursa    Date/Time: 5/31/2022 12:45 PM  Performed by: AMANDA Aguilar  Authorized by: AMANDA Aguilar     Consent Done?:  Yes (Written)  Indications:  Arthritis and pain  Site marked: the procedure site was marked    Timeout: prior to procedure the correct patient, procedure, and site was verified    Prep: patient was prepped and draped in usual sterile fashion      Details:  Needle Size:  22 G  Approach:  Lateral  Location:  Hip  Site:  L greater trochanteric bursa  Medications:  2 mL BUPivacaine (PF) 0.25% (2.5 mg/ml) 0.25 % (2.5 mg/mL); 40 mg triamcinolone acetonide 40 mg/mL  Aspirate amount (mL):  0  Patient tolerance:  Patient tolerated the procedure well with no immediate complications     Bupivacaine 0.25% 25 mg/ 10 ml , 2 cc     Tolerated procedure well    No complication noted    Band-Aid was applied

## 2022-05-31 NOTE — PROGRESS NOTES
Subjective:      Patient ID: Kathryn Marie is a 71 y.o. female.    Chief Complaint: Hip Pain (Left hip)      Pain  This is a chronic problem. The current episode started more than 1 year ago. The problem occurs daily. The problem has been unchanged. Associated symptoms include arthralgias and neck pain. Pertinent negatives include no change in bowel habit, chest pain, chills, coughing, fatigue, fever, rash, sore throat, vertigo or vomiting.     Review of Systems   Constitutional: Negative for appetite change, chills, fatigue, fever and unexpected weight change.   HENT: Negative for drooling, ear discharge, ear pain, facial swelling, nosebleeds, sore throat, trouble swallowing, voice change and goiter.    Eyes: Negative for photophobia, pain, discharge, redness and visual disturbance.   Respiratory: Negative for apnea, cough, choking, chest tightness, shortness of breath, wheezing and stridor.    Cardiovascular: Negative for chest pain, palpitations and leg swelling.   Gastrointestinal: Negative for abdominal distention, change in bowel habit, diarrhea, rectal pain, vomiting, fecal incontinence and change in bowel habit.   Endocrine: Negative for cold intolerance, heat intolerance, polydipsia, polyphagia and polyuria.   Genitourinary: Negative for bladder incontinence, dysuria, flank pain, frequency and hot flashes.   Musculoskeletal: Positive for arthralgias, back pain, leg pain, neck pain and neck stiffness.   Integumentary:  Negative for color change, pallor and rash.   Allergic/Immunologic: Negative for immunocompromised state.   Neurological: Negative for dizziness, vertigo, seizures, syncope, facial asymmetry, speech difficulty, light-headedness, disturbances in coordination, memory loss and coordination difficulties.   Hematological: Negative for adenopathy. Does not bruise/bleed easily.   Psychiatric/Behavioral: Negative for agitation, behavioral problems, confusion, decreased concentration, dysphoric mood,  "hallucinations, self-injury and suicidal ideas. The patient is not nervous/anxious and is not hyperactive.             Objective:  Vitals:    05/31/22 1307 05/31/22 1349   BP: (!) 140/66 (!) 155/58   Pulse: 88 61   Weight: 62.6 kg (138 lb)    Height: 5' 5" (1.651 m)    PainSc: 10-Worst pain ever   5   PainLoc:  Hip         Physical Exam  Vitals and nursing note reviewed. Exam conducted with a chaperone present.   Constitutional:       General: She is awake. She is not in acute distress.     Appearance: Normal appearance. She is not ill-appearing or diaphoretic.   HENT:      Head: Normocephalic and atraumatic.      Nose: Nose normal.      Mouth/Throat:      Mouth: Mucous membranes are moist.      Pharynx: Oropharynx is clear.   Eyes:      Conjunctiva/sclera: Conjunctivae normal.      Pupils: Pupils are equal, round, and reactive to light.   Cardiovascular:      Rate and Rhythm: Normal rate.   Pulmonary:      Effort: Pulmonary effort is normal. No respiratory distress.   Abdominal:      Palpations: Abdomen is soft.   Musculoskeletal:      Cervical back: Normal range of motion and neck supple. Tenderness present.      Thoracic back: Tenderness present.      Lumbar back: Tenderness present. Decreased range of motion.   Skin:     General: Skin is warm and dry.      Coloration: Skin is not jaundiced or pale.   Neurological:      General: No focal deficit present.      Mental Status: She is alert and oriented to person, place, and time. Mental status is at baseline.      Cranial Nerves: Cranial nerves are intact. No cranial nerve deficit (II-XII).   Psychiatric:         Mood and Affect: Mood normal.         Behavior: Behavior normal. Behavior is cooperative.         Thought Content: Thought content normal.           Orders Placed This Encounter   Procedures    Large Joint Aspiration/Injection: L greater trochanteric bursa     This order was created via procedure documentation        X-Ray Hips Bilateral 2 View Inc AP " Pelvis  Narrative: EXAMINATION:  XR HIPS BILATERAL 2 VIEW INCL AP PELVIS    CLINICAL HISTORY:  Spondylosis without myelopathy or radiculopathy, lumbosacral region    COMPARISON:  6 June 2020    FINDINGS:  No evidence of acute fracture seen.  The alignment of the joints appears normal.  Mild bilateral hip degenerative change is present.  No soft tissue abnormality is seen.  Impression: Mild bilateral hip osteoarthrosis.    Electronically signed by: Ron Valentin  Date:    01/05/2022  Time:    10:46       Office Visit on 05/06/2022   Component Date Value Ref Range Status    EKG 12-Lead 05/06/2022    Final    Ventricular Rate 05/06/2022 72 bmp   Final    SC Interval 05/06/2022 828 ms   Final    QRS Duration 05/06/2022 164 ms   Final    QT/QTc 05/06/2022 436 457 ms   Final    PRT Axes 05/06/2022 64 /177 8   Final    Free T4 05/06/2022 1.37  0.76 - 1.46 ng/dL Final    TSH 05/06/2022 1.150  0.358 - 3.740 uIU/mL Final    Hepatitis C Ab 05/06/2022 Non-Reactive  Non-Reactive Final    WBC 05/06/2022 3.00 (A) 4.50 - 11.00 K/uL Corrected    RBC 05/06/2022 3.09 (A) 4.20 - 5.40 M/uL Corrected    Hemoglobin 05/06/2022 9.7 (A) 12.0 - 16.0 g/dL Final    Hematocrit 05/06/2022 29.9 (A) 38.0 - 47.0 % Corrected    MCV 05/06/2022 96.8 (A) 80.0 - 96.0 fL Corrected    MCH 05/06/2022 31.4 (A) 27.0 - 31.0 pg Corrected    MCHC 05/06/2022 32.4  32.0 - 36.0 g/dL Corrected    RDW 05/06/2022 14.5  11.5 - 14.5 % Corrected    Platelet Count 05/06/2022 142 (A) 150 - 400 K/uL Corrected    MPV 05/06/2022 12.1  9.4 - 12.4 fL Final    Neutrophils % 05/06/2022 51.7 (A) 53.0 - 65.0 % Corrected    Lymphocytes % 05/06/2022 34.7  27.0 - 41.0 % Corrected    Monocytes % 05/06/2022 11.0 (A) 2.0 - 6.0 % Corrected    Eosinophils % 05/06/2022 1.3  1.0 - 4.0 % Corrected    Basophils % 05/06/2022 0.3  0.0 - 1.0 % Final    Immature Granulocytes % 05/06/2022 1.0 (A) 0.0 - 0.4 % Corrected    nRBC, Auto 05/06/2022 0.0  <=0.0 % Final     Neutrophils, Abs 05/06/2022 1.55 (A) 1.80 - 7.70 K/uL Corrected    Lymphocytes, Absolute 05/06/2022 1.04  1.00 - 4.80 K/uL Corrected    Monocytes, Absolute 05/06/2022 0.33  0.00 - 0.80 K/uL Corrected    Eosinophils, Absolute 05/06/2022 0.04  0.00 - 0.50 K/uL Corrected    Basophils, Absolute 05/06/2022 0.01  0.00 - 0.20 K/uL Final    Immature Granulocytes, Absolute 05/06/2022 0.03  0.00 - 0.04 K/uL Final    nRBC, Absolute 05/06/2022 0.00  <=0.00 x10e3/uL Final    Diff Type 05/06/2022 Manual   Corrected    Segmented Neutrophils, Man % 05/06/2022 44 (A) 50 - 62 % Final    Lymphocytes, Man % 05/06/2022 38  27 - 41 % Final    Monocytes, Man % 05/06/2022 13 (A) 2 - 6 % Final    Eosinophils, Man % 05/06/2022 4  1 - 4 % Final    Basophils, Man % 05/06/2022 1  0 - 1 % Final    Platelet Morphology 05/06/2022 Few Large Platelets (A) Normal Final    Anisocytosis 05/06/2022 1+   Final    Target Cells 05/06/2022 Few   Final    Crenated Cells 05/06/2022 Few   Final    Ovalocytes 05/06/2022 Few   Final    Polychromasia 05/06/2022 Few   Final    Hypochromic 05/06/2022 Few   Final    Atypical Lymphocytes 05/06/2022 Few   Final   Office Visit on 05/05/2022   Component Date Value Ref Range Status    Iron 05/05/2022 35 (A) 50 - 170 µg/dL Final    Iron Saturation 05/05/2022 14  14 - 50 % Final    TIBC 05/05/2022 248 (A) 250 - 450 µg/dL Final    Ferritin 05/05/2022 164  8 - 252 ng/mL Final    Uric Acid 05/05/2022 5.2  2.6 - 6.0 mg/dL Final   Office Visit on 04/07/2022   Component Date Value Ref Range Status    Hemoglobin A1C 04/07/2022 6.2  4.5 - 6.6 % Final    Estimated Average Glucose 04/07/2022 120  mg/dL Final    Sodium 04/07/2022 140  136 - 145 mmol/L Final    Potassium 04/07/2022 4.0  3.5 - 5.1 mmol/L Final    Chloride 04/07/2022 108 (A) 98 - 107 mmol/L Final    CO2 04/07/2022 25  21 - 32 mmol/L Final    Anion Gap 04/07/2022 11  7 - 16 mmol/L Final    Glucose 04/07/2022 133 (A) 74 - 106 mg/dL Final     BUN 04/07/2022 16  7 - 18 mg/dL Final    Creatinine 04/07/2022 1.25 (A) 0.55 - 1.02 mg/dL Final    BUN/Creatinine Ratio 04/07/2022 13  6 - 20 Final    Calcium 04/07/2022 8.9  8.5 - 10.1 mg/dL Final    Total Protein 04/07/2022 7.1  6.4 - 8.2 g/dL Final    Albumin 04/07/2022 3.4 (A) 3.5 - 5.0 g/dL Final    Globulin 04/07/2022 3.7  2.0 - 4.0 g/dL Final    A/G Ratio 04/07/2022 0.9   Final    Bilirubin, Total 04/07/2022 0.4  0.0 - 1.2 mg/dL Final    Alk Phos 04/07/2022 55  55 - 142 U/L Final    ALT 04/07/2022 36  13 - 56 U/L Final    AST 04/07/2022 34  15 - 37 U/L Final    eGFR 04/07/2022 45 (A) >=60 mL/min/1.73m² Final    WBC 04/07/2022 3.13 (A) 4.50 - 11.00 K/uL Final    RBC 04/07/2022 3.02 (A) 4.20 - 5.40 M/uL Final    Hemoglobin 04/07/2022 9.4 (A) 12.0 - 16.0 g/dL Final    Hematocrit 04/07/2022 29.0 (A) 38.0 - 47.0 % Final    MCV 04/07/2022 96.0  80.0 - 96.0 fL Final    MCH 04/07/2022 31.1 (A) 27.0 - 31.0 pg Final    MCHC 04/07/2022 32.4  32.0 - 36.0 g/dL Final    RDW 04/07/2022 14.2  11.5 - 14.5 % Final    Platelet Count 04/07/2022 144 (A) 150 - 400 K/uL Final    MPV 04/07/2022 11.4  9.4 - 12.4 fL Final    Neutrophils % 04/07/2022 70.9 (A) 53.0 - 65.0 % Final    Lymphocytes % 04/07/2022 19.8 (A) 27.0 - 41.0 % Final    Monocytes % 04/07/2022 6.4 (A) 2.0 - 6.0 % Final    Eosinophils % 04/07/2022 0.3 (A) 1.0 - 4.0 % Final    Basophils % 04/07/2022 0.0  0.0 - 1.0 % Final    Immature Granulocytes % 04/07/2022 2.6 (A) 0.0 - 0.4 % Final    nRBC, Auto 04/07/2022 0.0  <=0.0 % Final    Neutrophils, Abs 04/07/2022 2.22  1.80 - 7.70 K/uL Final    Lymphocytes, Absolute 04/07/2022 0.62 (A) 1.00 - 4.80 K/uL Final    Monocytes, Absolute 04/07/2022 0.20  0.00 - 0.80 K/uL Final    Eosinophils, Absolute 04/07/2022 0.01  0.00 - 0.50 K/uL Final    Basophils, Absolute 04/07/2022 0.00  0.00 - 0.20 K/uL Final    Immature Granulocytes, Absolute 04/07/2022 0.08 (A) 0.00 - 0.04 K/uL Final    nRBC,  Absolute 04/07/2022 0.00  <=0.00 x10e3/uL Final    Diff Type 04/07/2022 Manual   Final    Segmented Neutrophils, Man % 04/07/2022 71 (A) 50 - 62 % Final    Lymphocytes, Man % 04/07/2022 22 (A) 27 - 41 % Final    Monocytes, Man % 04/07/2022 7 (A) 2 - 6 % Final    Platelet Morphology 04/07/2022 Few Large Platelets (A) Normal Final    Anisocytosis 04/07/2022 1+   Final    Target Cells 04/07/2022 Few   Final    Ovalocytes 04/07/2022 Few   Final    Hypochromic 04/07/2022 Few   Final    Atypical Lymphocytes 04/07/2022 Rare   Final   Office Visit on 03/01/2022   Component Date Value Ref Range Status    POC Amphetamines 03/01/2022 Negative  Negative, Inconclusive Final    POC Barbiturates 03/01/2022 Negative  Negative, Inconclusive Final    POC Benzodiazepines 03/01/2022 Negative  Negative, Inconclusive Final    POC Cocaine 03/01/2022 Negative  Negative, Inconclusive Final    POC THC 03/01/2022 Negative  Negative, Inconclusive Final    POC Methadone 03/01/2022 Negative  Negative, Inconclusive Final    POC Methamphetamine 03/01/2022 Negative  Negative, Inconclusive Final    POC Opiates 03/01/2022 Presumptive Positive (A) Negative, Inconclusive Final    POC Oxycodone 03/01/2022 Negative  Negative, Inconclusive Final    POC Phencyclidine 03/01/2022 Negative  Negative, Inconclusive Final    POC Methylenedioxymethamphetamine * 03/01/2022 Negative  Negative, Inconclusive Final    POC Tricyclic Antidepressants 03/01/2022 Negative  Negative, Inconclusive Final    POC Buprenorphine 03/01/2022 Negative   Final     Acceptable 03/01/2022 Yes   Final    POC Temperature (Urine) 03/01/2022 90   Final   Office Visit on 12/22/2021   Component Date Value Ref Range Status    Color, UA 12/22/2021 Yellow   Final    Spec Grav UA 12/22/2021 1.015   Final    pH, UA 12/22/2021 5.5   Final    WBC, UA 12/22/2021 Negative   Final    Nitrite, UA 12/22/2021 Negative   Final    Protein, POC 12/22/2021  Negative   Final    Glucose, UA 12/22/2021 Negative   Final    Ketones, UA 12/22/2021 Negative   Final    Bilirubin, POC 12/22/2021 Negative   Final    Urobilinogen, UA 12/22/2021 0.2   Final    Blood, UA 12/22/2021 Trace *   Final   Office Visit on 12/20/2021   Component Date Value Ref Range Status    POC Amphetamines 12/20/2021 Negative  Negative, Inconclusive Final    POC Barbiturates 12/20/2021 Negative  Negative, Inconclusive Final    POC Benzodiazepines 12/20/2021 Negative  Negative, Inconclusive Final    POC Cocaine 12/20/2021 Negative  Negative, Inconclusive Final    POC THC 12/20/2021 Negative  Negative, Inconclusive Final    POC Methadone 12/20/2021 Negative  Negative, Inconclusive Final    POC Methamphetamine 12/20/2021 Negative  Negative, Inconclusive Final    POC Opiates 12/20/2021 Presumptive Positive (A) Negative, Inconclusive Final    POC Oxycodone 12/20/2021 Negative  Negative, Inconclusive Final    POC Phencyclidine 12/20/2021 Negative  Negative, Inconclusive Final    POC Methylenedioxymethamphetamine * 12/20/2021 Negative  Negative, Inconclusive Final    POC Tricyclic Antidepressants 12/20/2021 Negative  Negative, Inconclusive Final    POC Buprenorphine 12/20/2021 Negative   Final     Acceptable 12/20/2021 Yes   Final    POC Temperature (Urine) 12/20/2021 92   Final           Assessment:      1. Trochanteric bursitis, left hip    2. Disorder of sacrum    3. Postlaminectomy syndrome of cervical region    4. Lumbosacral spondylosis without myelopathy    5. Polymyalgia rheumatica            A's of Opioid Risk Assessment  Activity:Patient can perform ADL.   Analgesia:Patients pain is partially controlled by current medication. Patient has tried OTC medications such as Tylenol and Ibuprofen with out relief.   Adverse Effects: Patient denies constipation or sedation.  Aberrant Behavior:  reviewed with no aberrant drug seeking/taking behavior.  Overdose reversal drug  naloxone discussed    Drug screen reviewed      Requested Prescriptions     Signed Prescriptions Disp Refills    HYDROcodone-acetaminophen (NORCO)  mg per tablet 90 tablet 0     Sig: Take 1 tablet by mouth every 8 (eight) hours as needed for Pain.    HYDROcodone-acetaminophen (NORCO)  mg per tablet 90 tablet 0     Sig: Take 1 tablet by mouth every 8 (eight) hours as needed for Pain.    HYDROcodone-acetaminophen (NORCO)  mg per tablet 90 tablet 0     Sig: Take 1 tablet by mouth every 8 (eight) hours as needed for Pain.         Plan:    Rheumatoid arthritis [facility]    Complaint left trochanteric pain pointing to left trochanteric area ongoing for several months now getting worse with time    She is requesting an injection for left trochanteric bursitis she states she has had these injections in the past it does help control her discomfort    Left trochanteric injection today    She states current medications helping control her discomfort    She would like to continue with current medication conservative management    Continue home exercise program as directed    Follow-up 3 months     Dr. Piña, June 2022    Bring original prescription medication bottles/container/box with labels to each visit    Large Joint Aspiration/Injection: L greater trochanteric bursa    Date/Time: 5/31/2022 12:45 PM  Performed by: AMANDA Aguilar  Authorized by: AMANDA Aguilar     Consent Done?:  Yes (Written)  Indications:  Arthritis and pain  Site marked: the procedure site was marked    Timeout: prior to procedure the correct patient, procedure, and site was verified    Prep: patient was prepped and draped in usual sterile fashion      Details:  Needle Size:  22 G  Approach:  Lateral  Location:  Hip  Site:  L greater trochanteric bursa  Medications:  2 mL BUPivacaine (PF) 0.25% (2.5 mg/ml) 0.25 % (2.5 mg/mL); 40 mg triamcinolone acetonide 40 mg/mL  Aspirate amount (mL):  0  Patient tolerance:  Patient tolerated  the procedure well with no immediate complications     Bupivacaine 0.25% 25 mg/ 10 ml , 2 cc     Tolerated procedure well    No complication noted    Band-Aid was applied

## 2022-06-02 ENCOUNTER — CLINICAL SUPPORT (OUTPATIENT)
Dept: REHABILITATION | Facility: HOSPITAL | Age: 72
End: 2022-06-02
Payer: MEDICARE

## 2022-06-02 DIAGNOSIS — M54.2 NECK PAIN: Primary | ICD-10-CM

## 2022-06-02 PROCEDURE — 97110 THERAPEUTIC EXERCISES: CPT | Mod: CQ

## 2022-06-02 NOTE — PROGRESS NOTES
"                                                    Physical Therapy Daily Note     Name: Kathryn Marie  Clinic Number: [id number]  Diagnosis:   Encounter Diagnosis   Name Primary?    Neck pain Yes     Physician: Sera Dow MD  Precautions: pt has a pacemaker  Visit #: 11 of 12  PTA Visit #: 2  Time In: 1445  Time Out: 1515    Subjective     Pt reports: "its still cloudy outside" my pain is about an 7.    Pain Scale: Kathryn rates pain on a scale of 0-10 to be 7 currently.    Objective     Kathryn received individual therapeutic exercises to develop strength, ROM and posture for 35 minutes including:    UE bike x 6 minutes  Seated cervical rotation, flexion and extension 3 x 10 each   Seated scapular retraction elbows bent green TB 3 x 10  Standing shoulder shrugs 3 lb DB 3 x 10   Standing shoulder flexion and abduction 2 lb 3 x 10  Seated dumbbell rows 3 x 10 3  lbs    Objective Measurements:    Cervical AROM:  FDB: 40  BB: 25  SBR: 15  SBL: 20  RR: 39  LR: 40      Cervical Strength: 3-/5 to 3+/5      Shoulder AROM/MMT Right  Left    Flexion (180) 70/2+ 85/2+   Internal rotation (45) 35/2+ 70/3-   External rotation (45) 27/2+ 45/3-   Abduction (180) 50/2+ 55/2+          Written Home Exercises Provided: completed.    Education provided re:  Kathryn received verbal and tactile cues to facilitate proper execution of exercises and body mechanics.  No spiritual or educational barriers to learning.    Assessment     Patient tolerated treatment well.  Therapist provided verbal and tactile cues for proper posture and positioning with all exercises.     This is a 71 y.o. female referred to outpatient physical therapy and presents with a medical diagnosis of Polymyalgia rheumatica with neck pain and demonstrates limitations as described in the problem list. Pt prognosis is Good. Pt will continue to benefit from skilled outpatient physical therapy to address the deficits listed in the problem list, provide pt/family education and to " maximize pt's level of independence in the home and community environment.     Goals as follows:  STGs  1. Patient will demonstrate independent performance of home exercise program. Goal Met.  2. Patient will report decreased neck pain from 7 to 4 with activity or work to improve quality of life. Goal Met.  3. Patient will increase AROM Cervical spine by 10 degrees to improve functional mobility. Goal Met.  4. Patient will increase cervical strength to 3+/5 to improve functional activity.     LTGs  1. Patient will increase bilateral shoulder strength to 3-/5 to improve functional activity.  2. Patient will increase right  strength to 20 lb or greater to improve functional activity.  3. Patient will increase bilateral shoulder flexion and abduction by 10 degrees to improve functional mobility.          Plan     Continue with established Plan of Care towards PT goals.    Therapist: Taina Swain, NICOLE  6/2/2022

## 2022-06-07 ENCOUNTER — CLINICAL SUPPORT (OUTPATIENT)
Dept: REHABILITATION | Facility: HOSPITAL | Age: 72
End: 2022-06-07
Payer: MEDICARE

## 2022-06-07 DIAGNOSIS — M54.2 NECK PAIN: Primary | ICD-10-CM

## 2022-06-07 PROCEDURE — 97110 THERAPEUTIC EXERCISES: CPT | Mod: CQ

## 2022-06-07 NOTE — PROGRESS NOTES
Physical Therapy Daily Note     Name: Kathryn Marie  Clinic Number: [id number]  Diagnosis:   Encounter Diagnosis   Name Primary?    Neck pain Yes     Physician: Sera Dow MD  Precautions: pt has a pacemaker  Visit #: 12 of 12  PTA Visit #: 3  Time In: 1446  Time Out: 1511    Subjective     Pt reports:  Decreased pain today.  Pain Scale: Kathryn rates pain on a scale of 0-10 to be 2 currently.    Objective     Kathryn received individual therapeutic exercises to develop strength, ROM and posture for 25 minutes including:    UE bike x 6 minutes  Seated cervical rotation, flexion and extension 3 x 10 each   Seated scapular retraction elbows bent green TB 3 x 10  Standing shoulder shrugs 3 lb DB 3 x 10   Standing shoulder flexion and abduction 2 lb 3 x 10  Seated dumbbell rows 3 x 10 3  lbs    Objective Measurements:    Cervical AROM:  FDB: 40  BB: 25  SBR: 15  SBL: 20  RR: 39  LR: 40      Cervical Strength: 3-/5 to 3+/5      Shoulder AROM/MMT Right  Left    Flexion (180) 70/2+ 85/2+   Internal rotation (45) 35/2+ 70/3-   External rotation (45) 27/2+ 45/3-   Abduction (180) 50/2+ 55/2+          Written Home Exercises Provided: completed.    Education provided re:  Kathryn received verbal and tactile cues to facilitate proper execution of exercises and body mechanics.  No spiritual or educational barriers to learning.    Assessment     Patient tolerated treatment well.  Therapist provided verbal and tactile cues for proper posture and positioning with all exercises.     This is a 71 y.o. female referred to outpatient physical therapy and presents with a medical diagnosis of Polymyalgia rheumatica with neck pain and demonstrates limitations as described in the problem list. Pt prognosis is Good. Pt will continue to benefit from skilled outpatient physical therapy to address the deficits listed in the problem list, provide pt/family education and to maximize pt's level of  independence in the home and community environment.     Goals as follows:  STGs  1. Patient will demonstrate independent performance of home exercise program. Goal Met.  2. Patient will report decreased neck pain from 7 to 4 with activity or work to improve quality of life. Goal Met.  3. Patient will increase AROM Cervical spine by 10 degrees to improve functional mobility. Goal Met.  4. Patient will increase cervical strength to 3+/5 to improve functional activity.     LTGs  1. Patient will increase bilateral shoulder strength to 3-/5 to improve functional activity.  2. Patient will increase right  strength to 20 lb or greater to improve functional activity.  3. Patient will increase bilateral shoulder flexion and abduction by 10 degrees to improve functional mobility.          Plan     Continue with established Plan of Care towards PT goals.    Therapist: Taina Swain, PTA  6/7/2022

## 2022-06-08 ENCOUNTER — DOCUMENTATION ONLY (OUTPATIENT)
Dept: REHABILITATION | Facility: HOSPITAL | Age: 72
End: 2022-06-08
Payer: MEDICAID

## 2022-06-08 PROBLEM — M54.2 NECK PAIN: Status: RESOLVED | Noted: 2022-04-12 | Resolved: 2022-06-08

## 2022-06-08 NOTE — PROGRESS NOTES
Outpatient Therapy Discharge Summary     Name: Kathryn Marie  Clinic Number: [id number]  Diagnosis:   Encounter Diagnoses   Name Primary?    Neck pain Yes    Polymyalgia rheumatica      Physician: Sera Dow MD  Precautions: pt has a pacemaker  Visit #: 12 of 12  Evaluation Date: 4/12/22  Date of Last visit: 6/7/22  Total Visits Received: 12    Objective Measurements:    Cervical AROM:  FDB: 40  BB: 25  SBR: 15  SBL: 20  RR: 39  LR: 40      Cervical Strength: 3-/5 to 3+/5      Shoulder AROM/MMT Right  Left    Flexion (180) 70/2+ 85/2+   Internal rotation (45) 35/2+ 70/3-   External rotation (45) 27/2+ 45/3-   Abduction (180) 50/2+ 55/2+            Assessment    Kathryn Marie participated well in therapy, however, due to her polymyalgia rheumatica she is limited with shoulder ROM and Strength. Max benefit of therapy achieved.    Goals:  Goals as follows:  STGs  1. Patient will demonstrate independent performance of home exercise program. Goal Met.  2. Patient will report decreased neck pain from 7 to 4 with activity or work to improve quality of life. Goal Met.  3. Patient will increase AROM Cervical spine by 10 degrees to improve functional mobility. Goal Met.  4. Patient will increase cervical strength to 3+/5 to improve functional activity.     LTGs  1. Patient will increase bilateral shoulder strength to 3-/5 to improve functional activity. Partially met.  2. Patient will increase right  strength to 20 lb or greater to improve functional activity. Unable to meet goal.  3. Patient will increase bilateral shoulder flexion and abduction by 10 degrees to improve functional mobility. Partially met.      Discharge reason: Patient has completed the physician's prescription and Patient has reached the maximum rehab potential for the present time    Plan   This patient is discharged from Physical Therapy.      Cachorro Bowser, PT

## 2022-06-17 ENCOUNTER — OFFICE VISIT (OUTPATIENT)
Dept: FAMILY MEDICINE | Facility: CLINIC | Age: 72
End: 2022-06-17
Payer: MEDICARE

## 2022-06-17 VITALS
SYSTOLIC BLOOD PRESSURE: 128 MMHG | OXYGEN SATURATION: 99 % | HEIGHT: 65 IN | BODY MASS INDEX: 22.99 KG/M2 | DIASTOLIC BLOOD PRESSURE: 54 MMHG | WEIGHT: 138 LBS | RESPIRATION RATE: 18 BRPM | HEART RATE: 83 BPM | TEMPERATURE: 99 F

## 2022-06-17 DIAGNOSIS — D53.9 MACROCYTIC ANEMIA: ICD-10-CM

## 2022-06-17 DIAGNOSIS — E11.9 DIABETES MELLITUS WITHOUT COMPLICATION: Primary | ICD-10-CM

## 2022-06-17 DIAGNOSIS — E11.9 TYPE 2 DIABETES MELLITUS WITHOUT COMPLICATION, WITHOUT LONG-TERM CURRENT USE OF INSULIN: ICD-10-CM

## 2022-06-17 DIAGNOSIS — J30.9 ALLERGIC RHINITIS, UNSPECIFIED SEASONALITY, UNSPECIFIED TRIGGER: ICD-10-CM

## 2022-06-17 LAB
CREAT UR-MCNC: 74 MG/DL (ref 28–219)
FOLATE SERPL-MCNC: 17.6 NG/ML (ref 3.1–17.5)
MICROALBUMIN UR-MCNC: 0.6 MG/DL (ref 0–2.8)
MICROALBUMIN/CREAT RATIO PNL UR: 8.1 MG/G (ref 0–30)
VIT B12 SERPL-MCNC: 301 PG/ML (ref 193–986)

## 2022-06-17 PROCEDURE — 82607 VITAMIN B-12: CPT | Mod: ,,, | Performed by: CLINICAL MEDICAL LABORATORY

## 2022-06-17 PROCEDURE — 82570 ASSAY OF URINE CREATININE: CPT | Mod: ,,, | Performed by: CLINICAL MEDICAL LABORATORY

## 2022-06-17 PROCEDURE — 82043 UR ALBUMIN QUANTITATIVE: CPT | Mod: ,,, | Performed by: CLINICAL MEDICAL LABORATORY

## 2022-06-17 PROCEDURE — 82607 VITAMIN B12: ICD-10-PCS | Mod: ,,, | Performed by: CLINICAL MEDICAL LABORATORY

## 2022-06-17 PROCEDURE — 99213 PR OFFICE/OUTPT VISIT, EST, LEVL III, 20-29 MIN: ICD-10-PCS | Mod: ,,, | Performed by: FAMILY MEDICINE

## 2022-06-17 PROCEDURE — 82746 FOLATE: ICD-10-PCS | Mod: ,,, | Performed by: CLINICAL MEDICAL LABORATORY

## 2022-06-17 PROCEDURE — 82570 MICROALBUMIN / CREATININE RATIO URINE: ICD-10-PCS | Mod: ,,, | Performed by: CLINICAL MEDICAL LABORATORY

## 2022-06-17 PROCEDURE — 82043 MICROALBUMIN / CREATININE RATIO URINE: ICD-10-PCS | Mod: ,,, | Performed by: CLINICAL MEDICAL LABORATORY

## 2022-06-17 PROCEDURE — 82746 ASSAY OF FOLIC ACID SERUM: CPT | Mod: ,,, | Performed by: CLINICAL MEDICAL LABORATORY

## 2022-06-17 PROCEDURE — 99213 OFFICE O/P EST LOW 20 MIN: CPT | Mod: ,,, | Performed by: FAMILY MEDICINE

## 2022-06-17 RX ORDER — LATANOPROST 50 UG/ML
SOLUTION/ DROPS OPHTHALMIC
COMMUNITY
Start: 2022-05-16 | End: 2023-04-10

## 2022-06-17 RX ORDER — SAXAGLIPTIN 5 MG/1
5 TABLET, FILM COATED ORAL DAILY
COMMUNITY
Start: 2022-05-16 | End: 2022-12-08

## 2022-06-17 RX ORDER — IPRATROPIUM BROMIDE 21 UG/1
2 SPRAY, METERED NASAL
COMMUNITY
End: 2022-06-17 | Stop reason: SDUPTHER

## 2022-06-17 RX ORDER — DAPAGLIFLOZIN 5 MG/1
5 TABLET, FILM COATED ORAL DAILY
Qty: 90 TABLET | Refills: 1 | Status: SHIPPED | OUTPATIENT
Start: 2022-06-17 | End: 2022-11-21

## 2022-06-17 RX ORDER — IPRATROPIUM BROMIDE 21 UG/1
2 SPRAY, METERED NASAL EVERY 12 HOURS
Qty: 30 ML | Refills: 3 | Status: SHIPPED | OUTPATIENT
Start: 2022-06-17 | End: 2022-08-19

## 2022-06-17 NOTE — PROGRESS NOTES
Kathryn Marie is a 71 y.o. female seen today for follow-up on her diabetes.  On recent lab work her A1c was to goal but her creatinine has continued to decline.  We discussed starting the patient on Farxiga and holding her hydrochlorothiazide.  We discussed the side effects of this medication and I encouraged strict hygiene and increased intake of water daily.  She has a chronic anemia and I noticed that her MCV has risen.  Patient does have a past medical history B12 deficiency and is currently not on replacement.     Past Medical History:   Diagnosis Date    Anemia     Atherosclerotic heart disease of native coronary artery without angina pectoris     Carotid artery stenosis 01/15/2014    CHARO  LICA stenosis    Causalgia of lower limb     Cervical radiculopathy     Chronic low back pain     Chronic pain syndrome     CVA (cerebral vascular accident)     right cerebellar    Degenerative joint disease involving multiple joints     Disorder of parathyroid gland, unspecified     Disorder of sacrum     Essential (primary) hypertension     Gammopathy     GERD (gastroesophageal reflux disease)     Heart murmur     Hyperlipidemia     Hyperparathyroidism     Lumbosacral radiculopathy     Lumbosacral spondylosis     Other long term (current) drug therapy     Pernicious anemia     Sciatica     Spinal stenosis of lumbar region     L4-5    Type 2 diabetes mellitus      Family History   Problem Relation Age of Onset    Diabetes Mother     Heart disease Mother     Hypertension Mother     Heart attack Mother     Hypertension Father     Stroke Father     Stroke Sister     Hypertension Sister     Cancer Brother      Current Outpatient Medications on File Prior to Visit   Medication Sig Dispense Refill    amLODIPine (NORVASC) 10 MG tablet Take 1 tablet (10 mg total) by mouth once daily. 90 tablet 1    aspirin (ECOTRIN) 81 MG EC tablet Take 81 mg by mouth once daily.      blood sugar diagnostic  (ONETOUCH VERIO TEST STRIPS MISC) by Misc.(Non-Drug; Combo Route) route. Use 1 strip to check blood glucose every morning, fasting for E11.9      blood-glucose meter (ONETOUCH VERIO METER MISC) by Misc.(Non-Drug; Combo Route) route. Use for glucose checks once daily, E11.9      clopidogreL (PLAVIX) 75 mg tablet Take 1 tablet (75 mg total) by mouth once daily. 90 tablet 1    diclofenac sodium (VOLTAREN) 1 % Gel APPLY 1 APPLICATION TO  AFFECTED AREA(S) TOPICALLY  TWICE DAILY 300 g 2    ferrous sulfate (FEOSOL) 325 mg (65 mg iron) Tab tablet Take 325 mg by mouth daily with breakfast.      fluticasone propionate (FLONASE) 50 mcg/actuation nasal spray 2 sprays (100 mcg total) by Each Nostril route once daily. 48 g 1    folic acid (FOLVITE) 1 MG tablet Take 1 mg by mouth once daily.      HYDROcodone-acetaminophen (NORCO)  mg per tablet Take 1 tablet by mouth every 8 (eight) hours as needed for Pain. 90 tablet 0    [START ON 7/2/2022] HYDROcodone-acetaminophen (NORCO)  mg per tablet Take 1 tablet by mouth every 8 (eight) hours as needed for Pain. 90 tablet 0    [START ON 8/2/2022] HYDROcodone-acetaminophen (NORCO)  mg per tablet Take 1 tablet by mouth every 8 (eight) hours as needed for Pain. 90 tablet 0    latanoprost 0.005 % ophthalmic solution Place into both eyes.      methotrexate 2.5 MG Tab Take by mouth. Take 4 tablets by mouth every week      ONGLYZA 5 mg Tab tablet Take 5 mg by mouth once daily.      pantoprazole (PROTONIX) 40 MG tablet Take 1 tablet by mouth once daily at 6am.      rosuvastatin (CRESTOR) 20 MG tablet Take 1 tablet (20 mg total) by mouth every evening. 90 tablet 1    travoprost (TRAVATAN Z) 0.004 % ophthalmic solution 1 drop every evening.      vitamin D (VITAMIN D3) 1000 units Tab Take 1,000 Units by mouth once daily.      [DISCONTINUED] hydroCHLOROthiazide (HYDRODIURIL) 12.5 MG Tab Take 1 tablet (12.5 mg total) by mouth once daily. 90 tablet 1     [DISCONTINUED] ipratropium (ATROVENT) 21 mcg (0.03 %) nasal spray 2 sprays by Nasal route every 12 (twelve) hours.      [DISCONTINUED] gabapentin (NEURONTIN) 300 MG capsule Take 1 capsule (300 mg total) by mouth once daily. (Patient not taking: Reported on 6/17/2022) 90 capsule 1     No current facility-administered medications on file prior to visit.     Immunization History   Administered Date(s) Administered    COVID-19, MRNA, LN-S, PF (Pfizer) (Purple Cap) 01/28/2021, 02/27/2021, 08/27/2021, 04/22/2022    Influenza - Quadrivalent - High Dose - PF (65 years and older) 11/04/2021    Influenza - Trivalent (ADULT) 02/09/2016    Pneumococcal Conjugate - 13 Valent 11/13/2017    Pneumococcal Polysaccharide - 23 Valent 12/01/2014, 02/09/2016    Zoster Recombinant 07/30/2013       Review of Systems   Constitutional: Negative for fever, malaise/fatigue and weight loss.   Respiratory: Negative for shortness of breath.    Cardiovascular: Negative for chest pain and palpitations.   Gastrointestinal: Negative for nausea and vomiting.   Psychiatric/Behavioral: Negative for depression.        Vitals:    06/17/22 1353   BP: (!) 128/54   Pulse: 83   Resp: 18   Temp: 98.7 °F (37.1 °C)       Physical Exam  Vitals reviewed.   Constitutional:       Appearance: Normal appearance.   HENT:      Head: Normocephalic.   Eyes:      Extraocular Movements: Extraocular movements intact.      Conjunctiva/sclera: Conjunctivae normal.      Pupils: Pupils are equal, round, and reactive to light.   Neck:      Thyroid: No thyroid mass or thyromegaly.   Cardiovascular:      Rate and Rhythm: Normal rate and regular rhythm.      Heart sounds: Normal heart sounds. No murmur heard.    No gallop.   Pulmonary:      Effort: Pulmonary effort is normal. No respiratory distress.      Breath sounds: Normal breath sounds. No wheezing or rales.   Skin:     General: Skin is warm and dry.      Coloration: Skin is not jaundiced or pale.   Neurological:       Mental Status: She is alert.   Psychiatric:         Mood and Affect: Mood normal.         Behavior: Behavior normal.         Thought Content: Thought content normal.         Judgment: Judgment normal.          Assessment and Plan  Diabetes mellitus without complication    Allergic rhinitis, unspecified seasonality, unspecified trigger  -     ipratropium (ATROVENT) 21 mcg (0.03 %) nasal spray; 2 sprays by Nasal route every 12 (twelve) hours.  Dispense: 30 mL; Refill: 3    Macrocytic anemia  -     Folate; Future; Expected date: 06/17/2022  -     Vitamin B12; Future; Expected date: 06/17/2022    Other orders  -     dapagliflozin (FARXIGA) 5 mg Tab tablet; Take 1 tablet (5 mg total) by mouth once daily.  Dispense: 90 tablet; Refill: 1            Return to clinic in 1 month with blood sugar readings are as needed.    Health Maintenance Topics with due status: Not Due       Topic Last Completion Date    Lipid Panel 11/04/2021    Mammogram 12/15/2021    Eye Exam 03/07/2022    Hemoglobin A1c 04/07/2022    High Dose Statin 06/17/2022    Foot Exam 06/17/2022

## 2022-06-20 ENCOUNTER — TELEPHONE (OUTPATIENT)
Dept: FAMILY MEDICINE | Facility: CLINIC | Age: 72
End: 2022-06-20
Payer: MEDICAID

## 2022-06-20 NOTE — TELEPHONE ENCOUNTER
----- Message from Dequan Bar MD sent at 6/20/2022  8:21 AM CDT -----  Patient has a negative microalbumin but her B12 is low.  I recommend IM B12 every 2 weeks for 3 months and then recheck B12 level.

## 2022-06-21 NOTE — PROGRESS NOTES
Subjective:      Patient ID: Kathryn Marie is a 71 y.o. female.    Chief Complaint: Hip Pain (left)      Pain  This is a chronic problem. The current episode started more than 1 year ago. The problem occurs daily. The problem has been unchanged. Associated symptoms include arthralgias and neck pain. Pertinent negatives include no change in bowel habit, chest pain, chills, coughing, fatigue, fever, rash, sore throat, vertigo or vomiting.     Review of Systems   Constitutional: Negative for appetite change, chills, fatigue, fever and unexpected weight change.   HENT: Negative for drooling, ear discharge, ear pain, facial swelling, nosebleeds, sore throat, trouble swallowing, voice change and goiter.    Eyes: Negative for photophobia, pain, discharge, redness and visual disturbance.   Respiratory: Negative for apnea, cough, choking, chest tightness, shortness of breath, wheezing and stridor.    Cardiovascular: Negative for chest pain, palpitations and leg swelling.   Gastrointestinal: Negative for abdominal distention, change in bowel habit, diarrhea, rectal pain, vomiting, fecal incontinence and change in bowel habit.   Endocrine: Negative for cold intolerance, heat intolerance, polydipsia, polyphagia and polyuria.   Genitourinary: Negative for bladder incontinence, dysuria, flank pain, frequency and hot flashes.   Musculoskeletal: Positive for arthralgias, back pain, leg pain, neck pain and neck stiffness.   Integumentary:  Negative for color change, pallor and rash.   Allergic/Immunologic: Negative for immunocompromised state.   Neurological: Negative for dizziness, vertigo, seizures, syncope, facial asymmetry, speech difficulty, light-headedness, disturbances in coordination, memory loss and coordination difficulties.   Hematological: Negative for adenopathy. Does not bruise/bleed easily.   Psychiatric/Behavioral: Negative for agitation, behavioral problems, confusion, decreased concentration, dysphoric mood,  "hallucinations, self-injury and suicidal ideas. The patient is not nervous/anxious and is not hyperactive.             Objective:  Vitals:    06/22/22 1329   BP: (!) 153/53   Pulse: 83   Resp: 18   Weight: 60.8 kg (134 lb)   Height: 5' 5" (1.651 m)   PainSc:   5         Physical Exam  Vitals and nursing note reviewed. Exam conducted with a chaperone present.   Constitutional:       General: She is awake. She is not in acute distress.     Appearance: Normal appearance. She is not ill-appearing or diaphoretic.   HENT:      Head: Normocephalic and atraumatic.      Nose: Nose normal.      Mouth/Throat:      Mouth: Mucous membranes are moist.      Pharynx: Oropharynx is clear.   Eyes:      Conjunctiva/sclera: Conjunctivae normal.      Pupils: Pupils are equal, round, and reactive to light.   Cardiovascular:      Rate and Rhythm: Normal rate.   Pulmonary:      Effort: Pulmonary effort is normal. No respiratory distress.   Abdominal:      Palpations: Abdomen is soft.   Musculoskeletal:      Cervical back: Normal range of motion and neck supple. Tenderness present.      Thoracic back: Tenderness present.      Lumbar back: Tenderness present. Decreased range of motion.   Skin:     General: Skin is warm and dry.      Coloration: Skin is not jaundiced or pale.   Neurological:      General: No focal deficit present.      Mental Status: She is alert and oriented to person, place, and time. Mental status is at baseline.      Cranial Nerves: No cranial nerve deficit (II-XII).   Psychiatric:         Mood and Affect: Mood normal.         Behavior: Behavior normal. Behavior is cooperative.         Thought Content: Thought content normal.           Orders Placed This Encounter   Procedures    POCT Urine Drug Screen Presump     Interpretive Information:     Negative:  No drug detected at the cut off level.   Positive:  This result represents presumptive positive for the   tested drug, other substances may yield a positive response " other   than the analyte of interest. This result should be utilized for   diagnostic purpose only. Confirmation testing will be performed upon physician request only.           X-Ray Hips Bilateral 2 View Inc AP Pelvis  Narrative: EXAMINATION:  XR HIPS BILATERAL 2 VIEW INCL AP PELVIS    CLINICAL HISTORY:  Spondylosis without myelopathy or radiculopathy, lumbosacral region    COMPARISON:  6 June 2020    FINDINGS:  No evidence of acute fracture seen.  The alignment of the joints appears normal.  Mild bilateral hip degenerative change is present.  No soft tissue abnormality is seen.  Impression: Mild bilateral hip osteoarthrosis.    Electronically signed by: Ron Valentin  Date:    01/05/2022  Time:    10:46       Office Visit on 06/17/2022   Component Date Value Ref Range Status    Vitamin B12 06/17/2022 301  193 - 986 pg/mL Final    Folate 06/17/2022 17.6 (A) 3.1 - 17.5 ng/mL Final    Creatinine, Urine 06/17/2022 74  28 - 219 mg/dL Final    Microalbumin 06/17/2022 0.6  0.0 - 2.8 mg/dL Final    Microalbumin/Creatinine Ratio 06/17/2022 8.1  0.0 - 30.0 mg/g Final   Office Visit on 05/06/2022   Component Date Value Ref Range Status    EKG 12-Lead 05/06/2022    Final    Ventricular Rate 05/06/2022 72 bmp   Final    OR Interval 05/06/2022 828 ms   Final    QRS Duration 05/06/2022 164 ms   Final    QT/QTc 05/06/2022 436 457 ms   Final    PRT Axes 05/06/2022 64 /177 8   Final    Free T4 05/06/2022 1.37  0.76 - 1.46 ng/dL Final    TSH 05/06/2022 1.150  0.358 - 3.740 uIU/mL Final    Hepatitis C Ab 05/06/2022 Non-Reactive  Non-Reactive Final    WBC 05/06/2022 3.00 (A) 4.50 - 11.00 K/uL Corrected    RBC 05/06/2022 3.09 (A) 4.20 - 5.40 M/uL Corrected    Hemoglobin 05/06/2022 9.7 (A) 12.0 - 16.0 g/dL Final    Hematocrit 05/06/2022 29.9 (A) 38.0 - 47.0 % Corrected    MCV 05/06/2022 96.8 (A) 80.0 - 96.0 fL Corrected    MCH 05/06/2022 31.4 (A) 27.0 - 31.0 pg Corrected    MCHC 05/06/2022 32.4  32.0 - 36.0 g/dL  Corrected    RDW 05/06/2022 14.5  11.5 - 14.5 % Corrected    Platelet Count 05/06/2022 142 (A) 150 - 400 K/uL Corrected    MPV 05/06/2022 12.1  9.4 - 12.4 fL Final    Neutrophils % 05/06/2022 51.7 (A) 53.0 - 65.0 % Corrected    Lymphocytes % 05/06/2022 34.7  27.0 - 41.0 % Corrected    Monocytes % 05/06/2022 11.0 (A) 2.0 - 6.0 % Corrected    Eosinophils % 05/06/2022 1.3  1.0 - 4.0 % Corrected    Basophils % 05/06/2022 0.3  0.0 - 1.0 % Final    Immature Granulocytes % 05/06/2022 1.0 (A) 0.0 - 0.4 % Corrected    nRBC, Auto 05/06/2022 0.0  <=0.0 % Final    Neutrophils, Abs 05/06/2022 1.55 (A) 1.80 - 7.70 K/uL Corrected    Lymphocytes, Absolute 05/06/2022 1.04  1.00 - 4.80 K/uL Corrected    Monocytes, Absolute 05/06/2022 0.33  0.00 - 0.80 K/uL Corrected    Eosinophils, Absolute 05/06/2022 0.04  0.00 - 0.50 K/uL Corrected    Basophils, Absolute 05/06/2022 0.01  0.00 - 0.20 K/uL Final    Immature Granulocytes, Absolute 05/06/2022 0.03  0.00 - 0.04 K/uL Final    nRBC, Absolute 05/06/2022 0.00  <=0.00 x10e3/uL Final    Diff Type 05/06/2022 Manual   Corrected    Segmented Neutrophils, Man % 05/06/2022 44 (A) 50 - 62 % Final    Lymphocytes, Man % 05/06/2022 38  27 - 41 % Final    Monocytes, Man % 05/06/2022 13 (A) 2 - 6 % Final    Eosinophils, Man % 05/06/2022 4  1 - 4 % Final    Basophils, Man % 05/06/2022 1  0 - 1 % Final    Platelet Morphology 05/06/2022 Few Large Platelets (A) Normal Final    Anisocytosis 05/06/2022 1+   Final    Target Cells 05/06/2022 Few   Final    Crenated Cells 05/06/2022 Few   Final    Ovalocytes 05/06/2022 Few   Final    Polychromasia 05/06/2022 Few   Final    Hypochromic 05/06/2022 Few   Final    Atypical Lymphocytes 05/06/2022 Few   Final   Office Visit on 05/05/2022   Component Date Value Ref Range Status    Iron 05/05/2022 35 (A) 50 - 170 µg/dL Final    Iron Saturation 05/05/2022 14  14 - 50 % Final    TIBC 05/05/2022 248 (A) 250 - 450 µg/dL Final    Ferritin  05/05/2022 164  8 - 252 ng/mL Final    Uric Acid 05/05/2022 5.2  2.6 - 6.0 mg/dL Final   Office Visit on 04/07/2022   Component Date Value Ref Range Status    Hemoglobin A1C 04/07/2022 6.2  4.5 - 6.6 % Final    Estimated Average Glucose 04/07/2022 120  mg/dL Final    Sodium 04/07/2022 140  136 - 145 mmol/L Final    Potassium 04/07/2022 4.0  3.5 - 5.1 mmol/L Final    Chloride 04/07/2022 108 (A) 98 - 107 mmol/L Final    CO2 04/07/2022 25  21 - 32 mmol/L Final    Anion Gap 04/07/2022 11  7 - 16 mmol/L Final    Glucose 04/07/2022 133 (A) 74 - 106 mg/dL Final    BUN 04/07/2022 16  7 - 18 mg/dL Final    Creatinine 04/07/2022 1.25 (A) 0.55 - 1.02 mg/dL Final    BUN/Creatinine Ratio 04/07/2022 13  6 - 20 Final    Calcium 04/07/2022 8.9  8.5 - 10.1 mg/dL Final    Total Protein 04/07/2022 7.1  6.4 - 8.2 g/dL Final    Albumin 04/07/2022 3.4 (A) 3.5 - 5.0 g/dL Final    Globulin 04/07/2022 3.7  2.0 - 4.0 g/dL Final    A/G Ratio 04/07/2022 0.9   Final    Bilirubin, Total 04/07/2022 0.4  0.0 - 1.2 mg/dL Final    Alk Phos 04/07/2022 55  55 - 142 U/L Final    ALT 04/07/2022 36  13 - 56 U/L Final    AST 04/07/2022 34  15 - 37 U/L Final    eGFR 04/07/2022 45 (A) >=60 mL/min/1.73m² Final    WBC 04/07/2022 3.13 (A) 4.50 - 11.00 K/uL Final    RBC 04/07/2022 3.02 (A) 4.20 - 5.40 M/uL Final    Hemoglobin 04/07/2022 9.4 (A) 12.0 - 16.0 g/dL Final    Hematocrit 04/07/2022 29.0 (A) 38.0 - 47.0 % Final    MCV 04/07/2022 96.0  80.0 - 96.0 fL Final    MCH 04/07/2022 31.1 (A) 27.0 - 31.0 pg Final    MCHC 04/07/2022 32.4  32.0 - 36.0 g/dL Final    RDW 04/07/2022 14.2  11.5 - 14.5 % Final    Platelet Count 04/07/2022 144 (A) 150 - 400 K/uL Final    MPV 04/07/2022 11.4  9.4 - 12.4 fL Final    Neutrophils % 04/07/2022 70.9 (A) 53.0 - 65.0 % Final    Lymphocytes % 04/07/2022 19.8 (A) 27.0 - 41.0 % Final    Monocytes % 04/07/2022 6.4 (A) 2.0 - 6.0 % Final    Eosinophils % 04/07/2022 0.3 (A) 1.0 - 4.0 % Final     Basophils % 04/07/2022 0.0  0.0 - 1.0 % Final    Immature Granulocytes % 04/07/2022 2.6 (A) 0.0 - 0.4 % Final    nRBC, Auto 04/07/2022 0.0  <=0.0 % Final    Neutrophils, Abs 04/07/2022 2.22  1.80 - 7.70 K/uL Final    Lymphocytes, Absolute 04/07/2022 0.62 (A) 1.00 - 4.80 K/uL Final    Monocytes, Absolute 04/07/2022 0.20  0.00 - 0.80 K/uL Final    Eosinophils, Absolute 04/07/2022 0.01  0.00 - 0.50 K/uL Final    Basophils, Absolute 04/07/2022 0.00  0.00 - 0.20 K/uL Final    Immature Granulocytes, Absolute 04/07/2022 0.08 (A) 0.00 - 0.04 K/uL Final    nRBC, Absolute 04/07/2022 0.00  <=0.00 x10e3/uL Final    Diff Type 04/07/2022 Manual   Final    Segmented Neutrophils, Man % 04/07/2022 71 (A) 50 - 62 % Final    Lymphocytes, Man % 04/07/2022 22 (A) 27 - 41 % Final    Monocytes, Man % 04/07/2022 7 (A) 2 - 6 % Final    Platelet Morphology 04/07/2022 Few Large Platelets (A) Normal Final    Anisocytosis 04/07/2022 1+   Final    Target Cells 04/07/2022 Few   Final    Ovalocytes 04/07/2022 Few   Final    Hypochromic 04/07/2022 Few   Final    Atypical Lymphocytes 04/07/2022 Rare   Final   Office Visit on 03/01/2022   Component Date Value Ref Range Status    POC Amphetamines 03/01/2022 Negative  Negative, Inconclusive Final    POC Barbiturates 03/01/2022 Negative  Negative, Inconclusive Final    POC Benzodiazepines 03/01/2022 Negative  Negative, Inconclusive Final    POC Cocaine 03/01/2022 Negative  Negative, Inconclusive Final    POC THC 03/01/2022 Negative  Negative, Inconclusive Final    POC Methadone 03/01/2022 Negative  Negative, Inconclusive Final    POC Methamphetamine 03/01/2022 Negative  Negative, Inconclusive Final    POC Opiates 03/01/2022 Presumptive Positive (A) Negative, Inconclusive Final    POC Oxycodone 03/01/2022 Negative  Negative, Inconclusive Final    POC Phencyclidine 03/01/2022 Negative  Negative, Inconclusive Final    POC Methylenedioxymethamphetamine * 03/01/2022 Negative   Negative, Inconclusive Final    POC Tricyclic Antidepressants 03/01/2022 Negative  Negative, Inconclusive Final    POC Buprenorphine 03/01/2022 Negative   Final     Acceptable 03/01/2022 Yes   Final    POC Temperature (Urine) 03/01/2022 90   Final           Assessment:      1. Lumbosacral spondylosis without myelopathy    2. Disorder of sacrum    3. Postlaminectomy syndrome of cervical region    4. Other long term (current) drug therapy            A's of Opioid Risk Assessment  Activity:Patient can perform ADL.   Analgesia:Patients pain is partially controlled by current medication. Patient has tried OTC medications such as Tylenol and Ibuprofen with out relief.   Adverse Effects: Patient denies constipation or sedation.  Aberrant Behavior:  reviewed with no aberrant drug seeking/taking behavior.  Overdose reversal drug naloxone discussed    Drug screen reviewed      Requested Prescriptions      No prescriptions requested or ordered in this encounter         Plan:    Rheumatoid arthritis [facility]    Has [place] prescription up to August 2, 2022    Complaint back and joint pain requesting Toradol injection    She states her trochanteric injection she received several weeks ago did help control  Her trochanteric bursitis    Toradol 60 mg IM, tolerated well    She states current medications helping control her discomfort    She would like to continue with current medication conservative management    Continue home exercise program as directed    Follow-up 3 months     Dr. Piña, June 2022    Bring original prescription medication bottles/container/box with labels to each visit

## 2022-06-22 ENCOUNTER — OFFICE VISIT (OUTPATIENT)
Dept: PAIN MEDICINE | Facility: CLINIC | Age: 72
End: 2022-06-22
Payer: MEDICARE

## 2022-06-22 VITALS
DIASTOLIC BLOOD PRESSURE: 53 MMHG | RESPIRATION RATE: 18 BRPM | BODY MASS INDEX: 22.33 KG/M2 | HEIGHT: 65 IN | WEIGHT: 134 LBS | SYSTOLIC BLOOD PRESSURE: 153 MMHG | HEART RATE: 83 BPM

## 2022-06-22 DIAGNOSIS — M47.817 LUMBOSACRAL SPONDYLOSIS WITHOUT MYELOPATHY: Primary | Chronic | ICD-10-CM

## 2022-06-22 DIAGNOSIS — M53.3 DISORDER OF SACRUM: Chronic | ICD-10-CM

## 2022-06-22 DIAGNOSIS — M96.1 POSTLAMINECTOMY SYNDROME OF CERVICAL REGION: Chronic | ICD-10-CM

## 2022-06-22 DIAGNOSIS — Z79.899 OTHER LONG TERM (CURRENT) DRUG THERAPY: ICD-10-CM

## 2022-06-22 PROCEDURE — 96372 THER/PROPH/DIAG INJ SC/IM: CPT | Mod: PBBFAC | Performed by: PHYSICIAN ASSISTANT

## 2022-06-22 PROCEDURE — 99215 OFFICE O/P EST HI 40 MIN: CPT | Mod: PBBFAC | Performed by: PHYSICIAN ASSISTANT

## 2022-06-22 PROCEDURE — 99214 PR OFFICE/OUTPT VISIT, EST, LEVL IV, 30-39 MIN: ICD-10-PCS | Mod: S$PBB,25,, | Performed by: PHYSICIAN ASSISTANT

## 2022-06-22 PROCEDURE — 99214 OFFICE O/P EST MOD 30 MIN: CPT | Mod: S$PBB,25,, | Performed by: PHYSICIAN ASSISTANT

## 2022-06-22 PROCEDURE — 80305 DRUG TEST PRSMV DIR OPT OBS: CPT | Mod: PBBFAC | Performed by: PHYSICIAN ASSISTANT

## 2022-06-22 RX ORDER — HYDROCODONE BITARTRATE AND ACETAMINOPHEN 10; 325 MG/1; MG/1
1 TABLET ORAL EVERY 8 HOURS PRN
Qty: 90 TABLET | Refills: 0 | Status: CANCELLED | OUTPATIENT
Start: 2022-06-22 | End: 2022-07-22

## 2022-06-22 RX ORDER — KETOROLAC TROMETHAMINE 30 MG/ML
60 INJECTION, SOLUTION INTRAMUSCULAR; INTRAVENOUS
Status: COMPLETED | OUTPATIENT
Start: 2022-06-22 | End: 2022-06-22

## 2022-06-22 RX ADMIN — KETOROLAC TROMETHAMINE 60 MG: 30 INJECTION, SOLUTION INTRAMUSCULAR at 03:06

## 2022-06-30 ENCOUNTER — EXTERNAL CHRONIC CARE MANAGEMENT (OUTPATIENT)
Dept: FAMILY MEDICINE | Facility: CLINIC | Age: 72
End: 2022-06-30
Payer: MEDICARE

## 2022-06-30 PROCEDURE — G0511 PR CHRONIC CARE MGMT, RHC OR FQHC ONLY, 20 MINS OR MORE: ICD-10-PCS | Mod: ,,, | Performed by: FAMILY MEDICINE

## 2022-06-30 PROCEDURE — G0511 CCM/BHI BY RHC/FQHC 20MIN MO: HCPCS | Mod: ,,, | Performed by: FAMILY MEDICINE

## 2022-07-15 ENCOUNTER — OFFICE VISIT (OUTPATIENT)
Dept: FAMILY MEDICINE | Facility: CLINIC | Age: 72
End: 2022-07-15
Payer: MEDICARE

## 2022-07-15 VITALS
SYSTOLIC BLOOD PRESSURE: 117 MMHG | TEMPERATURE: 99 F | RESPIRATION RATE: 18 BRPM | OXYGEN SATURATION: 98 % | HEIGHT: 65 IN | DIASTOLIC BLOOD PRESSURE: 63 MMHG | BODY MASS INDEX: 22.16 KG/M2 | HEART RATE: 61 BPM | WEIGHT: 133 LBS

## 2022-07-15 DIAGNOSIS — R74.8 ELEVATED LIVER ENZYMES: ICD-10-CM

## 2022-07-15 DIAGNOSIS — I10 HYPERTENSION, UNSPECIFIED TYPE: ICD-10-CM

## 2022-07-15 DIAGNOSIS — E53.8 B12 DEFICIENCY: ICD-10-CM

## 2022-07-15 DIAGNOSIS — E11.9 DIABETES MELLITUS WITHOUT COMPLICATION: Primary | ICD-10-CM

## 2022-07-15 LAB
ALBUMIN SERPL BCP-MCNC: 3.6 G/DL (ref 3.5–5)
ALBUMIN/GLOB SERPL: 0.9 {RATIO}
ALP SERPL-CCNC: 57 U/L (ref 55–142)
ALT SERPL W P-5'-P-CCNC: 34 U/L (ref 13–56)
ANION GAP SERPL CALCULATED.3IONS-SCNC: 10 MMOL/L (ref 7–16)
AST SERPL W P-5'-P-CCNC: 46 U/L (ref 15–37)
BASOPHILS # BLD AUTO: 0 K/UL (ref 0–0.2)
BASOPHILS NFR BLD AUTO: 0 % (ref 0–1)
BILIRUB SERPL-MCNC: 0.4 MG/DL (ref 0–1.2)
BUN SERPL-MCNC: 16 MG/DL (ref 7–18)
BUN/CREAT SERPL: 16 (ref 6–20)
CALCIUM SERPL-MCNC: 9 MG/DL (ref 8.5–10.1)
CHLORIDE SERPL-SCNC: 104 MMOL/L (ref 98–107)
CO2 SERPL-SCNC: 28 MMOL/L (ref 21–32)
CREAT SERPL-MCNC: 1.02 MG/DL (ref 0.55–1.02)
DIFFERENTIAL METHOD BLD: ABNORMAL
EOSINOPHIL # BLD AUTO: 0.02 K/UL (ref 0–0.5)
EOSINOPHIL NFR BLD AUTO: 0.7 % (ref 1–4)
ERYTHROCYTE [DISTWIDTH] IN BLOOD BY AUTOMATED COUNT: 14.8 % (ref 11.5–14.5)
EST. AVERAGE GLUCOSE BLD GHB EST-MCNC: 117 MG/DL
GLOBULIN SER-MCNC: 4 G/DL (ref 2–4)
GLUCOSE SERPL-MCNC: 73 MG/DL (ref 74–106)
HBA1C MFR BLD HPLC: 6.1 % (ref 4.5–6.6)
HCT VFR BLD AUTO: 28.9 % (ref 38–47)
HGB BLD-MCNC: 9.8 G/DL (ref 12–16)
IMM GRANULOCYTES # BLD AUTO: 0.01 K/UL (ref 0–0.04)
IMM GRANULOCYTES NFR BLD: 0.3 % (ref 0–0.4)
LYMPHOCYTES # BLD AUTO: 0.94 K/UL (ref 1–4.8)
LYMPHOCYTES NFR BLD AUTO: 32.2 % (ref 27–41)
MCH RBC QN AUTO: 32.3 PG (ref 27–31)
MCHC RBC AUTO-ENTMCNC: 33.9 G/DL (ref 32–36)
MCV RBC AUTO: 95.4 FL (ref 80–96)
MONOCYTES # BLD AUTO: 0.34 K/UL (ref 0–0.8)
MONOCYTES NFR BLD AUTO: 11.6 % (ref 2–6)
MPC BLD CALC-MCNC: 10.3 FL (ref 9.4–12.4)
NEUTROPHILS # BLD AUTO: 1.61 K/UL (ref 1.8–7.7)
NEUTROPHILS NFR BLD AUTO: 55.2 % (ref 53–65)
NRBC # BLD AUTO: 0 X10E3/UL
NRBC, AUTO (.00): 0 %
PLATELET # BLD AUTO: 144 K/UL (ref 150–400)
POTASSIUM SERPL-SCNC: 4.9 MMOL/L (ref 3.5–5.1)
PROT SERPL-MCNC: 7.6 G/DL (ref 6.4–8.2)
RBC # BLD AUTO: 3.03 M/UL (ref 4.2–5.4)
SODIUM SERPL-SCNC: 137 MMOL/L (ref 136–145)
WBC # BLD AUTO: 2.92 K/UL (ref 4.5–11)

## 2022-07-15 PROCEDURE — 99214 OFFICE O/P EST MOD 30 MIN: CPT | Mod: ,,, | Performed by: FAMILY MEDICINE

## 2022-07-15 PROCEDURE — 85025 CBC WITH DIFFERENTIAL: ICD-10-PCS | Mod: ,,, | Performed by: CLINICAL MEDICAL LABORATORY

## 2022-07-15 PROCEDURE — 85025 COMPLETE CBC W/AUTO DIFF WBC: CPT | Mod: ,,, | Performed by: CLINICAL MEDICAL LABORATORY

## 2022-07-15 PROCEDURE — 80053 COMPREHEN METABOLIC PANEL: CPT | Mod: ,,, | Performed by: CLINICAL MEDICAL LABORATORY

## 2022-07-15 PROCEDURE — 96372 PR INJECTION,THERAP/PROPH/DIAG2ST, IM OR SUBCUT: ICD-10-PCS | Mod: ,,, | Performed by: FAMILY MEDICINE

## 2022-07-15 PROCEDURE — 96372 THER/PROPH/DIAG INJ SC/IM: CPT | Mod: ,,, | Performed by: FAMILY MEDICINE

## 2022-07-15 PROCEDURE — 80053 COMPREHENSIVE METABOLIC PANEL: ICD-10-PCS | Mod: ,,, | Performed by: CLINICAL MEDICAL LABORATORY

## 2022-07-15 PROCEDURE — 99214 PR OFFICE/OUTPT VISIT, EST, LEVL IV, 30-39 MIN: ICD-10-PCS | Mod: ,,, | Performed by: FAMILY MEDICINE

## 2022-07-15 PROCEDURE — 83036 HEMOGLOBIN A1C: ICD-10-PCS | Mod: ,,, | Performed by: CLINICAL MEDICAL LABORATORY

## 2022-07-15 PROCEDURE — 83036 HEMOGLOBIN GLYCOSYLATED A1C: CPT | Mod: ,,, | Performed by: CLINICAL MEDICAL LABORATORY

## 2022-07-15 RX ORDER — CYANOCOBALAMIN 1000 UG/ML
1000 INJECTION, SOLUTION INTRAMUSCULAR; SUBCUTANEOUS ONCE
Status: COMPLETED | OUTPATIENT
Start: 2022-07-15 | End: 2022-07-15

## 2022-07-15 RX ADMIN — CYANOCOBALAMIN 1000 MCG: 1000 INJECTION, SOLUTION INTRAMUSCULAR; SUBCUTANEOUS at 04:07

## 2022-07-15 NOTE — PROGRESS NOTES
Kathryn Marie is a 71 y.o. female seen today for follow-up for her diabetes and B12 deficiency.  Patient reports she has not been using her Xigduo and once her impaired renal function I do asked the patient to take the medication.  Patient also has had Toradol injections and I have asked her to limit those due to her impaired renal function.  Patient has a great deal of arthritis and difficulty ambulating and is at high risk for falling.  Patient also has B12 deficiency but has been unable to attend the clinic to have her B12 injections done.  We discussed a home health evaluation for PT OT evaluation as well as B12 injections every 2 weeks.  She may also benefit from a social work evaluation.    Past Medical History:   Diagnosis Date    Anemia     Atherosclerotic heart disease of native coronary artery without angina pectoris     Carotid artery stenosis 01/15/2014    CHARO  LICA stenosis    Causalgia of lower limb     Cervical radiculopathy     Chronic low back pain     Chronic pain syndrome     CVA (cerebral vascular accident)     right cerebellar    Degenerative joint disease involving multiple joints     Disorder of parathyroid gland, unspecified     Disorder of sacrum     Essential (primary) hypertension     Gammopathy     GERD (gastroesophageal reflux disease)     Heart murmur     Hyperlipidemia     Hyperparathyroidism     Lumbosacral radiculopathy     Lumbosacral spondylosis     Other long term (current) drug therapy     Pernicious anemia     Sciatica     Spinal stenosis of lumbar region     L4-5    Type 2 diabetes mellitus      Family History   Problem Relation Age of Onset    Diabetes Mother     Heart disease Mother     Hypertension Mother     Heart attack Mother     Hypertension Father     Stroke Father     Stroke Sister     Hypertension Sister     Cancer Brother      Current Outpatient Medications on File Prior to Visit   Medication Sig Dispense Refill    amLODIPine  (NORVASC) 10 MG tablet Take 1 tablet (10 mg total) by mouth once daily. 90 tablet 1    aspirin (ECOTRIN) 81 MG EC tablet Take 81 mg by mouth once daily.      blood sugar diagnostic (ONETOUCH VERIO TEST STRIPS MISC) by Misc.(Non-Drug; Combo Route) route. Use 1 strip to check blood glucose every morning, fasting for E11.9      blood-glucose meter (ONETOUCH VERIO METER MISC) by Misc.(Non-Drug; Combo Route) route. Use for glucose checks once daily, E11.9      clopidogreL (PLAVIX) 75 mg tablet Take 1 tablet (75 mg total) by mouth once daily. 90 tablet 1    dapagliflozin (FARXIGA) 5 mg Tab tablet Take 1 tablet (5 mg total) by mouth once daily. 90 tablet 1    diclofenac sodium (VOLTAREN) 1 % Gel APPLY 1 APPLICATION TO  AFFECTED AREA(S) TOPICALLY  TWICE DAILY 300 g 2    ferrous sulfate (FEOSOL) 325 mg (65 mg iron) Tab tablet Take 325 mg by mouth daily with breakfast.      fluticasone propionate (FLONASE) 50 mcg/actuation nasal spray 2 sprays (100 mcg total) by Each Nostril route once daily. 48 g 1    folic acid (FOLVITE) 1 MG tablet Take 1 mg by mouth once daily.      HYDROcodone-acetaminophen (NORCO)  mg per tablet Take 1 tablet by mouth every 8 (eight) hours as needed for Pain. 90 tablet 0    [START ON 8/2/2022] HYDROcodone-acetaminophen (NORCO)  mg per tablet Take 1 tablet by mouth every 8 (eight) hours as needed for Pain. 90 tablet 0    ipratropium (ATROVENT) 21 mcg (0.03 %) nasal spray 2 sprays by Nasal route every 12 (twelve) hours. 30 mL 3    latanoprost 0.005 % ophthalmic solution Place into both eyes.      methotrexate 2.5 MG Tab Take by mouth. Take 4 tablets by mouth every week      ONGLYZA 5 mg Tab tablet Take 5 mg by mouth once daily.      pantoprazole (PROTONIX) 40 MG tablet Take 1 tablet by mouth once daily at 6am.      rosuvastatin (CRESTOR) 20 MG tablet Take 1 tablet (20 mg total) by mouth every evening. 90 tablet 1    travoprost (TRAVATAN Z) 0.004 % ophthalmic solution 1 drop  every evening.      vitamin D (VITAMIN D3) 1000 units Tab Take 1,000 Units by mouth once daily.       No current facility-administered medications on file prior to visit.     Immunization History   Administered Date(s) Administered    COVID-19, MRNA, LN-S, PF (Pfizer) (Purple Cap) 01/28/2021, 02/27/2021, 08/27/2021, 04/22/2022    Influenza - Quadrivalent - High Dose - PF (65 years and older) 11/04/2021    Influenza - Trivalent (ADULT) 02/09/2016    Pneumococcal Conjugate - 13 Valent 11/13/2017    Pneumococcal Polysaccharide - 23 Valent 12/01/2014, 02/09/2016    Zoster Recombinant 07/30/2013       Review of Systems   Constitutional: Positive for malaise/fatigue. Negative for fever and weight loss.   Respiratory: Negative for shortness of breath.    Cardiovascular: Negative for chest pain and palpitations.   Gastrointestinal: Negative for nausea and vomiting.   Musculoskeletal: Positive for back pain, joint pain and myalgias. Negative for falls.   Psychiatric/Behavioral: Negative for depression.        Vitals:    07/15/22 1348   BP: 117/63   Pulse: 61   Resp: 18   Temp: 98.6 °F (37 °C)       Physical Exam  Vitals reviewed.   Constitutional:       Appearance: Normal appearance.   HENT:      Head: Normocephalic.   Eyes:      Extraocular Movements: Extraocular movements intact.      Conjunctiva/sclera: Conjunctivae normal.      Pupils: Pupils are equal, round, and reactive to light.   Neck:      Thyroid: No thyroid mass or thyromegaly.   Cardiovascular:      Rate and Rhythm: Normal rate and regular rhythm.      Heart sounds: Murmur heard.    Systolic murmur is present with a grade of 3/6.    No gallop.   Pulmonary:      Effort: Pulmonary effort is normal. No respiratory distress.      Breath sounds: Normal breath sounds. No wheezing or rales.   Musculoskeletal:      Lumbar back: Spasms and tenderness present. Decreased range of motion.      Comments: Patient has a stooped antalgic Rollator depending gait.    Skin:     General: Skin is warm and dry.      Coloration: Skin is not jaundiced or pale.   Neurological:      Mental Status: She is alert.   Psychiatric:         Mood and Affect: Mood normal.         Behavior: Behavior normal.         Thought Content: Thought content normal.         Judgment: Judgment normal.          Assessment and Plan  Diabetes mellitus without complication  -     Hemoglobin A1C; Future; Expected date: 07/15/2022    Hypertension, unspecified type  -     CBC Auto Differential; Future; Expected date: 07/15/2022  -     Comprehensive Metabolic Panel; Future; Expected date: 07/15/2022    B12 deficiency  -     cyanocobalamin injection 1,000 mcg            Return to clinic in 3 months or as needed once her blood work is in.  We will set her up for home health evaluation.    Health Maintenance Topics with due status: Not Due       Topic Last Completion Date    Influenza Vaccine 11/04/2021    Lipid Panel 11/04/2021    Mammogram 12/15/2021    Eye Exam 03/07/2022    Hemoglobin A1c 04/07/2022    Diabetes Urine Screening 06/17/2022    Foot Exam 06/17/2022    High Dose Statin 07/15/2022

## 2022-07-19 ENCOUNTER — TELEPHONE (OUTPATIENT)
Dept: FAMILY MEDICINE | Facility: CLINIC | Age: 72
End: 2022-07-19
Payer: MEDICAID

## 2022-07-19 DIAGNOSIS — R74.8 ELEVATED LIVER ENZYMES: Primary | ICD-10-CM

## 2022-07-19 NOTE — TELEPHONE ENCOUNTER
----- Message from Dequan Bar MD sent at 7/18/2022  7:56 AM CDT -----  Stable chronic anemia with good diabetic control.  Please add a GGT.

## 2022-07-28 ENCOUNTER — OFFICE VISIT (OUTPATIENT)
Dept: FAMILY MEDICINE | Facility: CLINIC | Age: 72
End: 2022-07-28
Payer: MEDICARE

## 2022-07-28 VITALS
HEART RATE: 79 BPM | OXYGEN SATURATION: 99 % | SYSTOLIC BLOOD PRESSURE: 138 MMHG | HEIGHT: 65 IN | WEIGHT: 133.38 LBS | RESPIRATION RATE: 18 BRPM | BODY MASS INDEX: 22.22 KG/M2 | TEMPERATURE: 97 F | DIASTOLIC BLOOD PRESSURE: 54 MMHG

## 2022-07-28 DIAGNOSIS — M19.90 ARTHRITIS: Primary | ICD-10-CM

## 2022-07-28 PROBLEM — D64.9 ANEMIA: Status: ACTIVE | Noted: 2022-07-28

## 2022-07-28 PROBLEM — E11.9 DIABETES MELLITUS: Status: ACTIVE | Noted: 2022-07-28

## 2022-07-28 PROBLEM — I10 HYPERTENSION: Status: ACTIVE | Noted: 2022-07-28

## 2022-07-28 PROCEDURE — 96372 PR INJECTION,THERAP/PROPH/DIAG2ST, IM OR SUBCUT: ICD-10-PCS | Mod: ,,, | Performed by: FAMILY MEDICINE

## 2022-07-28 PROCEDURE — 96372 THER/PROPH/DIAG INJ SC/IM: CPT | Mod: ,,, | Performed by: FAMILY MEDICINE

## 2022-07-28 PROCEDURE — 99213 PR OFFICE/OUTPT VISIT, EST, LEVL III, 20-29 MIN: ICD-10-PCS | Mod: ,,, | Performed by: FAMILY MEDICINE

## 2022-07-28 PROCEDURE — 99213 OFFICE O/P EST LOW 20 MIN: CPT | Mod: ,,, | Performed by: FAMILY MEDICINE

## 2022-07-28 RX ORDER — METHYLPREDNISOLONE ACETATE 40 MG/ML
40 INJECTION, SUSPENSION INTRA-ARTICULAR; INTRALESIONAL; INTRAMUSCULAR; SOFT TISSUE
Status: COMPLETED | OUTPATIENT
Start: 2022-07-28 | End: 2022-07-28

## 2022-07-28 RX ORDER — DEXAMETHASONE SODIUM PHOSPHATE 4 MG/ML
4 INJECTION, SOLUTION INTRA-ARTICULAR; INTRALESIONAL; INTRAMUSCULAR; INTRAVENOUS; SOFT TISSUE
Status: COMPLETED | OUTPATIENT
Start: 2022-07-28 | End: 2022-07-28

## 2022-07-28 RX ADMIN — DEXAMETHASONE SODIUM PHOSPHATE 4 MG: 4 INJECTION, SOLUTION INTRA-ARTICULAR; INTRALESIONAL; INTRAMUSCULAR; INTRAVENOUS; SOFT TISSUE at 11:07

## 2022-07-28 RX ADMIN — METHYLPREDNISOLONE ACETATE 40 MG: 40 INJECTION, SUSPENSION INTRA-ARTICULAR; INTRALESIONAL; INTRAMUSCULAR; SOFT TISSUE at 11:07

## 2022-07-28 NOTE — PROGRESS NOTES
Clinic Note    Patient Name: Kathryn Marie  : 1950  MRN: [id number]    Chief Complaint   Patient presents with    Leg Pain     Left leg pain x 2-3 months        HPI:    Ms. Kathryn Marie is a 71 y.o. female who presents to clinic today with CC of LLE pain X 2-3 months.  Patient reports pain is in L hip/back and radiates down leg to level of knee. Describes pain as aching. Reports a known h/o arthritis. Reports she has a h/o fractured pelvis several years ago. Denies any recent falls or injury. Reports she is currently having a flare with chronic pain.   She is, otherwise, without complaints.     Medications:  Medication List with Changes/Refills   Current Medications    AMLODIPINE (NORVASC) 10 MG TABLET    Take 1 tablet (10 mg total) by mouth once daily.    ASPIRIN (ECOTRIN) 81 MG EC TABLET    Take 81 mg by mouth once daily.    BLOOD SUGAR DIAGNOSTIC (ONETOUCH VERIO TEST STRIPS MISC)    by Misc.(Non-Drug; Combo Route) route. Use 1 strip to check blood glucose every morning, fasting for E11.9    BLOOD-GLUCOSE METER (ONETOUCH VERIO METER MISC)    by Misc.(Non-Drug; Combo Route) route. Use for glucose checks once daily, E11.9    CLOPIDOGREL (PLAVIX) 75 MG TABLET    Take 1 tablet (75 mg total) by mouth once daily.    DAPAGLIFLOZIN (FARXIGA) 5 MG TAB TABLET    Take 1 tablet (5 mg total) by mouth once daily.    DICLOFENAC SODIUM (VOLTAREN) 1 % GEL    APPLY 1 APPLICATION TO  AFFECTED AREA(S) TOPICALLY  TWICE DAILY    FERROUS SULFATE (FEOSOL) 325 MG (65 MG IRON) TAB TABLET    Take 325 mg by mouth daily with breakfast.    FLUTICASONE PROPIONATE (FLONASE) 50 MCG/ACTUATION NASAL SPRAY    2 sprays (100 mcg total) by Each Nostril route once daily.    FOLIC ACID (FOLVITE) 1 MG TABLET    Take 1 mg by mouth once daily.    HYDROCODONE-ACETAMINOPHEN (NORCO)  MG PER TABLET    Take 1 tablet by mouth every 8 (eight) hours as needed for Pain.    HYDROCODONE-ACETAMINOPHEN (NORCO)  MG PER TABLET    Take 1 tablet by mouth  every 8 (eight) hours as needed for Pain.    IPRATROPIUM (ATROVENT) 21 MCG (0.03 %) NASAL SPRAY    2 sprays by Nasal route every 12 (twelve) hours.    LATANOPROST 0.005 % OPHTHALMIC SOLUTION    Place into both eyes.    METHOTREXATE 2.5 MG TAB    Take by mouth. Take 4 tablets by mouth every week    ONGLYZA 5 MG TAB TABLET    Take 5 mg by mouth once daily.    PANTOPRAZOLE (PROTONIX) 40 MG TABLET    Take 1 tablet by mouth once daily at 6am.    ROSUVASTATIN (CRESTOR) 20 MG TABLET    Take 1 tablet (20 mg total) by mouth every evening.    TRAVOPROST (TRAVATAN Z) 0.004 % OPHTHALMIC SOLUTION    1 drop every evening.    VITAMIN D (VITAMIN D3) 1000 UNITS TAB    Take 1,000 Units by mouth once daily.        Allergies: Patient has no known allergies.      Past Medical History:    Past Medical History:   Diagnosis Date    Anemia     Atherosclerotic heart disease of native coronary artery without angina pectoris     Carotid artery stenosis 01/15/2014    CHARO  LICA stenosis    Causalgia of lower limb     Cervical radiculopathy     Chronic low back pain     Chronic pain syndrome     CVA (cerebral vascular accident)     right cerebellar    Degenerative joint disease involving multiple joints     Disorder of parathyroid gland, unspecified     Disorder of sacrum     Essential (primary) hypertension     Gammopathy     GERD (gastroesophageal reflux disease)     Heart murmur     Hyperlipidemia     Hyperparathyroidism     Lumbosacral radiculopathy     Lumbosacral spondylosis     Other long term (current) drug therapy     Pernicious anemia     Sciatica     Spinal stenosis of lumbar region     L4-5    Type 2 diabetes mellitus        Past Surgical History:    Past Surgical History:   Procedure Laterality Date    CARPAL TUNNEL RELEASE Right     caudal MOIZ  07/03/2018    CHOLECYSTECTOMY  1975    CORONARY ARTERY BYPASS GRAFT      CORONARY ARTERY BYPASS GRAFT (CABG)      triple    HIP SURGERY Right     L4-S1  "Caudal cath guided MOIZ  07/03/2018    Dr Orta    L5-S1 Caudal cath guided MOIZ  09/26/2014 6/26/2014 4/11/2014    Dr Orta    left shoulder repair Left     NECK SURGERY  2018    does not know what kind    right sacral RF Right 12/26/2013 1/24/2012    Dr Orta    right SIJI Right 10/24/2013    Dr Orta         Social History:    Social History     Tobacco Use   Smoking Status Never Smoker   Smokeless Tobacco Never Used     Social History     Substance and Sexual Activity   Alcohol Use Not Currently     Social History     Substance and Sexual Activity   Drug Use Yes    Types: Hydrocodone    Comment: pain medication with pain treatment          Family History:    Family History   Problem Relation Age of Onset    Diabetes Mother     Heart disease Mother     Hypertension Mother     Heart attack Mother     Hypertension Father     Stroke Father     Stroke Sister     Hypertension Sister     Cancer Brother        Review of Systems:    Review of Systems   Constitutional: Negative for appetite change, chills, fatigue, fever and unexpected weight change.   Eyes: Negative for visual disturbance.   Respiratory: Negative for cough and shortness of breath.    Cardiovascular: Negative for chest pain and leg swelling.   Gastrointestinal: Negative for abdominal pain, change in bowel habit, constipation, diarrhea, nausea, vomiting and change in bowel habit.   Musculoskeletal: Positive for arthralgias.   Integumentary:  Negative for rash.   Neurological: Negative for dizziness and headaches.   Psychiatric/Behavioral: The patient is not nervous/anxious.         Vitals:    Vitals:    07/28/22 1056   BP: (!) 138/54   BP Location: Left arm   Patient Position: Sitting   BP Method: Large (Manual)   Pulse: 79   Resp: 18   Temp: 97.4 °F (36.3 °C)   TempSrc: Oral   SpO2: 99%   Weight: 60.5 kg (133 lb 6.4 oz)   Height: 5' 5" (1.651 m)       Body mass index is 22.2 kg/m².    Wt Readings from Last 3 Encounters:   07/28/22 1056 60.5 kg (133 " lb 6.4 oz)   07/15/22 1348 60.3 kg (133 lb)   06/22/22 1329 60.8 kg (134 lb)        Physical Exam:    Physical Exam  Constitutional:       General: She is not in acute distress.     Appearance: Normal appearance.   HENT:      Nose: Nose normal.      Mouth/Throat:      Mouth: Mucous membranes are moist.      Pharynx: Oropharynx is clear.   Eyes:      Conjunctiva/sclera: Conjunctivae normal.   Cardiovascular:      Rate and Rhythm: Normal rate and regular rhythm.      Heart sounds: Normal heart sounds. No murmur heard.  Pulmonary:      Effort: Pulmonary effort is normal. No respiratory distress.      Breath sounds: Normal breath sounds. No wheezing, rhonchi or rales.   Abdominal:      General: Bowel sounds are normal.      Palpations: Abdomen is soft.      Tenderness: There is no abdominal tenderness.   Musculoskeletal:         General: Tenderness present. No swelling, deformity or signs of injury. Normal range of motion.      Cervical back: Neck supple.      Right lower leg: No edema.      Left lower leg: No edema.      Comments: + TTP lower back and L hip   Skin:     Findings: No rash.   Neurological:      Mental Status: She is alert. Mental status is at baseline.      Comments: + h/o prior CVA - ambulating with a cane; at baseline   Psychiatric:         Mood and Affect: Mood normal.       Assessment/Plan:   Arthritis  -     dexamethasone injection 4 mg  -     methylPREDNISolone acetate injection 40 mg  - Advised to monitor blood glucose levels closely as steroid injection may elevated blood glucose values  - Follow up with pain management as scheduled.    Active Problem List with Overview Notes    Diagnosis Date Noted    Hypertension 07/28/2022    Diabetes mellitus 07/28/2022     Formatting of this note might be different from the original.  type ii      Anemia 07/28/2022    Trochanteric bursitis, left hip 05/31/2022    Chronic low back pain with sciatica 05/06/2022    Polymyalgia rheumatica 04/12/2022     Cerebrovascular accident (CVA) 03/01/2022    Arthritis 09/13/2021    Postlaminectomy syndrome of cervical region 09/13/2021    Cervical radiculopathy     Disorder of sacrum     Lumbosacral spondylosis without myelopathy     Hyperlipidemia 05/21/2021    Intracranial aneurysm 03/05/2014    Carotid stenosis, left 03/05/2014        Health Maintenance:  Health Maintenance   Topic Date Due    DEXA Scan  Never done    TETANUS VACCINE  03/03/2023 (Originally 9/11/1968)    Lipid Panel  11/04/2022    Mammogram  12/15/2022    Hemoglobin A1c  01/15/2023    Eye Exam  03/07/2023    Foot Exam  06/17/2023    High Dose Statin  07/28/2023    Hepatitis C Screening  Completed       RTC prn if symptoms worsen or fail to resolve.  Patient voiced understanding and is agreeable to plan.      Stacy Booth MD    Family Medicine

## 2022-07-31 ENCOUNTER — EXTERNAL CHRONIC CARE MANAGEMENT (OUTPATIENT)
Dept: FAMILY MEDICINE | Facility: CLINIC | Age: 72
End: 2022-07-31
Payer: MEDICARE

## 2022-07-31 PROCEDURE — G0511 PR CHRONIC CARE MGMT, RHC OR FQHC ONLY, 20 MINS OR MORE: ICD-10-PCS | Mod: ,,, | Performed by: FAMILY MEDICINE

## 2022-07-31 PROCEDURE — G0511 CCM/BHI BY RHC/FQHC 20MIN MO: HCPCS | Mod: ,,, | Performed by: FAMILY MEDICINE

## 2022-08-31 ENCOUNTER — EXTERNAL CHRONIC CARE MANAGEMENT (OUTPATIENT)
Dept: FAMILY MEDICINE | Facility: CLINIC | Age: 72
End: 2022-08-31
Payer: MEDICARE

## 2022-08-31 ENCOUNTER — OFFICE VISIT (OUTPATIENT)
Dept: PAIN MEDICINE | Facility: CLINIC | Age: 72
End: 2022-08-31
Payer: MEDICARE

## 2022-08-31 VITALS
SYSTOLIC BLOOD PRESSURE: 131 MMHG | WEIGHT: 133 LBS | HEIGHT: 66 IN | HEART RATE: 87 BPM | DIASTOLIC BLOOD PRESSURE: 53 MMHG | BODY MASS INDEX: 21.38 KG/M2

## 2022-08-31 DIAGNOSIS — M96.1 POSTLAMINECTOMY SYNDROME OF CERVICAL REGION: Primary | ICD-10-CM

## 2022-08-31 DIAGNOSIS — M47.817 LUMBOSACRAL SPONDYLOSIS WITHOUT MYELOPATHY: ICD-10-CM

## 2022-08-31 DIAGNOSIS — M06.9 RHEUMATOID ARTHRITIS INVOLVING MULTIPLE SITES, UNSPECIFIED WHETHER RHEUMATOID FACTOR PRESENT: ICD-10-CM

## 2022-08-31 DIAGNOSIS — M46.1 SACROILIITIS: ICD-10-CM

## 2022-08-31 PROCEDURE — 99215 OFFICE O/P EST HI 40 MIN: CPT | Mod: PBBFAC | Performed by: PAIN MEDICINE

## 2022-08-31 PROCEDURE — G0511 PR CHRONIC CARE MGMT, RHC OR FQHC ONLY, 20 MINS OR MORE: ICD-10-PCS | Mod: ,,, | Performed by: FAMILY MEDICINE

## 2022-08-31 PROCEDURE — 99213 PR OFFICE/OUTPT VISIT, EST, LEVL III, 20-29 MIN: ICD-10-PCS | Mod: S$PBB,,, | Performed by: PAIN MEDICINE

## 2022-08-31 PROCEDURE — G0511 CCM/BHI BY RHC/FQHC 20MIN MO: HCPCS | Mod: ,,, | Performed by: FAMILY MEDICINE

## 2022-08-31 PROCEDURE — 99213 OFFICE O/P EST LOW 20 MIN: CPT | Mod: S$PBB,,, | Performed by: PAIN MEDICINE

## 2022-08-31 RX ORDER — HYDROCODONE BITARTRATE AND ACETAMINOPHEN 10; 325 MG/1; MG/1
1 TABLET ORAL EVERY 8 HOURS PRN
Qty: 90 TABLET | Refills: 0 | Status: SHIPPED | OUTPATIENT
Start: 2022-10-28 | End: 2022-10-25

## 2022-08-31 RX ORDER — HYDROCODONE BITARTRATE AND ACETAMINOPHEN 10; 325 MG/1; MG/1
1 TABLET ORAL EVERY 8 HOURS PRN
Qty: 90 TABLET | Refills: 0 | Status: SHIPPED | OUTPATIENT
Start: 2022-09-28 | End: 2022-10-25

## 2022-08-31 NOTE — PROGRESS NOTES
She Disclaimer: This note has been generated using voice-recognition software. There may be typographical errors that have been missed during proof-reading        Patient ID: Kathryn Marie is a 71 y.o. female.      Chief Complaint: Leg Pain and Low-back Pain      71-year-old female returns for re-evaluation of chronic lower back and left thigh pain.  She has rheumatoid arthritis and remains on methotrexate from Dr. Otto.   Prednisone was discontinued and her pain  exacerbated.  Her last x-rays were in 2019.  She has a long history of post-laminectomy pain syndrome, status post L5-S1 fusion in [place] around 2015.  She can not recall her last physical therapy and has deferred nerve block injections.            Pain Assessment  Pain Assessment: 0-10  Pain Score:   7  Pain Location: Other (Comment) (lower back and left thigh)  Pain Descriptors: Aching, Constant, Dull  Pain Frequency: Continuous  Pain Onset: Awakened from sleep  Clinical Progression: Not changed  Aggravating Factors: Standing, Walking, Other (Comment) (lying and sitting)  Pain Intervention(s): Medication (See eMAR)      A's of Opioid Risk Assessment  Activity:Patient can not perform ADL.   Analgesia:Patients pain is  controlled by current medication.   Adverse Effects: Patient denies constipation or sedation.  Aberrant Behavior:  reviewed with no aberrant drug seeking/taking behavior.      Patient denies any suicidal or homicidal ideations    Physical Therapy/Home Exercise: no      X-Ray Hips Bilateral 2 View Inc AP Pelvis  Narrative: EXAMINATION:  XR HIPS BILATERAL 2 VIEW INCL AP PELVIS    CLINICAL HISTORY:  Spondylosis without myelopathy or radiculopathy, lumbosacral region    COMPARISON:  6 June 2020    FINDINGS:  No evidence of acute fracture seen.  The alignment of the joints appears normal.  Mild bilateral hip degenerative change is present.  No soft tissue abnormality is seen.  Impression: Mild bilateral hip osteoarthrosis.    Electronically  signed by: Ron Valentin  Date:    01/05/2022  Time:    10:46      Review of Systems   Constitutional: Negative.    HENT: Negative.     Eyes: Negative.    Respiratory: Negative.     Cardiovascular: Negative.    Gastrointestinal: Negative.    Endocrine: Negative.    Genitourinary: Negative.    Musculoskeletal:  Positive for arthralgias, back pain and leg pain (left thigh).   Integumentary:  Negative.   Neurological: Negative.    Hematological: Negative.    Psychiatric/Behavioral: Negative.             Past Medical History:   Diagnosis Date    Anemia     Atherosclerotic heart disease of native coronary artery without angina pectoris     Carotid artery stenosis 01/15/2014    CHARO  LICA stenosis    Causalgia of lower limb     Cervical radiculopathy     Chronic low back pain     Chronic pain syndrome     CVA (cerebral vascular accident)     right cerebellar    Degenerative joint disease involving multiple joints     Disorder of parathyroid gland, unspecified     Disorder of sacrum     Essential (primary) hypertension     Gammopathy     GERD (gastroesophageal reflux disease)     Heart murmur     Hyperlipidemia     Hyperparathyroidism     Lumbosacral radiculopathy     Lumbosacral spondylosis     Other long term (current) drug therapy     Pernicious anemia     Sciatica     Spinal stenosis of lumbar region     L4-5    Type 2 diabetes mellitus      Past Surgical History:   Procedure Laterality Date    CARPAL TUNNEL RELEASE Right     caudal MOIZ  07/03/2018    CHOLECYSTECTOMY  1975    CORONARY ARTERY BYPASS GRAFT      CORONARY ARTERY BYPASS GRAFT (CABG)      triple    HIP SURGERY Right     L4-S1 Caudal cath guided MOIZ  07/03/2018    Dr Orta    L5-S1 Caudal cath guided MOIZ  09/26/2014 6/26/2014 4/11/2014    Dr Orta    left shoulder repair Left     NECK SURGERY  2018    does not know what kind    right sacral RF Right 12/26/2013 1/24/2012    Dr Orta    right SIJI Right 10/24/2013    Dr Orta     Social History     Socioeconomic  History    Marital status:    Occupational History    Occupation: RETIRED   Tobacco Use    Smoking status: Never    Smokeless tobacco: Never   Substance and Sexual Activity    Alcohol use: Not Currently    Drug use: Yes     Types: Hydrocodone     Comment: pain medication with pain treatment     Sexual activity: Not Currently     Family History   Problem Relation Age of Onset    Diabetes Mother     Heart disease Mother     Hypertension Mother     Heart attack Mother     Hypertension Father     Stroke Father     Stroke Sister     Hypertension Sister     Cancer Brother      Review of patient's allergies indicates:  No Known Allergies  has a current medication list which includes the following prescription(s): amlodipine, aspirin, blood sugar diagnostic, blood-glucose meter, clopidogrel, dapagliflozin, diclofenac sodium, ferrous sulfate, fluticasone propionate, folic acid, hydrocodone-acetaminophen, ipratropium, latanoprost, methotrexate, onglyza, pantoprazole, rosuvastatin, travoprost, vitamin d, [START ON 9/28/2022] hydrocodone-acetaminophen, and [START ON 10/28/2022] hydrocodone-acetaminophen.      Objective:  Vitals:    08/31/22 1414   BP: (!) 131/53   Pulse: 87        Physical Exam  Vitals and nursing note reviewed.   Constitutional:       General: She is not in acute distress.     Appearance: Normal appearance. She is not ill-appearing, toxic-appearing or diaphoretic.   HENT:      Head: Normocephalic and atraumatic.      Nose: Nose normal.      Mouth/Throat:      Mouth: Mucous membranes are moist.   Eyes:      Extraocular Movements: Extraocular movements intact.      Pupils: Pupils are equal, round, and reactive to light.   Cardiovascular:      Rate and Rhythm: Normal rate and regular rhythm.      Heart sounds: Normal heart sounds.   Pulmonary:      Effort: Pulmonary effort is normal. No respiratory distress.      Breath sounds: Normal breath sounds. No stridor. No wheezing or rhonchi.   Abdominal:       General: Bowel sounds are normal.      Palpations: Abdomen is soft.   Musculoskeletal:         General: No swelling or deformity.      Cervical back: Normal and normal range of motion. No spasms or tenderness. No pain with movement. Normal range of motion.      Thoracic back: Normal.      Lumbar back: Tenderness and bony tenderness present. No spasms. Decreased range of motion. Negative right straight leg raise test and negative left straight leg raise test. No scoliosis.      Right lower leg: No edema.      Left lower leg: No edema.   Skin:     General: Skin is warm.   Neurological:      General: No focal deficit present.      Mental Status: She is alert and oriented to person, place, and time. Mental status is at baseline.      Cranial Nerves: No cranial nerve deficit.      Sensory: Sensation is intact. No sensory deficit.      Motor: No weakness.      Coordination: Coordination normal.      Gait: Gait abnormal.      Deep Tendon Reflexes: Reflexes are normal and symmetric.   Psychiatric:         Mood and Affect: Mood normal.         Behavior: Behavior normal.         Assessment:      1. Postlaminectomy syndrome of cervical region    2. Rheumatoid arthritis involving multiple sites, unspecified whether rheumatoid factor present    3. Lumbosacral spondylosis without myelopathy    4. Sacroiliitis            Plan:  1. reviewed  2.Addiction, Dependency, Tolerance, Opioid abuse-misuse, Death, Diversion Discussed. Overdose reversal drug Naloxone discussed.  3.Refill/Continue medications for pain control and function       Requested Prescriptions     Signed Prescriptions Disp Refills    HYDROcodone-acetaminophen (NORCO)  mg per tablet 90 tablet 0     Sig: Take 1 tablet by mouth every 8 (eight) hours as needed for Pain.    HYDROcodone-acetaminophen (NORCO)  mg per tablet 90 tablet 0     Sig: Take 1 tablet by mouth every 8 (eight) hours as needed for Pain.     4. X-ray of the lumbar spine and SI  joints  Orders Placed This Encounter   Procedures    X-Ray Lumbar Complete Including Flex And Ext     Standing Status:   Future     Standing Expiration Date:   8/31/2023     Order Specific Question:   May the Radiologist modify the order per protocol to meet the clinical needs of the patient?     Answer:   Yes    X-Ray Sacroiliac Joints Complete     Standing Status:   Future     Standing Expiration Date:   8/31/2023     Order Specific Question:   May the Radiologist modify the order per protocol to meet the clinical needs of the patient?     Answer:   Yes     Order Specific Question:   Release to patient     Answer:   Immediate      5. Consider physical therapy versus lumbar medial branch blocks for lower back pain and spondylosis  5.Follow with AMANDA Rodriguez in 2 months for re-evaluation and medication refill       report:  Reviewed and consistent with medication use as prescribed.      The total time spent for evaluation and management on 08/31/2022 including reviewing separately obtained history, performing a medically appropriate exam and evaluation, documenting clinical information in the health record, independently interpreting results and communicating them to the patient/family/caregiver, and ordering medications/tests/procedures was between 15-29 minutes.    The above plan and management options were discussed at length with patient. Patient is in agreement with the above and verbalized understanding. It will be communicated with the referring physician via electronic record, fax, or mail.

## 2022-09-28 ENCOUNTER — OFFICE VISIT (OUTPATIENT)
Dept: OBSTETRICS AND GYNECOLOGY | Facility: CLINIC | Age: 72
End: 2022-09-28
Payer: MEDICARE

## 2022-09-28 VITALS
BODY MASS INDEX: 22.3 KG/M2 | HEIGHT: 62 IN | HEART RATE: 63 BPM | WEIGHT: 121.19 LBS | DIASTOLIC BLOOD PRESSURE: 65 MMHG | OXYGEN SATURATION: 99 % | SYSTOLIC BLOOD PRESSURE: 98 MMHG | RESPIRATION RATE: 17 BRPM

## 2022-09-28 DIAGNOSIS — N95.1 POSTMENOPAUSAL DISORDER: ICD-10-CM

## 2022-09-28 DIAGNOSIS — N81.10 PELVIC ORGAN PROLAPSE QUANTIFICATION STAGE 2 CYSTOCELE: Primary | ICD-10-CM

## 2022-09-28 PROCEDURE — 99212 PR OFFICE/OUTPT VISIT, EST, LEVL II, 10-19 MIN: ICD-10-PCS | Mod: ,,, | Performed by: ADVANCED PRACTICE MIDWIFE

## 2022-09-28 PROCEDURE — 99212 OFFICE O/P EST SF 10 MIN: CPT | Mod: ,,, | Performed by: ADVANCED PRACTICE MIDWIFE

## 2022-09-28 NOTE — PROGRESS NOTES
Subjective:       Patient ID: Kathryn Marie is a 72 y.o. female.    Chief Complaint:  Prolapsed uterus      History of Present Illness  Ms Marie is a 73 y/o female,  c/o feeling her bladder at the vaginal opening.  She has bladder accidents also and wears a pad or towel.  She has an intact uterus.  Cystocele/Rectocele  Patient complains of a cystocele. Problem started 3 month ago. Symptoms include: prolapse of tissue with straining, urinary incontinence: severe, and discomfort: moderate. Symptoms have gradually worsened.    GYN & OB History  No LMP recorded. Patient is postmenopausal.   Date of Last Pap: No result found  age 63 and was normal with Dr. Higgins    OB History    Para Term  AB Living   4 4 4         SAB IAB Ectopic Multiple Live Births                  # Outcome Date GA Lbr Juan Jose/2nd Weight Sex Delivery Anes PTL Lv   4 Term            3 Term            2 Term            1 Term                Review of Systems  Review of Systems   Constitutional:  Positive for fatigue.   HENT: Negative.     Respiratory: Negative.     Cardiovascular: Negative.    Gastrointestinal: Negative.    Endocrine: Positive for cold intolerance.   Genitourinary:  Positive for urgency.   Psychiatric/Behavioral: Negative.      She feels her bladder at the vaginal opening and has incontinence or urine     Objective:     Physical Exam   Constitutional: She is oriented to person, place, and time. No distress.   HENT:   Head: Normocephalic.   Eyes: EOM are normal.   Cardiovascular: Normal rate.   Pulmonary/Chest: Effort normal.   Genitourinary:    Genitourinary Comments: Cystocele noted, vaginal tissue is atropic.  No discharge noted.     Neurological: She is alert and oriented to person, place, and time.   Skin: Skin is warm and dry.   Psychiatric: She has a normal mood and affect.        Assessment:        1. Pelvic organ prolapse quantification stage 2 cystocele    2. Postmenopausal disorder               Plan:     Refer to Dr. Ferrer for evaluation and treatment recommendations asap

## 2022-09-30 ENCOUNTER — EXTERNAL CHRONIC CARE MANAGEMENT (OUTPATIENT)
Dept: FAMILY MEDICINE | Facility: CLINIC | Age: 72
End: 2022-09-30
Payer: MEDICARE

## 2022-09-30 PROCEDURE — G0511 CCM/BHI BY RHC/FQHC 20MIN MO: HCPCS | Mod: ,,, | Performed by: FAMILY MEDICINE

## 2022-09-30 PROCEDURE — G0511 PR CHRONIC CARE MGMT, RHC OR FQHC ONLY, 20 MINS OR MORE: ICD-10-PCS | Mod: ,,, | Performed by: FAMILY MEDICINE

## 2022-10-18 ENCOUNTER — OFFICE VISIT (OUTPATIENT)
Dept: OBSTETRICS AND GYNECOLOGY | Facility: CLINIC | Age: 72
End: 2022-10-18
Payer: MEDICARE

## 2022-10-18 VITALS — SYSTOLIC BLOOD PRESSURE: 128 MMHG | DIASTOLIC BLOOD PRESSURE: 72 MMHG

## 2022-10-18 DIAGNOSIS — R35.0 URINARY FREQUENCY: Primary | ICD-10-CM

## 2022-10-18 DIAGNOSIS — N39.41 URGE INCONTINENCE OF URINE: ICD-10-CM

## 2022-10-18 DIAGNOSIS — Z12.4 SCREENING FOR MALIGNANT NEOPLASM OF THE CERVIX: ICD-10-CM

## 2022-10-18 DIAGNOSIS — N81.10 PELVIC ORGAN PROLAPSE QUANTIFICATION STAGE 2 CYSTOCELE: ICD-10-CM

## 2022-10-18 LAB
BILIRUB UR QL STRIP: NEGATIVE
CLARITY UR: CLEAR
COLOR UR: NORMAL
GLUCOSE UR STRIP-MCNC: NORMAL MG/DL
KETONES UR STRIP-SCNC: NEGATIVE MG/DL
LEUKOCYTE ESTERASE UR QL STRIP: NEGATIVE
MUCOUS, UA: ABNORMAL /LPF
NITRITE UR QL STRIP: NEGATIVE
PH UR STRIP: 6 PH UNITS
PROT UR QL STRIP: NEGATIVE
RBC # UR STRIP: NEGATIVE /UL
RBC #/AREA URNS HPF: 1 /HPF
SP GR UR STRIP: 1.01
SQUAMOUS #/AREA URNS LPF: ABNORMAL /HPF
UROBILINOGEN UR STRIP-ACNC: NORMAL MG/DL
WBC #/AREA URNS HPF: 1 /HPF

## 2022-10-18 PROCEDURE — 81001 URINALYSIS AUTO W/SCOPE: CPT | Mod: ,,, | Performed by: CLINICAL MEDICAL LABORATORY

## 2022-10-18 PROCEDURE — 81001 URINALYSIS, REFLEX TO URINE CULTURE: ICD-10-PCS | Mod: ,,, | Performed by: CLINICAL MEDICAL LABORATORY

## 2022-10-18 PROCEDURE — 99213 PR OFFICE/OUTPT VISIT, EST, LEVL III, 20-29 MIN: ICD-10-PCS | Mod: S$PBB,,, | Performed by: OBSTETRICS & GYNECOLOGY

## 2022-10-18 PROCEDURE — 99213 OFFICE O/P EST LOW 20 MIN: CPT | Mod: PBBFAC | Performed by: OBSTETRICS & GYNECOLOGY

## 2022-10-18 PROCEDURE — 99213 OFFICE O/P EST LOW 20 MIN: CPT | Mod: S$PBB,,, | Performed by: OBSTETRICS & GYNECOLOGY

## 2022-10-18 PROCEDURE — G0123 SCREEN CERV/VAG THIN LAYER: HCPCS | Mod: GCY | Performed by: OBSTETRICS & GYNECOLOGY

## 2022-10-18 NOTE — PROGRESS NOTES
Subjective:       Patient ID: Kathryn Marie is a 72 y.o. female.    Chief Complaint: Vaginal Prolapse (Here with c/o of bladder/utereus is hanging out vaginal vault x 3 months. Some urinary incontinence noted also, wears pads daily.)    Presents on referral from Danii Arizmendi    History of uterine prolapse.  Protrusion has been at the vaginal introitus for approximately 3 months    History of urinary incontinence.    Review of Systems      Objective:      Physical Exam  Genitourinary:     Comments: On examination cervix protrudes approximately 1 cm beyond the vaginal introitus with Valsalva.  Cervical os is atrophied and has closed nevertheless Pap was taken Bulging 4th degree cystocele also noted.  Bimanual exam revealed uterus normal size there were no adnexal masses noted on vaginal or rectovaginal examination.        She has had colonoscopy screening within the last year.      After examination patient was fitted with pessary.  She will require a number 3 pessary.  However we do not have these in stock today.  Will order pessary and have patient return.      Assessment:       1. Urinary frequency    2. Pelvic organ prolapse quantification stage 2 cystocele    3. Urge incontinence of urine    4. Screening for malignant neoplasm of the cervix          Plan:       Patient Instructions    discussed continue follow-up with primary care provider regarding routine yearly examinations and testing such as mammography screening bone density screening glucose and cholesterol screening.      Discussed I will order pessary.  We discussed pessary management.      We discussed the possibility of hysterectomy with anterior and posterior vaginal colporrhaphy in the future however patient has had triple bypass at [facility] in the past and the potential risk of surgery were discussed.    Follow-up appointment given in 2 weeks.

## 2022-10-18 NOTE — PATIENT INSTRUCTIONS
discussed continue follow-up with primary care provider regarding routine yearly examinations and testing such as mammography screening bone density screening glucose and cholesterol screening.      Discussed I will order pessary.  We discussed pessary management.      We discussed the possibility of hysterectomy with anterior and posterior vaginal colporrhaphy in the future however patient has had triple bypass at [facility] in the past and the potential risk of surgery were discussed.    Follow-up appointment given in 3 weeks.

## 2022-10-21 LAB
GH SERPL-MCNC: NORMAL NG/ML
INSULIN SERPL-ACNC: NORMAL U[IU]/ML
LAB AP CLINICAL INFORMATION: NORMAL
LAB AP GYN INTERPRETATION: NEGATIVE
LAB AP PAP DISCLAIMER COMMENTS: NORMAL
RENIN PLAS-CCNC: NORMAL NG/ML/H

## 2022-10-25 ENCOUNTER — OFFICE VISIT (OUTPATIENT)
Dept: PAIN MEDICINE | Facility: CLINIC | Age: 72
End: 2022-10-25
Payer: MEDICARE

## 2022-10-25 VITALS
HEIGHT: 62 IN | HEART RATE: 66 BPM | SYSTOLIC BLOOD PRESSURE: 138 MMHG | BODY MASS INDEX: 22.08 KG/M2 | RESPIRATION RATE: 18 BRPM | DIASTOLIC BLOOD PRESSURE: 48 MMHG | WEIGHT: 120 LBS

## 2022-10-25 DIAGNOSIS — M96.1 POSTLAMINECTOMY SYNDROME OF CERVICAL REGION: Chronic | ICD-10-CM

## 2022-10-25 DIAGNOSIS — M47.817 LUMBOSACRAL SPONDYLOSIS WITHOUT MYELOPATHY: Chronic | ICD-10-CM

## 2022-10-25 DIAGNOSIS — Z79.899 OTHER LONG TERM (CURRENT) DRUG THERAPY: ICD-10-CM

## 2022-10-25 DIAGNOSIS — M54.12 CERVICAL RADICULOPATHY: Primary | Chronic | ICD-10-CM

## 2022-10-25 PROCEDURE — 99214 OFFICE O/P EST MOD 30 MIN: CPT | Mod: S$PBB,,, | Performed by: PHYSICIAN ASSISTANT

## 2022-10-25 PROCEDURE — 99214 OFFICE O/P EST MOD 30 MIN: CPT | Mod: PBBFAC | Performed by: PHYSICIAN ASSISTANT

## 2022-10-25 PROCEDURE — 80305 DRUG TEST PRSMV DIR OPT OBS: CPT | Mod: PBBFAC | Performed by: PHYSICIAN ASSISTANT

## 2022-10-25 PROCEDURE — 99214 PR OFFICE/OUTPT VISIT, EST, LEVL IV, 30-39 MIN: ICD-10-PCS | Mod: S$PBB,,, | Performed by: PHYSICIAN ASSISTANT

## 2022-10-25 RX ORDER — BUPRENORPHINE 15 UG/H
1 PATCH TRANSDERMAL
Qty: 4 PATCH | Refills: 2 | Status: SHIPPED | OUTPATIENT
Start: 2022-10-25 | End: 2022-10-26

## 2022-10-25 RX ORDER — HYDROCODONE BITARTRATE AND ACETAMINOPHEN 10; 325 MG/1; MG/1
1 TABLET ORAL EVERY 8 HOURS PRN
Qty: 90 TABLET | Refills: 0 | Status: CANCELLED | OUTPATIENT
Start: 2022-10-25 | End: 2022-11-24

## 2022-10-25 RX ORDER — HYDROCODONE BITARTRATE AND ACETAMINOPHEN 10; 325 MG/1; MG/1
1 TABLET ORAL EVERY 8 HOURS PRN
Qty: 90 TABLET | Refills: 0 | Status: CANCELLED | OUTPATIENT
Start: 2022-10-28 | End: 2022-11-27

## 2022-10-25 NOTE — PROGRESS NOTES
Subjective:         Patient ID: Kathryn Marie is a 72 y.o. female.    Chief Complaint: Back Pain, Low-back Pain, Neck Pain, and Knee Pain      Pain  This is a chronic problem. The current episode started more than 1 year ago. The problem occurs daily. The problem has been waxing and waning. Associated symptoms include arthralgias and neck pain. Pertinent negatives include no anorexia, change in bowel habit, chest pain, chills, coughing, diaphoresis, fatigue, fever, rash, sore throat, vertigo or vomiting.   Review of Systems   Constitutional:  Negative for appetite change, chills, diaphoresis, fatigue, fever and unexpected weight change.   HENT:  Negative for drooling, ear discharge, ear pain, facial swelling, nosebleeds, sore throat, trouble swallowing, voice change and goiter.    Eyes:  Negative for photophobia, pain, discharge, redness and visual disturbance.   Respiratory:  Negative for apnea, cough, choking, chest tightness, shortness of breath, wheezing and stridor.    Cardiovascular:  Negative for chest pain, palpitations and leg swelling.   Gastrointestinal:  Negative for abdominal distention, anorexia, change in bowel habit, diarrhea, rectal pain, vomiting, fecal incontinence and change in bowel habit.   Endocrine: Negative for cold intolerance, heat intolerance, polydipsia, polyphagia and polyuria.   Genitourinary:  Negative for bladder incontinence, dysuria, flank pain, frequency and hot flashes.   Musculoskeletal:  Positive for arthralgias, back pain, leg pain, neck pain and neck stiffness.   Integumentary:  Negative for color change, pallor and rash.   Allergic/Immunologic: Negative for immunocompromised state.   Neurological:  Negative for dizziness, vertigo, seizures, syncope, facial asymmetry, speech difficulty, light-headedness, coordination difficulties, memory loss and coordination difficulties.   Hematological:  Negative for adenopathy. Does not bruise/bleed easily.   Psychiatric/Behavioral:   Negative for agitation, behavioral problems, confusion, decreased concentration, dysphoric mood, hallucinations, self-injury and suicidal ideas. The patient is not nervous/anxious and is not hyperactive.          Past Medical History:   Diagnosis Date    Anemia     Atherosclerotic heart disease of native coronary artery without angina pectoris     Carotid artery stenosis 01/15/2014    CHARO  LICA stenosis    Causalgia of lower limb     Cervical radiculopathy     Chronic low back pain     Chronic pain syndrome     CVA (cerebral vascular accident)     right cerebellar    Degenerative joint disease involving multiple joints     Disorder of parathyroid gland, unspecified     Disorder of sacrum     Essential (primary) hypertension     Gammopathy     GERD (gastroesophageal reflux disease)     Heart murmur     Hyperlipidemia     Hyperparathyroidism     Lumbosacral radiculopathy     Lumbosacral spondylosis     Other long term (current) drug therapy     Pernicious anemia     Sciatica     Spinal stenosis of lumbar region     L4-5    Type 2 diabetes mellitus      Past Surgical History:   Procedure Laterality Date    CARPAL TUNNEL RELEASE Right     caudal MOIZ  07/03/2018    CHOLECYSTECTOMY  1975    CORONARY ARTERY BYPASS GRAFT      CORONARY ARTERY BYPASS GRAFT (CABG)      triple    HIP SURGERY Right     L4-S1 Caudal cath guided MOIZ  07/03/2018    Dr Orta    L5-S1 Caudal cath guided MOIZ  09/26/2014 6/26/2014 4/11/2014    Dr Orta    left shoulder repair Left     NECK SURGERY  2018    does not know what kind    right sacral RF Right 12/26/2013 1/24/2012    Dr Orta    right SIJI Right 10/24/2013    Dr Orta     Social History     Socioeconomic History    Marital status:    Occupational History    Occupation: RETIRED   Tobacco Use    Smoking status: Never    Smokeless tobacco: Never   Substance and Sexual Activity    Alcohol use: Not Currently    Drug use: Yes     Types: Hydrocodone     Comment: pain medication with pain  "treatment     Sexual activity: Not Currently     Family History   Problem Relation Age of Onset    Diabetes Mother     Heart disease Mother     Hypertension Mother     Heart attack Mother     Hypertension Father     Stroke Father     Stroke Sister     Hypertension Sister     Cancer Brother      Review of patient's allergies indicates:  No Known Allergies     Objective:  Vitals:    10/25/22 1418 10/25/22 1419   BP: (!) 138/48    Pulse: 66    Resp: 18    Weight: 54.4 kg (120 lb)    Height: 5' 2" (1.575 m)    PainSc:   9   9         Physical Exam  Vitals and nursing note reviewed. Exam conducted with a chaperone present.   Constitutional:       General: She is awake. She is not in acute distress.     Appearance: Normal appearance. She is not ill-appearing, toxic-appearing or diaphoretic.   HENT:      Head: Normocephalic and atraumatic.      Nose: Nose normal.      Mouth/Throat:      Mouth: Mucous membranes are moist.      Pharynx: Oropharynx is clear.   Eyes:      Conjunctiva/sclera: Conjunctivae normal.      Pupils: Pupils are equal, round, and reactive to light.   Cardiovascular:      Rate and Rhythm: Normal rate.   Pulmonary:      Effort: Pulmonary effort is normal. No respiratory distress.   Abdominal:      Palpations: Abdomen is soft.   Musculoskeletal:      Cervical back: Normal range of motion and neck supple. Tenderness present.      Thoracic back: Tenderness present.      Lumbar back: Tenderness present. Decreased range of motion.   Skin:     General: Skin is warm and dry.      Coloration: Skin is not jaundiced or pale.   Neurological:      General: No focal deficit present.      Mental Status: She is alert and oriented to person, place, and time. Mental status is at baseline.      Cranial Nerves: No cranial nerve deficit (II-XII).   Psychiatric:         Mood and Affect: Mood normal.         Behavior: Behavior normal. Behavior is cooperative.         Thought Content: Thought content normal.         X-Ray Hips " Bilateral 2 View Inc AP Pelvis  Narrative: EXAMINATION:  XR HIPS BILATERAL 2 VIEW INCL AP PELVIS    CLINICAL HISTORY:  Spondylosis without myelopathy or radiculopathy, lumbosacral region    COMPARISON:  6 June 2020    FINDINGS:  No evidence of acute fracture seen.  The alignment of the joints appears normal.  Mild bilateral hip degenerative change is present.  No soft tissue abnormality is seen.  Impression: Mild bilateral hip osteoarthrosis.    Electronically signed by: Ron Valentin  Date:    01/05/2022  Time:    10:46       Office Visit on 10/18/2022   Component Date Value Ref Range Status    Color, UA 10/18/2022 Light-Yellow  Colorless, Straw, Yellow, Light Yellow, Dark Yellow Final    Clarity, UA 10/18/2022 Clear  Clear Final    pH, UA 10/18/2022 6.0  5.0 to 8.0 pH Units Final    Leukocytes, UA 10/18/2022 Negative  Negative Final    Nitrites, UA 10/18/2022 Negative  Negative Final    Protein, UA 10/18/2022 Negative  Negative Final    Glucose, UA 10/18/2022 Normal  Normal mg/dL Final    Ketones, UA 10/18/2022 Negative  Negative mg/dL Final    Urobilinogen, UA 10/18/2022 Normal  0.2, 1.0, Normal mg/dL Final    Bilirubin, UA 10/18/2022 Negative  Negative Final    Blood, UA 10/18/2022 Negative  Negative Final    Specific Gravity, UA 10/18/2022 1.015  <=1.030 Final    Case Report 10/18/2022    Final                    Value:Pap Cytology                                      Case: [id number]                                   Authorizing Provider:  Cong Ferrer MD      Collected:           10/18/2022 03:45 PM          Ordering Location:     [facility] Received:            10/19/2022 09:25 AM                                 - Obstetrics And                                                                                    Gynecology                                                                   First Screen:          CHRISTIANO Mahmood(ASCP)                                                     Specimen:    Liquid-Based Pap Test, Screening, Cervix                                                   Interpretation 10/18/2022 Negative              Final    General Categorization 10/18/2022 Negative for intraepithelial lesion or malignancy   Final    Specimen Adequacy 10/18/2022 Satisfactory for evaluation  No endocervical component   Final    Clinical Information 10/18/2022    Final                    Value:This result contains rich text formatting which cannot be displayed here.    Disclaimer 10/18/2022    Final                    Value:This result contains rich text formatting which cannot be displayed here.    WBC, UA 10/18/2022 1  <=5 /hpf Final    RBC, UA 10/18/2022 1  <=3 /hpf Final    Squamous Epithelial Cells, UA 10/18/2022 Occasional (A)  None Seen /HPF Final    Mucous 10/18/2022 Occasional (A)  None Seen /LPF Final   Office Visit on 07/15/2022   Component Date Value Ref Range Status    Hemoglobin A1C 07/15/2022 6.1  4.5 - 6.6 % Final    Estimated Average Glucose 07/15/2022 117  mg/dL Final    Sodium 07/15/2022 137  136 - 145 mmol/L Final    Potassium 07/15/2022 4.9  3.5 - 5.1 mmol/L Final    Chloride 07/15/2022 104  98 - 107 mmol/L Final    CO2 07/15/2022 28  21 - 32 mmol/L Final    Anion Gap 07/15/2022 10  7 - 16 mmol/L Final    Glucose 07/15/2022 73 (L)  74 - 106 mg/dL Final    BUN 07/15/2022 16  7 - 18 mg/dL Final    Creatinine 07/15/2022 1.02  0.55 - 1.02 mg/dL Final    BUN/Creatinine Ratio 07/15/2022 16  6 - 20 Final    Calcium 07/15/2022 9.0  8.5 - 10.1 mg/dL Final    Total Protein 07/15/2022 7.6  6.4 - 8.2 g/dL Final    Albumin 07/15/2022 3.6  3.5 - 5.0 g/dL Final    Globulin 07/15/2022 4.0  2.0 - 4.0 g/dL Final    A/G Ratio 07/15/2022 0.9   Final    Bilirubin, Total 07/15/2022 0.4  0.0 - 1.2 mg/dL Final    Alk Phos 07/15/2022 57  55 - 142 U/L Final    ALT 07/15/2022 34  13 - 56 U/L Final    AST 07/15/2022 46 (H)  15 - 37 U/L Final    eGFR 07/15/2022 56 (L)  >=60 mL/min/1.73m² Final    WBC  07/15/2022 2.92 (L)  4.50 - 11.00 K/uL Final    RBC 07/15/2022 3.03 (L)  4.20 - 5.40 M/uL Final    Hemoglobin 07/15/2022 9.8 (L)  12.0 - 16.0 g/dL Final    Hematocrit 07/15/2022 28.9 (L)  38.0 - 47.0 % Final    MCV 07/15/2022 95.4  80.0 - 96.0 fL Final    MCH 07/15/2022 32.3 (H)  27.0 - 31.0 pg Final    MCHC 07/15/2022 33.9  32.0 - 36.0 g/dL Final    RDW 07/15/2022 14.8 (H)  11.5 - 14.5 % Final    Platelet Count 07/15/2022 144 (L)  150 - 400 K/uL Final    MPV 07/15/2022 10.3  9.4 - 12.4 fL Final    Neutrophils % 07/15/2022 55.2  53.0 - 65.0 % Final    Lymphocytes % 07/15/2022 32.2  27.0 - 41.0 % Final    Monocytes % 07/15/2022 11.6 (H)  2.0 - 6.0 % Final    Eosinophils % 07/15/2022 0.7 (L)  1.0 - 4.0 % Final    Basophils % 07/15/2022 0.0  0.0 - 1.0 % Final    Immature Granulocytes % 07/15/2022 0.3  0.0 - 0.4 % Final    nRBC, Auto 07/15/2022 0.0  <=0.0 % Final    Neutrophils, Abs 07/15/2022 1.61 (L)  1.80 - 7.70 K/uL Final    Lymphocytes, Absolute 07/15/2022 0.94 (L)  1.00 - 4.80 K/uL Final    Monocytes, Absolute 07/15/2022 0.34  0.00 - 0.80 K/uL Final    Eosinophils, Absolute 07/15/2022 0.02  0.00 - 0.50 K/uL Final    Basophils, Absolute 07/15/2022 0.00  0.00 - 0.20 K/uL Final    Immature Granulocytes, Absolute 07/15/2022 0.01  0.00 - 0.04 K/uL Final    nRBC, Absolute 07/15/2022 0.00  <=0.00 x10e3/uL Final    Diff Type 07/15/2022 Auto   Final   Office Visit on 06/22/2022   Component Date Value Ref Range Status    POC Amphetamines 06/22/2022 Negative  Negative, Inconclusive Final    POC Barbiturates 06/22/2022 Negative  Negative, Inconclusive Final    POC Benzodiazepines 06/22/2022 Negative  Negative, Inconclusive Final    POC Cocaine 06/22/2022 Negative  Negative, Inconclusive Final    POC THC 06/22/2022 Negative  Negative, Inconclusive Final    POC Methadone 06/22/2022 Negative  Negative, Inconclusive Final    POC Methamphetamine 06/22/2022 Negative  Negative, Inconclusive Final    POC Opiates 06/22/2022  Presumptive Positive (A)  Negative, Inconclusive Final    POC Oxycodone 06/22/2022 Negative  Negative, Inconclusive Final    POC Phencyclidine 06/22/2022 Negative  Negative, Inconclusive Final    POC Methylenedioxymethamphetamine * 06/22/2022 Negative  Negative, Inconclusive Final    POC Tricyclic Antidepressants 06/22/2022 Negative  Negative, Inconclusive Final    POC Buprenorphine 06/22/2022 Negative   Final     Acceptable 06/22/2022 Yes   Final    POC Temperature (Urine) 06/22/2022 90   Final   Office Visit on 06/17/2022   Component Date Value Ref Range Status    Vitamin B12 06/17/2022 301  193 - 986 pg/mL Final    Folate 06/17/2022 17.6 (H)  3.1 - 17.5 ng/mL Final    Creatinine, Urine 06/17/2022 74  28 - 219 mg/dL Final    Microalbumin 06/17/2022 0.6  0.0 - 2.8 mg/dL Final    Microalbumin/Creatinine Ratio 06/17/2022 8.1  0.0 - 30.0 mg/g Final   Office Visit on 05/06/2022   Component Date Value Ref Range Status    EKG 12-Lead 05/06/2022    Final    Ventricular Rate 05/06/2022 72 bmp   Final    MT Interval 05/06/2022 828 ms   Final    QRS Duration 05/06/2022 164 ms   Final    QT/QTc 05/06/2022 436 457 ms   Final    PRT Axes 05/06/2022 64 /177 8   Final    Free T4 05/06/2022 1.37  0.76 - 1.46 ng/dL Final    TSH 05/06/2022 1.150  0.358 - 3.740 uIU/mL Final    Hepatitis C Ab 05/06/2022 Non-Reactive  Non-Reactive Final    WBC 05/06/2022 3.00 (L)  4.50 - 11.00 K/uL Corrected    RBC 05/06/2022 3.09 (L)  4.20 - 5.40 M/uL Corrected    Hemoglobin 05/06/2022 9.7 (L)  12.0 - 16.0 g/dL Final    Hematocrit 05/06/2022 29.9 (L)  38.0 - 47.0 % Corrected    MCV 05/06/2022 96.8 (H)  80.0 - 96.0 fL Corrected    MCH 05/06/2022 31.4 (H)  27.0 - 31.0 pg Corrected    MCHC 05/06/2022 32.4  32.0 - 36.0 g/dL Corrected    RDW 05/06/2022 14.5  11.5 - 14.5 % Corrected    Platelet Count 05/06/2022 142 (L)  150 - 400 K/uL Corrected    MPV 05/06/2022 12.1  9.4 - 12.4 fL Final    Neutrophils % 05/06/2022 51.7 (L)  53.0 - 65.0 %  Corrected    Lymphocytes % 05/06/2022 34.7  27.0 - 41.0 % Corrected    Monocytes % 05/06/2022 11.0 (H)  2.0 - 6.0 % Corrected    Eosinophils % 05/06/2022 1.3  1.0 - 4.0 % Corrected    Basophils % 05/06/2022 0.3  0.0 - 1.0 % Final    Immature Granulocytes % 05/06/2022 1.0 (H)  0.0 - 0.4 % Corrected    nRBC, Auto 05/06/2022 0.0  <=0.0 % Final    Neutrophils, Abs 05/06/2022 1.55 (L)  1.80 - 7.70 K/uL Corrected    Lymphocytes, Absolute 05/06/2022 1.04  1.00 - 4.80 K/uL Corrected    Monocytes, Absolute 05/06/2022 0.33  0.00 - 0.80 K/uL Corrected    Eosinophils, Absolute 05/06/2022 0.04  0.00 - 0.50 K/uL Corrected    Basophils, Absolute 05/06/2022 0.01  0.00 - 0.20 K/uL Final    Immature Granulocytes, Absolute 05/06/2022 0.03  0.00 - 0.04 K/uL Final    nRBC, Absolute 05/06/2022 0.00  <=0.00 x10e3/uL Final    Diff Type 05/06/2022 Manual   Corrected    Segmented Neutrophils, Man % 05/06/2022 44 (L)  50 - 62 % Final    Lymphocytes, Man % 05/06/2022 38  27 - 41 % Final    Monocytes, Man % 05/06/2022 13 (H)  2 - 6 % Final    Eosinophils, Man % 05/06/2022 4  1 - 4 % Final    Basophils, Man % 05/06/2022 1  0 - 1 % Final    Platelet Morphology 05/06/2022 Few Large Platelets (A)  Normal Final    Anisocytosis 05/06/2022 1+   Final    Target Cells 05/06/2022 Few   Final    Crenated Cells 05/06/2022 Few   Final    Ovalocytes 05/06/2022 Few   Final    Polychromasia 05/06/2022 Few   Final    Hypochromic 05/06/2022 Few   Final    Atypical Lymphocytes 05/06/2022 Few   Final   Office Visit on 05/05/2022   Component Date Value Ref Range Status    Iron 05/05/2022 35 (L)  50 - 170 µg/dL Final    Iron Saturation 05/05/2022 14  14 - 50 % Final    TIBC 05/05/2022 248 (L)  250 - 450 µg/dL Final    Ferritin 05/05/2022 164  8 - 252 ng/mL Final    Uric Acid 05/05/2022 5.2  2.6 - 6.0 mg/dL Final         Orders Placed This Encounter   Procedures    POCT Urine Drug Screen Presump     Interpretive Information:     Negative:  No drug detected at the cut  off level.   Positive:  This result represents presumptive positive for the   tested drug, other substances may yield a positive response other   than the analyte of interest. This result should be utilized for   diagnostic purpose only. Confirmation testing will be performed upon physician request only.            Requested Prescriptions     Signed Prescriptions Disp Refills    buprenorphine 15 mcg/hour PTWK 4 patch 2     Sig: Place 1 patch onto the skin every 7 days.       Assessment:     1. Cervical radiculopathy    2. Lumbosacral spondylosis without myelopathy    3. Postlaminectomy syndrome of cervical region    4. Other long term (current) drug therapy         A's of Opioid Risk Assessment  Activity:Patient can perform ADL.   Analgesia:Patients pain is partially controlled by current medication. Patient has tried OTC medications such as Tylenol and Ibuprofen with out relief.   Adverse Effects: Patient denies constipation or sedation.  Aberrant Behavior:  reviewed with no aberrant drug seeking/taking behavior.  Overdose reversal drug naloxone discussed    Drug screen reviewed      Plan:    October 26, 2022, insurance would not cover Butrans patient requesting to resume Norco in discuss options with Dr. Piña    Rheumatoid arthritis ARM    Using 4 point walker assistance ambulation    She states her Norco is no longer helping control her discomfort she is requesting options    After discussing options risks benefits she would like to try Butrans patch    Butrans patch topical q.week as directed    She would like to continue with current medication conservative management    Continue home exercise program as directed    Follow-up 3 months     Dr. Piña, August 2023    Bring original prescription medication bottles/container/box with labels to each visit    Pill count    Physical therapy    Massage therapy declines

## 2022-10-26 ENCOUNTER — HOSPITAL ENCOUNTER (OUTPATIENT)
Dept: RADIOLOGY | Facility: HOSPITAL | Age: 72
Discharge: HOME | End: 2022-10-26
Attending: FAMILY MEDICINE
Payer: MEDICARE

## 2022-10-26 ENCOUNTER — OFFICE VISIT (OUTPATIENT)
Dept: FAMILY MEDICINE | Facility: CLINIC | Age: 72
End: 2022-10-26
Payer: MEDICARE

## 2022-10-26 VITALS
WEIGHT: 118.63 LBS | OXYGEN SATURATION: 99 % | HEART RATE: 67 BPM | SYSTOLIC BLOOD PRESSURE: 120 MMHG | DIASTOLIC BLOOD PRESSURE: 54 MMHG | HEIGHT: 62 IN | TEMPERATURE: 97 F | RESPIRATION RATE: 18 BRPM | BODY MASS INDEX: 21.83 KG/M2

## 2022-10-26 DIAGNOSIS — M19.90 ARTHRITIS PAIN: ICD-10-CM

## 2022-10-26 DIAGNOSIS — R29.6 FALL IN ELDERLY PATIENT: ICD-10-CM

## 2022-10-26 DIAGNOSIS — R29.6 FALL IN ELDERLY PATIENT: Primary | ICD-10-CM

## 2022-10-26 PROBLEM — Z95.1 PRESENCE OF AORTOCORONARY BYPASS GRAFT: Status: ACTIVE | Noted: 2022-10-26

## 2022-10-26 PROBLEM — M81.0 AGE RELATED OSTEOPOROSIS: Status: ACTIVE | Noted: 2022-10-26

## 2022-10-26 PROBLEM — I34.0 MITRAL VALVE REGURGITATION: Status: ACTIVE | Noted: 2022-10-26

## 2022-10-26 PROBLEM — M25.659 STIFFNESS OF HIP JOINT: Status: ACTIVE | Noted: 2022-10-26

## 2022-10-26 PROBLEM — N81.4 UTEROVAGINAL PROLAPSE: Status: ACTIVE | Noted: 2022-09-24

## 2022-10-26 PROBLEM — E21.3 HYPERPARATHYROIDISM: Status: ACTIVE | Noted: 2022-10-26

## 2022-10-26 PROBLEM — Z79.899 OTHER LONG TERM (CURRENT) DRUG THERAPY: Status: ACTIVE | Noted: 2022-10-26

## 2022-10-26 PROBLEM — I35.1 AORTIC VALVE INSUFFICIENCY: Status: ACTIVE | Noted: 2022-10-26

## 2022-10-26 PROBLEM — K21.9 GASTROESOPHAGEAL REFLUX DISEASE WITHOUT ESOPHAGITIS: Status: ACTIVE | Noted: 2022-09-19

## 2022-10-26 PROBLEM — M70.61 TROCHANTERIC BURSITIS OF RIGHT HIP: Status: ACTIVE | Noted: 2022-10-26

## 2022-10-26 PROBLEM — M17.9 OSTEOARTHRITIS OF KNEE: Status: ACTIVE | Noted: 2022-10-26

## 2022-10-26 PROBLEM — R00.1 BRADYCARDIA: Status: ACTIVE | Noted: 2022-10-26

## 2022-10-26 PROBLEM — I25.5 ISCHEMIC CARDIOMYOPATHY: Status: ACTIVE | Noted: 2022-10-26

## 2022-10-26 PROBLEM — M25.569 ARTHRALGIA OF LOWER LEG: Status: ACTIVE | Noted: 2022-10-26

## 2022-10-26 PROBLEM — I44.7 LEFT BUNDLE BRANCH BLOCK: Status: ACTIVE | Noted: 2022-10-26

## 2022-10-26 PROBLEM — I25.10 ATHEROSCLEROTIC HEART DISEASE OF NATIVE CORONARY ARTERY WITHOUT ANGINA PECTORIS: Status: ACTIVE | Noted: 2022-10-26

## 2022-10-26 PROCEDURE — 96372 THER/PROPH/DIAG INJ SC/IM: CPT | Mod: ,,, | Performed by: FAMILY MEDICINE

## 2022-10-26 PROCEDURE — 96372 PR INJECTION,THERAP/PROPH/DIAG2ST, IM OR SUBCUT: ICD-10-PCS | Mod: ,,, | Performed by: FAMILY MEDICINE

## 2022-10-26 PROCEDURE — 70450 CT HEAD/BRAIN W/O DYE: CPT | Mod: TC

## 2022-10-26 PROCEDURE — 99213 OFFICE O/P EST LOW 20 MIN: CPT | Mod: ,,, | Performed by: FAMILY MEDICINE

## 2022-10-26 PROCEDURE — 99213 PR OFFICE/OUTPT VISIT, EST, LEVL III, 20-29 MIN: ICD-10-PCS | Mod: ,,, | Performed by: FAMILY MEDICINE

## 2022-10-26 RX ORDER — DICYCLOMINE HYDROCHLORIDE 10 MG/1
1 CAPSULE ORAL 2 TIMES DAILY
COMMUNITY
End: 2023-04-10

## 2022-10-26 RX ORDER — HYDROCODONE BITARTRATE AND ACETAMINOPHEN 10; 325 MG/1; MG/1
1 TABLET ORAL EVERY 8 HOURS
Qty: 90 TABLET | Refills: 0 | Status: SHIPPED | OUTPATIENT
Start: 2022-10-30 | End: 2022-11-29

## 2022-10-26 RX ORDER — HYDROCHLOROTHIAZIDE 12.5 MG/1
1 TABLET ORAL DAILY
COMMUNITY
Start: 2022-09-19 | End: 2022-11-21

## 2022-10-26 RX ORDER — HYDROCODONE BITARTRATE AND ACETAMINOPHEN 10; 325 MG/1; MG/1
1 TABLET ORAL
COMMUNITY
End: 2022-10-26 | Stop reason: SDUPTHER

## 2022-10-26 RX ORDER — KETOROLAC TROMETHAMINE 30 MG/ML
30 INJECTION, SOLUTION INTRAMUSCULAR; INTRAVENOUS
Status: COMPLETED | OUTPATIENT
Start: 2022-10-26 | End: 2022-10-26

## 2022-10-26 RX ORDER — OMEPRAZOLE 20 MG/1
1 CAPSULE, DELAYED RELEASE ORAL DAILY
COMMUNITY
End: 2023-11-01

## 2022-10-26 RX ADMIN — KETOROLAC TROMETHAMINE 30 MG: 30 INJECTION, SOLUTION INTRAMUSCULAR; INTRAVENOUS at 03:10

## 2022-10-26 NOTE — TELEPHONE ENCOUNTER
----- Message from Kenyetta Payne sent at 10/26/2022  8:03 AM CDT -----  Regarding: RX  Patient states insurance will not pay for patch rx that was written on yesterday please call [phone]

## 2022-10-26 NOTE — PROGRESS NOTES
Clinic Note    Patient Name: Kathryn Marie  : 1950  MRN: [id number]    Chief Complaint   Patient presents with    Fall     Pt states she fell last night going to bathroom, states she hurts all over, and over right eye swollen and bruised. Pt states that she wants a pain shot.       HPI:    Ms. Kathryn Marie is a 72 y.o. female who presents to clinic today with CC of fall. Patient states she fell last night when she got up to go to the bathroom. Reports chronic apin. Reports she hurts all over today. Reports R eye is swollen and bruised. She is requesting a shot for pain.   Patient reports she has OAB and she had to go to the bathroom. Reports she jumped up to run to the bathroom. Reports she got her feet tangled up and fell. Reports she landed on a rugged floor. Reports she hit R side of head on floor when she fell. Denies any LOC. Denies feeling like she broke or injured anything. Reports she is just having a flare with her chronic pain due to fall.  Patient is, otherwise, without complaints.     Medications:  Medication List with Changes/Refills   Current Medications    AMLODIPINE (NORVASC) 10 MG TABLET    Take 1 tablet (10 mg total) by mouth once daily.    ASPIRIN (ECOTRIN) 81 MG EC TABLET    Take 81 mg by mouth once daily.    BLOOD SUGAR DIAGNOSTIC (ONETOUCH VERIO TEST STRIPS MISC)    by Misc.(Non-Drug; Combo Route) route. Use 1 strip to check blood glucose every morning, fasting for E11.9    BLOOD-GLUCOSE METER (ONETOUCH VERIO METER MISC)    by Misc.(Non-Drug; Combo Route) route. Use for glucose checks once daily, E11.9    CLOPIDOGREL (PLAVIX) 75 MG TABLET    Take 1 tablet (75 mg total) by mouth once daily.    DAPAGLIFLOZIN (FARXIGA) 5 MG TAB TABLET    Take 1 tablet (5 mg total) by mouth once daily.    DICLOFENAC SODIUM (VOLTAREN) 1 % GEL    APPLY 1 APPLICATION TO  AFFECTED AREA(S) TOPICALLY  TWICE DAILY    DICYCLOMINE (BENTYL) 10 MG CAPSULE    Take 1 capsule by mouth 2 (two) times a day.    FERROUS  SULFATE (FEOSOL) 325 MG (65 MG IRON) TAB TABLET    Take 325 mg by mouth daily with breakfast.    FLUTICASONE PROPIONATE (FLONASE) 50 MCG/ACTUATION NASAL SPRAY    2 sprays (100 mcg total) by Each Nostril route once daily.    FOLIC ACID (FOLVITE) 1 MG TABLET    Take 1 mg by mouth once daily.    HYDROCHLOROTHIAZIDE (HYDRODIURIL) 12.5 MG TAB    Take 1 tablet by mouth once daily.    HYDROCODONE-ACETAMINOPHEN (NORCO)  MG PER TABLET    Take 1 tablet by mouth every 8 (eight) hours.    IPRATROPIUM (ATROVENT) 21 MCG (0.03 %) NASAL SPRAY    USE 2 SPRAYS NASALLY EVERY  12 HOURS    LATANOPROST 0.005 % OPHTHALMIC SOLUTION    Place into both eyes.    METHOTREXATE 2.5 MG TAB    Take by mouth. Take 4 tablets by mouth every week    OMEPRAZOLE (PRILOSEC) 20 MG CAPSULE    Take 1 capsule by mouth once daily.    ONGLYZA 5 MG TAB TABLET    Take 5 mg by mouth once daily.    PANTOPRAZOLE (PROTONIX) 40 MG TABLET    Take 1 tablet by mouth once daily at 6am.    ROSUVASTATIN (CRESTOR) 20 MG TABLET    Take 1 tablet (20 mg total) by mouth every evening.    TRAVOPROST (TRAVATAN Z) 0.004 % OPHTHALMIC SOLUTION    1 drop every evening.    VITAMIN D (VITAMIN D3) 1000 UNITS TAB    Take 1,000 Units by mouth once daily.        Allergies: Patient has no known allergies.      Past Medical History:    Past Medical History:   Diagnosis Date    Anemia     Atherosclerotic heart disease of native coronary artery without angina pectoris     Carotid artery stenosis 01/15/2014    CHARO  LICA stenosis    Causalgia of lower limb     Cervical radiculopathy     Chronic low back pain     Chronic pain syndrome     CVA (cerebral vascular accident)     right cerebellar    Degenerative joint disease involving multiple joints     Disorder of parathyroid gland, unspecified     Disorder of sacrum     Essential (primary) hypertension     Gammopathy     GERD (gastroesophageal reflux disease)     Heart murmur     Hyperlipidemia     Hyperparathyroidism     Lumbosacral  radiculopathy     Lumbosacral spondylosis     Other long term (current) drug therapy     Pernicious anemia     Sciatica     Spinal stenosis of lumbar region     L4-5    Type 2 diabetes mellitus        Past Surgical History:    Past Surgical History:   Procedure Laterality Date    CARPAL TUNNEL RELEASE Right     caudal MOIZ  07/03/2018    CHOLECYSTECTOMY  1975    CORONARY ARTERY BYPASS GRAFT      CORONARY ARTERY BYPASS GRAFT (CABG)      triple    HIP SURGERY Right     L4-S1 Caudal cath guided MOIZ  07/03/2018    Dr Orta    L5-S1 Caudal cath guided MOIZ  09/26/2014 6/26/2014 4/11/2014    Dr Orta    left shoulder repair Left     NECK SURGERY  2018    does not know what kind    right sacral RF Right 12/26/2013 1/24/2012    Dr Orta    right SIJI Right 10/24/2013    Dr Orta         Social History:    Social History     Tobacco Use   Smoking Status Never   Smokeless Tobacco Never     Social History     Substance and Sexual Activity   Alcohol Use Not Currently     Social History     Substance and Sexual Activity   Drug Use Yes    Types: Hydrocodone    Comment: pain medication with pain treatment          Family History:    Family History   Problem Relation Age of Onset    Diabetes Mother     Heart disease Mother     Hypertension Mother     Heart attack Mother     Hypertension Father     Stroke Father     Stroke Sister     Hypertension Sister     Cancer Brother        Review of Systems:    Review of Systems   Constitutional:  Negative for appetite change, chills, fatigue, fever and unexpected weight change.   Eyes:  Negative for visual disturbance.   Respiratory:  Negative for cough and shortness of breath.    Cardiovascular:  Negative for chest pain and leg swelling.   Gastrointestinal:  Negative for abdominal pain, change in bowel habit, constipation, diarrhea, nausea, vomiting and change in bowel habit.   Musculoskeletal:  Positive for arthralgias.   Integumentary:  Negative for rash.        + mild bruising above R eye from fall  "  Neurological:  Negative for dizziness and headaches.   Psychiatric/Behavioral:  The patient is not nervous/anxious.       Vitals:    Vitals:    10/26/22 1315   BP: (!) 120/54   BP Location: Left arm   Patient Position: Sitting   BP Method: Large (Manual)   Pulse: 67   Resp: 18   Temp: 97.2 °F (36.2 °C)   TempSrc: Oral   SpO2: 99%   Weight: 53.8 kg (118 lb 9.6 oz)   Height: 5' 2" (1.575 m)       Body mass index is 21.69 kg/m².    Wt Readings from Last 3 Encounters:   10/26/22 1315 53.8 kg (118 lb 9.6 oz)   10/25/22 1418 54.4 kg (120 lb)   09/28/22 1440 55 kg (121 lb 3.2 oz)        Physical Exam:    Physical Exam  Constitutional:       General: She is not in acute distress.     Appearance: Normal appearance.   HENT:      Nose: Nose normal.      Mouth/Throat:      Mouth: Mucous membranes are moist.      Pharynx: Oropharynx is clear.   Eyes:      Conjunctiva/sclera: Conjunctivae normal.   Cardiovascular:      Rate and Rhythm: Normal rate and regular rhythm.      Heart sounds: Normal heart sounds. No murmur heard.  Pulmonary:      Effort: Pulmonary effort is normal. No respiratory distress.      Breath sounds: Normal breath sounds. No wheezing, rhonchi or rales.   Abdominal:      General: Bowel sounds are normal.      Palpations: Abdomen is soft.      Tenderness: There is no abdominal tenderness.   Musculoskeletal:         General: No swelling or tenderness. Normal range of motion.      Cervical back: Neck supple.      Right lower leg: No edema.      Left lower leg: No edema.   Skin:     Findings: No rash.      Comments: + mild bruising above R eye from fall   Neurological:      General: No focal deficit present.      Mental Status: She is alert. Mental status is at baseline.   Psychiatric:         Mood and Affect: Mood normal.     Results:  FINDINGS:  Moderate encephalomalacia involving the dorsal aspect of the cerebellum suggesting sequela of old infarct.  Midline structures are nondisplaced.  No convincing evidence " of acute intracranial hemorrhage.  No convincing evidence of hydrocephalus.  Mild global volume loss demonstrated. Mild periventricular and subcortical hypoattenuation noted which is nonspecific but consistent with chronic microvascular ischemic change. Less likely considerations include demyelinating process and vasculitis. Presumed senescent mineralization of bilateral basal ganglia although appearance can be demonstrated with other etiologies such as hyperparathyroidism. Visualized paranasal sinuses and mastoid air cells are predominantly clear.     Impression:     No convincing imaging evidence of acute intracranial abnormality.  Probable chronic microvascular ischemic change and volume loss.  Old infarct of the right cerebellar hemisphere as detailed.    Assessment/Plan:   Fall in elderly patient  -     CT Head Without Contrast; Future; Expected date: 10/26/2022  - CT head results reviewed with patient. She does report h/o prior CVA. No evidence of bleed.    Arthritis pain  -     ketorolac injection 30 mg given after CT performed.       Active Problem List with Overview Notes    Diagnosis Date Noted    Arthralgia of lower leg 10/26/2022    Age related osteoporosis 10/26/2022    Aortic valve insufficiency 10/26/2022    Mitral valve regurgitation 10/26/2022    Atherosclerotic heart disease of native coronary artery without angina pectoris 10/26/2022    Bradycardia 10/26/2022    Hyperparathyroidism 10/26/2022    Ischemic cardiomyopathy 10/26/2022    Left bundle branch block 10/26/2022    Osteoarthritis of knee 10/26/2022    Other long term (current) drug therapy 10/26/2022    Presence of aortocoronary bypass graft 10/26/2022    Stiffness of hip joint 10/26/2022    Trochanteric bursitis of right hip 10/26/2022    Uterovaginal prolapse 09/24/2022    Gastroesophageal reflux disease without esophagitis 09/19/2022    Hypertension 07/28/2022    Diabetes mellitus 07/28/2022     Formatting of this note might be different  from the original.  type ii      Anemia 07/28/2022    Trochanteric bursitis, left hip 05/31/2022    Chronic low back pain with sciatica 05/06/2022    Polymyalgia rheumatica 04/12/2022    Cerebrovascular accident (CVA) 03/01/2022    Arthritis 09/13/2021    Postlaminectomy syndrome of cervical region 09/13/2021    Cervical radiculopathy     Disorder of sacrum     Lumbosacral spondylosis without myelopathy     Hyperlipidemia 05/21/2021    Intracranial aneurysm 03/05/2014    Carotid stenosis, left 03/05/2014          RTC prn if symptoms worsen or fail to resolve.  Patient voiced understanding and is agreeable to plan.      Stacy Booth MD    Family Medicine

## 2022-10-31 ENCOUNTER — EXTERNAL CHRONIC CARE MANAGEMENT (OUTPATIENT)
Dept: FAMILY MEDICINE | Facility: CLINIC | Age: 72
End: 2022-10-31
Payer: MEDICARE

## 2022-10-31 PROCEDURE — G0511 PR CHRONIC CARE MGMT, RHC OR FQHC ONLY, 20 MINS OR MORE: ICD-10-PCS | Mod: ,,, | Performed by: FAMILY MEDICINE

## 2022-10-31 PROCEDURE — G0511 CCM/BHI BY RHC/FQHC 20MIN MO: HCPCS | Mod: ,,, | Performed by: FAMILY MEDICINE

## 2022-11-08 ENCOUNTER — OFFICE VISIT (OUTPATIENT)
Dept: OBSTETRICS AND GYNECOLOGY | Facility: CLINIC | Age: 72
End: 2022-11-08
Payer: MEDICARE

## 2022-11-08 VITALS — DIASTOLIC BLOOD PRESSURE: 70 MMHG | SYSTOLIC BLOOD PRESSURE: 120 MMHG

## 2022-11-08 DIAGNOSIS — N39.41 URGE INCONTINENCE OF URINE: ICD-10-CM

## 2022-11-08 DIAGNOSIS — R35.0 URINARY FREQUENCY: ICD-10-CM

## 2022-11-08 DIAGNOSIS — N81.4 UTEROVAGINAL PROLAPSE: Primary | ICD-10-CM

## 2022-11-08 LAB
BACTERIA #/AREA URNS HPF: ABNORMAL /HPF
BILIRUB UR QL STRIP: NEGATIVE
CLARITY UR: CLEAR
COLOR UR: NORMAL
GLUCOSE UR STRIP-MCNC: NORMAL MG/DL
KETONES UR STRIP-SCNC: NEGATIVE MG/DL
LEUKOCYTE ESTERASE UR QL STRIP: NEGATIVE
NITRITE UR QL STRIP: NEGATIVE
PH UR STRIP: 5.5 PH UNITS
PROT UR QL STRIP: NEGATIVE
RBC # UR STRIP: NEGATIVE /UL
SP GR UR STRIP: 1.01
SQUAMOUS #/AREA URNS LPF: ABNORMAL /HPF
UROBILINOGEN UR STRIP-ACNC: NORMAL MG/DL
WBC #/AREA URNS HPF: 2 /HPF

## 2022-11-08 PROCEDURE — 87086 URINE CULTURE/COLONY COUNT: CPT | Mod: ,,, | Performed by: CLINICAL MEDICAL LABORATORY

## 2022-11-08 PROCEDURE — 57160 INSERT PESSARY/OTHER DEVICE: CPT | Mod: PBBFAC | Performed by: OBSTETRICS & GYNECOLOGY

## 2022-11-08 PROCEDURE — 57160 PR FIT/INSERT INTRAVAG SUPPORT DEVICE: ICD-10-PCS | Mod: S$PBB,,, | Performed by: OBSTETRICS & GYNECOLOGY

## 2022-11-08 PROCEDURE — 99212 OFFICE O/P EST SF 10 MIN: CPT | Mod: PBBFAC,25 | Performed by: OBSTETRICS & GYNECOLOGY

## 2022-11-08 PROCEDURE — 81001 URINALYSIS: ICD-10-PCS | Mod: ,,, | Performed by: CLINICAL MEDICAL LABORATORY

## 2022-11-08 PROCEDURE — 57160 INSERT PESSARY/OTHER DEVICE: CPT | Mod: S$PBB,,, | Performed by: OBSTETRICS & GYNECOLOGY

## 2022-11-08 PROCEDURE — 87086 CULTURE, URINE: ICD-10-PCS | Mod: ,,, | Performed by: CLINICAL MEDICAL LABORATORY

## 2022-11-08 PROCEDURE — 81001 URINALYSIS AUTO W/SCOPE: CPT | Mod: ,,, | Performed by: CLINICAL MEDICAL LABORATORY

## 2022-11-08 NOTE — PATIENT INSTRUCTIONS
We discussed general management of pessary use.      We discussed holding pessary in place during defecation.      Cath urinalysis was taken today to rule out cystitis will follow-up culture.      We have discussed if any urinary retention occurs to present to the emergency room.

## 2022-11-08 NOTE — PROGRESS NOTES
Presents for pessary placement.  She was previously sized and thought to need a 2. Pessary.      This was ordered she re presents today.        Subjective:       Patient ID: Kathryn Marie is a 72 y.o. female.    Chief Complaint: Follow-up (Here for pessary placement-pt still c/o urinary urgency.)    HPI  Review of Systems      Objective:      Physical Exam  Genitourinary:     Comments: With Valsalva cervix protrudes through the vaginal introitus.  Bulging cystocele noted.  However the cervix and cystocele or easily replaced within the vaginal vault.  Bimanual exam revealed uterus normal size no adnexal masses.  Thereafter 2. Pessary was lubricated and inserted.  Excellent fit.  With Valsalva no further bulging cystocele or rectocele was noted.      Patient was shown how to hold pessary in place during defecation.          Assessment:       1. Uterovaginal prolapse    2. Urge incontinence of urine    3. Urinary frequency          Plan:       Patient Instructions   We discussed general management of pessary use.      We discussed holding pessary in place during defecation.      Cath urinalysis was taken today to rule out cystitis will follow-up culture.      We have discussed if any urinary retention occurs to present to the emergency room.

## 2022-11-10 LAB — UA COMPLETE W REFLEX CULTURE PNL UR: NO GROWTH

## 2022-11-22 ENCOUNTER — OFFICE VISIT (OUTPATIENT)
Dept: OBSTETRICS AND GYNECOLOGY | Facility: CLINIC | Age: 72
End: 2022-11-22
Payer: MEDICARE

## 2022-11-22 VITALS — DIASTOLIC BLOOD PRESSURE: 74 MMHG | SYSTOLIC BLOOD PRESSURE: 140 MMHG

## 2022-11-22 DIAGNOSIS — Z46.89 PESSARY MAINTENANCE: Primary | ICD-10-CM

## 2022-11-22 LAB — BMD RECOMMENDATION EXT: ABNORMAL

## 2022-11-22 PROCEDURE — 3061F NEG MICROALBUMINURIA REV: CPT | Mod: CPTII,,, | Performed by: OBSTETRICS & GYNECOLOGY

## 2022-11-22 PROCEDURE — 3066F NEPHROPATHY DOC TX: CPT | Mod: CPTII,,, | Performed by: OBSTETRICS & GYNECOLOGY

## 2022-11-22 PROCEDURE — 3066F PR DOCUMENTATION OF TREATMENT FOR NEPHROPATHY: ICD-10-PCS | Mod: CPTII,,, | Performed by: OBSTETRICS & GYNECOLOGY

## 2022-11-22 PROCEDURE — 99212 OFFICE O/P EST SF 10 MIN: CPT | Mod: PBBFAC | Performed by: OBSTETRICS & GYNECOLOGY

## 2022-11-22 PROCEDURE — 3078F DIAST BP <80 MM HG: CPT | Mod: CPTII,,, | Performed by: OBSTETRICS & GYNECOLOGY

## 2022-11-22 PROCEDURE — 99212 OFFICE O/P EST SF 10 MIN: CPT | Mod: S$PBB,,, | Performed by: OBSTETRICS & GYNECOLOGY

## 2022-11-22 PROCEDURE — 3061F PR NEG MICROALBUMINURIA RESULT DOCUMENTED/REVIEW: ICD-10-PCS | Mod: CPTII,,, | Performed by: OBSTETRICS & GYNECOLOGY

## 2022-11-22 PROCEDURE — 3077F PR MOST RECENT SYSTOLIC BLOOD PRESSURE >= 140 MM HG: ICD-10-PCS | Mod: CPTII,,, | Performed by: OBSTETRICS & GYNECOLOGY

## 2022-11-22 PROCEDURE — 3044F HG A1C LEVEL LT 7.0%: CPT | Mod: CPTII,,, | Performed by: OBSTETRICS & GYNECOLOGY

## 2022-11-22 PROCEDURE — 99212 PR OFFICE/OUTPT VISIT, EST, LEVL II, 10-19 MIN: ICD-10-PCS | Mod: S$PBB,,, | Performed by: OBSTETRICS & GYNECOLOGY

## 2022-11-22 PROCEDURE — 3077F SYST BP >= 140 MM HG: CPT | Mod: CPTII,,, | Performed by: OBSTETRICS & GYNECOLOGY

## 2022-11-22 PROCEDURE — 3078F PR MOST RECENT DIASTOLIC BLOOD PRESSURE < 80 MM HG: ICD-10-PCS | Mod: CPTII,,, | Performed by: OBSTETRICS & GYNECOLOGY

## 2022-11-22 PROCEDURE — 3044F PR MOST RECENT HEMOGLOBIN A1C LEVEL <7.0%: ICD-10-PCS | Mod: CPTII,,, | Performed by: OBSTETRICS & GYNECOLOGY

## 2022-11-22 NOTE — PROGRESS NOTES
Subjective:       Patient ID: Kathryn Marie is a 72 y.o. female.    Chief Complaint: Follow-up (Here for 2 week pessary check (see previous note))    Presents 2 weeks for pessary check.      She is very pleased with her pessary.     It has provided excellent support.    Review of Systems      Objective:      Physical Exam  Genitourinary:     Comments: Pessary was removed.  Vaginal vault inspected.  No evidence of abrasions.  Cervix normal to appearance.  Bimanual exam was unremarkable.  Pessary was lubricated and reinserted.  Patient was shown how to reinsert pessary.  We will discuss this further on next check.      Assessment:       No diagnosis found.    Plan:       Patient Instructions   Follow-up with me in 1 month for further education on how to remove and reinsert pessary.    She will continue follow-up with her primary care provider regarding hypertension.  Follow-up with Dr. Bar     Rheumatologist has ordered bone density testing.    Continue yearly screening mammography.    She will follow-up colonoscopy screening as directed by Gastroenterology

## 2022-11-22 NOTE — PATIENT INSTRUCTIONS
Follow-up with me in 1 month for further education on how to remove and reinsert pessary.    She will continue follow-up with her primary care provider regarding hypertension.  Follow-up with Dr. Bar     Rheumatologist has ordered bone density testing.    Continue yearly screening mammography.    She will follow-up colonoscopy screening as directed by Gastroenterology

## 2022-11-28 ENCOUNTER — HOSPITAL ENCOUNTER (OUTPATIENT)
Dept: RADIOLOGY | Facility: HOSPITAL | Age: 72
Discharge: HOME | End: 2022-11-28
Attending: PAIN MEDICINE
Payer: MEDICARE

## 2022-11-28 ENCOUNTER — OFFICE VISIT (OUTPATIENT)
Dept: PAIN MEDICINE | Facility: CLINIC | Age: 72
End: 2022-11-28
Payer: MEDICARE

## 2022-11-28 VITALS
WEIGHT: 118 LBS | SYSTOLIC BLOOD PRESSURE: 147 MMHG | HEIGHT: 64 IN | BODY MASS INDEX: 20.14 KG/M2 | HEART RATE: 77 BPM | DIASTOLIC BLOOD PRESSURE: 42 MMHG

## 2022-11-28 DIAGNOSIS — M96.1 POSTLAMINECTOMY SYNDROME OF CERVICAL REGION: ICD-10-CM

## 2022-11-28 DIAGNOSIS — M25.551 BILATERAL HIP PAIN: ICD-10-CM

## 2022-11-28 DIAGNOSIS — M54.12 CERVICAL RADICULOPATHY: Chronic | ICD-10-CM

## 2022-11-28 DIAGNOSIS — M46.1 SACROILIITIS: ICD-10-CM

## 2022-11-28 DIAGNOSIS — M46.1 SACROILIITIS: Chronic | ICD-10-CM

## 2022-11-28 DIAGNOSIS — M47.817 LUMBOSACRAL SPONDYLOSIS WITHOUT MYELOPATHY: Chronic | ICD-10-CM

## 2022-11-28 DIAGNOSIS — M06.9 RHEUMATOID ARTHRITIS INVOLVING MULTIPLE SITES, UNSPECIFIED WHETHER RHEUMATOID FACTOR PRESENT: Chronic | ICD-10-CM

## 2022-11-28 DIAGNOSIS — M25.552 BILATERAL HIP PAIN: ICD-10-CM

## 2022-11-28 DIAGNOSIS — M96.1 POSTLAMINECTOMY SYNDROME OF CERVICAL REGION: Primary | Chronic | ICD-10-CM

## 2022-11-28 PROCEDURE — 3044F PR MOST RECENT HEMOGLOBIN A1C LEVEL <7.0%: ICD-10-PCS | Mod: CPTII,,, | Performed by: PAIN MEDICINE

## 2022-11-28 PROCEDURE — 72114 X-RAY EXAM L-S SPINE BENDING: CPT | Mod: TC

## 2022-11-28 PROCEDURE — 3061F NEG MICROALBUMINURIA REV: CPT | Mod: CPTII,,, | Performed by: PAIN MEDICINE

## 2022-11-28 PROCEDURE — 72114 XR LUMBAR SPINE 5 VIEW WITH FLEX AND EXT: ICD-10-PCS | Mod: 26,,, | Performed by: RADIOLOGY

## 2022-11-28 PROCEDURE — 3077F PR MOST RECENT SYSTOLIC BLOOD PRESSURE >= 140 MM HG: ICD-10-PCS | Mod: CPTII,,, | Performed by: PAIN MEDICINE

## 2022-11-28 PROCEDURE — 3288F FALL RISK ASSESSMENT DOCD: CPT | Mod: CPTII,,, | Performed by: PAIN MEDICINE

## 2022-11-28 PROCEDURE — 72202 X-RAY EXAM SI JOINTS 3/> VWS: CPT | Mod: TC

## 2022-11-28 PROCEDURE — 3077F SYST BP >= 140 MM HG: CPT | Mod: CPTII,,, | Performed by: PAIN MEDICINE

## 2022-11-28 PROCEDURE — 72114 X-RAY EXAM L-S SPINE BENDING: CPT | Mod: 26,,, | Performed by: RADIOLOGY

## 2022-11-28 PROCEDURE — 3044F HG A1C LEVEL LT 7.0%: CPT | Mod: CPTII,,, | Performed by: PAIN MEDICINE

## 2022-11-28 PROCEDURE — 3066F NEPHROPATHY DOC TX: CPT | Mod: CPTII,,, | Performed by: PAIN MEDICINE

## 2022-11-28 PROCEDURE — 3061F PR NEG MICROALBUMINURIA RESULT DOCUMENTED/REVIEW: ICD-10-PCS | Mod: CPTII,,, | Performed by: PAIN MEDICINE

## 2022-11-28 PROCEDURE — 3078F PR MOST RECENT DIASTOLIC BLOOD PRESSURE < 80 MM HG: ICD-10-PCS | Mod: CPTII,,, | Performed by: PAIN MEDICINE

## 2022-11-28 PROCEDURE — 3008F BODY MASS INDEX DOCD: CPT | Mod: CPTII,,, | Performed by: PAIN MEDICINE

## 2022-11-28 PROCEDURE — 1101F PR PT FALLS ASSESS DOC 0-1 FALLS W/OUT INJ PAST YR: ICD-10-PCS | Mod: CPTII,,, | Performed by: PAIN MEDICINE

## 2022-11-28 PROCEDURE — 99214 PR OFFICE/OUTPT VISIT, EST, LEVL IV, 30-39 MIN: ICD-10-PCS | Mod: S$PBB,,, | Performed by: PAIN MEDICINE

## 2022-11-28 PROCEDURE — 1125F AMNT PAIN NOTED PAIN PRSNT: CPT | Mod: CPTII,,, | Performed by: PAIN MEDICINE

## 2022-11-28 PROCEDURE — 3288F PR FALLS RISK ASSESSMENT DOCUMENTED: ICD-10-PCS | Mod: CPTII,,, | Performed by: PAIN MEDICINE

## 2022-11-28 PROCEDURE — 99214 OFFICE O/P EST MOD 30 MIN: CPT | Mod: S$PBB,,, | Performed by: PAIN MEDICINE

## 2022-11-28 PROCEDURE — 1159F MED LIST DOCD IN RCRD: CPT | Mod: CPTII,,, | Performed by: PAIN MEDICINE

## 2022-11-28 PROCEDURE — 3078F DIAST BP <80 MM HG: CPT | Mod: CPTII,,, | Performed by: PAIN MEDICINE

## 2022-11-28 PROCEDURE — 72202 XR SACROILIAC JOINTS COMPLETE: ICD-10-PCS | Mod: 26,,, | Performed by: RADIOLOGY

## 2022-11-28 PROCEDURE — 1101F PT FALLS ASSESS-DOCD LE1/YR: CPT | Mod: CPTII,,, | Performed by: PAIN MEDICINE

## 2022-11-28 PROCEDURE — 1125F PR PAIN SEVERITY QUANTIFIED, PAIN PRESENT: ICD-10-PCS | Mod: CPTII,,, | Performed by: PAIN MEDICINE

## 2022-11-28 PROCEDURE — 1159F PR MEDICATION LIST DOCUMENTED IN MEDICAL RECORD: ICD-10-PCS | Mod: CPTII,,, | Performed by: PAIN MEDICINE

## 2022-11-28 PROCEDURE — 3008F PR BODY MASS INDEX (BMI) DOCUMENTED: ICD-10-PCS | Mod: CPTII,,, | Performed by: PAIN MEDICINE

## 2022-11-28 PROCEDURE — 72202 X-RAY EXAM SI JOINTS 3/> VWS: CPT | Mod: 26,,, | Performed by: RADIOLOGY

## 2022-11-28 PROCEDURE — 3066F PR DOCUMENTATION OF TREATMENT FOR NEPHROPATHY: ICD-10-PCS | Mod: CPTII,,, | Performed by: PAIN MEDICINE

## 2022-11-28 PROCEDURE — 99215 OFFICE O/P EST HI 40 MIN: CPT | Mod: PBBFAC | Performed by: PAIN MEDICINE

## 2022-11-28 RX ORDER — HYDROCODONE BITARTRATE AND ACETAMINOPHEN 10; 325 MG/1; MG/1
1 TABLET ORAL EVERY 6 HOURS PRN
Qty: 120 TABLET | Refills: 0 | Status: SHIPPED | OUTPATIENT
Start: 2022-12-01 | End: 2022-12-31

## 2022-11-28 NOTE — PROGRESS NOTES
She Disclaimer: This note has been generated using voice-recognition software. There may be typographical errors that have been missed during proof-reading        Patient ID: Kathryn Marie is a 72 y.o. female.      Chief Complaint: Low-back Pain and Joint Pain      72-year-old female returns with complaint of intractable lower back pain.  She was prescribed  a Butrans patch but notes inadequate pain relief.  Norco 3 times a day failed to provide relief.  She failed to start physical therapy or obtain x-rays of the lumbar spine and SI joints, ordered August 31, 2022.  She has known rheumatoid arthritis and remains on methotrexate from  Dr. Otto.  She received an L5-S1 laminectomy  in [place] around 2015.  She returns today to discuss medication adjustment due ineffective pain relief.  She must obtain the x-rays as previously ordered.  I will discontinue Butrans due to lack of efficacy and increase to Norco to 4 times a day.  If she fails to obtain the x-rays,  I will no longer increase opioids for pain.  She was informed that possible medial branch blocks or epidural steroid injections can be performed based on x-ray findings.        Pain Assessment  Pain Assessment: 0-10  Pain Score:   9  Pain Location: Other (Comment) (lower back and joint pain)  Pain Descriptors: Stabbing, Constant  Pain Frequency: Continuous  Pain Onset: Awakened from sleep  Clinical Progression: Not changed  Aggravating Factors: Standing, Walking, Other (Comment) (sitting and lying)  Pain Intervention(s): Medication (See eMAR)      A's of Opioid Risk Assessment  Activity:Patient is unable to  perform ADL.   Analgesia:Patients pain is not controlled by current medication.   Adverse Effects: Patient denies constipation or sedation.  Aberrant Behavior:  reviewed with no aberrant drug seeking/taking behavior.      Patient denies any suicidal or homicidal ideations    Physical Therapy/Home Exercise: no      X-Ray Lumbar Complete Including Flex  And Ext  Narrative: EXAMINATION:  XR LUMBAR SPINE 5 VIEW WITH FLEX AND EXT    CLINICAL HISTORY:  .  Postlaminectomy syndrome, not elsewhere classified    COMPARISON:  April 2, 2019    TECHNIQUE:  AP, lateral, lateral flexion, and lateral extension views lumbar spine    FINDINGS:  There is some grade 1 anterolisthesis of L3 on L4 which is grossly unchanged with flexion and extension.  Pedicular screws and rods fuse the posterior elements at L4-L5.  There is interbody hardware which overlies the L4-L5 disc space.    There is moderate to prominent degenerative disc narrowing at L2-L3 and L3-L4.  There is mild scattered lumbar spondylosis.  There is moderate facet hypertrophy.    There is no focal lytic or blastic lesion.    No acute fracture seen.    There is no gross aneurysm of the heavily calcified abdominal aorta  Impression: Grade 1 spondylolisthesis L3-L4, grossly stable in flexion and extension    Degenerative disc disease    Electronically signed by: Jovanny James  Date:    11/28/2022  Time:    16:15  X-Ray Sacroiliac Joints Complete  Narrative: EXAMINATION:  XR SACROILIAC JOINTS COMPLETE    CLINICAL HISTORY:  .  Sacroiliitis, not elsewhere classified    COMPARISON:  No previous similar    TECHNIQUE:  Three views of the sacroiliac joints, to include AP and bilateral obliques    FINDINGS:  Suspect mild osteopenia.  There is no focal lytic or blastic lesion.    There is mild osteophyte formation of the sacroiliac joints.  No areas of definite active erosion are seen.  There is no ankylosis of the SI joints.    There is moderate osteophyte formation of either hip, though the joint spaces are well maintained.  There is a healing fracture of the superior pubic ramus on the left, laterally.    Pedicular screws and rods fuse the posterior elements at L4-L5  Impression: Mild osteoarthritis of the sacroiliac joints.  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    HENT: Negative.     Eyes: Negative.    Respiratory: Negative.     Cardiovascular: Negative.    Gastrointestinal: Negative.    Endocrine: Negative.    Genitourinary: Negative.    Musculoskeletal:  Positive for arthralgias, back pain and gait problem.   Integumentary:  Negative.   Hematological: Negative.    Psychiatric/Behavioral: Negative.             Past Medical History:   Diagnosis Date    Anemia     Atherosclerotic heart disease of native coronary artery without angina pectoris     Carotid artery stenosis 01/15/2014    CHARO  LICA stenosis    Causalgia of lower limb     Cervical radiculopathy     Chronic low back pain     Chronic pain syndrome     CVA (cerebral vascular accident)     right cerebellar    Degenerative joint disease involving multiple joints     Disorder of parathyroid gland, unspecified     Disorder of sacrum     Essential (primary) hypertension     Gammopathy     GERD (gastroesophageal reflux disease)     Heart murmur     Hyperlipidemia     Hyperparathyroidism     Lumbosacral radiculopathy     Lumbosacral spondylosis     Other long term (current) drug therapy     Pernicious anemia     Sciatica     Spinal stenosis of lumbar region     L4-5    Type 2 diabetes mellitus      Past Surgical History:   Procedure Laterality Date    CARPAL TUNNEL RELEASE Right     caudal MOIZ  07/03/2018    CHOLECYSTECTOMY  1975    CORONARY ARTERY BYPASS GRAFT      CORONARY ARTERY BYPASS GRAFT (CABG)      triple    HIP SURGERY Right     L4-S1 Caudal cath guided MOIZ  07/03/2018    Dr Orta    L5-S1 Caudal cath guided MOIZ  09/26/2014 6/26/2014 4/11/2014    Dr Orta    left shoulder repair Left     NECK SURGERY  2018    does not know what kind    right sacral RF Right 12/26/2013 1/24/2012    Dr Orta    right SIJI Right 10/24/2013    Dr Orta     Social History      Socioeconomic History    Marital status:    Occupational History    Occupation: RETIRED   Tobacco Use    Smoking status: Never    Smokeless tobacco: Never   Substance and Sexual Activity    Alcohol use: Not Currently    Drug use: Yes     Types: Hydrocodone     Comment: pain medication with pain treatment     Sexual activity: Not Currently     Family History   Problem Relation Age of Onset    Diabetes Mother     Heart disease Mother     Hypertension Mother     Heart attack Mother     Hypertension Father     Stroke Father     Stroke Sister     Hypertension Sister     Cancer Brother      Review of patient's allergies indicates:  No Known Allergies  has a current medication list which includes the following prescription(s): amlodipine, aspirin, blood sugar diagnostic, blood-glucose meter, clopidogrel, diclofenac sodium, dicyclomine, farxiga, ferrous sulfate, fluticasone propionate, folic acid, hydrochlorothiazide, ipratropium, latanoprost, methotrexate, omeprazole, onglyza, pantoprazole, rosuvastatin, travoprost, vitamin d, and hydrocodone-acetaminophen.      Objective:  Vitals:    11/28/22 1342   BP: (!) 147/42   Pulse: 77        Physical Exam  Vitals and nursing note reviewed.   Constitutional:       General: She is not in acute distress.     Appearance: Normal appearance. She is not ill-appearing, toxic-appearing or diaphoretic.   HENT:      Head: Normocephalic and atraumatic.      Nose: Nose normal.      Mouth/Throat:      Mouth: Mucous membranes are moist.   Eyes:      Extraocular Movements: Extraocular movements intact.      Pupils: Pupils are equal, round, and reactive to light.   Cardiovascular:      Rate and Rhythm: Normal rate and regular rhythm.      Heart sounds: Normal heart sounds.   Pulmonary:      Effort: Pulmonary effort is normal. No respiratory distress.      Breath sounds: Normal breath sounds. No stridor. No wheezing or rhonchi.   Abdominal:      General: Bowel sounds are normal.       Palpations: Abdomen is soft.   Musculoskeletal:         General: No swelling or deformity.      Cervical back: Normal and normal range of motion. No spasms or tenderness. No pain with movement. Normal range of motion.      Thoracic back: Normal.      Lumbar back: Spasms, tenderness and bony tenderness present. Decreased range of motion. Negative right straight leg raise test and negative left straight leg raise test. No scoliosis.      Right lower leg: No edema.      Left lower leg: No edema.   Skin:     General: Skin is warm.   Neurological:      General: No focal deficit present.      Mental Status: She is alert and oriented to person, place, and time. Mental status is at baseline.      Cranial Nerves: No cranial nerve deficit.      Sensory: Sensation is intact. No sensory deficit.      Motor: No weakness.      Coordination: Coordination normal.      Gait: Gait normal.      Deep Tendon Reflexes: Reflexes are normal and symmetric.   Psychiatric:         Mood and Affect: Mood normal.         Behavior: Behavior normal.         Assessment:      1. Postlaminectomy syndrome of cervical region    2. Sacroiliitis    3. Lumbosacral spondylosis without myelopathy    4. Cervical radiculopathy    5. Rheumatoid arthritis involving multiple sites, unspecified whether rheumatoid factor present    6. Bilateral hip pain            Plan:  1. reviewed  2.Addiction, Dependency, Tolerance, Opioid abuse-misuse, Death, Diversion Discussed. Overdose reversal drug Naloxone discussed.  3.Refill/Continue medications for pain control and function       Requested Prescriptions     Signed Prescriptions Disp Refills    HYDROcodone-acetaminophen (NORCO)  mg per tablet 120 tablet 0     Sig: Take 1 tablet by mouth every 6 (six) hours as needed for Pain.     4.Obtain MRI of lumbar spine ans SI joints  Orders Placed This Encounter   Procedures    X-Ray Lumbar Complete Including Flex And Ext     Standing Status:   Future     Number of  Occurrences:   1     Standing Expiration Date:   11/28/2023     Order Specific Question:   May the Radiologist modify the order per protocol to meet the clinical needs of the patient?     Answer:   Yes    X-Ray Sacroiliac Joints Complete     Standing Status:   Future     Number of Occurrences:   1     Standing Expiration Date:   11/28/2023     Order Specific Question:   May the Radiologist modify the order per protocol to meet the clinical needs of the patient?     Answer:   Yes     Order Specific Question:   Release to patient     Answer:   Immediate      5.Consider lumbar MBB for lower back pain and spondylosis pending Xray results  6.Follow with AMANDA Rodriguez in 2-3 weeks for re-evaluation and medication refill     report:  Reviewed and consistent with medication use as prescribed.      The total time spent for evaluation and management on 12/01/2022 including reviewing separately obtained history, performing a medically appropriate exam and evaluation, documenting clinical information in the health record, independently interpreting results and communicating them to the patient/family/caregiver, and ordering medications/tests/procedures was between 15-29 minutes.    The above plan and management options were discussed at length with patient. Patient is in agreement with the above and verbalized understanding. It will be communicated with the referring physician via electronic record, fax, or mail.

## 2022-11-30 ENCOUNTER — EXTERNAL CHRONIC CARE MANAGEMENT (OUTPATIENT)
Dept: FAMILY MEDICINE | Facility: CLINIC | Age: 72
End: 2022-11-30
Payer: MEDICARE

## 2022-11-30 PROCEDURE — G0511 PR CHRONIC CARE MGMT, RHC OR FQHC ONLY, 20 MINS OR MORE: ICD-10-PCS | Mod: ,,, | Performed by: FAMILY MEDICINE

## 2022-11-30 PROCEDURE — G0511 CCM/BHI BY RHC/FQHC 20MIN MO: HCPCS | Mod: ,,, | Performed by: FAMILY MEDICINE

## 2022-12-12 ENCOUNTER — CLINICAL SUPPORT (OUTPATIENT)
Dept: FAMILY MEDICINE | Facility: CLINIC | Age: 72
End: 2022-12-12
Payer: MEDICARE

## 2022-12-12 DIAGNOSIS — I10 HYPERTENSION, UNSPECIFIED TYPE: Primary | ICD-10-CM

## 2022-12-12 DIAGNOSIS — D53.9 MACROCYTIC ANEMIA: ICD-10-CM

## 2022-12-12 DIAGNOSIS — R74.8 ELEVATED LIVER ENZYMES: ICD-10-CM

## 2022-12-12 DIAGNOSIS — R73.9 HYPERGLYCEMIA: ICD-10-CM

## 2022-12-12 DIAGNOSIS — E78.5 HYPERLIPIDEMIA, UNSPECIFIED HYPERLIPIDEMIA TYPE: ICD-10-CM

## 2022-12-12 DIAGNOSIS — Z23 NEED FOR IMMUNIZATION AGAINST INFLUENZA: ICD-10-CM

## 2022-12-12 DIAGNOSIS — E11.9 DIABETES MELLITUS WITHOUT COMPLICATION: ICD-10-CM

## 2022-12-12 LAB
ALBUMIN SERPL BCP-MCNC: 3.6 G/DL (ref 3.5–5)
ALBUMIN/GLOB SERPL: 0.8 {RATIO}
ALP SERPL-CCNC: 55 U/L (ref 55–142)
ALT SERPL W P-5'-P-CCNC: 33 U/L (ref 13–56)
ANION GAP SERPL CALCULATED.3IONS-SCNC: 10 MMOL/L (ref 7–16)
AST SERPL W P-5'-P-CCNC: 47 U/L (ref 15–37)
BILIRUB SERPL-MCNC: 0.4 MG/DL (ref ?–1.2)
BUN SERPL-MCNC: 29 MG/DL (ref 7–18)
BUN/CREAT SERPL: 26 (ref 6–20)
CALCIUM SERPL-MCNC: 9.2 MG/DL (ref 8.5–10.1)
CHLORIDE SERPL-SCNC: 102 MMOL/L (ref 98–107)
CHOLEST SERPL-MCNC: 179 MG/DL (ref 0–200)
CHOLEST/HDLC SERPL: 2.1 {RATIO}
CO2 SERPL-SCNC: 28 MMOL/L (ref 21–32)
CREAT SERPL-MCNC: 1.1 MG/DL (ref 0.55–1.02)
EGFR (NO RACE VARIABLE) (RUSH/TITUS): 53 ML/MIN/1.73M²
EST. AVERAGE GLUCOSE BLD GHB EST-MCNC: 104 MG/DL
GLOBULIN SER-MCNC: 4.5 G/DL (ref 2–4)
GLUCOSE SERPL-MCNC: 109 MG/DL (ref 74–106)
HBA1C MFR BLD HPLC: 5.7 % (ref 4.5–6.6)
HDLC SERPL-MCNC: 84 MG/DL (ref 40–60)
LDLC SERPL CALC-MCNC: 88 MG/DL
LDLC/HDLC SERPL: 1 {RATIO}
NONHDLC SERPL-MCNC: 95 MG/DL
POTASSIUM SERPL-SCNC: 4.8 MMOL/L (ref 3.5–5.1)
PROT SERPL-MCNC: 8.1 G/DL (ref 6.4–8.2)
SODIUM SERPL-SCNC: 135 MMOL/L (ref 136–145)
TRIGL SERPL-MCNC: 36 MG/DL (ref 35–150)
VLDLC SERPL-MCNC: 7 MG/DL

## 2022-12-12 PROCEDURE — G0008 FLU VACCINE - QUADRIVALENT - ADJUVANTED: ICD-10-PCS | Mod: ,,, | Performed by: FAMILY MEDICINE

## 2022-12-12 PROCEDURE — 85025 COMPLETE CBC W/AUTO DIFF WBC: CPT | Mod: ,,, | Performed by: CLINICAL MEDICAL LABORATORY

## 2022-12-12 PROCEDURE — 80053 COMPREHEN METABOLIC PANEL: CPT | Mod: ,,, | Performed by: CLINICAL MEDICAL LABORATORY

## 2022-12-12 PROCEDURE — 85025 CBC WITH DIFFERENTIAL: ICD-10-PCS | Mod: ,,, | Performed by: CLINICAL MEDICAL LABORATORY

## 2022-12-12 PROCEDURE — 83090 HOMOCYSTEINE, SERUM: ICD-10-PCS | Mod: ,,, | Performed by: CLINICAL MEDICAL LABORATORY

## 2022-12-12 PROCEDURE — 83090 ASSAY OF HOMOCYSTEINE: CPT | Mod: ,,, | Performed by: CLINICAL MEDICAL LABORATORY

## 2022-12-12 PROCEDURE — 82746 VITAMIN B12/FOLATE, SERUM PANEL: ICD-10-PCS | Mod: ,,, | Performed by: CLINICAL MEDICAL LABORATORY

## 2022-12-12 PROCEDURE — 80053 COMPREHENSIVE METABOLIC PANEL: ICD-10-PCS | Mod: ,,, | Performed by: CLINICAL MEDICAL LABORATORY

## 2022-12-12 PROCEDURE — 82746 ASSAY OF FOLIC ACID SERUM: CPT | Mod: ,,, | Performed by: CLINICAL MEDICAL LABORATORY

## 2022-12-12 PROCEDURE — 82977 GAMMA GT: ICD-10-PCS | Mod: ,,, | Performed by: CLINICAL MEDICAL LABORATORY

## 2022-12-12 PROCEDURE — 82607 VITAMIN B-12: CPT | Mod: ,,, | Performed by: CLINICAL MEDICAL LABORATORY

## 2022-12-12 PROCEDURE — 82607 VITAMIN B12/FOLATE, SERUM PANEL: ICD-10-PCS | Mod: ,,, | Performed by: CLINICAL MEDICAL LABORATORY

## 2022-12-12 PROCEDURE — G0008 ADMIN INFLUENZA VIRUS VAC: HCPCS | Mod: ,,, | Performed by: FAMILY MEDICINE

## 2022-12-12 PROCEDURE — 90694 FLU VACCINE - QUADRIVALENT - ADJUVANTED: ICD-10-PCS | Mod: ,,, | Performed by: FAMILY MEDICINE

## 2022-12-12 PROCEDURE — 82977 ASSAY OF GGT: CPT | Mod: ,,, | Performed by: CLINICAL MEDICAL LABORATORY

## 2022-12-12 PROCEDURE — 90694 VACC AIIV4 NO PRSRV 0.5ML IM: CPT | Mod: ,,, | Performed by: FAMILY MEDICINE

## 2022-12-12 PROCEDURE — 80061 LIPID PANEL: ICD-10-PCS | Mod: ,,, | Performed by: CLINICAL MEDICAL LABORATORY

## 2022-12-12 PROCEDURE — 83036 HEMOGLOBIN GLYCOSYLATED A1C: CPT | Mod: ,,, | Performed by: CLINICAL MEDICAL LABORATORY

## 2022-12-12 PROCEDURE — 80061 LIPID PANEL: CPT | Mod: ,,, | Performed by: CLINICAL MEDICAL LABORATORY

## 2022-12-12 PROCEDURE — 83036 HEMOGLOBIN A1C: ICD-10-PCS | Mod: ,,, | Performed by: CLINICAL MEDICAL LABORATORY

## 2022-12-13 LAB
BASOPHILS # BLD AUTO: 0 K/UL (ref 0–0.2)
BASOPHILS NFR BLD AUTO: 0 % (ref 0–1)
DIFFERENTIAL METHOD BLD: ABNORMAL
EOSINOPHIL # BLD AUTO: 0.01 K/UL (ref 0–0.5)
EOSINOPHIL NFR BLD AUTO: 0.3 % (ref 1–4)
ERYTHROCYTE [DISTWIDTH] IN BLOOD BY AUTOMATED COUNT: 15.9 % (ref 11.5–14.5)
HCT VFR BLD AUTO: 29.5 % (ref 38–47)
HGB BLD-MCNC: 9.5 G/DL (ref 12–16)
IMM GRANULOCYTES # BLD AUTO: 0.15 K/UL (ref 0–0.04)
IMM GRANULOCYTES NFR BLD: 3.8 % (ref 0–0.4)
LYMPHOCYTES # BLD AUTO: 0.4 K/UL (ref 1–4.8)
LYMPHOCYTES NFR BLD AUTO: 10.1 % (ref 27–41)
LYMPHOCYTES NFR BLD MANUAL: 5 % (ref 27–41)
MCH RBC QN AUTO: 31.4 PG (ref 27–31)
MCHC RBC AUTO-ENTMCNC: 32.2 G/DL (ref 32–36)
MCV RBC AUTO: 97.4 FL (ref 80–96)
MONOCYTES # BLD AUTO: 0.2 K/UL (ref 0–0.8)
MONOCYTES NFR BLD AUTO: 5 % (ref 2–6)
MONOCYTES NFR BLD MANUAL: 5 % (ref 2–6)
MPC BLD CALC-MCNC: 11.5 FL (ref 9.4–12.4)
NEUTROPHILS # BLD AUTO: 3.22 K/UL (ref 1.8–7.7)
NEUTROPHILS NFR BLD AUTO: 80.8 % (ref 53–65)
NEUTS BAND NFR BLD MANUAL: 5 % (ref 1–5)
NEUTS SEG NFR BLD MANUAL: 84 % (ref 50–62)
NRBC # BLD AUTO: 0 X10E3/UL
NRBC, AUTO (.00): 0 %
PLATELET # BLD AUTO: 170 K/UL (ref 150–400)
PLATELET MORPHOLOGY: NORMAL
RBC # BLD AUTO: 3.03 M/UL (ref 4.2–5.4)
RBC MORPH BLD: NORMAL
WBC # BLD AUTO: 3.98 K/UL (ref 4.5–11)

## 2022-12-15 ENCOUNTER — TELEPHONE (OUTPATIENT)
Dept: FAMILY MEDICINE | Facility: CLINIC | Age: 72
End: 2022-12-15
Payer: MEDICAID

## 2022-12-15 DIAGNOSIS — D53.9 MACROCYTIC ANEMIA: Primary | ICD-10-CM

## 2022-12-15 DIAGNOSIS — R73.9 HYPERGLYCEMIA: ICD-10-CM

## 2022-12-15 DIAGNOSIS — R74.8 ELEVATED LIVER ENZYMES: ICD-10-CM

## 2022-12-15 NOTE — TELEPHONE ENCOUNTER
----- Message from Dequan Bar MD sent at 12/13/2022  8:15 AM CST -----  Please add a B12 folate homocystine for macrocytic anemia.  Please add an A1c for diabetes.  Please add a GGT for elevated liver enzymes.

## 2022-12-16 ENCOUNTER — TELEPHONE (OUTPATIENT)
Dept: FAMILY MEDICINE | Facility: CLINIC | Age: 72
End: 2022-12-16
Payer: MEDICAID

## 2022-12-16 LAB
FOLATE SERPL-MCNC: >20 NG/ML (ref 3.1–17.5)
GGT SERPL-CCNC: 46 U/L (ref 5–55)
HCYS SERPL-SCNC: 20.9 ΜMOL/L (ref 3.2–10.7)
VIT B12 SERPL-MCNC: >6000 PG/ML (ref 193–986)

## 2022-12-16 NOTE — PROGRESS NOTES
Subjective:         Patient ID: Kathryn Marie is a 72 y.o. female.    Chief Complaint: Back Pain (Pt states everything hurts)        Pain  This is a chronic problem. The current episode started more than 1 year ago. The problem occurs daily. The problem has been unchanged. Associated symptoms include arthralgias and neck pain. Pertinent negatives include no anorexia, change in bowel habit, chest pain, chills, coughing, diaphoresis, fever, sore throat, vertigo or vomiting.   Review of Systems   Constitutional:  Negative for appetite change, chills, diaphoresis, fever and unexpected weight change.   HENT:  Negative for drooling, ear discharge, ear pain, facial swelling, nosebleeds, sore throat, trouble swallowing, voice change and goiter.    Eyes:  Negative for photophobia, pain, discharge, redness and visual disturbance.   Respiratory:  Negative for apnea, cough, choking, chest tightness, shortness of breath, wheezing and stridor.    Cardiovascular:  Negative for chest pain, palpitations and leg swelling.   Gastrointestinal:  Negative for abdominal distention, anorexia, change in bowel habit, diarrhea, rectal pain, vomiting, fecal incontinence and change in bowel habit.   Endocrine: Negative for cold intolerance, heat intolerance, polydipsia, polyphagia and polyuria.   Genitourinary:  Negative for bladder incontinence, dysuria, flank pain, frequency and hot flashes.   Musculoskeletal:  Positive for arthralgias, back pain, leg pain, neck pain and neck stiffness.   Integumentary:  Negative for color change and pallor.   Allergic/Immunologic: Negative for immunocompromised state.   Neurological:  Negative for dizziness, vertigo, seizures, syncope, facial asymmetry, speech difficulty, light-headedness, coordination difficulties, memory loss and coordination difficulties.   Hematological:  Negative for adenopathy. Does not bruise/bleed easily.   Psychiatric/Behavioral:  Negative for agitation, behavioral problems,  confusion, decreased concentration, dysphoric mood, hallucinations, self-injury and suicidal ideas. The patient is not nervous/anxious and is not hyperactive.          Past Medical History:   Diagnosis Date    Anemia     Atherosclerotic heart disease of native coronary artery without angina pectoris     Carotid artery stenosis 01/15/2014    CHARO  LICA stenosis    Causalgia of lower limb     Cervical radiculopathy     Chronic low back pain     Chronic pain syndrome     CVA (cerebral vascular accident)     right cerebellar    Degenerative joint disease involving multiple joints     Disorder of parathyroid gland, unspecified     Disorder of sacrum     Essential (primary) hypertension     Gammopathy     GERD (gastroesophageal reflux disease)     Heart murmur     Hyperlipidemia     Hyperparathyroidism     Lumbosacral radiculopathy     Lumbosacral spondylosis     Other long term (current) drug therapy     Pernicious anemia     Sciatica     Spinal stenosis of lumbar region     L4-5    Type 2 diabetes mellitus      Past Surgical History:   Procedure Laterality Date    CARPAL TUNNEL RELEASE Right     caudal MOIZ  07/03/2018    CHOLECYSTECTOMY  1975    CORONARY ARTERY BYPASS GRAFT      CORONARY ARTERY BYPASS GRAFT (CABG)      triple    HIP SURGERY Right     L4-S1 Caudal cath guided MOIZ  07/03/2018    Dr Orta    L5-S1 Caudal cath guided MOIZ  09/26/2014 6/26/2014 4/11/2014    Dr Orta    left shoulder repair Left     NECK SURGERY  2018    does not know what kind    right sacral RF Right 12/26/2013 1/24/2012    Dr Orta    right SIJI Right 10/24/2013    Dr Orta     Social History     Socioeconomic History    Marital status:    Occupational History    Occupation: RETIRED   Tobacco Use    Smoking status: Never    Smokeless tobacco: Never   Substance and Sexual Activity    Alcohol use: Not Currently    Drug use: Yes     Types: Hydrocodone     Comment: pain medication with pain treatment     Sexual activity: Not Currently  "    Family History   Problem Relation Age of Onset    Diabetes Mother     Heart disease Mother     Hypertension Mother     Heart attack Mother     Hypertension Father     Stroke Father     Stroke Sister     Hypertension Sister     Cancer Brother      Review of patient's allergies indicates:  No Known Allergies     Objective:  Vitals:    12/19/22 0927   BP: (!) 156/36   Pulse: 74   Weight: 57.6 kg (127 lb)   Height: 5' 3" (1.6 m)   PainSc:   9   PainLoc: Back         Physical Exam  Vitals and nursing note reviewed. Exam conducted with a chaperone present.   Constitutional:       General: She is awake. She is not in acute distress.     Appearance: Normal appearance. She is not ill-appearing, toxic-appearing or diaphoretic.   HENT:      Head: Normocephalic and atraumatic.      Nose: Nose normal.      Mouth/Throat:      Mouth: Mucous membranes are moist.      Pharynx: Oropharynx is clear.   Eyes:      Conjunctiva/sclera: Conjunctivae normal.      Pupils: Pupils are equal, round, and reactive to light.   Cardiovascular:      Rate and Rhythm: Normal rate.   Pulmonary:      Effort: Pulmonary effort is normal. No respiratory distress.   Abdominal:      Palpations: Abdomen is soft.   Musculoskeletal:      Cervical back: Normal range of motion and neck supple. Tenderness present.      Thoracic back: Tenderness present.      Lumbar back: Tenderness present. Decreased range of motion.   Skin:     General: Skin is warm and dry.      Coloration: Skin is not jaundiced or pale.   Neurological:      General: No focal deficit present.      Mental Status: She is alert and oriented to person, place, and time. Mental status is at baseline.      Cranial Nerves: No cranial nerve deficit (II-XII).   Psychiatric:         Mood and Affect: Mood normal.         Behavior: Behavior normal. Behavior is cooperative.         Thought Content: Thought content normal.         X-Ray Lumbar Complete Including Flex And Ext  Narrative: EXAMINATION:  XR " LUMBAR SPINE 5 VIEW WITH FLEX AND EXT    CLINICAL HISTORY:  .  Postlaminectomy syndrome, not elsewhere classified    COMPARISON:  April 2, 2019    TECHNIQUE:  AP, lateral, lateral flexion, and lateral extension views lumbar spine    FINDINGS:  There is some grade 1 anterolisthesis of L3 on L4 which is grossly unchanged with flexion and extension.  Pedicular screws and rods fuse the posterior elements at L4-L5.  There is interbody hardware which overlies the L4-L5 disc space.    There is moderate to prominent degenerative disc narrowing at L2-L3 and L3-L4.  There is mild scattered lumbar spondylosis.  There is moderate facet hypertrophy.    There is no focal lytic or blastic lesion.    No acute fracture seen.    There is no gross aneurysm of the heavily calcified abdominal aorta  Impression: Grade 1 spondylolisthesis L3-L4, grossly stable in flexion and extension    Degenerative disc disease    Electronically signed by: Jovanny James  Date:    11/28/2022  Time:    16:15  X-Ray Sacroiliac Joints Complete  Narrative: EXAMINATION:  XR SACROILIAC JOINTS COMPLETE    CLINICAL HISTORY:  .  Sacroiliitis, not elsewhere classified    COMPARISON:  No previous similar    TECHNIQUE:  Three views of the sacroiliac joints, to include AP and bilateral obliques    FINDINGS:  Suspect mild osteopenia.  There is no focal lytic or blastic lesion.    There is mild osteophyte formation of the sacroiliac joints.  No areas of definite active erosion are seen.  There is no ankylosis of the SI joints.    There is moderate osteophyte formation of either hip, though the joint spaces are well maintained.  There is a healing fracture of the superior pubic ramus on the left, laterally.    Pedicular screws and rods fuse the posterior elements at L4-L5  Impression: Mild osteoarthritis of the sacroiliac joints.  No definite sacroiliitis    Older healing fracture superior pubic ramus on the left    Osteoarthritis of the hips, mild in  degree    Electronically signed by: Jovanny James  Date:    11/28/2022  Time:    16:07         Clinical Support on 12/12/2022   Component Date Value Ref Range Status    Sodium 12/12/2022 135 (L)  136 - 145 mmol/L Final    Potassium 12/12/2022 4.8  3.5 - 5.1 mmol/L Final    Chloride 12/12/2022 102  98 - 107 mmol/L Final    CO2 12/12/2022 28  21 - 32 mmol/L Final    Anion Gap 12/12/2022 10  7 - 16 mmol/L Final    Glucose 12/12/2022 109 (H)  74 - 106 mg/dL Final    BUN 12/12/2022 29 (H)  7 - 18 mg/dL Final    Creatinine 12/12/2022 1.10 (H)  0.55 - 1.02 mg/dL Final    BUN/Creatinine Ratio 12/12/2022 26 (H)  6 - 20 Final    Calcium 12/12/2022 9.2  8.5 - 10.1 mg/dL Final    Total Protein 12/12/2022 8.1  6.4 - 8.2 g/dL Final    Albumin 12/12/2022 3.6  3.5 - 5.0 g/dL Final    Globulin 12/12/2022 4.5 (H)  2.0 - 4.0 g/dL Final    A/G Ratio 12/12/2022 0.8   Final    Bilirubin, Total 12/12/2022 0.4  >0.0 - 1.2 mg/dL Final    Alk Phos 12/12/2022 55  55 - 142 U/L Final    ALT 12/12/2022 33  13 - 56 U/L Final    AST 12/12/2022 47 (H)  15 - 37 U/L Final    eGFR 12/12/2022 53 (L)  >=60 mL/min/1.73m² Final    Hemoglobin A1C 12/12/2022 5.7  4.5 - 6.6 % Final    Estimated Average Glucose 12/12/2022 104  mg/dL Final    Triglycerides 12/12/2022 36  35 - 150 mg/dL Final    Cholesterol 12/12/2022 179  0 - 200 mg/dL Final    HDL Cholesterol 12/12/2022 84 (H)  40 - 60 mg/dL Final    Cholesterol/HDL Ratio (Risk Factor) 12/12/2022 2.1   Final    Non-HDL 12/12/2022 95  mg/dL Final    LDL Calculated 12/12/2022 88  mg/dL Final    LDL/HDL 12/12/2022 1.0   Final    VLDL 12/12/2022 7  mg/dL Final    WBC 12/12/2022 3.98 (L)  4.50 - 11.00 K/uL Final    RBC 12/12/2022 3.03 (L)  4.20 - 5.40 M/uL Final    Hemoglobin 12/12/2022 9.5 (L)  12.0 - 16.0 g/dL Final    Hematocrit 12/12/2022 29.5 (L)  38.0 - 47.0 % Final    MCV 12/12/2022 97.4 (H)  80.0 - 96.0 fL Final    MCH 12/12/2022 31.4 (H)  27.0 - 31.0 pg Final    MCHC 12/12/2022 32.2  32.0 - 36.0 g/dL  Final    RDW 12/12/2022 15.9 (H)  11.5 - 14.5 % Final    Platelet Count 12/12/2022 170  150 - 400 K/uL Final    MPV 12/12/2022 11.5  9.4 - 12.4 fL Final    Neutrophils % 12/12/2022 80.8 (H)  53.0 - 65.0 % Final    Lymphocytes % 12/12/2022 10.1 (L)  27.0 - 41.0 % Final    Monocytes % 12/12/2022 5.0  2.0 - 6.0 % Final    Eosinophils % 12/12/2022 0.3 (L)  1.0 - 4.0 % Final    Basophils % 12/12/2022 0.0  0.0 - 1.0 % Final    Immature Granulocytes % 12/12/2022 3.8 (H)  0.0 - 0.4 % Final    nRBC, Auto 12/12/2022 0.0  <=0.0 % Final    Neutrophils, Abs 12/12/2022 3.22  1.80 - 7.70 K/uL Final    Lymphocytes, Absolute 12/12/2022 0.40 (L)  1.00 - 4.80 K/uL Final    Monocytes, Absolute 12/12/2022 0.20  0.00 - 0.80 K/uL Final    Eosinophils, Absolute 12/12/2022 0.01  0.00 - 0.50 K/uL Final    Basophils, Absolute 12/12/2022 0.00  0.00 - 0.20 K/uL Final    Immature Granulocytes, Absolute 12/12/2022 0.15 (H)  0.00 - 0.04 K/uL Final    nRBC, Absolute 12/12/2022 0.00  <=0.00 x10e3/uL Final    Diff Type 12/12/2022 Manual   Final    Segmented Neutrophils, Man % 12/12/2022 84 (H)  50 - 62 % Final    Bands, Man % 12/12/2022 5  1 - 5 % Final    Lymphocytes, Man % 12/12/2022 5 (L)  27 - 41 % Final    Monocytes, Man % 12/12/2022 5  2 - 6 % Final    Platelet Morphology 12/12/2022 Normal  Normal Final    RBC Morphology 12/12/2022 Normal   Final    Vitamin B12 12/12/2022 >6,000 (H)  193 - 986 pg/mL Final    Folate 12/12/2022 >20.0 (H)  3.1 - 17.5 ng/mL Final    Homocysteine 12/12/2022 20.9 (H)  3.2 - 10.7 µmol/L Final    GGT 12/12/2022 46  5 - 55 U/L Final   Office Visit on 11/08/2022   Component Date Value Ref Range Status    Culture, Urine 11/08/2022 No Growth   Final    Color, UA 11/08/2022 Light-Yellow  Colorless, Straw, Yellow, Light Yellow, Dark Yellow Final    Clarity, UA 11/08/2022 Clear  Clear Final    pH, UA 11/08/2022 5.5  5.0 to 8.0 pH Units Final    Leukocytes, UA 11/08/2022 Negative  Negative Final    Nitrites, UA 11/08/2022  Negative  Negative Final    Protein, UA 11/08/2022 Negative  Negative Final    Glucose, UA 11/08/2022 Normal  Normal mg/dL Final    Ketones, UA 11/08/2022 Negative  Negative mg/dL Final    Urobilinogen, UA 11/08/2022 Normal  0.2, 1.0, Normal mg/dL Final    Bilirubin, UA 11/08/2022 Negative  Negative Final    Blood, UA 11/08/2022 Negative  Negative Final    Specific Gravity, UA 11/08/2022 1.015  <=1.030 Final    WBC, UA 11/08/2022 2  <=5 /hpf Final    Bacteria, UA 11/08/2022 Occ (A)  None Seen /hpf Final    Squamous Epithelial Cells, UA 11/08/2022 Moderate (A)  None Seen /HPF Final   Office Visit on 10/25/2022   Component Date Value Ref Range Status    POC Amphetamines 10/25/2022 Negative  Negative, Inconclusive Final    POC Barbiturates 10/25/2022 Negative  Negative, Inconclusive Final    POC Benzodiazepines 10/25/2022 Negative  Negative, Inconclusive Final    POC Cocaine 10/25/2022 Negative  Negative, Inconclusive Final    POC THC 10/25/2022 Negative  Negative, Inconclusive Final    POC Methadone 10/25/2022 Negative  Negative, Inconclusive Final    POC Methamphetamine 10/25/2022 Negative  Negative, Inconclusive Final    POC Opiates 10/25/2022 Presumptive Positive (A)  Negative, Inconclusive Final    POC Oxycodone 10/25/2022 Negative  Negative, Inconclusive Final    POC Phencyclidine 10/25/2022 Negative  Negative, Inconclusive Final    POC Methylenedioxymethamphetamine * 10/25/2022 Negative  Negative, Inconclusive Final    POC Tricyclic Antidepressants 10/25/2022 Negative  Negative, Inconclusive Final    POC Buprenorphine 10/25/2022 Negative   Final     Acceptable 10/25/2022 Yes   Final    POC Temperature (Urine) 10/25/2022 90   Final   Office Visit on 10/18/2022   Component Date Value Ref Range Status    Color, UA 10/18/2022 Light-Yellow  Colorless, Straw, Yellow, Light Yellow, Dark Yellow Final    Clarity, UA 10/18/2022 Clear  Clear Final    pH, UA 10/18/2022 6.0  5.0 to 8.0 pH Units Final     Leukocytes, UA 10/18/2022 Negative  Negative Final    Nitrites, UA 10/18/2022 Negative  Negative Final    Protein, UA 10/18/2022 Negative  Negative Final    Glucose, UA 10/18/2022 Normal  Normal mg/dL Final    Ketones, UA 10/18/2022 Negative  Negative mg/dL Final    Urobilinogen, UA 10/18/2022 Normal  0.2, 1.0, Normal mg/dL Final    Bilirubin, UA 10/18/2022 Negative  Negative Final    Blood, UA 10/18/2022 Negative  Negative Final    Specific Gravity, UA 10/18/2022 1.015  <=1.030 Final    Case Report 10/18/2022    Final                    Value:Pap Cytology                                      Case: [id number]                                   Authorizing Provider:  Cong Ferrer MD      Collected:           10/18/2022 03:45 PM          Ordering Location:     [facility] Received:            10/19/2022 09:25 AM                                 - Obstetrics And                                                                                    Gynecology                                                                   First Screen:          CHRISTIANO Mahmood(ASCP)                                                    Specimen:    Liquid-Based Pap Test, Screening, Cervix                                                   Interpretation 10/18/2022 Negative              Final    General Categorization 10/18/2022 Negative for intraepithelial lesion or malignancy   Final    Specimen Adequacy 10/18/2022 Satisfactory for evaluation  No endocervical component   Final    Clinical Information 10/18/2022    Final                    Value:This result contains rich text formatting which cannot be displayed here.    Disclaimer 10/18/2022    Final                    Value:This result contains rich text formatting which cannot be displayed here.    WBC, UA 10/18/2022 1  <=5 /hpf Final    RBC, UA 10/18/2022 1  <=3 /hpf Final    Squamous Epithelial Cells, UA 10/18/2022 Occasional (A)  None Seen /HPF Final    Mucous  10/18/2022 Occasional (A)  None Seen /LPF Final   Office Visit on 07/15/2022   Component Date Value Ref Range Status    Hemoglobin A1C 07/15/2022 6.1  4.5 - 6.6 % Final    Estimated Average Glucose 07/15/2022 117  mg/dL Final    Sodium 07/15/2022 137  136 - 145 mmol/L Final    Potassium 07/15/2022 4.9  3.5 - 5.1 mmol/L Final    Chloride 07/15/2022 104  98 - 107 mmol/L Final    CO2 07/15/2022 28  21 - 32 mmol/L Final    Anion Gap 07/15/2022 10  7 - 16 mmol/L Final    Glucose 07/15/2022 73 (L)  74 - 106 mg/dL Final    BUN 07/15/2022 16  7 - 18 mg/dL Final    Creatinine 07/15/2022 1.02  0.55 - 1.02 mg/dL Final    BUN/Creatinine Ratio 07/15/2022 16  6 - 20 Final    Calcium 07/15/2022 9.0  8.5 - 10.1 mg/dL Final    Total Protein 07/15/2022 7.6  6.4 - 8.2 g/dL Final    Albumin 07/15/2022 3.6  3.5 - 5.0 g/dL Final    Globulin 07/15/2022 4.0  2.0 - 4.0 g/dL Final    A/G Ratio 07/15/2022 0.9   Final    Bilirubin, Total 07/15/2022 0.4  0.0 - 1.2 mg/dL Final    Alk Phos 07/15/2022 57  55 - 142 U/L Final    ALT 07/15/2022 34  13 - 56 U/L Final    AST 07/15/2022 46 (H)  15 - 37 U/L Final    eGFR 07/15/2022 56 (L)  >=60 mL/min/1.73m² Final    WBC 07/15/2022 2.92 (L)  4.50 - 11.00 K/uL Final    RBC 07/15/2022 3.03 (L)  4.20 - 5.40 M/uL Final    Hemoglobin 07/15/2022 9.8 (L)  12.0 - 16.0 g/dL Final    Hematocrit 07/15/2022 28.9 (L)  38.0 - 47.0 % Final    MCV 07/15/2022 95.4  80.0 - 96.0 fL Final    MCH 07/15/2022 32.3 (H)  27.0 - 31.0 pg Final    MCHC 07/15/2022 33.9  32.0 - 36.0 g/dL Final    RDW 07/15/2022 14.8 (H)  11.5 - 14.5 % Final    Platelet Count 07/15/2022 144 (L)  150 - 400 K/uL Final    MPV 07/15/2022 10.3  9.4 - 12.4 fL Final    Neutrophils % 07/15/2022 55.2  53.0 - 65.0 % Final    Lymphocytes % 07/15/2022 32.2  27.0 - 41.0 % Final    Monocytes % 07/15/2022 11.6 (H)  2.0 - 6.0 % Final    Eosinophils % 07/15/2022 0.7 (L)  1.0 - 4.0 % Final    Basophils % 07/15/2022 0.0  0.0 - 1.0 % Final    Immature Granulocytes %  07/15/2022 0.3  0.0 - 0.4 % Final    nRBC, Auto 07/15/2022 0.0  <=0.0 % Final    Neutrophils, Abs 07/15/2022 1.61 (L)  1.80 - 7.70 K/uL Final    Lymphocytes, Absolute 07/15/2022 0.94 (L)  1.00 - 4.80 K/uL Final    Monocytes, Absolute 07/15/2022 0.34  0.00 - 0.80 K/uL Final    Eosinophils, Absolute 07/15/2022 0.02  0.00 - 0.50 K/uL Final    Basophils, Absolute 07/15/2022 0.00  0.00 - 0.20 K/uL Final    Immature Granulocytes, Absolute 07/15/2022 0.01  0.00 - 0.04 K/uL Final    nRBC, Absolute 07/15/2022 0.00  <=0.00 x10e3/uL Final    Diff Type 07/15/2022 Auto   Final   Office Visit on 06/22/2022   Component Date Value Ref Range Status    POC Amphetamines 06/22/2022 Negative  Negative, Inconclusive Final    POC Barbiturates 06/22/2022 Negative  Negative, Inconclusive Final    POC Benzodiazepines 06/22/2022 Negative  Negative, Inconclusive Final    POC Cocaine 06/22/2022 Negative  Negative, Inconclusive Final    POC THC 06/22/2022 Negative  Negative, Inconclusive Final    POC Methadone 06/22/2022 Negative  Negative, Inconclusive Final    POC Methamphetamine 06/22/2022 Negative  Negative, Inconclusive Final    POC Opiates 06/22/2022 Presumptive Positive (A)  Negative, Inconclusive Final    POC Oxycodone 06/22/2022 Negative  Negative, Inconclusive Final    POC Phencyclidine 06/22/2022 Negative  Negative, Inconclusive Final    POC Methylenedioxymethamphetamine * 06/22/2022 Negative  Negative, Inconclusive Final    POC Tricyclic Antidepressants 06/22/2022 Negative  Negative, Inconclusive Final    POC Buprenorphine 06/22/2022 Negative   Final     Acceptable 06/22/2022 Yes   Final    POC Temperature (Urine) 06/22/2022 90   Final         Orders Placed This Encounter   Procedures    Case Request Operating Room: Block-nerve-medial branch-lumbar, bilateral L4 through S1     Order Specific Question:   Medical Necessity:     Answer:   Medically Non-Urgent [100]     Order Specific Question:   CPT Code:     Answer:   [place]  INJ DX/THER AGNT PARAVERT FACET JOINT,IMG GUIDE,LUMBAR/SAC,1ST LVL [51868]     Order Specific Question:   CPT Code:     Answer:   [place] INJ DX/THER AGNT PARAVERT FACET JOINT,IMG GUIDE,LUMBAR/SAC, 2ND LEVEL [32297]     Order Specific Question:   Is an on-site pathologist required for this procedure?     Answer:   N/A         Requested Prescriptions     Signed Prescriptions Disp Refills    naloxone (NARCAN) 4 mg/actuation Spry 1 each 0     Si spray (4 mg total) by Nasal route once. for 1 dose    HYDROcodone-acetaminophen (NORCO)  mg per tablet 120 tablet 0     Sig: Take 1 tablet by mouth every 6 (six) hours as needed for Pain.       Assessment:     1. Lumbosacral spondylosis without myelopathy    2. Cervical radiculopathy    3. Postlaminectomy syndrome of cervical region    4. Disorder of sacrum         A's of Opioid Risk Assessment  Activity:Patient can perform ADL.   Analgesia:Patients pain is partially controlled by current medication. Patient has tried OTC medications such as Tylenol and Ibuprofen with out relief.   Adverse Effects: Patient denies constipation or sedation.  Aberrant Behavior:  reviewed with no aberrant drug seeking/taking behavior.  Overdose reversal drug naloxone discussed    Drug screen reviewed      Plan:    Rheumatoid arthritis [facility]    Using 4 point walker assistance ambulation    Follow-up after x-ray sacroiliac joint lumbar spine     X-ray lumbar spine [facility] 2022  Grade 1 anterior listhesis L3/4 multiple level degenerative changes pedicle screws rods posterior L4/5 interbody hardware L4/5  Prominent degenerative changes L2/3 L3/4 moderate facet joint arthropathy throughout    X-ray sacroiliac joints [facility] 2022 osteoarthritis sacroiliac joints    Requesting for procedure for back pain    Requesting Toradol injection    Toradol 60 mg IM, tolerated well    Continue home exercise program as directed    Indications for this procedure for this  specific patient include the following   - Pt has had symptoms for three months with moderate to severe pain with functional impairment rated of 7/10 pain.   - Pain non-responsive to conservative care.    - Pain predominately axial and not associated with radiculopathy or claudication.    - No non-facet pathology as source of pain.    - Clinical assessment implicates facet joint as putative pain source.    - facet loading maneuver positive  - Pain is exacerbated by extension or prolonged sitting/standing and relieved by rest.    - No unexplained neurologic deficit.    - No history of coagulopathy, infection or unstable medical conditions.    - Pain is causing significant functional limitation resulting in diminished quality of life and impaired age appropriate ADL's.   - Clinical assessment implicates facet joint as putative source of pain  - Repeat injections not done prior to 7 days   - no more than 2 levels will be done    The planned medically necessary  surgical procedure is performed in a hospital outpatient department and not in an ambulatory surgical center due to:     -there is no geographically assessable ambulatory surgery center that has the  necessary equipment and fluoroscopy needed for the procedure     -there is no geographically assessable ambulatory surgical center available at which the physician has privileges     -an ASC's  specific  guideline regarding the individuals weight or health conditions that prevent the use of an ASC    Monitor anesthesia request is medically indicated for the scheduled nerve block procedure due to:  1- needle phobia and anxiety, placing  the patient at risk during the provided service.  2-patient has an ASA class greater than 3 and requires constant presence of an anesthesiologist during the procedure,   3-patient has severe problems hard to lie still  4-patient suffers from chronic pain and is unable to function due to  diminished ADLs    Schedule bilateral lumbar  medial branch block L4 through S1 # 1, lumbosacral spondylosis    Dr. Piña,     Bring original prescription medication bottles/container/box with labels to each visit    Pill count    Physical therapy    Massage therapy declines

## 2022-12-19 ENCOUNTER — OFFICE VISIT (OUTPATIENT)
Dept: PAIN MEDICINE | Facility: CLINIC | Age: 72
End: 2022-12-19
Payer: MEDICARE

## 2022-12-19 VITALS
SYSTOLIC BLOOD PRESSURE: 156 MMHG | WEIGHT: 127 LBS | HEIGHT: 63 IN | BODY MASS INDEX: 22.5 KG/M2 | HEART RATE: 74 BPM | DIASTOLIC BLOOD PRESSURE: 36 MMHG

## 2022-12-19 DIAGNOSIS — M96.1 POSTLAMINECTOMY SYNDROME OF CERVICAL REGION: Chronic | ICD-10-CM

## 2022-12-19 DIAGNOSIS — M53.3 DISORDER OF SACRUM: Chronic | ICD-10-CM

## 2022-12-19 DIAGNOSIS — M54.12 CERVICAL RADICULOPATHY: Chronic | ICD-10-CM

## 2022-12-19 DIAGNOSIS — M47.817 LUMBOSACRAL SPONDYLOSIS WITHOUT MYELOPATHY: Primary | Chronic | ICD-10-CM

## 2022-12-19 PROCEDURE — 1159F MED LIST DOCD IN RCRD: CPT | Mod: CPTII,,, | Performed by: PHYSICIAN ASSISTANT

## 2022-12-19 PROCEDURE — 3044F HG A1C LEVEL LT 7.0%: CPT | Mod: CPTII,,, | Performed by: PHYSICIAN ASSISTANT

## 2022-12-19 PROCEDURE — 96372 THER/PROPH/DIAG INJ SC/IM: CPT | Mod: PBBFAC | Performed by: PHYSICIAN ASSISTANT

## 2022-12-19 PROCEDURE — 1101F PT FALLS ASSESS-DOCD LE1/YR: CPT | Mod: CPTII,,, | Performed by: PHYSICIAN ASSISTANT

## 2022-12-19 PROCEDURE — 3288F FALL RISK ASSESSMENT DOCD: CPT | Mod: CPTII,,, | Performed by: PHYSICIAN ASSISTANT

## 2022-12-19 PROCEDURE — 3077F SYST BP >= 140 MM HG: CPT | Mod: CPTII,,, | Performed by: PHYSICIAN ASSISTANT

## 2022-12-19 PROCEDURE — 99214 PR OFFICE/OUTPT VISIT, EST, LEVL IV, 30-39 MIN: ICD-10-PCS | Mod: S$PBB,,, | Performed by: PHYSICIAN ASSISTANT

## 2022-12-19 PROCEDURE — 3066F PR DOCUMENTATION OF TREATMENT FOR NEPHROPATHY: ICD-10-PCS | Mod: CPTII,,, | Performed by: PHYSICIAN ASSISTANT

## 2022-12-19 PROCEDURE — 3077F PR MOST RECENT SYSTOLIC BLOOD PRESSURE >= 140 MM HG: ICD-10-PCS | Mod: CPTII,,, | Performed by: PHYSICIAN ASSISTANT

## 2022-12-19 PROCEDURE — 3061F PR NEG MICROALBUMINURIA RESULT DOCUMENTED/REVIEW: ICD-10-PCS | Mod: CPTII,,, | Performed by: PHYSICIAN ASSISTANT

## 2022-12-19 PROCEDURE — 99214 OFFICE O/P EST MOD 30 MIN: CPT | Mod: S$PBB,,, | Performed by: PHYSICIAN ASSISTANT

## 2022-12-19 PROCEDURE — 1125F AMNT PAIN NOTED PAIN PRSNT: CPT | Mod: CPTII,,, | Performed by: PHYSICIAN ASSISTANT

## 2022-12-19 PROCEDURE — 3066F NEPHROPATHY DOC TX: CPT | Mod: CPTII,,, | Performed by: PHYSICIAN ASSISTANT

## 2022-12-19 PROCEDURE — 3061F NEG MICROALBUMINURIA REV: CPT | Mod: CPTII,,, | Performed by: PHYSICIAN ASSISTANT

## 2022-12-19 PROCEDURE — 3008F BODY MASS INDEX DOCD: CPT | Mod: CPTII,,, | Performed by: PHYSICIAN ASSISTANT

## 2022-12-19 PROCEDURE — 1125F PR PAIN SEVERITY QUANTIFIED, PAIN PRESENT: ICD-10-PCS | Mod: CPTII,,, | Performed by: PHYSICIAN ASSISTANT

## 2022-12-19 PROCEDURE — 1101F PR PT FALLS ASSESS DOC 0-1 FALLS W/OUT INJ PAST YR: ICD-10-PCS | Mod: CPTII,,, | Performed by: PHYSICIAN ASSISTANT

## 2022-12-19 PROCEDURE — 3078F PR MOST RECENT DIASTOLIC BLOOD PRESSURE < 80 MM HG: ICD-10-PCS | Mod: CPTII,,, | Performed by: PHYSICIAN ASSISTANT

## 2022-12-19 PROCEDURE — 3008F PR BODY MASS INDEX (BMI) DOCUMENTED: ICD-10-PCS | Mod: CPTII,,, | Performed by: PHYSICIAN ASSISTANT

## 2022-12-19 PROCEDURE — 99215 OFFICE O/P EST HI 40 MIN: CPT | Mod: PBBFAC | Performed by: PHYSICIAN ASSISTANT

## 2022-12-19 PROCEDURE — 3288F PR FALLS RISK ASSESSMENT DOCUMENTED: ICD-10-PCS | Mod: CPTII,,, | Performed by: PHYSICIAN ASSISTANT

## 2022-12-19 PROCEDURE — 1159F PR MEDICATION LIST DOCUMENTED IN MEDICAL RECORD: ICD-10-PCS | Mod: CPTII,,, | Performed by: PHYSICIAN ASSISTANT

## 2022-12-19 PROCEDURE — 3078F DIAST BP <80 MM HG: CPT | Mod: CPTII,,, | Performed by: PHYSICIAN ASSISTANT

## 2022-12-19 PROCEDURE — 3044F PR MOST RECENT HEMOGLOBIN A1C LEVEL <7.0%: ICD-10-PCS | Mod: CPTII,,, | Performed by: PHYSICIAN ASSISTANT

## 2022-12-19 RX ORDER — NALOXONE HYDROCHLORIDE 4 MG/.1ML
1 SPRAY NASAL ONCE
Qty: 1 EACH | Refills: 0 | Status: SHIPPED | OUTPATIENT
Start: 2022-12-19 | End: 2022-12-19

## 2022-12-19 RX ORDER — HYDROCODONE BITARTRATE AND ACETAMINOPHEN 10; 325 MG/1; MG/1
1 TABLET ORAL EVERY 6 HOURS PRN
Qty: 120 TABLET | Refills: 0 | Status: CANCELLED | OUTPATIENT
Start: 2023-03-01 | End: 2023-03-31

## 2022-12-19 RX ORDER — KETOROLAC TROMETHAMINE 30 MG/ML
60 INJECTION, SOLUTION INTRAMUSCULAR; INTRAVENOUS
Status: COMPLETED | OUTPATIENT
Start: 2022-12-19 | End: 2022-12-19

## 2022-12-19 RX ORDER — HYDROCODONE BITARTRATE AND ACETAMINOPHEN 10; 325 MG/1; MG/1
1 TABLET ORAL EVERY 6 HOURS PRN
Qty: 120 TABLET | Refills: 0 | Status: SHIPPED | OUTPATIENT
Start: 2022-12-31 | End: 2023-02-01 | Stop reason: SDUPTHER

## 2022-12-19 RX ORDER — HYDROCODONE BITARTRATE AND ACETAMINOPHEN 10; 325 MG/1; MG/1
1 TABLET ORAL EVERY 6 HOURS PRN
Qty: 120 TABLET | Refills: 0 | Status: CANCELLED | OUTPATIENT
Start: 2022-12-31 | End: 2023-01-30

## 2022-12-19 RX ADMIN — KETOROLAC TROMETHAMINE 60 MG: 30 INJECTION, SOLUTION INTRAMUSCULAR at 11:12

## 2022-12-19 NOTE — PATIENT INSTRUCTIONS
Procedure Instructions:    Nothing to eat or drink for 8 hours or after midnight including gum, candy, mints, or tobacco products.  If you are scheduled for 1:30 or later nothing to eat or drink after 5 a.m. the morning of the procedure, including gum, candy, mints, or tobacco products.  Must have a  at least 18 yrs of age to stay present at all times  No Diabetic medications the morning of procedure, check blood sugar the morning of procedure, if it is greater than 200 call the office at [phone]  If you are started on antibiotics or have been prescribed antibiotics, have a fever, or have any other type of infection call to reschedule procedure.  If you take blood pressure medications you can take it at your regular scheduled time with a small sip of WATER!    HOLD ASPIRIN AND ASPIRIN PRODUCTS  (ASPIRIN, BC POWDER ETC. ) FOR 7 DAYS  PRIOR TO PROCEDURE  HOLD NSAIDS( ibuprofen, mobic, meloxicam, advil, diclofenac, naproxen, relafen, celebrex,  methotrexate, aleve etc....)  FOR 3 DAYS   PRIOR TO PROCEDURE  Stop ecotrin 1/3/23  Stop Plavix 1/1/23 DO NOT STOP UNTIL WE CALL WITH CLEARANCE

## 2022-12-29 ENCOUNTER — TELEPHONE (OUTPATIENT)
Dept: PAIN MEDICINE | Facility: CLINIC | Age: 72
End: 2022-12-29
Payer: MEDICAID

## 2022-12-29 NOTE — TELEPHONE ENCOUNTER
Pt is scheduled for Bilateral L4-S1 MBB on 1-10.  Dr Piña will not be available. Pt's procedure is not approved at this time. I attempted to contact pt, the mailbox is full.  Pt's son was contacted and procedure was RS for 1-19 at 0820,. Voiced understanding.

## 2022-12-31 ENCOUNTER — EXTERNAL CHRONIC CARE MANAGEMENT (OUTPATIENT)
Dept: FAMILY MEDICINE | Facility: CLINIC | Age: 72
End: 2022-12-31
Payer: MEDICARE

## 2022-12-31 PROCEDURE — G0511 PR CHRONIC CARE MGMT, RHC OR FQHC ONLY, 20 MINS OR MORE: ICD-10-PCS | Mod: ,,, | Performed by: FAMILY MEDICINE

## 2022-12-31 PROCEDURE — G0511 CCM/BHI BY RHC/FQHC 20MIN MO: HCPCS | Mod: ,,, | Performed by: FAMILY MEDICINE

## 2023-01-18 ENCOUNTER — HOSPITAL ENCOUNTER (OUTPATIENT)
Dept: RADIOLOGY | Facility: HOSPITAL | Age: 73
Discharge: HOME | End: 2023-01-18
Attending: FAMILY MEDICINE
Payer: MEDICARE

## 2023-01-18 DIAGNOSIS — Z12.31 BREAST CANCER SCREENING BY MAMMOGRAM: ICD-10-CM

## 2023-01-18 PROCEDURE — 77067 SCR MAMMO BI INCL CAD: CPT | Mod: TC

## 2023-01-19 ENCOUNTER — ANESTHESIA (OUTPATIENT)
Dept: PAIN MEDICINE | Facility: HOSPITAL | Age: 73
End: 2023-01-19
Payer: MEDICARE

## 2023-01-19 ENCOUNTER — HOSPITAL ENCOUNTER (OUTPATIENT)
Facility: HOSPITAL | Age: 73
Discharge: HOME | End: 2023-01-19
Attending: PAIN MEDICINE | Admitting: PAIN MEDICINE
Payer: MEDICARE

## 2023-01-19 ENCOUNTER — ANESTHESIA EVENT (OUTPATIENT)
Dept: PAIN MEDICINE | Facility: HOSPITAL | Age: 73
End: 2023-01-19
Payer: MEDICARE

## 2023-01-19 VITALS
OXYGEN SATURATION: 100 % | WEIGHT: 123 LBS | RESPIRATION RATE: 10 BRPM | TEMPERATURE: 99 F | DIASTOLIC BLOOD PRESSURE: 55 MMHG | HEIGHT: 65 IN | HEART RATE: 60 BPM | SYSTOLIC BLOOD PRESSURE: 165 MMHG | BODY MASS INDEX: 20.49 KG/M2

## 2023-01-19 DIAGNOSIS — M47.817 LUMBOSACRAL SPONDYLOSIS WITHOUT MYELOPATHY: ICD-10-CM

## 2023-01-19 PROCEDURE — 64495 PR INJ DX/THER AGNT PARAVERT FACET JOINT,IMG GUIDE,LUMBAR/SAC, ADD LEVEL: ICD-10-PCS | Mod: 50,,, | Performed by: PAIN MEDICINE

## 2023-01-19 PROCEDURE — 64495 INJ PARAVERT F JNT L/S 3 LEV: CPT | Mod: LT | Performed by: PAIN MEDICINE

## 2023-01-19 PROCEDURE — 64493 INJ PARAVERT F JNT L/S 1 LEV: CPT | Mod: RT | Performed by: PAIN MEDICINE

## 2023-01-19 PROCEDURE — 64494 INJ PARAVERT F JNT L/S 2 LEV: CPT | Mod: RT | Performed by: PAIN MEDICINE

## 2023-01-19 PROCEDURE — 64493 PR INJ DX/THER AGNT PARAVERT FACET JOINT,IMG GUIDE,LUMBAR/SAC,1ST LVL: ICD-10-PCS | Mod: 50,,, | Performed by: PAIN MEDICINE

## 2023-01-19 PROCEDURE — D9220A PRA ANESTHESIA: ICD-10-PCS | Mod: ,,, | Performed by: NURSE ANESTHETIST, CERTIFIED REGISTERED

## 2023-01-19 PROCEDURE — 63600175 PHARM REV CODE 636 W HCPCS: Performed by: PAIN MEDICINE

## 2023-01-19 PROCEDURE — 25000003 PHARM REV CODE 250: Performed by: NURSE ANESTHETIST, CERTIFIED REGISTERED

## 2023-01-19 PROCEDURE — 25000003 PHARM REV CODE 250: Performed by: PAIN MEDICINE

## 2023-01-19 PROCEDURE — 64493 INJ PARAVERT F JNT L/S 1 LEV: CPT | Mod: 50,,, | Performed by: PAIN MEDICINE

## 2023-01-19 PROCEDURE — 64495 INJ PARAVERT F JNT L/S 3 LEV: CPT | Mod: 50,,, | Performed by: PAIN MEDICINE

## 2023-01-19 PROCEDURE — 64494 INJ PARAVERT F JNT L/S 2 LEV: CPT | Mod: 50,,, | Performed by: PAIN MEDICINE

## 2023-01-19 PROCEDURE — 37000008 HC ANESTHESIA 1ST 15 MINUTES: Performed by: PAIN MEDICINE

## 2023-01-19 PROCEDURE — 64494 PR INJ DX/THER AGNT PARAVERT FACET JOINT,IMG GUIDE,LUMBAR/SAC, 2ND LEVEL: ICD-10-PCS | Mod: 50,,, | Performed by: PAIN MEDICINE

## 2023-01-19 PROCEDURE — D9220A PRA ANESTHESIA: Mod: ,,, | Performed by: NURSE ANESTHETIST, CERTIFIED REGISTERED

## 2023-01-19 PROCEDURE — 63600175 PHARM REV CODE 636 W HCPCS: Performed by: NURSE ANESTHETIST, CERTIFIED REGISTERED

## 2023-01-19 RX ORDER — SODIUM CHLORIDE 9 MG/ML
INJECTION, SOLUTION INTRAVENOUS CONTINUOUS
Status: DISCONTINUED | OUTPATIENT
Start: 2023-01-19 | End: 2023-01-19 | Stop reason: HOSPADM

## 2023-01-19 RX ORDER — PROPOFOL 10 MG/ML
VIAL (ML) INTRAVENOUS
Status: DISCONTINUED | OUTPATIENT
Start: 2023-01-19 | End: 2023-01-19

## 2023-01-19 RX ORDER — BUPIVACAINE HYDROCHLORIDE 2.5 MG/ML
INJECTION, SOLUTION INFILTRATION; PERINEURAL CODE/TRAUMA/SEDATION MEDICATION
Status: DISCONTINUED | OUTPATIENT
Start: 2023-01-19 | End: 2023-01-19 | Stop reason: HOSPADM

## 2023-01-19 RX ORDER — LIDOCAINE HYDROCHLORIDE 20 MG/ML
INJECTION INTRAVENOUS
Status: DISCONTINUED | OUTPATIENT
Start: 2023-01-19 | End: 2023-01-19

## 2023-01-19 RX ORDER — TRIAMCINOLONE ACETONIDE 40 MG/ML
INJECTION, SUSPENSION INTRA-ARTICULAR; INTRAMUSCULAR CODE/TRAUMA/SEDATION MEDICATION
Status: DISCONTINUED | OUTPATIENT
Start: 2023-01-19 | End: 2023-01-19 | Stop reason: HOSPADM

## 2023-01-19 RX ADMIN — LIDOCAINE HYDROCHLORIDE 50 MG: 20 INJECTION, SOLUTION INTRAVENOUS at 10:01

## 2023-01-19 RX ADMIN — SODIUM CHLORIDE: 9 INJECTION, SOLUTION INTRAVENOUS at 10:01

## 2023-01-19 RX ADMIN — PROPOFOL 30 MG: 10 INJECTION, EMULSION INTRAVENOUS at 10:01

## 2023-01-19 NOTE — BRIEF OP NOTE
Discharge Note  Short Stay    Admit Date: 1/19/2023    Discharge Date: 1/19/2023    Attending Physician: Ashley Piña     Discharge Provider: Ashley Piña    Diagnoses:  Bilateral lumbosacral spondylosis    Discharged Condition: Good    Final Diagnoses: Lumbosacral spondylosis without myelopathy [M47.817]    Disposition: Home or Self Care    Hospital Course: No complications, uneventful    Outcome of Hospitalization, Treatment, Procedure, or Surgery:  Patient was admitted for outpatient interventional pain management procedure. The patient tolerated the procedure well with no complications.    Follow up/Patient Instructions:  Follow up as scheduled in Pain Management office in 3-4 weeks.  Patient has received instructions and follow up date and time.    Medications:  Continue previous medications          
No

## 2023-01-19 NOTE — TRANSFER OF CARE
"Anesthesia Transfer of Care Note    Patient: Kathryn Marie    Procedure(s) Performed: Procedure(s) (LRB):  Block-nerve-medial branch-lumbar, bilateral L4 through S1 (Bilateral)    Patient location: Other: [facility]    Anesthesia Type: general    Transport from OR: Transported from OR on room air with adequate spontaneous ventilation    Post pain: adequate analgesia    Post assessment: no apparent anesthetic complications    Post vital signs: stable    Level of consciousness: sedated and responds to stimulation    Nausea/Vomiting: no nausea/vomiting    Complications: none    Transfer of care protocol was followedComments: Good SV continue, NAD noted, VSS, RTRN      Last vitals:   Visit Vitals  BP (!) 148/52 (BP Location: Right arm, Patient Position: Lying)   Pulse 60   Temp 37 °C (98.6 °F) (Oral)   Resp 18   Ht 5' 5" (1.651 m)   Wt 55.8 kg (123 lb)   SpO2 100%   BMI 20.47 kg/m²     "

## 2023-01-19 NOTE — H&P
Subjective:         Patient ID: Kathryn Marie is a 72 y.o. female.    Chief Complaint: Back Pain (Pt states everything hurts)      Pain  This is a chronic problem. The current episode started more than 1 year ago. The problem occurs daily. The problem has been unchanged. Associated symptoms include arthralgias and neck pain. Pertinent negatives include no anorexia, change in bowel habit, chest pain, chills, coughing, diaphoresis, fever, sore throat, vertigo or vomiting.   Review of Systems   Constitutional:  Negative for appetite change, chills, diaphoresis, fever and unexpected weight change.   HENT:  Negative for drooling, ear discharge, ear pain, facial swelling, nosebleeds, sore throat, trouble swallowing, voice change and goiter.    Eyes:  Negative for photophobia, pain, discharge, redness and visual disturbance.   Respiratory:  Negative for apnea, cough, choking, chest tightness, shortness of breath, wheezing and stridor.    Cardiovascular:  Negative for chest pain, palpitations and leg swelling.   Gastrointestinal:  Negative for abdominal distention, anorexia, change in bowel habit, diarrhea, rectal pain, vomiting, fecal incontinence and change in bowel habit.   Endocrine: Negative for cold intolerance, heat intolerance, polydipsia, polyphagia and polyuria.   Genitourinary:  Negative for bladder incontinence, dysuria, flank pain, frequency and hot flashes.   Musculoskeletal:  Positive for arthralgias, back pain, leg pain, neck pain and neck stiffness.   Integumentary:  Negative for color change and pallor.   Allergic/Immunologic: Negative for immunocompromised state.   Neurological:  Negative for dizziness, vertigo, seizures, syncope, facial asymmetry, speech difficulty, light-headedness, coordination difficulties, memory loss and coordination difficulties.   Hematological:  Negative for adenopathy. Does not bruise/bleed easily.   Psychiatric/Behavioral:  Negative for agitation, behavioral problems,  confusion, decreased concentration, dysphoric mood, hallucinations, self-injury and suicidal ideas. The patient is not nervous/anxious and is not hyperactive.          Past Medical History:   Diagnosis Date    Anemia     Atherosclerotic heart disease of native coronary artery without angina pectoris     Carotid artery stenosis 01/15/2014    CHARO  LICA stenosis    Causalgia of lower limb     Cervical radiculopathy     Chronic low back pain     Chronic pain syndrome     CVA (cerebral vascular accident)     right cerebellar    Degenerative joint disease involving multiple joints     Disorder of parathyroid gland, unspecified     Disorder of sacrum     Essential (primary) hypertension     Gammopathy     GERD (gastroesophageal reflux disease)     Heart murmur     Hyperlipidemia     Hyperparathyroidism     Lumbosacral radiculopathy     Lumbosacral spondylosis     Other long term (current) drug therapy     Pernicious anemia     Sciatica     Spinal stenosis of lumbar region     L4-5    Type 2 diabetes mellitus      Past Surgical History:   Procedure Laterality Date    CARPAL TUNNEL RELEASE Right     caudal MOIZ  07/03/2018    CHOLECYSTECTOMY  1975    CORONARY ARTERY BYPASS GRAFT      CORONARY ARTERY BYPASS GRAFT (CABG)      triple    HIP SURGERY Right     L4-S1 Caudal cath guided MOIZ  07/03/2018    Dr Orta    L5-S1 Caudal cath guided MOIZ  09/26/2014 6/26/2014 4/11/2014    Dr Orta    left shoulder repair Left     NECK SURGERY  2018    does not know what kind    right sacral RF Right 12/26/2013 1/24/2012    Dr Orta    right SIJI Right 10/24/2013    Dr Orta     Social History     Socioeconomic History    Marital status:    Occupational History    Occupation: RETIRED   Tobacco Use    Smoking status: Never    Smokeless tobacco: Never   Substance and Sexual Activity    Alcohol use: Not Currently    Drug use: Yes     Types: Hydrocodone     Comment: pain medication with pain treatment     Sexual activity: Not Currently  "    Family History   Problem Relation Age of Onset    Diabetes Mother     Heart disease Mother     Hypertension Mother     Heart attack Mother     Hypertension Father     Stroke Father     Stroke Sister     Hypertension Sister     Cancer Brother      Review of patient's allergies indicates:  No Known Allergies     Objective:  Vitals:    12/19/22 0927   BP: (!) 156/36   Pulse: 74   Weight: 57.6 kg (127 lb)   Height: 5' 3" (1.6 m)   PainSc:   9   PainLoc: Back         Physical Exam  Vitals and nursing note reviewed. Exam conducted with a chaperone present.   Constitutional:       General: She is awake. She is not in acute distress.     Appearance: Normal appearance. She is not ill-appearing, toxic-appearing or diaphoretic.   HENT:      Head: Normocephalic and atraumatic.      Nose: Nose normal.      Mouth/Throat:      Mouth: Mucous membranes are moist.      Pharynx: Oropharynx is clear.   Eyes:      Conjunctiva/sclera: Conjunctivae normal.      Pupils: Pupils are equal, round, and reactive to light.   Cardiovascular:      Rate and Rhythm: Normal rate.   Pulmonary:      Effort: Pulmonary effort is normal. No respiratory distress.   Abdominal:      Palpations: Abdomen is soft.   Musculoskeletal:      Cervical back: Normal range of motion and neck supple. Tenderness present.      Thoracic back: Tenderness present.      Lumbar back: Tenderness present. Decreased range of motion.   Skin:     General: Skin is warm and dry.      Coloration: Skin is not jaundiced or pale.   Neurological:      General: No focal deficit present.      Mental Status: She is alert and oriented to person, place, and time. Mental status is at baseline.      Cranial Nerves: No cranial nerve deficit (II-XII).   Psychiatric:         Mood and Affect: Mood normal.         Behavior: Behavior normal. Behavior is cooperative.         Thought Content: Thought content normal.         X-Ray Lumbar Complete Including Flex And Ext  Narrative: EXAMINATION:  XR " LUMBAR SPINE 5 VIEW WITH FLEX AND EXT    CLINICAL HISTORY:  .  Postlaminectomy syndrome, not elsewhere classified    COMPARISON:  April 2, 2019    TECHNIQUE:  AP, lateral, lateral flexion, and lateral extension views lumbar spine    FINDINGS:  There is some grade 1 anterolisthesis of L3 on L4 which is grossly unchanged with flexion and extension.  Pedicular screws and rods fuse the posterior elements at L4-L5.  There is interbody hardware which overlies the L4-L5 disc space.    There is moderate to prominent degenerative disc narrowing at L2-L3 and L3-L4.  There is mild scattered lumbar spondylosis.  There is moderate facet hypertrophy.    There is no focal lytic or blastic lesion.    No acute fracture seen.    There is no gross aneurysm of the heavily calcified abdominal aorta  Impression: Grade 1 spondylolisthesis L3-L4, grossly stable in flexion and extension    Degenerative disc disease    Electronically signed by: Jovanny James  Date:    11/28/2022  Time:    16:15  X-Ray Sacroiliac Joints Complete  Narrative: EXAMINATION:  XR SACROILIAC JOINTS COMPLETE    CLINICAL HISTORY:  .  Sacroiliitis, not elsewhere classified    COMPARISON:  No previous similar    TECHNIQUE:  Three views of the sacroiliac joints, to include AP and bilateral obliques    FINDINGS:  Suspect mild osteopenia.  There is no focal lytic or blastic lesion.    There is mild osteophyte formation of the sacroiliac joints.  No areas of definite active erosion are seen.  There is no ankylosis of the SI joints.    There is moderate osteophyte formation of either hip, though the joint spaces are well maintained.  There is a healing fracture of the superior pubic ramus on the left, laterally.    Pedicular screws and rods fuse the posterior elements at L4-L5  Impression: Mild osteoarthritis of the sacroiliac joints.  No definite sacroiliitis    Older healing fracture superior pubic ramus on the left    Osteoarthritis of the hips, mild in  degree    Electronically signed by: Jovanny James  Date:    11/28/2022  Time:    16:07         Clinical Support on 12/12/2022   Component Date Value Ref Range Status    Sodium 12/12/2022 135 (L)  136 - 145 mmol/L Final    Potassium 12/12/2022 4.8  3.5 - 5.1 mmol/L Final    Chloride 12/12/2022 102  98 - 107 mmol/L Final    CO2 12/12/2022 28  21 - 32 mmol/L Final    Anion Gap 12/12/2022 10  7 - 16 mmol/L Final    Glucose 12/12/2022 109 (H)  74 - 106 mg/dL Final    BUN 12/12/2022 29 (H)  7 - 18 mg/dL Final    Creatinine 12/12/2022 1.10 (H)  0.55 - 1.02 mg/dL Final    BUN/Creatinine Ratio 12/12/2022 26 (H)  6 - 20 Final    Calcium 12/12/2022 9.2  8.5 - 10.1 mg/dL Final    Total Protein 12/12/2022 8.1  6.4 - 8.2 g/dL Final    Albumin 12/12/2022 3.6  3.5 - 5.0 g/dL Final    Globulin 12/12/2022 4.5 (H)  2.0 - 4.0 g/dL Final    A/G Ratio 12/12/2022 0.8   Final    Bilirubin, Total 12/12/2022 0.4  >0.0 - 1.2 mg/dL Final    Alk Phos 12/12/2022 55  55 - 142 U/L Final    ALT 12/12/2022 33  13 - 56 U/L Final    AST 12/12/2022 47 (H)  15 - 37 U/L Final    eGFR 12/12/2022 53 (L)  >=60 mL/min/1.73m² Final    Hemoglobin A1C 12/12/2022 5.7  4.5 - 6.6 % Final    Estimated Average Glucose 12/12/2022 104  mg/dL Final    Triglycerides 12/12/2022 36  35 - 150 mg/dL Final    Cholesterol 12/12/2022 179  0 - 200 mg/dL Final    HDL Cholesterol 12/12/2022 84 (H)  40 - 60 mg/dL Final    Cholesterol/HDL Ratio (Risk Factor) 12/12/2022 2.1   Final    Non-HDL 12/12/2022 95  mg/dL Final    LDL Calculated 12/12/2022 88  mg/dL Final    LDL/HDL 12/12/2022 1.0   Final    VLDL 12/12/2022 7  mg/dL Final    WBC 12/12/2022 3.98 (L)  4.50 - 11.00 K/uL Final    RBC 12/12/2022 3.03 (L)  4.20 - 5.40 M/uL Final    Hemoglobin 12/12/2022 9.5 (L)  12.0 - 16.0 g/dL Final    Hematocrit 12/12/2022 29.5 (L)  38.0 - 47.0 % Final    MCV 12/12/2022 97.4 (H)  80.0 - 96.0 fL Final    MCH 12/12/2022 31.4 (H)  27.0 - 31.0 pg Final    MCHC 12/12/2022 32.2  32.0 - 36.0 g/dL  Final    RDW 12/12/2022 15.9 (H)  11.5 - 14.5 % Final    Platelet Count 12/12/2022 170  150 - 400 K/uL Final    MPV 12/12/2022 11.5  9.4 - 12.4 fL Final    Neutrophils % 12/12/2022 80.8 (H)  53.0 - 65.0 % Final    Lymphocytes % 12/12/2022 10.1 (L)  27.0 - 41.0 % Final    Monocytes % 12/12/2022 5.0  2.0 - 6.0 % Final    Eosinophils % 12/12/2022 0.3 (L)  1.0 - 4.0 % Final    Basophils % 12/12/2022 0.0  0.0 - 1.0 % Final    Immature Granulocytes % 12/12/2022 3.8 (H)  0.0 - 0.4 % Final    nRBC, Auto 12/12/2022 0.0  <=0.0 % Final    Neutrophils, Abs 12/12/2022 3.22  1.80 - 7.70 K/uL Final    Lymphocytes, Absolute 12/12/2022 0.40 (L)  1.00 - 4.80 K/uL Final    Monocytes, Absolute 12/12/2022 0.20  0.00 - 0.80 K/uL Final    Eosinophils, Absolute 12/12/2022 0.01  0.00 - 0.50 K/uL Final    Basophils, Absolute 12/12/2022 0.00  0.00 - 0.20 K/uL Final    Immature Granulocytes, Absolute 12/12/2022 0.15 (H)  0.00 - 0.04 K/uL Final    nRBC, Absolute 12/12/2022 0.00  <=0.00 x10e3/uL Final    Diff Type 12/12/2022 Manual   Final    Segmented Neutrophils, Man % 12/12/2022 84 (H)  50 - 62 % Final    Bands, Man % 12/12/2022 5  1 - 5 % Final    Lymphocytes, Man % 12/12/2022 5 (L)  27 - 41 % Final    Monocytes, Man % 12/12/2022 5  2 - 6 % Final    Platelet Morphology 12/12/2022 Normal  Normal Final    RBC Morphology 12/12/2022 Normal   Final    Vitamin B12 12/12/2022 >6,000 (H)  193 - 986 pg/mL Final    Folate 12/12/2022 >20.0 (H)  3.1 - 17.5 ng/mL Final    Homocysteine 12/12/2022 20.9 (H)  3.2 - 10.7 µmol/L Final    GGT 12/12/2022 46  5 - 55 U/L Final   Office Visit on 11/08/2022   Component Date Value Ref Range Status    Culture, Urine 11/08/2022 No Growth   Final    Color, UA 11/08/2022 Light-Yellow  Colorless, Straw, Yellow, Light Yellow, Dark Yellow Final    Clarity, UA 11/08/2022 Clear  Clear Final    pH, UA 11/08/2022 5.5  5.0 to 8.0 pH Units Final    Leukocytes, UA 11/08/2022 Negative  Negative Final    Nitrites, UA 11/08/2022  Negative  Negative Final    Protein, UA 11/08/2022 Negative  Negative Final    Glucose, UA 11/08/2022 Normal  Normal mg/dL Final    Ketones, UA 11/08/2022 Negative  Negative mg/dL Final    Urobilinogen, UA 11/08/2022 Normal  0.2, 1.0, Normal mg/dL Final    Bilirubin, UA 11/08/2022 Negative  Negative Final    Blood, UA 11/08/2022 Negative  Negative Final    Specific Gravity, UA 11/08/2022 1.015  <=1.030 Final    WBC, UA 11/08/2022 2  <=5 /hpf Final    Bacteria, UA 11/08/2022 Occ (A)  None Seen /hpf Final    Squamous Epithelial Cells, UA 11/08/2022 Moderate (A)  None Seen /HPF Final   Office Visit on 10/25/2022   Component Date Value Ref Range Status    POC Amphetamines 10/25/2022 Negative  Negative, Inconclusive Final    POC Barbiturates 10/25/2022 Negative  Negative, Inconclusive Final    POC Benzodiazepines 10/25/2022 Negative  Negative, Inconclusive Final    POC Cocaine 10/25/2022 Negative  Negative, Inconclusive Final    POC THC 10/25/2022 Negative  Negative, Inconclusive Final    POC Methadone 10/25/2022 Negative  Negative, Inconclusive Final    POC Methamphetamine 10/25/2022 Negative  Negative, Inconclusive Final    POC Opiates 10/25/2022 Presumptive Positive (A)  Negative, Inconclusive Final    POC Oxycodone 10/25/2022 Negative  Negative, Inconclusive Final    POC Phencyclidine 10/25/2022 Negative  Negative, Inconclusive Final    POC Methylenedioxymethamphetamine * 10/25/2022 Negative  Negative, Inconclusive Final    POC Tricyclic Antidepressants 10/25/2022 Negative  Negative, Inconclusive Final    POC Buprenorphine 10/25/2022 Negative   Final     Acceptable 10/25/2022 Yes   Final    POC Temperature (Urine) 10/25/2022 90   Final   Office Visit on 10/18/2022   Component Date Value Ref Range Status    Color, UA 10/18/2022 Light-Yellow  Colorless, Straw, Yellow, Light Yellow, Dark Yellow Final    Clarity, UA 10/18/2022 Clear  Clear Final    pH, UA 10/18/2022 6.0  5.0 to 8.0 pH Units Final     Leukocytes, UA 10/18/2022 Negative  Negative Final    Nitrites, UA 10/18/2022 Negative  Negative Final    Protein, UA 10/18/2022 Negative  Negative Final    Glucose, UA 10/18/2022 Normal  Normal mg/dL Final    Ketones, UA 10/18/2022 Negative  Negative mg/dL Final    Urobilinogen, UA 10/18/2022 Normal  0.2, 1.0, Normal mg/dL Final    Bilirubin, UA 10/18/2022 Negative  Negative Final    Blood, UA 10/18/2022 Negative  Negative Final    Specific Gravity, UA 10/18/2022 1.015  <=1.030 Final    Case Report 10/18/2022    Final                    Value:Pap Cytology                                      Case: [id number]                                   Authorizing Provider:  Cong Ferrer MD      Collected:           10/18/2022 03:45 PM          Ordering Location:     [facility] Received:            10/19/2022 09:25 AM                                 - Obstetrics And                                                                                    Gynecology                                                                   First Screen:          CHRISTIANO Mahmood(ASCP)                                                    Specimen:    Liquid-Based Pap Test, Screening, Cervix                                                   Interpretation 10/18/2022 Negative              Final    General Categorization 10/18/2022 Negative for intraepithelial lesion or malignancy   Final    Specimen Adequacy 10/18/2022 Satisfactory for evaluation  No endocervical component   Final    Clinical Information 10/18/2022    Final                    Value:This result contains rich text formatting which cannot be displayed here.    Disclaimer 10/18/2022    Final                    Value:This result contains rich text formatting which cannot be displayed here.    WBC, UA 10/18/2022 1  <=5 /hpf Final    RBC, UA 10/18/2022 1  <=3 /hpf Final    Squamous Epithelial Cells, UA 10/18/2022 Occasional (A)  None Seen /HPF Final    Mucous  10/18/2022 Occasional (A)  None Seen /LPF Final   Office Visit on 07/15/2022   Component Date Value Ref Range Status    Hemoglobin A1C 07/15/2022 6.1  4.5 - 6.6 % Final    Estimated Average Glucose 07/15/2022 117  mg/dL Final    Sodium 07/15/2022 137  136 - 145 mmol/L Final    Potassium 07/15/2022 4.9  3.5 - 5.1 mmol/L Final    Chloride 07/15/2022 104  98 - 107 mmol/L Final    CO2 07/15/2022 28  21 - 32 mmol/L Final    Anion Gap 07/15/2022 10  7 - 16 mmol/L Final    Glucose 07/15/2022 73 (L)  74 - 106 mg/dL Final    BUN 07/15/2022 16  7 - 18 mg/dL Final    Creatinine 07/15/2022 1.02  0.55 - 1.02 mg/dL Final    BUN/Creatinine Ratio 07/15/2022 16  6 - 20 Final    Calcium 07/15/2022 9.0  8.5 - 10.1 mg/dL Final    Total Protein 07/15/2022 7.6  6.4 - 8.2 g/dL Final    Albumin 07/15/2022 3.6  3.5 - 5.0 g/dL Final    Globulin 07/15/2022 4.0  2.0 - 4.0 g/dL Final    A/G Ratio 07/15/2022 0.9   Final    Bilirubin, Total 07/15/2022 0.4  0.0 - 1.2 mg/dL Final    Alk Phos 07/15/2022 57  55 - 142 U/L Final    ALT 07/15/2022 34  13 - 56 U/L Final    AST 07/15/2022 46 (H)  15 - 37 U/L Final    eGFR 07/15/2022 56 (L)  >=60 mL/min/1.73m² Final    WBC 07/15/2022 2.92 (L)  4.50 - 11.00 K/uL Final    RBC 07/15/2022 3.03 (L)  4.20 - 5.40 M/uL Final    Hemoglobin 07/15/2022 9.8 (L)  12.0 - 16.0 g/dL Final    Hematocrit 07/15/2022 28.9 (L)  38.0 - 47.0 % Final    MCV 07/15/2022 95.4  80.0 - 96.0 fL Final    MCH 07/15/2022 32.3 (H)  27.0 - 31.0 pg Final    MCHC 07/15/2022 33.9  32.0 - 36.0 g/dL Final    RDW 07/15/2022 14.8 (H)  11.5 - 14.5 % Final    Platelet Count 07/15/2022 144 (L)  150 - 400 K/uL Final    MPV 07/15/2022 10.3  9.4 - 12.4 fL Final    Neutrophils % 07/15/2022 55.2  53.0 - 65.0 % Final    Lymphocytes % 07/15/2022 32.2  27.0 - 41.0 % Final    Monocytes % 07/15/2022 11.6 (H)  2.0 - 6.0 % Final    Eosinophils % 07/15/2022 0.7 (L)  1.0 - 4.0 % Final    Basophils % 07/15/2022 0.0  0.0 - 1.0 % Final    Immature Granulocytes %  07/15/2022 0.3  0.0 - 0.4 % Final    nRBC, Auto 07/15/2022 0.0  <=0.0 % Final    Neutrophils, Abs 07/15/2022 1.61 (L)  1.80 - 7.70 K/uL Final    Lymphocytes, Absolute 07/15/2022 0.94 (L)  1.00 - 4.80 K/uL Final    Monocytes, Absolute 07/15/2022 0.34  0.00 - 0.80 K/uL Final    Eosinophils, Absolute 07/15/2022 0.02  0.00 - 0.50 K/uL Final    Basophils, Absolute 07/15/2022 0.00  0.00 - 0.20 K/uL Final    Immature Granulocytes, Absolute 07/15/2022 0.01  0.00 - 0.04 K/uL Final    nRBC, Absolute 07/15/2022 0.00  <=0.00 x10e3/uL Final    Diff Type 07/15/2022 Auto   Final   Office Visit on 06/22/2022   Component Date Value Ref Range Status    POC Amphetamines 06/22/2022 Negative  Negative, Inconclusive Final    POC Barbiturates 06/22/2022 Negative  Negative, Inconclusive Final    POC Benzodiazepines 06/22/2022 Negative  Negative, Inconclusive Final    POC Cocaine 06/22/2022 Negative  Negative, Inconclusive Final    POC THC 06/22/2022 Negative  Negative, Inconclusive Final    POC Methadone 06/22/2022 Negative  Negative, Inconclusive Final    POC Methamphetamine 06/22/2022 Negative  Negative, Inconclusive Final    POC Opiates 06/22/2022 Presumptive Positive (A)  Negative, Inconclusive Final    POC Oxycodone 06/22/2022 Negative  Negative, Inconclusive Final    POC Phencyclidine 06/22/2022 Negative  Negative, Inconclusive Final    POC Methylenedioxymethamphetamine * 06/22/2022 Negative  Negative, Inconclusive Final    POC Tricyclic Antidepressants 06/22/2022 Negative  Negative, Inconclusive Final    POC Buprenorphine 06/22/2022 Negative   Final     Acceptable 06/22/2022 Yes   Final    POC Temperature (Urine) 06/22/2022 90   Final         Orders Placed This Encounter   Procedures    Case Request Operating Room: Block-nerve-medial branch-lumbar, bilateral L4 through S1     Order Specific Question:   Medical Necessity:     Answer:   Medically Non-Urgent [100]     Order Specific Question:   CPT Code:     Answer:   [place]  INJ DX/THER AGNT PARAVERT FACET JOINT,IMG GUIDE,LUMBAR/SAC,1ST LVL [09802]     Order Specific Question:   CPT Code:     Answer:   [place] INJ DX/THER AGNT PARAVERT FACET JOINT,IMG GUIDE,LUMBAR/SAC, 2ND LEVEL [46867]     Order Specific Question:   Is an on-site pathologist required for this procedure?     Answer:   N/A         Requested Prescriptions     Signed Prescriptions Disp Refills    naloxone (NARCAN) 4 mg/actuation Spry 1 each 0     Si spray (4 mg total) by Nasal route once. for 1 dose    HYDROcodone-acetaminophen (NORCO)  mg per tablet 120 tablet 0     Sig: Take 1 tablet by mouth every 6 (six) hours as needed for Pain.       Assessment:     1. Lumbosacral spondylosis without myelopathy    2. Cervical radiculopathy    3. Postlaminectomy syndrome of cervical region    4. Disorder of sacrum         A's of Opioid Risk Assessment  Activity:Patient can perform ADL.   Analgesia:Patients pain is partially controlled by current medication. Patient has tried OTC medications such as Tylenol and Ibuprofen with out relief.   Adverse Effects: Patient denies constipation or sedation.  Aberrant Behavior:  reviewed with no aberrant drug seeking/taking behavior.  Overdose reversal drug naloxone discussed    Drug screen reviewed      Plan:    Rheumatoid arthritis [facility]    Using 4 point walker assistance ambulation    Follow-up after x-ray sacroiliac joint lumbar spine     X-ray lumbar spine [facility] 2022  Grade 1 anterior listhesis L3/4 multiple level degenerative changes pedicle screws rods posterior L4/5 interbody hardware L4/5  Prominent degenerative changes L2/3 L3/4 moderate facet joint arthropathy throughout    X-ray sacroiliac joints [facility] 2022 osteoarthritis sacroiliac joints    Requesting for procedure for back pain    Requesting Toradol injection    Toradol 60 mg IM, tolerated well    Continue home exercise program as directed    Indications for this procedure for this  specific patient include the following   - Pt has had symptoms for three months with moderate to severe pain with functional impairment rated of 7/10 pain.   - Pain non-responsive to conservative care.    - Pain predominately axial and not associated with radiculopathy or claudication.    - No non-facet pathology as source of pain.    - Clinical assessment implicates facet joint as putative pain source.    - facet loading maneuver positive  - Pain is exacerbated by extension or prolonged sitting/standing and relieved by rest.    - No unexplained neurologic deficit.    - No history of coagulopathy, infection or unstable medical conditions.    - Pain is causing significant functional limitation resulting in diminished quality of life and impaired age appropriate ADL's.   - Clinical assessment implicates facet joint as putative source of pain  - Repeat injections not done prior to 7 days   - no more than 2 levels will be done    The planned medically necessary  surgical procedure is performed in a hospital outpatient department and not in an ambulatory surgical center due to:     -there is no geographically assessable ambulatory surgery center that has the  necessary equipment and fluoroscopy needed for the procedure     -there is no geographically assessable ambulatory surgical center available at which the physician has privileges     -an ASC's  specific  guideline regarding the individuals weight or health conditions that prevent the use of an ASC    Monitor anesthesia request is medically indicated for the scheduled nerve block procedure due to:  1- needle phobia and anxiety, placing  the patient at risk during the provided service.  2-patient has an ASA class greater than 3 and requires constant presence of an anesthesiologist during the procedure,   3-patient has severe problems hard to lie still  4-patient suffers from chronic pain and is unable to function due to  diminished ADLs    Schedule bilateral lumbar  medial branch block L4 through S1 # 1, lumbosacral spondylosis      Bring original prescription medication bottles/container/box with labels to each visit    Pill count    Physical therapy    Massage therapy declines     Review of Systems   Constitutional:  Negative for appetite change, chills, diaphoresis, fever and unexpected weight change.   HENT:  Negative for drooling, ear discharge, ear pain, facial swelling, nosebleeds, sore throat, trouble swallowing and voice change.    Eyes:  Negative for photophobia, pain, discharge, redness and visual disturbance.   Respiratory:  Negative for apnea, cough, choking, chest tightness, shortness of breath, wheezing and stridor.    Cardiovascular:  Negative for chest pain, palpitations and leg swelling.   Gastrointestinal:  Negative for abdominal distention, anorexia, change in bowel habit, diarrhea, rectal pain and vomiting.   Endocrine: Negative for cold intolerance, heat intolerance, polydipsia, polyphagia and polyuria.   Genitourinary:  Negative for bladder incontinence, dysuria, flank pain and frequency.   Musculoskeletal:  Positive for arthralgias, back pain, neck pain and neck stiffness.   Skin:  Negative for color change and pallor.   Allergic/Immunologic: Negative for immunocompromised state.   Neurological:  Negative for dizziness, vertigo, seizures, syncope, facial asymmetry, speech difficulty, light-headedness and disturbances in coordination.   Hematological:  Negative for adenopathy. Does not bruise/bleed easily.   Psychiatric/Behavioral:  Negative for agitation, behavioral problems, confusion, decreased concentration, dysphoric mood, hallucinations, self-injury and suicidal ideas. The patient is not nervous/anxious and is not hyperactive.        CRANIAL NERVES     CN III, IV, VI   Pupils are equal, round, and reactive to light.

## 2023-01-19 NOTE — OP NOTE
Procedure Note    Procedure Date: 1/19/2023    Procedure Performed:  Bilateral  Lumbar Facet  Medial Branch Block @ L3-4,4-5,5-S1 with Fluoroscopic Guidance    Indications: Patient has failed conservative therapy.      Pre-op diagnosis: Lumbar Spondylosis    Post-op diagnosis: same    Physician: JEANETH Piña MD    Anesthesia: MAC    Estimated Blood Loss: Less than 1cc    IVF: Per Anesthesia    Complications: None    Technique:  The patient was interviewed in the holding area and Risks/Benefits were discussed, including but not limited to  the possibility of new or different pain, bleeding or infection.   All questions were answered.  The patient understood and accepted risks.  Consent was reviewed and signed.  A time out was taken to identify the patient, procedure and side of the procedure. The patient was placed in a prone position, then prepped and draped in the usual sterile fashion using ChloraPrep and sterile towels.  The levels were determined under fluoroscopic guidance and then marked.  1% Lidocaine was given by raising a skin wheal at the skin over each site and then infiltrated.  A 22-gauge 3.5 inch needle was introduced to the anatomic location of the bilateral L3-4,4-5,5-S1djobsl branch nerves.  Then, after negative aspiration, 1cc from a 6cc  mixture of  (Bupivacaine 0.25% 5cc  and  40mg kenalog ) was injected @ each level.The patient tolerated the procedure well and was transferred to the [facility] in stable condition.  The patient was monitored after the procedure.  Patient was given post procedure and discharge instructions to follow at home.  The patient was discharged in a stable condition with an adult .

## 2023-01-19 NOTE — ANESTHESIA POSTPROCEDURE EVALUATION
Anesthesia Post Evaluation    Patient: Kathryn Marie    Procedure(s) Performed: Procedure(s) (LRB):  Block-nerve-medial branch-lumbar, bilateral L4 through S1 (Bilateral)    Final Anesthesia Type: general      Patient location: [facility].  Patient participation: Yes- Able to Participate  Level of consciousness: awake and alert  Post-procedure vital signs: reviewed and stable  Pain management: adequate  Airway patency: patent    PONV status at discharge: No PONV  Anesthetic complications: no      Cardiovascular status: blood pressure returned to baseline, hemodynamically stable and stable  Respiratory status: unassisted  Hydration status: euvolemic  Follow-up not needed.  Comments: Pt voices appreciation for care          Vitals Value Taken Time   /52 01/19/23 1014   Temp 37 °C (98.6 °F) 01/19/23 1014   Pulse 60 01/19/23 1014   Resp 18 01/19/23 1014   SpO2 100 % 01/19/23 1014         No case tracking events are documented in the log.      Pain/Danyell Score: No data recorded

## 2023-01-19 NOTE — PLAN OF CARE
REFER TO WRITTEN DOCUMENT AND RECOVERY INFORMATION.    D/CD PATIENT VIAA WHEELCHAIR AT 1116.    INFORMED PATIENT IF UNABLE TO VOID IN 8 HOURS, GO TO ER. NOTIFY MD OF REDNESS OR DRAINAGE FROM INJECTION SITE OR FEVER OVER 3-4 DAY. REST AND DRINK PLENTY OF FLUIDS FOR THE REMAINDER OF THE DAY. NO LIFTING OVER 5 LBS FOR THE REMAINDER OF THE DAY. CONTINUE REGULAR MEDICATIONS AS PRESCRIBED. MAY TAKE PAIN MEDICATION AS PRESCRIBED.     PAIN IMPROVED  100%

## 2023-01-19 NOTE — ANESTHESIA RELEASE NOTE
"Anesthesia Release from PACU Note    Patient: Kathryn Marie    Procedure(s) Performed: Procedure(s) (LRB):  Block-nerve-medial branch-lumbar, bilateral L4 through S1 (Bilateral)    Anesthesia type: general    Post pain: Adequate analgesia    Post assessment: no apparent anesthetic complications    Last Vitals:   Visit Vitals  BP (!) 148/52 (BP Location: Right arm, Patient Position: Lying)   Pulse 60   Temp 37 °C (98.6 °F) (Oral)   Resp 18   Ht 5' 5" (1.651 m)   Wt 55.8 kg (123 lb)   SpO2 100%   BMI 20.47 kg/m²       Post vital signs: stable    Level of consciousness: awake    Nausea/Vomiting: no nausea/no vomiting    Complications: none    Airway Patency: patent    Respiratory: unassisted    Cardiovascular: stable and blood pressure at baseline    Hydration: euvolemic  "

## 2023-01-20 ENCOUNTER — TELEPHONE (OUTPATIENT)
Dept: FAMILY MEDICINE | Facility: CLINIC | Age: 73
End: 2023-01-20
Payer: MEDICAID

## 2023-01-20 NOTE — LETTER
January 20, 2023    Kathryn Carvajalton  [address] [facility] [place] [address]             [facility]  [address] [facility] [place] [address]  Phone: [phone]  Fax: [phone] Dear Ms. Marie:    Your recent mammogram resulted as normal.       If you have any questions or concerns, please don't hesitate to call.    Sincerely,        Samantha Dash lpn

## 2023-01-31 ENCOUNTER — EXTERNAL CHRONIC CARE MANAGEMENT (OUTPATIENT)
Dept: FAMILY MEDICINE | Facility: CLINIC | Age: 73
End: 2023-01-31
Payer: MEDICARE

## 2023-01-31 PROCEDURE — G0511 PR CHRONIC CARE MGMT, RHC OR FQHC ONLY, 20 MINS OR MORE: ICD-10-PCS | Mod: ,,, | Performed by: FAMILY MEDICINE

## 2023-01-31 PROCEDURE — G0511 CCM/BHI BY RHC/FQHC 20MIN MO: HCPCS | Mod: ,,, | Performed by: FAMILY MEDICINE

## 2023-02-01 ENCOUNTER — OFFICE VISIT (OUTPATIENT)
Dept: PAIN MEDICINE | Facility: CLINIC | Age: 73
End: 2023-02-01
Payer: MEDICARE

## 2023-02-01 VITALS
BODY MASS INDEX: 20.49 KG/M2 | HEIGHT: 65 IN | HEART RATE: 65 BPM | SYSTOLIC BLOOD PRESSURE: 169 MMHG | RESPIRATION RATE: 20 BRPM | WEIGHT: 123 LBS | DIASTOLIC BLOOD PRESSURE: 67 MMHG

## 2023-02-01 DIAGNOSIS — M96.1 POSTLAMINECTOMY SYNDROME OF CERVICAL REGION: Chronic | ICD-10-CM

## 2023-02-01 DIAGNOSIS — M54.12 CERVICAL RADICULOPATHY: Chronic | ICD-10-CM

## 2023-02-01 DIAGNOSIS — M53.3 DISORDER OF SACRUM: Chronic | ICD-10-CM

## 2023-02-01 DIAGNOSIS — M47.817 LUMBOSACRAL SPONDYLOSIS WITHOUT MYELOPATHY: Primary | Chronic | ICD-10-CM

## 2023-02-01 PROCEDURE — 3077F SYST BP >= 140 MM HG: CPT | Mod: CPTII,,, | Performed by: PHYSICIAN ASSISTANT

## 2023-02-01 PROCEDURE — 3008F BODY MASS INDEX DOCD: CPT | Mod: CPTII,,, | Performed by: PHYSICIAN ASSISTANT

## 2023-02-01 PROCEDURE — 99214 OFFICE O/P EST MOD 30 MIN: CPT | Mod: S$PBB,,, | Performed by: PHYSICIAN ASSISTANT

## 2023-02-01 PROCEDURE — 1159F MED LIST DOCD IN RCRD: CPT | Mod: CPTII,,, | Performed by: PHYSICIAN ASSISTANT

## 2023-02-01 PROCEDURE — 3288F FALL RISK ASSESSMENT DOCD: CPT | Mod: CPTII,,, | Performed by: PHYSICIAN ASSISTANT

## 2023-02-01 PROCEDURE — 99215 OFFICE O/P EST HI 40 MIN: CPT | Mod: PBBFAC | Performed by: PHYSICIAN ASSISTANT

## 2023-02-01 PROCEDURE — 1100F PR PT FALLS ASSESS DOC 2+ FALLS/FALL W/INJURY/YR: ICD-10-PCS | Mod: CPTII,,, | Performed by: PHYSICIAN ASSISTANT

## 2023-02-01 PROCEDURE — 3077F PR MOST RECENT SYSTOLIC BLOOD PRESSURE >= 140 MM HG: ICD-10-PCS | Mod: CPTII,,, | Performed by: PHYSICIAN ASSISTANT

## 2023-02-01 PROCEDURE — 1159F PR MEDICATION LIST DOCUMENTED IN MEDICAL RECORD: ICD-10-PCS | Mod: CPTII,,, | Performed by: PHYSICIAN ASSISTANT

## 2023-02-01 PROCEDURE — 1100F PTFALLS ASSESS-DOCD GE2>/YR: CPT | Mod: CPTII,,, | Performed by: PHYSICIAN ASSISTANT

## 2023-02-01 PROCEDURE — 1125F AMNT PAIN NOTED PAIN PRSNT: CPT | Mod: CPTII,,, | Performed by: PHYSICIAN ASSISTANT

## 2023-02-01 PROCEDURE — 3078F PR MOST RECENT DIASTOLIC BLOOD PRESSURE < 80 MM HG: ICD-10-PCS | Mod: CPTII,,, | Performed by: PHYSICIAN ASSISTANT

## 2023-02-01 PROCEDURE — 99214 PR OFFICE/OUTPT VISIT, EST, LEVL IV, 30-39 MIN: ICD-10-PCS | Mod: S$PBB,,, | Performed by: PHYSICIAN ASSISTANT

## 2023-02-01 PROCEDURE — 3008F PR BODY MASS INDEX (BMI) DOCUMENTED: ICD-10-PCS | Mod: CPTII,,, | Performed by: PHYSICIAN ASSISTANT

## 2023-02-01 PROCEDURE — 3078F DIAST BP <80 MM HG: CPT | Mod: CPTII,,, | Performed by: PHYSICIAN ASSISTANT

## 2023-02-01 PROCEDURE — 1125F PR PAIN SEVERITY QUANTIFIED, PAIN PRESENT: ICD-10-PCS | Mod: CPTII,,, | Performed by: PHYSICIAN ASSISTANT

## 2023-02-01 PROCEDURE — 3288F PR FALLS RISK ASSESSMENT DOCUMENTED: ICD-10-PCS | Mod: CPTII,,, | Performed by: PHYSICIAN ASSISTANT

## 2023-02-01 RX ORDER — HYDROCODONE BITARTRATE AND ACETAMINOPHEN 10; 325 MG/1; MG/1
1 TABLET ORAL EVERY 6 HOURS PRN
Qty: 120 TABLET | Refills: 0 | Status: SHIPPED | OUTPATIENT
Start: 2023-02-01 | End: 2023-03-29 | Stop reason: SDUPTHER

## 2023-02-01 RX ORDER — HYDROCODONE BITARTRATE AND ACETAMINOPHEN 10; 325 MG/1; MG/1
1 TABLET ORAL EVERY 6 HOURS PRN
Qty: 120 TABLET | Refills: 0 | Status: SHIPPED | OUTPATIENT
Start: 2023-03-03 | End: 2023-03-03 | Stop reason: RX

## 2023-02-01 RX ORDER — NALOXONE HYDROCHLORIDE 4 MG/.1ML
2 SPRAY NASAL ONCE
Qty: 2 EACH | Refills: 0 | Status: SHIPPED | OUTPATIENT
Start: 2023-02-01 | End: 2023-02-01

## 2023-02-07 ENCOUNTER — OFFICE VISIT (OUTPATIENT)
Dept: FAMILY MEDICINE | Facility: CLINIC | Age: 73
End: 2023-02-07
Payer: MEDICARE

## 2023-02-07 ENCOUNTER — OFFICE VISIT (OUTPATIENT)
Dept: OBSTETRICS AND GYNECOLOGY | Facility: CLINIC | Age: 73
End: 2023-02-07
Payer: MEDICARE

## 2023-02-07 VITALS
OXYGEN SATURATION: 99 % | WEIGHT: 125 LBS | SYSTOLIC BLOOD PRESSURE: 132 MMHG | BODY MASS INDEX: 20.83 KG/M2 | HEIGHT: 65 IN | HEART RATE: 60 BPM | TEMPERATURE: 98 F | DIASTOLIC BLOOD PRESSURE: 62 MMHG | RESPIRATION RATE: 20 BRPM

## 2023-02-07 DIAGNOSIS — R60.0 LOWER EXTREMITY EDEMA: Primary | ICD-10-CM

## 2023-02-07 DIAGNOSIS — M47.817 SPONDYLOSIS OF LUMBOSACRAL REGION WITHOUT MYELOPATHY OR RADICULOPATHY: ICD-10-CM

## 2023-02-07 DIAGNOSIS — Z46.89 PESSARY MAINTENANCE: Primary | ICD-10-CM

## 2023-02-07 PROCEDURE — 1101F PT FALLS ASSESS-DOCD LE1/YR: CPT | Mod: ,,, | Performed by: NURSE PRACTITIONER

## 2023-02-07 PROCEDURE — 3075F PR MOST RECENT SYSTOLIC BLOOD PRESS GE 130-139MM HG: ICD-10-PCS | Mod: ,,, | Performed by: NURSE PRACTITIONER

## 2023-02-07 PROCEDURE — 99212 OFFICE O/P EST SF 10 MIN: CPT | Mod: ,,, | Performed by: NURSE PRACTITIONER

## 2023-02-07 PROCEDURE — 99213 OFFICE O/P EST LOW 20 MIN: CPT | Mod: PBBFAC | Performed by: OBSTETRICS & GYNECOLOGY

## 2023-02-07 PROCEDURE — 1159F MED LIST DOCD IN RCRD: CPT | Mod: ,,, | Performed by: NURSE PRACTITIONER

## 2023-02-07 PROCEDURE — 1160F PR REVIEW ALL MEDS BY PRESCRIBER/CLIN PHARMACIST DOCUMENTED: ICD-10-PCS | Mod: ,,, | Performed by: NURSE PRACTITIONER

## 2023-02-07 PROCEDURE — 3078F DIAST BP <80 MM HG: CPT | Mod: ,,, | Performed by: NURSE PRACTITIONER

## 2023-02-07 PROCEDURE — 1160F RVW MEDS BY RX/DR IN RCRD: CPT | Mod: ,,, | Performed by: NURSE PRACTITIONER

## 2023-02-07 PROCEDURE — 99212 PR OFFICE/OUTPT VISIT, EST, LEVL II, 10-19 MIN: ICD-10-PCS | Mod: S$PBB,,, | Performed by: OBSTETRICS & GYNECOLOGY

## 2023-02-07 PROCEDURE — 99212 OFFICE O/P EST SF 10 MIN: CPT | Mod: S$PBB,,, | Performed by: OBSTETRICS & GYNECOLOGY

## 2023-02-07 PROCEDURE — 1159F PR MEDICATION LIST DOCUMENTED IN MEDICAL RECORD: ICD-10-PCS | Mod: ,,, | Performed by: NURSE PRACTITIONER

## 2023-02-07 PROCEDURE — 1126F PR PAIN SEVERITY QUANTIFIED, NO PAIN PRESENT: ICD-10-PCS | Mod: ,,, | Performed by: NURSE PRACTITIONER

## 2023-02-07 PROCEDURE — 99212 PR OFFICE/OUTPT VISIT, EST, LEVL II, 10-19 MIN: ICD-10-PCS | Mod: ,,, | Performed by: NURSE PRACTITIONER

## 2023-02-07 PROCEDURE — 3078F PR MOST RECENT DIASTOLIC BLOOD PRESSURE < 80 MM HG: ICD-10-PCS | Mod: ,,, | Performed by: NURSE PRACTITIONER

## 2023-02-07 PROCEDURE — 3288F PR FALLS RISK ASSESSMENT DOCUMENTED: ICD-10-PCS | Mod: ,,, | Performed by: NURSE PRACTITIONER

## 2023-02-07 PROCEDURE — 3288F FALL RISK ASSESSMENT DOCD: CPT | Mod: ,,, | Performed by: NURSE PRACTITIONER

## 2023-02-07 PROCEDURE — 3075F SYST BP GE 130 - 139MM HG: CPT | Mod: ,,, | Performed by: NURSE PRACTITIONER

## 2023-02-07 PROCEDURE — 3008F PR BODY MASS INDEX (BMI) DOCUMENTED: ICD-10-PCS | Mod: ,,, | Performed by: NURSE PRACTITIONER

## 2023-02-07 PROCEDURE — 3008F BODY MASS INDEX DOCD: CPT | Mod: ,,, | Performed by: NURSE PRACTITIONER

## 2023-02-07 PROCEDURE — 1126F AMNT PAIN NOTED NONE PRSNT: CPT | Mod: ,,, | Performed by: NURSE PRACTITIONER

## 2023-02-07 PROCEDURE — 1101F PR PT FALLS ASSESS DOC 0-1 FALLS W/OUT INJ PAST YR: ICD-10-PCS | Mod: ,,, | Performed by: NURSE PRACTITIONER

## 2023-02-07 RX ORDER — DICLOFENAC SODIUM 10 MG/G
GEL TOPICAL
Qty: 300 G | Refills: 2 | Status: SHIPPED | OUTPATIENT
Start: 2023-02-07 | End: 2023-05-11 | Stop reason: SDUPTHER

## 2023-02-07 NOTE — PATIENT INSTRUCTIONS
Discussed with patient that pessary will be cleaned and sterilized.  She will have follow-up with me in 1 month we will reinsert pessary at that time.    Follow-up appointment 1 month

## 2023-02-07 NOTE — PROGRESS NOTES
Subjective:       Patient ID: Kathryn Marie is a 72 y.o. female.    Chief Complaint: Leg Swelling (Bilateral- since this past saturday)    Ms. Marie presents to clinic with complaints of swelling of bilateral lower extremities she noticed in the last 3 days. She denies dyspnea, she is not tachycardic today. The patient did not bring medications to clinic, does not know her meds. She does report that she saw Dr. Leonardo recently with medication changes, no recent note from cardiology.    Review of Systems   Constitutional: Negative.    Respiratory:  Negative for shortness of breath.    Cardiovascular:  Positive for leg swelling. Negative for chest pain, palpitations and claudication.   Gastrointestinal:  Negative for abdominal pain.   Genitourinary: Negative.    Musculoskeletal:  Negative for arthralgias and back pain.   Neurological: Negative.    Psychiatric/Behavioral: Negative.         Objective:      Physical Exam  Constitutional:       Appearance: Normal appearance.   HENT:      Head: Normocephalic.   Cardiovascular:      Rate and Rhythm: Normal rate and regular rhythm.      Heart sounds: Normal heart sounds.      Comments: Pitting edema noted about the bilateral ankles only, no edema noted to foot or calf.  Pulmonary:      Effort: Pulmonary effort is normal. No respiratory distress.      Breath sounds: Normal breath sounds.   Abdominal:      General: Bowel sounds are normal.      Palpations: Abdomen is soft.   Musculoskeletal:      Right lower le+ Pitting Edema present.      Left lower le+ Pitting Edema present.      Comments: Ambulates with assist of walker   Skin:     General: Skin is warm and dry.   Neurological:      Mental Status: She is alert and oriented to person, place, and time.      Gait: Gait abnormal.   Psychiatric:         Behavior: Behavior normal.       Assessment:       Problem List Items Addressed This Visit    None  Visit Diagnoses       Lower extremity edema    -  Primary     Spondylosis of lumbosacral region without myelopathy or radiculopathy        Relevant Medications    diclofenac sodium (VOLTAREN) 1 % Gel              Plan:        Pt. Denies dyspnea, dizziness or light headedness.   Cardiology note reviewed, the note indicates the patient is on eliquis and toprol, neither of which are on her med list here in clinic.   Pt. Instructed to take HCTZ 12.5mg QD X 3, today, tomorrow and Thursday, keep LE elevated when sitting. Pt. Instructed to come back to clinic Thursday afternoon and BRING ALL MEDICATIONS. Consider reducing dose of amlodipine.

## 2023-02-07 NOTE — PROGRESS NOTES
Subjective:       Patient ID: Kathryn Marie is a 72 y.o. female.    Chief Complaint: Pessary Check (/Here for 2 1/2 month pessary check.)    Presents today for pessary check.  No specific complaints.      She is been using pessary since November 2022.      She is unable to remove her pessary presents today for 2 and half month pessary check  Review of Systems      Objective:      Physical Exam  Genitourinary:     Comments: External genitalia normal to appearance.  Pessary was removed.  Speculum inserted vaginal vault inspected.  On the inferior aspect of the cervix along the posterior vaginal fornix there is an abrasion developing.  Therefore pessary was left out.  Bimanual exam revealed uterus normal size no adnexal masses.      Assessment:       1. Pessary maintenance        Plan:       Patient Instructions   Discussed with patient that pessary will be cleaned and sterilized.  She will have follow-up with me in 1 month we will reinsert pessary at that time.    Follow-up appointment 1 month

## 2023-02-09 ENCOUNTER — OFFICE VISIT (OUTPATIENT)
Dept: FAMILY MEDICINE | Facility: CLINIC | Age: 73
End: 2023-02-09
Payer: MEDICARE

## 2023-02-09 VITALS
WEIGHT: 125 LBS | TEMPERATURE: 99 F | SYSTOLIC BLOOD PRESSURE: 126 MMHG | BODY MASS INDEX: 20.83 KG/M2 | RESPIRATION RATE: 18 BRPM | DIASTOLIC BLOOD PRESSURE: 62 MMHG | HEART RATE: 60 BPM | OXYGEN SATURATION: 99 % | HEIGHT: 65 IN

## 2023-02-09 DIAGNOSIS — I63.9 CEREBROVASCULAR ACCIDENT (CVA), UNSPECIFIED MECHANISM: ICD-10-CM

## 2023-02-09 DIAGNOSIS — M35.3 POLYMYALGIA RHEUMATICA: Chronic | ICD-10-CM

## 2023-02-09 DIAGNOSIS — I10 PRIMARY HYPERTENSION: Primary | ICD-10-CM

## 2023-02-09 DIAGNOSIS — R26.81 UNSTEADY GAIT: ICD-10-CM

## 2023-02-09 DIAGNOSIS — E78.2 MIXED HYPERLIPIDEMIA: ICD-10-CM

## 2023-02-09 PROCEDURE — 1101F PT FALLS ASSESS-DOCD LE1/YR: CPT | Mod: ,,, | Performed by: NURSE PRACTITIONER

## 2023-02-09 PROCEDURE — 3288F FALL RISK ASSESSMENT DOCD: CPT | Mod: ,,, | Performed by: NURSE PRACTITIONER

## 2023-02-09 PROCEDURE — 1125F PR PAIN SEVERITY QUANTIFIED, PAIN PRESENT: ICD-10-PCS | Mod: ,,, | Performed by: NURSE PRACTITIONER

## 2023-02-09 PROCEDURE — 1101F PR PT FALLS ASSESS DOC 0-1 FALLS W/OUT INJ PAST YR: ICD-10-PCS | Mod: ,,, | Performed by: NURSE PRACTITIONER

## 2023-02-09 PROCEDURE — 3288F PR FALLS RISK ASSESSMENT DOCUMENTED: ICD-10-PCS | Mod: ,,, | Performed by: NURSE PRACTITIONER

## 2023-02-09 PROCEDURE — 3074F SYST BP LT 130 MM HG: CPT | Mod: ,,, | Performed by: NURSE PRACTITIONER

## 2023-02-09 PROCEDURE — 99213 OFFICE O/P EST LOW 20 MIN: CPT | Mod: ,,, | Performed by: NURSE PRACTITIONER

## 2023-02-09 PROCEDURE — 3078F DIAST BP <80 MM HG: CPT | Mod: ,,, | Performed by: NURSE PRACTITIONER

## 2023-02-09 PROCEDURE — 1159F PR MEDICATION LIST DOCUMENTED IN MEDICAL RECORD: ICD-10-PCS | Mod: ,,, | Performed by: NURSE PRACTITIONER

## 2023-02-09 PROCEDURE — 3074F PR MOST RECENT SYSTOLIC BLOOD PRESSURE < 130 MM HG: ICD-10-PCS | Mod: ,,, | Performed by: NURSE PRACTITIONER

## 2023-02-09 PROCEDURE — 1159F MED LIST DOCD IN RCRD: CPT | Mod: ,,, | Performed by: NURSE PRACTITIONER

## 2023-02-09 PROCEDURE — 99213 PR OFFICE/OUTPT VISIT, EST, LEVL III, 20-29 MIN: ICD-10-PCS | Mod: ,,, | Performed by: NURSE PRACTITIONER

## 2023-02-09 PROCEDURE — 3008F PR BODY MASS INDEX (BMI) DOCUMENTED: ICD-10-PCS | Mod: ,,, | Performed by: NURSE PRACTITIONER

## 2023-02-09 PROCEDURE — 3078F PR MOST RECENT DIASTOLIC BLOOD PRESSURE < 80 MM HG: ICD-10-PCS | Mod: ,,, | Performed by: NURSE PRACTITIONER

## 2023-02-09 PROCEDURE — 3008F BODY MASS INDEX DOCD: CPT | Mod: ,,, | Performed by: NURSE PRACTITIONER

## 2023-02-09 PROCEDURE — 1125F AMNT PAIN NOTED PAIN PRSNT: CPT | Mod: ,,, | Performed by: NURSE PRACTITIONER

## 2023-02-09 RX ORDER — HYDROCHLOROTHIAZIDE 12.5 MG/1
12.5 TABLET ORAL
Qty: 30 TABLET | Refills: 0 | Status: SHIPPED | OUTPATIENT
Start: 2023-02-09 | End: 2023-06-19 | Stop reason: SDUPTHER

## 2023-02-09 NOTE — PROGRESS NOTES
Subjective:       Patient ID: Kathryn Marie is a 72 y.o. female.    Chief Complaint: Follow-up (2 day follow up, pt has all meds in hand)     presents in follow up for lower extremity edema, it has improved. She has HCTZ in her medication bag and states that she has difficulty keeping up with all of her medications. Cardiology note states for patient to only take HCTZ as needed. She cannot recall how she has been taking it.    Follow-up    Review of Systems   Constitutional: Negative.    Respiratory: Negative.     Cardiovascular:  Positive for leg swelling.   Gastrointestinal: Negative.    Genitourinary: Negative.    Musculoskeletal: Negative.    Neurological: Negative.    Psychiatric/Behavioral: Negative.         Objective:      Physical Exam  Vitals and nursing note reviewed.   Constitutional:       Appearance: Normal appearance.   HENT:      Head: Normocephalic.   Cardiovascular:      Rate and Rhythm: Normal rate and regular rhythm.      Pulses: Normal pulses.      Heart sounds: Normal heart sounds.   Pulmonary:      Effort: Pulmonary effort is normal.      Breath sounds: Normal breath sounds.   Abdominal:      Palpations: Abdomen is soft.   Musculoskeletal:      Comments: Antalgic gait with walker assist   Skin:     General: Skin is warm and dry.   Neurological:      Mental Status: She is alert and oriented to person, place, and time.   Psychiatric:         Behavior: Behavior normal.       Assessment:       Problem List Items Addressed This Visit          Neuro    Cerebrovascular accident (CVA)       Cardiac/Vascular    Hyperlipidemia - Primary    Hypertension       Immunology/Multi System    Polymyalgia rheumatica (Chronic)       Plan:        Home Health referral for medication reconciliation, polypharmacy and pt. Is clearly confused about the large bag of medications she has. Social service consult as well, may benefit from PT/OT, Needs rollator walker and strengthening due to unsteady gait.   I wrote  on her bottle of HCTZ to only take as needed for lower extremity swelling.

## 2023-02-28 ENCOUNTER — EXTERNAL CHRONIC CARE MANAGEMENT (OUTPATIENT)
Dept: FAMILY MEDICINE | Facility: CLINIC | Age: 73
End: 2023-02-28
Payer: MEDICARE

## 2023-02-28 PROCEDURE — G0511 PR CHRONIC CARE MGMT, RHC OR FQHC ONLY, 20 MINS OR MORE: ICD-10-PCS | Mod: ,,, | Performed by: FAMILY MEDICINE

## 2023-02-28 PROCEDURE — G0511 CCM/BHI BY RHC/FQHC 20MIN MO: HCPCS | Mod: ,,, | Performed by: FAMILY MEDICINE

## 2023-03-03 ENCOUNTER — TELEPHONE (OUTPATIENT)
Dept: PAIN MEDICINE | Facility: CLINIC | Age: 73
End: 2023-03-03
Payer: MEDICARE

## 2023-03-03 DIAGNOSIS — M54.12 CERVICAL RADICULOPATHY: Chronic | ICD-10-CM

## 2023-03-03 DIAGNOSIS — M47.817 LUMBOSACRAL SPONDYLOSIS WITHOUT MYELOPATHY: Chronic | ICD-10-CM

## 2023-03-03 DIAGNOSIS — M53.3 DISORDER OF SACRUM: Chronic | ICD-10-CM

## 2023-03-03 DIAGNOSIS — M96.1 POSTLAMINECTOMY SYNDROME OF CERVICAL REGION: Chronic | ICD-10-CM

## 2023-03-03 RX ORDER — HYDROCODONE BITARTRATE AND ACETAMINOPHEN 10; 325 MG/1; MG/1
1 TABLET ORAL EVERY 6 HOURS PRN
Qty: 120 TABLET | Refills: 0 | Status: SHIPPED | OUTPATIENT
Start: 2023-03-03 | End: 2023-03-29 | Stop reason: SDUPTHER

## 2023-03-03 NOTE — TELEPHONE ENCOUNTER
Patient is notified her [place] is sent to [facility] in [place], [place] Patient verbalizes understanding.

## 2023-03-03 NOTE — TELEPHONE ENCOUNTER
----- Message from Tali Garg sent at 3/3/2023  9:02 AM CST -----  Regarding: rx  Patient call said she needs her [place] sent to [facility] in [place] because her pharmacy is out.  [phone]

## 2023-03-07 ENCOUNTER — OFFICE VISIT (OUTPATIENT)
Dept: FAMILY MEDICINE | Facility: CLINIC | Age: 73
End: 2023-03-07
Payer: MEDICARE

## 2023-03-07 VITALS
RESPIRATION RATE: 16 BRPM | DIASTOLIC BLOOD PRESSURE: 68 MMHG | BODY MASS INDEX: 21.62 KG/M2 | SYSTOLIC BLOOD PRESSURE: 130 MMHG | WEIGHT: 122 LBS | OXYGEN SATURATION: 98 % | TEMPERATURE: 98 F | HEART RATE: 64 BPM | HEIGHT: 63 IN

## 2023-03-07 DIAGNOSIS — I10 PRIMARY HYPERTENSION: ICD-10-CM

## 2023-03-07 DIAGNOSIS — E11.9 TYPE 2 DIABETES MELLITUS WITHOUT COMPLICATION, WITHOUT LONG-TERM CURRENT USE OF INSULIN: ICD-10-CM

## 2023-03-07 DIAGNOSIS — E78.2 MIXED HYPERLIPIDEMIA: ICD-10-CM

## 2023-03-07 DIAGNOSIS — Z00.00 ENCOUNTER FOR SUBSEQUENT ANNUAL WELLNESS VISIT (AWV) IN MEDICARE PATIENT: Primary | ICD-10-CM

## 2023-03-07 PROCEDURE — 3008F BODY MASS INDEX DOCD: CPT | Mod: ,,, | Performed by: NURSE PRACTITIONER

## 2023-03-07 PROCEDURE — G0439 PPPS, SUBSEQ VISIT: HCPCS | Mod: ,,, | Performed by: NURSE PRACTITIONER

## 2023-03-07 PROCEDURE — 3078F DIAST BP <80 MM HG: CPT | Mod: ,,, | Performed by: NURSE PRACTITIONER

## 2023-03-07 PROCEDURE — 1100F PTFALLS ASSESS-DOCD GE2>/YR: CPT | Mod: ,,, | Performed by: NURSE PRACTITIONER

## 2023-03-07 PROCEDURE — 1159F MED LIST DOCD IN RCRD: CPT | Mod: ,,, | Performed by: NURSE PRACTITIONER

## 2023-03-07 PROCEDURE — 1125F PR PAIN SEVERITY QUANTIFIED, PAIN PRESENT: ICD-10-PCS | Mod: ,,, | Performed by: NURSE PRACTITIONER

## 2023-03-07 PROCEDURE — G0439 PR MEDICARE ANNUAL WELLNESS SUBSEQUENT VISIT: ICD-10-PCS | Mod: ,,, | Performed by: NURSE PRACTITIONER

## 2023-03-07 PROCEDURE — 1160F PR REVIEW ALL MEDS BY PRESCRIBER/CLIN PHARMACIST DOCUMENTED: ICD-10-PCS | Mod: ,,, | Performed by: NURSE PRACTITIONER

## 2023-03-07 PROCEDURE — 3288F PR FALLS RISK ASSESSMENT DOCUMENTED: ICD-10-PCS | Mod: ,,, | Performed by: NURSE PRACTITIONER

## 2023-03-07 PROCEDURE — 1159F PR MEDICATION LIST DOCUMENTED IN MEDICAL RECORD: ICD-10-PCS | Mod: ,,, | Performed by: NURSE PRACTITIONER

## 2023-03-07 PROCEDURE — 3075F PR MOST RECENT SYSTOLIC BLOOD PRESS GE 130-139MM HG: ICD-10-PCS | Mod: ,,, | Performed by: NURSE PRACTITIONER

## 2023-03-07 PROCEDURE — 3288F FALL RISK ASSESSMENT DOCD: CPT | Mod: ,,, | Performed by: NURSE PRACTITIONER

## 2023-03-07 PROCEDURE — 3078F PR MOST RECENT DIASTOLIC BLOOD PRESSURE < 80 MM HG: ICD-10-PCS | Mod: ,,, | Performed by: NURSE PRACTITIONER

## 2023-03-07 PROCEDURE — 1100F PR PT FALLS ASSESS DOC 2+ FALLS/FALL W/INJURY/YR: ICD-10-PCS | Mod: ,,, | Performed by: NURSE PRACTITIONER

## 2023-03-07 PROCEDURE — 3075F SYST BP GE 130 - 139MM HG: CPT | Mod: ,,, | Performed by: NURSE PRACTITIONER

## 2023-03-07 PROCEDURE — 1125F AMNT PAIN NOTED PAIN PRSNT: CPT | Mod: ,,, | Performed by: NURSE PRACTITIONER

## 2023-03-07 PROCEDURE — 3008F PR BODY MASS INDEX (BMI) DOCUMENTED: ICD-10-PCS | Mod: ,,, | Performed by: NURSE PRACTITIONER

## 2023-03-07 PROCEDURE — 1160F RVW MEDS BY RX/DR IN RCRD: CPT | Mod: ,,, | Performed by: NURSE PRACTITIONER

## 2023-03-07 RX ORDER — DENOSUMAB 60 MG/ML
INJECTION SUBCUTANEOUS
COMMUNITY
Start: 2023-02-23 | End: 2023-04-10

## 2023-03-07 NOTE — PATIENT INSTRUCTIONS
Counseling and Referral of Other Preventative  (Italic type indicates deductible and co-insurance are waived)    Patient Name: Kathryn Marie  Today's Date: 3/7/2023    Health Maintenance         Date Due Completion Date    Sign Pain Contract 03/07/2023 (Originally 9/11/1968) ---    Eye Exam 03/14/2023 (Originally 3/7/2023) 3/7/2022    Override on 3/7/2022: Done    DEXA Scan 03/22/2023 (Originally 9/11/1988) ---    TETANUS VACCINE 03/07/2024 (Originally 9/11/1968) ---    Shingles Vaccine (2 of 2) 03/07/2024 (Originally 9/24/2013) 7/30/2013    Hemoglobin A1c 06/12/2023 12/12/2022    Diabetes Urine Screening 06/17/2023 6/17/2022    Foot Exam 06/17/2023 6/17/2022 (Done)    Override on 6/17/2022: Done    Lipid Panel 12/12/2023 12/12/2022    Mammogram 01/18/2024 1/18/2023    High Dose Statin 03/07/2024 3/7/2023    Pap Smear 10/18/2025 10/18/2022    Colorectal Cancer Screening 08/04/2032 8/4/2022          No orders of the defined types were placed in this encounter.

## 2023-03-07 NOTE — PROGRESS NOTES
[facility] LA[facility]   [facility]      PATIENT NAME: Kathryn Marie   : 1950    AGE: 72 y.o. DATE: 2023   MRN: [id number]        Reason for Visit / Chief Complaint: Medicare AWV (Subsequent Medicare AWV )        Kathryn Marie presents for a Subsequent Medicare AWV today.     The following components were reviewed and updated:    Medical/Social/Family History:  Past Medical History:   Diagnosis Date    Anemia     Atherosclerotic heart disease of native coronary artery without angina pectoris     Carotid artery stenosis 01/15/2014    CHARO  LICA stenosis    Causalgia of lower limb     Cervical radiculopathy     Chronic low back pain     Chronic pain syndrome     CVA (cerebral vascular accident)     right cerebellar    Degenerative joint disease involving multiple joints     Disorder of parathyroid gland, unspecified     Disorder of sacrum     Essential (primary) hypertension     Gammopathy     GERD (gastroesophageal reflux disease)     Heart murmur     Hyperlipidemia     Hyperparathyroidism     Lumbosacral radiculopathy     Lumbosacral spondylosis     Other long term (current) drug therapy     Pernicious anemia     Sciatica     Spinal stenosis of lumbar region     L4-5    Type 2 diabetes mellitus         Family History   Problem Relation Age of Onset    Diabetes Mother     Heart disease Mother     Hypertension Mother     Heart attack Mother     Hypertension Father     Stroke Father     Stroke Sister     Hypertension Sister     Cancer Brother         Social History     Tobacco Use   Smoking Status Never    Passive exposure: Never   Smokeless Tobacco Never      Social History     Substance and Sexual Activity   Alcohol Use Not Currently       Family History   Problem Relation Age of Onset    Diabetes Mother     Heart disease Mother     Hypertension Mother     Heart attack Mother     Hypertension Father     Stroke Father     Stroke Sister     Hypertension Sister     Cancer Brother        Past  Surgical History:   Procedure Laterality Date    CARPAL TUNNEL RELEASE Right     caudal MOIZ  07/03/2018    CHOLECYSTECTOMY  1975    CORONARY ARTERY BYPASS GRAFT      CORONARY ARTERY BYPASS GRAFT (CABG)      triple    HIP SURGERY Right     INJECTION OF ANESTHETIC AGENT AROUND MEDIAL BRANCH NERVES INNERVATING LUMBAR FACET JOINT Bilateral 1/19/2023    Procedure: Block-nerve-medial branch-lumbar, bilateral L4 through S1;  Surgeon: Ashley Piña MD;  Location: [facility];  Service: Pain Management;  Laterality: Bilateral;    L4-S1 Caudal cath guided MOIZ  07/03/2018    Dr Orta    L5-S1 Caudal cath guided MOIZ  09/26/2014 6/26/2014 4/11/2014    Dr Orta    left shoulder repair Left     NECK SURGERY  2018    does not know what kind    right sacral RF Right 12/26/2013 1/24/2012    Dr rOta    right SIJI Right 10/24/2013    Dr Orta         Allergies and Current Medications   Review of patient's allergies indicates:  No Known Allergies    Current Outpatient Medications:     amLODIPine (NORVASC) 10 MG tablet, TAKE 1 TABLET BY MOUTH ONCE DAILY, Disp: 90 tablet, Rfl: 3    apixaban (ELIQUIS) 5 mg Tab, Take 5 mg by mouth 2 (two) times daily., Disp: , Rfl:     aspirin (ECOTRIN) 81 MG EC tablet, Take 81 mg by mouth once daily., Disp: , Rfl:     blood sugar diagnostic (ONETOUCH VERIO TEST STRIPS MISC), by Misc.(Non-Drug; Combo Route) route. Use 1 strip to check blood glucose every morning, fasting for E11.9, Disp: , Rfl:     blood-glucose meter (ONETOUCH VERIO METER MISC), by Misc.(Non-Drug; Combo Route) route. Use for glucose checks once daily, E11.9, Disp: , Rfl:     diclofenac sodium (VOLTAREN) 1 % Gel, APPLY 1 APPLICATION TO  AFFECTED AREA(S) TOPICALLY  TWICE DAILY, Disp: 300 g, Rfl: 2    dicyclomine (BENTYL) 10 MG capsule, Take 1 capsule by mouth 2 (two) times a day., Disp: , Rfl:     FARXIGA 5 mg Tab tablet, TAKE 1 TABLET BY MOUTH ONCE DAILY, Disp: 90 tablet, Rfl: 3    ferrous sulfate (FEOSOL) 325 mg (65 mg iron) Tab tablet,  Take 325 mg by mouth daily with breakfast., Disp: , Rfl:     fluticasone propionate (FLONASE) 50 mcg/actuation nasal spray, 2 sprays (100 mcg total) by Each Nostril route once daily., Disp: 48 g, Rfl: 1    folic acid (FOLVITE) 1 MG tablet, Take 1 mg by mouth once daily., Disp: , Rfl:     hydroCHLOROthiazide (HYDRODIURIL) 12.5 MG Tab, Take 1 tablet (12.5 mg total) by mouth as needed (edema)., Disp: 30 tablet, Rfl: 0    HYDROcodone-acetaminophen (NORCO)  mg per tablet, Take 1 tablet by mouth every 6 (six) hours as needed for Pain., Disp: 120 tablet, Rfl: 0    ipratropium (ATROVENT) 21 mcg (0.03 %) nasal spray, USE 2 SPRAYS NASALLY EVERY  12 HOURS, Disp: 90 mL, Rfl: 3    latanoprost 0.005 % ophthalmic solution, Place into both eyes., Disp: , Rfl:     methotrexate 2.5 MG Tab, Take by mouth. Take 4 tablets by mouth every week, Disp: , Rfl:     omeprazole (PRILOSEC) 20 MG capsule, Take 1 capsule by mouth once daily., Disp: , Rfl:     ONGLYZA 5 mg Tab tablet, TAKE 1 TABLET BY MOUTH  DAILY, Disp: 90 tablet, Rfl: 3    pantoprazole (PROTONIX) 40 MG tablet, Take 1 tablet by mouth once daily at 6am., Disp: , Rfl:     PROLIA 60 mg/mL Syrg, Inject into the skin., Disp: , Rfl:     rosuvastatin (CRESTOR) 20 MG tablet, Take 1 tablet (20 mg total) by mouth every evening., Disp: 90 tablet, Rfl: 1    travoprost (TRAVATAN Z) 0.004 % ophthalmic solution, 1 drop every evening., Disp: , Rfl:     vitamin D (VITAMIN D3) 1000 units Tab, Take 1,000 Units by mouth once daily., Disp: , Rfl:     clopidogreL (PLAVIX) 75 mg tablet, TAKE 1 TABLET BY MOUTH ONCE DAILY (Patient not taking: Reported on 2/9/2023), Disp: 90 tablet, Rfl: 3    Health Risk Assessment   Fall Risk: Yes   Advance Directive:  Does not have an advanced directive. Verbal education and written education included in today's AVS.   Depression: PHQ9 score: 5    HTN:  DASH diet, exercise, weight management, med compliance, home BP monitoring, and follow-up discussed.   T2DM:  Diabetic diet, glucose monitoring, activity level, weight management, med compliance, and follow-up discussed.   Tobacco use: No  STI: Not at risk    Alcohol misuse: No   Statin Use: Yes    Opioid Risk Score         Value Time User    Opioid Risk Score  0 3/7/2023  9:48 AM Sarah Mcknight RN               Health Risk Assessment  What [age] 70-79  Are you male or female?: Female  During the past four weeks, how much have you been bothered by emotional problems such as feeling anxious, depressed, irritable, sad, or downhearted and blue?: Slightly  During the past five weeks, has your physical and/or emotional health limited your social activities with family, friends, neighbors, or groups?: Extremely  During the past four weeks, how much bodily pain have you generally had?: Severe pain  During the past four weeks, was someone available to help if you needed and wanted help?: Yes, as much as I wanted  During the past four weeks, what was the hardest physical activity you could do for at least two minutes?: Very light  Can you get to places out of walking distance without help?  (For example, can you travel alone on buses or taxis, or drive your own car?): No  Can you go shopping for groceries or clothes without someone's help?: No  Can you prepare your own meals?: Yes  Can you do your own housework without help?: No  Because of any health problems, do you need the help of another person with your personal care needs such as eating, bathing, dressing, or getting around the house?: No  Can you handle your own money without help?: No  During the past four weeks, how would you rate your health in general?: Poor  How have things been going for you during the past four weeks?: Very bad  Are you having difficulties driving your car?: Not applicable, I do not use a car  Do you always fasten your seat belt when you are in a car?: Yes, usually  How often in the past four weeks have you been bothered by falling or dizzy  when standing up?: Sometimes  How often in the past four weeks have you been bothered by sexual problems?: Never  How often in the past four weeks have you been bothered by trouble eating well?: Always  How often in the past four weeks have you been bothered by teeth or denture problems?: Never  How often in the past four weeks have you been bothered with problems using the telephone?: Never  How often in the past four weeks have you been bothered by tiredness or fatigue?: Always  Have you fallen two or more times in the past year?: Yes  Are you afraid of falling?: Yes  Are you a smoker?: No  During the past four weeks, how many drinks of wine, beer, or other alcoholic beverages did you have?: No alcohol at all  Do you exercise for about 20 minutes three or more days a week?: No, I usually do not exercise this much  Have you been given any information to help you with hazards in your house that might hurt you?: Yes  Have you been given any information to help you with keeping track of your medications?: Yes  How often do you have trouble taking medicines the way you've been told to take them?: I always take them as prescribed  How confident are you that you can control and manage most of your health problems?: Somewhat confident  What is your race? (Check all that apply.):     Health Maintenance       Health Maintenance Topics with due status: Not Due       Topic Last Completion Date    Diabetes Urine Screening 06/17/2022    Foot Exam 06/17/2022    Colorectal Cancer Screening 08/04/2022    Pap Smear 10/18/2022    Lipid Panel 12/12/2022    Hemoglobin A1c 12/12/2022    Mammogram 01/18/2023    High Dose Statin 03/07/2023     There are no preventive care reminders to display for this patient.    Incontinence  Bowel: No  Bladder: Yes    Lab results available in Epic or see dates from [facility] above:   Lab Results   Component Value Date    CHOL 179 12/12/2022    CHOL 139 11/04/2021    CHOL 160 05/21/2021      Lab Results   Component Value Date    HDL 84 (H) 12/12/2022    HDL 66 (H) 11/04/2021    HDL 70 (H) 05/21/2021     Lab Results   Component Value Date    LDLCALC 88 12/12/2022    LDLCALC 66 11/04/2021    LDLCALC 80 05/21/2021     Lab Results   Component Value Date    TRIG 36 12/12/2022    TRIG 34 (L) 11/04/2021    TRIG 52 05/21/2021     Lab Results   Component Value Date    CHOLHDL 2.1 12/12/2022    CHOLHDL 2.1 11/04/2021    CHOLHDL 2.3 05/21/2021       Lab Results   Component Value Date    HGBA1C 5.7 12/12/2022       Sodium   Date Value Ref Range Status   12/12/2022 135 (L) 136 - 145 mmol/L Final     Potassium   Date Value Ref Range Status   12/12/2022 4.8 3.5 - 5.1 mmol/L Final     Chloride   Date Value Ref Range Status   12/12/2022 102 98 - 107 mmol/L Final     CO2   Date Value Ref Range Status   12/12/2022 28 21 - 32 mmol/L Final     Glucose   Date Value Ref Range Status   12/12/2022 109 (H) 74 - 106 mg/dL Final     BUN   Date Value Ref Range Status   12/12/2022 29 (H) 7 - 18 mg/dL Final     Creatinine   Date Value Ref Range Status   12/12/2022 1.10 (H) 0.55 - 1.02 mg/dL Final     Calcium   Date Value Ref Range Status   12/12/2022 9.2 8.5 - 10.1 mg/dL Final     Total Protein   Date Value Ref Range Status   12/12/2022 8.1 6.4 - 8.2 g/dL Final     Albumin   Date Value Ref Range Status   12/12/2022 3.6 3.5 - 5.0 g/dL Final     Bilirubin, Total   Date Value Ref Range Status   12/12/2022 0.4 >0.0 - 1.2 mg/dL Final     Alk Phos   Date Value Ref Range Status   12/12/2022 55 55 - 142 U/L Final     AST   Date Value Ref Range Status   12/12/2022 47 (H) 15 - 37 U/L Final     ALT   Date Value Ref Range Status   12/12/2022 33 13 - 56 U/L Final     Anion Gap   Date Value Ref Range Status   12/12/2022 10 7 - 16 mmol/L Final     eGFR    Date Value Ref Range Status   11/04/2021 67 >=60 mL/min/1.73m² Final     eGFR   Date Value Ref Range Status   07/15/2022 56 (L) >=60 mL/min/1.73m² Final           Care  "Team   PCP:     Eye specialist:   Neurologist: Dr.Guin Lazar   GYN:    Cardiologist: Dr.[facility]       **See Completed Assessments for Annual Wellness visit within the encounter summary    The following assessments were completed & reviewed:  Depression Screening  Cognitive function Screening  Timed Get Up Test  Whisper Test  Vision Screen  Health Risk Assessment  Checklist of ADLs and IADLs      Objective  Vitals:    03/07/23 0930   BP: 130/68   Pulse: 64   Resp: 16   Temp: 98.1 °F (36.7 °C)   TempSrc: Oral   SpO2: 98%   Weight: 55.3 kg (122 lb)   Height: 5' 3" (1.6 m)   PainSc:   8   PainLoc: Generalized      Body mass index is 21.61 kg/m².  Ideal body weight: 52.4 kg (115 lb 8.3 oz)       Physical Exam      Assessment:     1. Encounter for subsequent annual wellness visit (AWV) in Medicare patient    2. BMI 21.0-21.9, adult    3. Mixed hyperlipidemia  -continue medications, low fat/low chol diet, regular activity/exercise.    4. Primary hypertension  -controlled, continue medications, mediterranean diet, regular exercise.      Plan:    Referrals:        Advised to call office if does not hear from anyone with referral appt within 2-3 weeks to check on status of referral. Voiced understanding.      Discussed and provided with a screening schedule and personal prevention plan in accordance with USPSTF age appropriate recommendations and Medicare screening guidelines.   Education, counseling, and referrals were provided as needed.  After Visit Summary printed and given to patient which includes written education and a list of any referrals if indicated.     Education including advanced directives, diet, exercise, falls, and preventive health discussed with patient and patient verbalized understanding.      F/u plan for yearly AWV.    Signature:  KANDY Maki    "

## 2023-03-29 ENCOUNTER — OFFICE VISIT (OUTPATIENT)
Dept: PAIN MEDICINE | Facility: CLINIC | Age: 73
End: 2023-03-29
Payer: MEDICARE

## 2023-03-29 VITALS — BODY MASS INDEX: 20.99 KG/M2 | RESPIRATION RATE: 20 BRPM | WEIGHT: 126 LBS | HEIGHT: 65 IN

## 2023-03-29 DIAGNOSIS — M47.817 LUMBOSACRAL SPONDYLOSIS WITHOUT MYELOPATHY: Primary | Chronic | ICD-10-CM

## 2023-03-29 DIAGNOSIS — M96.1 POSTLAMINECTOMY SYNDROME OF CERVICAL REGION: Chronic | ICD-10-CM

## 2023-03-29 DIAGNOSIS — Z79.899 OTHER LONG TERM (CURRENT) DRUG THERAPY: ICD-10-CM

## 2023-03-29 DIAGNOSIS — M53.3 DISORDER OF SACRUM: Chronic | ICD-10-CM

## 2023-03-29 DIAGNOSIS — M54.12 CERVICAL RADICULOPATHY: Chronic | ICD-10-CM

## 2023-03-29 PROCEDURE — 3288F FALL RISK ASSESSMENT DOCD: CPT | Mod: CPTII,,, | Performed by: PHYSICIAN ASSISTANT

## 2023-03-29 PROCEDURE — 80305 DRUG TEST PRSMV DIR OPT OBS: CPT | Mod: PBBFAC | Performed by: PHYSICIAN ASSISTANT

## 2023-03-29 PROCEDURE — 3288F PR FALLS RISK ASSESSMENT DOCUMENTED: ICD-10-PCS | Mod: CPTII,,, | Performed by: PHYSICIAN ASSISTANT

## 2023-03-29 PROCEDURE — 3008F BODY MASS INDEX DOCD: CPT | Mod: CPTII,,, | Performed by: PHYSICIAN ASSISTANT

## 2023-03-29 PROCEDURE — 1159F MED LIST DOCD IN RCRD: CPT | Mod: CPTII,,, | Performed by: PHYSICIAN ASSISTANT

## 2023-03-29 PROCEDURE — 1125F PR PAIN SEVERITY QUANTIFIED, PAIN PRESENT: ICD-10-PCS | Mod: CPTII,,, | Performed by: PHYSICIAN ASSISTANT

## 2023-03-29 PROCEDURE — 1125F AMNT PAIN NOTED PAIN PRSNT: CPT | Mod: CPTII,,, | Performed by: PHYSICIAN ASSISTANT

## 2023-03-29 PROCEDURE — 99214 PR OFFICE/OUTPT VISIT, EST, LEVL IV, 30-39 MIN: ICD-10-PCS | Mod: S$PBB,,, | Performed by: PHYSICIAN ASSISTANT

## 2023-03-29 PROCEDURE — 3008F PR BODY MASS INDEX (BMI) DOCUMENTED: ICD-10-PCS | Mod: CPTII,,, | Performed by: PHYSICIAN ASSISTANT

## 2023-03-29 PROCEDURE — 99214 OFFICE O/P EST MOD 30 MIN: CPT | Mod: S$PBB,,, | Performed by: PHYSICIAN ASSISTANT

## 2023-03-29 PROCEDURE — 1159F PR MEDICATION LIST DOCUMENTED IN MEDICAL RECORD: ICD-10-PCS | Mod: CPTII,,, | Performed by: PHYSICIAN ASSISTANT

## 2023-03-29 PROCEDURE — 99215 OFFICE O/P EST HI 40 MIN: CPT | Mod: PBBFAC | Performed by: PHYSICIAN ASSISTANT

## 2023-03-29 PROCEDURE — 1101F PT FALLS ASSESS-DOCD LE1/YR: CPT | Mod: CPTII,,, | Performed by: PHYSICIAN ASSISTANT

## 2023-03-29 PROCEDURE — 1101F PR PT FALLS ASSESS DOC 0-1 FALLS W/OUT INJ PAST YR: ICD-10-PCS | Mod: CPTII,,, | Performed by: PHYSICIAN ASSISTANT

## 2023-03-29 RX ORDER — HYDROCODONE BITARTRATE AND ACETAMINOPHEN 10; 325 MG/1; MG/1
1 TABLET ORAL EVERY 6 HOURS PRN
Qty: 120 TABLET | Refills: 0 | Status: SHIPPED | OUTPATIENT
Start: 2023-04-01 | End: 2023-05-01

## 2023-03-29 RX ORDER — HYDROCODONE BITARTRATE AND ACETAMINOPHEN 10; 325 MG/1; MG/1
1 TABLET ORAL EVERY 6 HOURS PRN
Qty: 120 TABLET | Refills: 0 | Status: SHIPPED | OUTPATIENT
Start: 2023-05-02 | End: 2023-06-01

## 2023-03-29 RX ORDER — HYDROCODONE BITARTRATE AND ACETAMINOPHEN 10; 325 MG/1; MG/1
1 TABLET ORAL EVERY 6 HOURS PRN
Qty: 120 TABLET | Refills: 0 | Status: SHIPPED | OUTPATIENT
Start: 2023-06-01 | End: 2023-06-29

## 2023-03-29 NOTE — PROGRESS NOTES
Subjective:         Patient ID: Kathryn Marie is a 72 y.o. female.    Chief Complaint: Neck Pain and Low-back Pain        Pain  This is a chronic problem. The current episode started more than 1 year ago. The problem occurs daily. The problem has been waxing and waning. Associated symptoms include arthralgias and neck pain. Pertinent negatives include no anorexia, chest pain, chills, coughing, diaphoresis, fatigue, fever, sore throat, vertigo or vomiting.   Review of Systems   Constitutional:  Negative for activity change, appetite change, chills, diaphoresis, fatigue, fever and unexpected weight change.   HENT:  Negative for drooling, ear discharge, ear pain, facial swelling, nosebleeds, sore throat, trouble swallowing, voice change and goiter.    Eyes:  Negative for photophobia, pain, discharge, redness and visual disturbance.   Respiratory:  Negative for apnea, cough, choking, chest tightness, shortness of breath, wheezing and stridor.    Cardiovascular:  Negative for chest pain, palpitations and leg swelling.   Gastrointestinal:  Negative for abdominal distention, anorexia, diarrhea, rectal pain, vomiting and fecal incontinence.   Endocrine: Negative for cold intolerance, heat intolerance, polydipsia, polyphagia and polyuria.   Genitourinary:  Negative for bladder incontinence, dysuria, flank pain, frequency and hot flashes.   Musculoskeletal:  Positive for arthralgias, back pain, leg pain, neck pain and neck stiffness.   Integumentary:  Negative for color change and pallor.   Allergic/Immunologic: Negative for immunocompromised state.   Neurological:  Negative for dizziness, vertigo, seizures, syncope, facial asymmetry, speech difficulty, light-headedness, coordination difficulties, memory loss and coordination difficulties.   Hematological:  Negative for adenopathy. Does not bruise/bleed easily.   Psychiatric/Behavioral:  Negative for agitation, behavioral problems, confusion, decreased concentration,  dysphoric mood, hallucinations, self-injury and suicidal ideas. The patient is not nervous/anxious and is not hyperactive.          Past Medical History:   Diagnosis Date    Anemia     Atherosclerotic heart disease of native coronary artery without angina pectoris     Carotid artery stenosis 01/15/2014    CHARO  LICA stenosis    Causalgia of lower limb     Cervical radiculopathy     Chronic low back pain     Chronic pain syndrome     CVA (cerebral vascular accident)     right cerebellar    Degenerative joint disease involving multiple joints     Disorder of parathyroid gland, unspecified     Disorder of sacrum     Essential (primary) hypertension     Gammopathy     GERD (gastroesophageal reflux disease)     Heart murmur     Hyperlipidemia     Hyperparathyroidism     Lumbosacral radiculopathy     Lumbosacral spondylosis     Other long term (current) drug therapy     Pernicious anemia     Sciatica     Spinal stenosis of lumbar region     L4-5    Type 2 diabetes mellitus      Past Surgical History:   Procedure Laterality Date    CARPAL TUNNEL RELEASE Right     caudal MOIZ  07/03/2018    CHOLECYSTECTOMY  1975    CORONARY ARTERY BYPASS GRAFT      CORONARY ARTERY BYPASS GRAFT (CABG)      triple    HIP SURGERY Right     INJECTION OF ANESTHETIC AGENT AROUND MEDIAL BRANCH NERVES INNERVATING LUMBAR FACET JOINT Bilateral 1/19/2023    Procedure: Block-nerve-medial branch-lumbar, bilateral L4 through S1;  Surgeon: Ashley Piña MD;  Location: [facility];  Service: Pain Management;  Laterality: Bilateral;    L4-S1 Caudal cath guided MOIZ  07/03/2018    Dr Orta    L5-S1 Caudal cath guided MOIZ  09/26/2014 6/26/2014 4/11/2014    Dr Orta    left shoulder repair Left     NECK SURGERY  2018    does not know what kind    right sacral RF Right 12/26/2013 1/24/2012    Dr Orta    right SIJI Right 10/24/2013    Dr Orta     Social History     Socioeconomic History    Marital status:    Occupational History    Occupation:  "RETIRED   Tobacco Use    Smoking status: Never     Passive exposure: Never    Smokeless tobacco: Never   Substance and Sexual Activity    Alcohol use: Not Currently    Drug use: Yes     Types: Hydrocodone     Comment: pain medication with pain treatment     Sexual activity: Not Currently     Family History   Problem Relation Age of Onset    Diabetes Mother     Heart disease Mother     Hypertension Mother     Heart attack Mother     Hypertension Father     Stroke Father     Stroke Sister     Hypertension Sister     Cancer Brother      Review of patient's allergies indicates:  No Known Allergies     Objective:  Vitals:    03/29/23 1402   Resp: 20   Weight: 57.2 kg (126 lb)   Height: 5' 5" (1.651 m)   PainSc:   4         Physical Exam  Vitals and nursing note reviewed. Exam conducted with a chaperone present.   Constitutional:       General: She is awake. She is not in acute distress.     Appearance: Normal appearance. She is not ill-appearing or diaphoretic.   HENT:      Head: Normocephalic and atraumatic.      Nose: Nose normal.      Mouth/Throat:      Mouth: Mucous membranes are moist.      Pharynx: Oropharynx is clear.   Eyes:      Conjunctiva/sclera: Conjunctivae normal.      Pupils: Pupils are equal, round, and reactive to light.   Cardiovascular:      Rate and Rhythm: Normal rate.   Pulmonary:      Effort: Pulmonary effort is normal. No respiratory distress.   Abdominal:      Palpations: Abdomen is soft.   Musculoskeletal:      Cervical back: Normal range of motion and neck supple. Tenderness present.      Thoracic back: Tenderness present.      Lumbar back: Tenderness present. Decreased range of motion.   Skin:     General: Skin is warm and dry.      Coloration: Skin is not jaundiced or pale.   Neurological:      General: No focal deficit present.      Mental Status: She is alert and oriented to person, place, and time. Mental status is at baseline.      Cranial Nerves: No cranial nerve deficit (II-XII). "   Psychiatric:         Mood and Affect: Mood normal.         Behavior: Behavior normal. Behavior is cooperative.         Thought Content: Thought content normal.         FL Fluoro for Pain Management  See OP Notes for results.     IMPRESSION: See OP Notes for results.     This procedure was auto-finalized by: Virtual [employer]iologist         Clinical Support on 12/12/2022   Component Date Value Ref Range Status    Sodium 12/12/2022 135 (L)  136 - 145 mmol/L Final    Potassium 12/12/2022 4.8  3.5 - 5.1 mmol/L Final    Chloride 12/12/2022 102  98 - 107 mmol/L Final    CO2 12/12/2022 28  21 - 32 mmol/L Final    Anion Gap 12/12/2022 10  7 - 16 mmol/L Final    Glucose 12/12/2022 109 (H)  74 - 106 mg/dL Final    BUN 12/12/2022 29 (H)  7 - 18 mg/dL Final    Creatinine 12/12/2022 1.10 (H)  0.55 - 1.02 mg/dL Final    BUN/Creatinine Ratio 12/12/2022 26 (H)  6 - 20 Final    Calcium 12/12/2022 9.2  8.5 - 10.1 mg/dL Final    Total Protein 12/12/2022 8.1  6.4 - 8.2 g/dL Final    Albumin 12/12/2022 3.6  3.5 - 5.0 g/dL Final    Globulin 12/12/2022 4.5 (H)  2.0 - 4.0 g/dL Final    A/G Ratio 12/12/2022 0.8   Final    Bilirubin, Total 12/12/2022 0.4  >0.0 - 1.2 mg/dL Final    Alk Phos 12/12/2022 55  55 - 142 U/L Final    ALT 12/12/2022 33  13 - 56 U/L Final    AST 12/12/2022 47 (H)  15 - 37 U/L Final    eGFR 12/12/2022 53 (L)  >=60 mL/min/1.73m² Final    Hemoglobin A1C 12/12/2022 5.7  4.5 - 6.6 % Final    Estimated Average Glucose 12/12/2022 104  mg/dL Final    Triglycerides 12/12/2022 36  35 - 150 mg/dL Final    Cholesterol 12/12/2022 179  0 - 200 mg/dL Final    HDL Cholesterol 12/12/2022 84 (H)  40 - 60 mg/dL Final    Cholesterol/HDL Ratio (Risk Factor) 12/12/2022 2.1   Final    Non-HDL 12/12/2022 95  mg/dL Final    LDL Calculated 12/12/2022 88  mg/dL Final    LDL/HDL 12/12/2022 1.0   Final    VLDL 12/12/2022 7  mg/dL Final    WBC 12/12/2022 3.98 (L)  4.50 - 11.00 K/uL Final    RBC 12/12/2022 3.03 (L)  4.20 - 5.40 M/uL Final     Hemoglobin 12/12/2022 9.5 (L)  12.0 - 16.0 g/dL Final    Hematocrit 12/12/2022 29.5 (L)  38.0 - 47.0 % Final    MCV 12/12/2022 97.4 (H)  80.0 - 96.0 fL Final    MCH 12/12/2022 31.4 (H)  27.0 - 31.0 pg Final    MCHC 12/12/2022 32.2  32.0 - 36.0 g/dL Final    RDW 12/12/2022 15.9 (H)  11.5 - 14.5 % Final    Platelet Count 12/12/2022 170  150 - 400 K/uL Final    MPV 12/12/2022 11.5  9.4 - 12.4 fL Final    Neutrophils % 12/12/2022 80.8 (H)  53.0 - 65.0 % Final    Lymphocytes % 12/12/2022 10.1 (L)  27.0 - 41.0 % Final    Monocytes % 12/12/2022 5.0  2.0 - 6.0 % Final    Eosinophils % 12/12/2022 0.3 (L)  1.0 - 4.0 % Final    Basophils % 12/12/2022 0.0  0.0 - 1.0 % Final    Immature Granulocytes % 12/12/2022 3.8 (H)  0.0 - 0.4 % Final    nRBC, Auto 12/12/2022 0.0  <=0.0 % Final    Neutrophils, Abs 12/12/2022 3.22  1.80 - 7.70 K/uL Final    Lymphocytes, Absolute 12/12/2022 0.40 (L)  1.00 - 4.80 K/uL Final    Monocytes, Absolute 12/12/2022 0.20  0.00 - 0.80 K/uL Final    Eosinophils, Absolute 12/12/2022 0.01  0.00 - 0.50 K/uL Final    Basophils, Absolute 12/12/2022 0.00  0.00 - 0.20 K/uL Final    Immature Granulocytes, Absolute 12/12/2022 0.15 (H)  0.00 - 0.04 K/uL Final    nRBC, Absolute 12/12/2022 0.00  <=0.00 x10e3/uL Final    Diff Type 12/12/2022 Manual   Final    Segmented Neutrophils, Man % 12/12/2022 84 (H)  50 - 62 % Final    Bands, Man % 12/12/2022 5  1 - 5 % Final    Lymphocytes, Man % 12/12/2022 5 (L)  27 - 41 % Final    Monocytes, Man % 12/12/2022 5  2 - 6 % Final    Platelet Morphology 12/12/2022 Normal  Normal Final    RBC Morphology 12/12/2022 Normal   Final    Vitamin B12 12/12/2022 >6,000 (H)  193 - 986 pg/mL Final    Folate 12/12/2022 >20.0 (H)  3.1 - 17.5 ng/mL Final    Homocysteine 12/12/2022 20.9 (H)  3.2 - 10.7 µmol/L Final    GGT 12/12/2022 46  5 - 55 U/L Final   Office Visit on 11/08/2022   Component Date Value Ref Range Status    Culture, Urine 11/08/2022 No Growth   Final    Color, UA 11/08/2022  Light-Yellow  Colorless, Straw, Yellow, Light Yellow, Dark Yellow Final    Clarity, UA 11/08/2022 Clear  Clear Final    pH, UA 11/08/2022 5.5  5.0 to 8.0 pH Units Final    Leukocytes, UA 11/08/2022 Negative  Negative Final    Nitrites, UA 11/08/2022 Negative  Negative Final    Protein, UA 11/08/2022 Negative  Negative Final    Glucose, UA 11/08/2022 Normal  Normal mg/dL Final    Ketones, UA 11/08/2022 Negative  Negative mg/dL Final    Urobilinogen, UA 11/08/2022 Normal  0.2, 1.0, Normal mg/dL Final    Bilirubin, UA 11/08/2022 Negative  Negative Final    Blood, UA 11/08/2022 Negative  Negative Final    Specific Gravity, UA 11/08/2022 1.015  <=1.030 Final    WBC, UA 11/08/2022 2  <=5 /hpf Final    Bacteria, UA 11/08/2022 Occ (A)  None Seen /hpf Final    Squamous Epithelial Cells, UA 11/08/2022 Moderate (A)  None Seen /HPF Final   Office Visit on 10/25/2022   Component Date Value Ref Range Status    POC Amphetamines 10/25/2022 Negative  Negative, Inconclusive Final    POC Barbiturates 10/25/2022 Negative  Negative, Inconclusive Final    POC Benzodiazepines 10/25/2022 Negative  Negative, Inconclusive Final    POC Cocaine 10/25/2022 Negative  Negative, Inconclusive Final    POC THC 10/25/2022 Negative  Negative, Inconclusive Final    POC Methadone 10/25/2022 Negative  Negative, Inconclusive Final    POC Methamphetamine 10/25/2022 Negative  Negative, Inconclusive Final    POC Opiates 10/25/2022 Presumptive Positive (A)  Negative, Inconclusive Final    POC Oxycodone 10/25/2022 Negative  Negative, Inconclusive Final    POC Phencyclidine 10/25/2022 Negative  Negative, Inconclusive Final    POC Methylenedioxymethamphetamine * 10/25/2022 Negative  Negative, Inconclusive Final    POC Tricyclic Antidepressants 10/25/2022 Negative  Negative, Inconclusive Final    POC Buprenorphine 10/25/2022 Negative   Final     Acceptable 10/25/2022 Yes   Final    POC Temperature (Urine) 10/25/2022 90   Final   Office Visit on  10/18/2022   Component Date Value Ref Range Status    Color, UA 10/18/2022 Light-Yellow  Colorless, Straw, Yellow, Light Yellow, Dark Yellow Final    Clarity, UA 10/18/2022 Clear  Clear Final    pH, UA 10/18/2022 6.0  5.0 to 8.0 pH Units Final    Leukocytes, UA 10/18/2022 Negative  Negative Final    Nitrites, UA 10/18/2022 Negative  Negative Final    Protein, UA 10/18/2022 Negative  Negative Final    Glucose, UA 10/18/2022 Normal  Normal mg/dL Final    Ketones, UA 10/18/2022 Negative  Negative mg/dL Final    Urobilinogen, UA 10/18/2022 Normal  0.2, 1.0, Normal mg/dL Final    Bilirubin, UA 10/18/2022 Negative  Negative Final    Blood, UA 10/18/2022 Negative  Negative Final    Specific Gravity, UA 10/18/2022 1.015  <=1.030 Final    Case Report 10/18/2022    Final                    Value:Pap Cytology                                      Case: [id number]                                   Authorizing Provider:  Cong Ferrer MD      Collected:           10/18/2022 03:45 PM          Ordering Location:     [facility] Received:            10/19/2022 09:25 AM                                 - Obstetrics And                                                                                    Gynecology                                                                   First Screen:          CHRISTIANO Mahmood(ASCP)                                                    Specimen:    Liquid-Based Pap Test, Screening, Cervix                                                   Interpretation 10/18/2022 Negative              Final    General Categorization 10/18/2022 Negative for intraepithelial lesion or malignancy   Final    Specimen Adequacy 10/18/2022 Satisfactory for evaluation  No endocervical component   Final    Clinical Information 10/18/2022    Final                    Value:This result contains rich text formatting which cannot be displayed here.    Disclaimer 10/18/2022    Final                     Value:This result contains rich text formatting which cannot be displayed here.    WBC, UA 10/18/2022 1  <=5 /hpf Final    RBC, UA 10/18/2022 1  <=3 /hpf Final    Squamous Epithelial Cells, UA 10/18/2022 Occasional (A)  None Seen /HPF Final    Mucous 10/18/2022 Occasional (A)  None Seen /LPF Final         Orders Placed This Encounter   Procedures    POCT Urine Drug Screen Presump     Interpretive Information:     Negative:  No drug detected at the cut off level.   Positive:  This result represents presumptive positive for the   tested drug, other substances may yield a positive response other   than the analyte of interest. This result should be utilized for   diagnostic purpose only. Confirmation testing will be performed upon physician request only.            Requested Prescriptions     Signed Prescriptions Disp Refills    HYDROcodone-acetaminophen (NORCO)  mg per tablet 120 tablet 0     Sig: Take 1 tablet by mouth every 6 (six) hours as needed for Pain.    HYDROcodone-acetaminophen (NORCO)  mg per tablet 120 tablet 0     Sig: Take 1 tablet by mouth every 6 (six) hours as needed for Pain.    HYDROcodone-acetaminophen (NORCO)  mg per tablet 120 tablet 0     Sig: Take 1 tablet by mouth every 6 (six) hours as needed for Pain.       Assessment:     1. Lumbosacral spondylosis without myelopathy    2. Cervical radiculopathy    3. Postlaminectomy syndrome of cervical region    4. Disorder of sacrum    5. Other long term (current) drug therapy         A's of Opioid Risk Assessment  Activity:Patient can perform ADL.   Analgesia:Patients pain is partially controlled by current medication. Patient has tried OTC medications such as Tylenol and Ibuprofen with out relief.   Adverse Effects: Patient denies constipation or sedation.  Aberrant Behavior:  reviewed with no aberrant drug seeking/taking behavior.  Overdose reversal drug naloxone discussed    Drug screen reviewed      X-ray lumbar spine [facility] November 28, 2022  Grade 1 anterior listhesis L3/4 multiple level degenerative changes pedicle screws rods posterior L4/5 interbody hardware L4/5  Prominent degenerative changes L2/3 L3/4 moderate facet joint arthropathy throughout    X-ray sacroiliac joints [facility] November 28, 2022 osteoarthritis sacroiliac joints      Plan:    Narcan February 2023    Rheumatoid arthritis ARM    Using 4 point walker/wheelchair assistance ambulation    Did not receive permission to hold Plavix for repeat procedure    She continues have some back and joint pain however she states she can not hold her anticoagulant she would like to continue conservative management current medication    Continue activity as tolerated     Continue current medication    Follow-up 3 months    Dr. Piña, January 2024    Bring original prescription medication bottles/container/box with labels to each visit

## 2023-03-31 ENCOUNTER — EXTERNAL CHRONIC CARE MANAGEMENT (OUTPATIENT)
Dept: FAMILY MEDICINE | Facility: CLINIC | Age: 73
End: 2023-03-31
Payer: MEDICARE

## 2023-03-31 PROCEDURE — G0511 CCM/BHI BY RHC/FQHC 20MIN MO: HCPCS | Mod: ,,, | Performed by: FAMILY MEDICINE

## 2023-03-31 PROCEDURE — G0511 PR CHRONIC CARE MGMT, RHC OR FQHC ONLY, 20 MINS OR MORE: ICD-10-PCS | Mod: ,,, | Performed by: FAMILY MEDICINE

## 2023-04-10 ENCOUNTER — OFFICE VISIT (OUTPATIENT)
Dept: FAMILY MEDICINE | Facility: CLINIC | Age: 73
End: 2023-04-10
Payer: MEDICARE

## 2023-04-10 VITALS
BODY MASS INDEX: 20.66 KG/M2 | OXYGEN SATURATION: 98 % | HEART RATE: 68 BPM | SYSTOLIC BLOOD PRESSURE: 162 MMHG | DIASTOLIC BLOOD PRESSURE: 50 MMHG | WEIGHT: 124 LBS | HEIGHT: 65 IN | RESPIRATION RATE: 18 BRPM

## 2023-04-10 DIAGNOSIS — E11.9 TYPE 2 DIABETES MELLITUS WITHOUT COMPLICATION, WITHOUT LONG-TERM CURRENT USE OF INSULIN: Primary | ICD-10-CM

## 2023-04-10 DIAGNOSIS — R20.0 NUMBNESS AND TINGLING OF RIGHT ARM: ICD-10-CM

## 2023-04-10 DIAGNOSIS — Z23 NEED FOR PROPHYLACTIC VACCINATION AGAINST STREPTOCOCCUS PNEUMONIAE (PNEUMOCOCCUS): ICD-10-CM

## 2023-04-10 DIAGNOSIS — R20.2 NUMBNESS AND TINGLING OF RIGHT ARM: ICD-10-CM

## 2023-04-10 LAB
ALBUMIN SERPL BCP-MCNC: 3.7 G/DL (ref 3.5–5)
ALBUMIN/GLOB SERPL: 0.8 {RATIO}
ALP SERPL-CCNC: 62 U/L (ref 55–142)
ALT SERPL W P-5'-P-CCNC: 20 U/L (ref 13–56)
ANION GAP SERPL CALCULATED.3IONS-SCNC: 10 MMOL/L (ref 7–16)
AST SERPL W P-5'-P-CCNC: 27 U/L (ref 15–37)
BASOPHILS # BLD AUTO: 0.01 K/UL (ref 0–0.2)
BASOPHILS NFR BLD AUTO: 0.3 % (ref 0–1)
BILIRUB SERPL-MCNC: 0.5 MG/DL (ref ?–1.2)
BUN SERPL-MCNC: 17 MG/DL (ref 7–18)
BUN/CREAT SERPL: 18 (ref 6–20)
CALCIUM SERPL-MCNC: 9.6 MG/DL (ref 8.5–10.1)
CHLORIDE SERPL-SCNC: 107 MMOL/L (ref 98–107)
CO2 SERPL-SCNC: 27 MMOL/L (ref 21–32)
CREAT SERPL-MCNC: 0.97 MG/DL (ref 0.55–1.02)
DACRYOCYTES BLD QL SMEAR: ABNORMAL
DIFFERENTIAL METHOD BLD: ABNORMAL
EGFR (NO RACE VARIABLE) (RUSH/TITUS): 61 ML/MIN/1.73M²
EOSINOPHIL # BLD AUTO: 0.01 K/UL (ref 0–0.5)
EOSINOPHIL NFR BLD AUTO: 0.3 % (ref 1–4)
ERYTHROCYTE [DISTWIDTH] IN BLOOD BY AUTOMATED COUNT: 13.4 % (ref 11.5–14.5)
EST. AVERAGE GLUCOSE BLD GHB EST-MCNC: 127 MG/DL
GLOBULIN SER-MCNC: 4.5 G/DL (ref 2–4)
GLUCOSE SERPL-MCNC: 141 MG/DL (ref 74–106)
HBA1C MFR BLD HPLC: 6.4 % (ref 4.5–6.6)
HCT VFR BLD AUTO: 30.8 % (ref 38–47)
HGB BLD-MCNC: 10 G/DL (ref 12–16)
IMM GRANULOCYTES # BLD AUTO: 0.03 K/UL (ref 0–0.04)
IMM GRANULOCYTES NFR BLD: 0.9 % (ref 0–0.4)
LYMPHOCYTES # BLD AUTO: 1.06 K/UL (ref 1–4.8)
LYMPHOCYTES NFR BLD AUTO: 32.9 % (ref 27–41)
MCH RBC QN AUTO: 31.3 PG (ref 27–31)
MCHC RBC AUTO-ENTMCNC: 32.5 G/DL (ref 32–36)
MCV RBC AUTO: 96.3 FL (ref 80–96)
MONOCYTES # BLD AUTO: 0.4 K/UL (ref 0–0.8)
MONOCYTES NFR BLD AUTO: 12.4 % (ref 2–6)
MPC BLD CALC-MCNC: 10.8 FL (ref 9.4–12.4)
NEUTROPHILS # BLD AUTO: 1.71 K/UL (ref 1.8–7.7)
NEUTROPHILS NFR BLD AUTO: 53.2 % (ref 53–65)
NRBC # BLD AUTO: 0 X10E3/UL
NRBC, AUTO (.00): 0 %
OVALOCYTES BLD QL SMEAR: ABNORMAL
PLATELET # BLD AUTO: 133 K/UL (ref 150–400)
PLATELET MORPHOLOGY: ABNORMAL
POTASSIUM SERPL-SCNC: 4.2 MMOL/L (ref 3.5–5.1)
PROT SERPL-MCNC: 8.2 G/DL (ref 6.4–8.2)
RBC # BLD AUTO: 3.2 M/UL (ref 4.2–5.4)
SODIUM SERPL-SCNC: 140 MMOL/L (ref 136–145)
WBC # BLD AUTO: 3.22 K/UL (ref 4.5–11)

## 2023-04-10 PROCEDURE — 1160F PR REVIEW ALL MEDS BY PRESCRIBER/CLIN PHARMACIST DOCUMENTED: ICD-10-PCS | Mod: ,,, | Performed by: FAMILY MEDICINE

## 2023-04-10 PROCEDURE — 3078F DIAST BP <80 MM HG: CPT | Mod: ,,, | Performed by: FAMILY MEDICINE

## 2023-04-10 PROCEDURE — 80053 COMPREHEN METABOLIC PANEL: CPT | Mod: ,,, | Performed by: CLINICAL MEDICAL LABORATORY

## 2023-04-10 PROCEDURE — 1125F PR PAIN SEVERITY QUANTIFIED, PAIN PRESENT: ICD-10-PCS | Mod: ,,, | Performed by: FAMILY MEDICINE

## 2023-04-10 PROCEDURE — 1100F PTFALLS ASSESS-DOCD GE2>/YR: CPT | Mod: ,,, | Performed by: FAMILY MEDICINE

## 2023-04-10 PROCEDURE — 90677 PCV20 VACCINE IM: CPT | Mod: ,,, | Performed by: FAMILY MEDICINE

## 2023-04-10 PROCEDURE — 85025 CBC WITH DIFFERENTIAL: ICD-10-PCS | Mod: ,,, | Performed by: CLINICAL MEDICAL LABORATORY

## 2023-04-10 PROCEDURE — 1100F PR PT FALLS ASSESS DOC 2+ FALLS/FALL W/INJURY/YR: ICD-10-PCS | Mod: ,,, | Performed by: FAMILY MEDICINE

## 2023-04-10 PROCEDURE — 85025 COMPLETE CBC W/AUTO DIFF WBC: CPT | Mod: ,,, | Performed by: CLINICAL MEDICAL LABORATORY

## 2023-04-10 PROCEDURE — 83036 HEMOGLOBIN GLYCOSYLATED A1C: CPT | Mod: ,,, | Performed by: CLINICAL MEDICAL LABORATORY

## 2023-04-10 PROCEDURE — 90677 PNEUMOCOCCAL CONJUGATE VACCINE 20-VALENT: ICD-10-PCS | Mod: ,,, | Performed by: FAMILY MEDICINE

## 2023-04-10 PROCEDURE — 3008F BODY MASS INDEX DOCD: CPT | Mod: ,,, | Performed by: FAMILY MEDICINE

## 2023-04-10 PROCEDURE — 99213 OFFICE O/P EST LOW 20 MIN: CPT | Mod: 25,,, | Performed by: FAMILY MEDICINE

## 2023-04-10 PROCEDURE — 1125F AMNT PAIN NOTED PAIN PRSNT: CPT | Mod: ,,, | Performed by: FAMILY MEDICINE

## 2023-04-10 PROCEDURE — 83036 HEMOGLOBIN A1C: ICD-10-PCS | Mod: ,,, | Performed by: CLINICAL MEDICAL LABORATORY

## 2023-04-10 PROCEDURE — 99213 PR OFFICE/OUTPT VISIT, EST, LEVL III, 20-29 MIN: ICD-10-PCS | Mod: 25,,, | Performed by: FAMILY MEDICINE

## 2023-04-10 PROCEDURE — 3077F PR MOST RECENT SYSTOLIC BLOOD PRESSURE >= 140 MM HG: ICD-10-PCS | Mod: ,,, | Performed by: FAMILY MEDICINE

## 2023-04-10 PROCEDURE — 3288F PR FALLS RISK ASSESSMENT DOCUMENTED: ICD-10-PCS | Mod: ,,, | Performed by: FAMILY MEDICINE

## 2023-04-10 PROCEDURE — 3078F PR MOST RECENT DIASTOLIC BLOOD PRESSURE < 80 MM HG: ICD-10-PCS | Mod: ,,, | Performed by: FAMILY MEDICINE

## 2023-04-10 PROCEDURE — 1159F MED LIST DOCD IN RCRD: CPT | Mod: ,,, | Performed by: FAMILY MEDICINE

## 2023-04-10 PROCEDURE — G0009 PNEUMOCOCCAL CONJUGATE VACCINE 20-VALENT: ICD-10-PCS | Mod: ,,, | Performed by: FAMILY MEDICINE

## 2023-04-10 PROCEDURE — G0009 ADMIN PNEUMOCOCCAL VACCINE: HCPCS | Mod: ,,, | Performed by: FAMILY MEDICINE

## 2023-04-10 PROCEDURE — 1159F PR MEDICATION LIST DOCUMENTED IN MEDICAL RECORD: ICD-10-PCS | Mod: ,,, | Performed by: FAMILY MEDICINE

## 2023-04-10 PROCEDURE — 3008F PR BODY MASS INDEX (BMI) DOCUMENTED: ICD-10-PCS | Mod: ,,, | Performed by: FAMILY MEDICINE

## 2023-04-10 PROCEDURE — 3077F SYST BP >= 140 MM HG: CPT | Mod: ,,, | Performed by: FAMILY MEDICINE

## 2023-04-10 PROCEDURE — 1160F RVW MEDS BY RX/DR IN RCRD: CPT | Mod: ,,, | Performed by: FAMILY MEDICINE

## 2023-04-10 PROCEDURE — 80053 COMPREHENSIVE METABOLIC PANEL: ICD-10-PCS | Mod: ,,, | Performed by: CLINICAL MEDICAL LABORATORY

## 2023-04-10 PROCEDURE — 3288F FALL RISK ASSESSMENT DOCD: CPT | Mod: ,,, | Performed by: FAMILY MEDICINE

## 2023-04-10 RX ORDER — METOPROLOL SUCCINATE 25 MG/1
TABLET, EXTENDED RELEASE ORAL
COMMUNITY
End: 2023-06-19 | Stop reason: SDUPTHER

## 2023-04-10 RX ORDER — SAXAGLIPTIN 5 MG/1
5 TABLET, FILM COATED ORAL DAILY
Qty: 90 TABLET | Refills: 3 | Status: SHIPPED | OUTPATIENT
Start: 2023-04-10 | End: 2023-05-09

## 2023-04-10 RX ORDER — DAPAGLIFLOZIN 5 MG/1
5 TABLET, FILM COATED ORAL DAILY
Qty: 90 TABLET | Refills: 3 | Status: SHIPPED | OUTPATIENT
Start: 2023-04-10 | End: 2024-01-10 | Stop reason: SDUPTHER

## 2023-04-10 RX ORDER — HYDROCODONE BITARTRATE AND ACETAMINOPHEN 5; 325 MG/1; MG/1
5 TABLET ORAL 3 TIMES DAILY PRN
COMMUNITY
End: 2023-04-10 | Stop reason: DRUGHIGH

## 2023-04-10 NOTE — PROGRESS NOTES
Kathryn Marie is a 72 y.o. female seen today for follow-up on her diabetes.  Patient's lipids when checked in December were close to goal with an LDL of 88.  I have encouraged her to stay on a low-fat and low-carbohydrate diet.  Patient is also due for her Prevnar 20.   [facility] but is only using her Norco once daily due to episodes of hallucinations on the medication 2 to 3 times a day.  She reports she is considering the injections once again.  She has a known history of her for disc disease and is status post disc surgery but reports numbness affecting her right upper extremity and is requesting a neurology evaluation.    Past Medical History:   Diagnosis Date    Anemia     Atherosclerotic heart disease of native coronary artery without angina pectoris     Carotid artery stenosis 01/15/2014    CHARO  LICA stenosis    Causalgia of lower limb     Cervical radiculopathy     Chronic low back pain     Chronic pain syndrome     CVA (cerebral vascular accident)     right cerebellar    Degenerative joint disease involving multiple joints     Disorder of parathyroid gland, unspecified     Disorder of sacrum     Essential (primary) hypertension     Gammopathy     GERD (gastroesophageal reflux disease)     Heart murmur     Hyperlipidemia     Hyperparathyroidism     Lumbosacral radiculopathy     Lumbosacral spondylosis     Other long term (current) drug therapy     Pernicious anemia     Sciatica     Spinal stenosis of lumbar region     L4-5    Type 2 diabetes mellitus      Family History   Problem Relation Age of Onset    Diabetes Mother     Heart disease Mother     Hypertension Mother     Heart attack Mother     Hypertension Father     Stroke Father     Stroke Sister     Hypertension Sister     Cancer Brother      Current Outpatient Medications on File Prior to Visit   Medication Sig Dispense Refill    amLODIPine (NORVASC) 10 MG tablet TAKE 1 TABLET BY MOUTH ONCE DAILY 90 tablet 3     apixaban (ELIQUIS) 5 mg Tab Take 5 mg by mouth 2 (two) times daily.      aspirin (ECOTRIN) 81 MG EC tablet Take 81 mg by mouth once daily.      blood sugar diagnostic (ONETOUCH VERIO TEST STRIPS MISC) by Misc.(Non-Drug; Combo Route) route. Use 1 strip to check blood glucose every morning, fasting for E11.9      blood-glucose meter (ONETOUCH VERIO METER MISC) by Misc.(Non-Drug; Combo Route) route. Use for glucose checks once daily, E11.9      diclofenac sodium (VOLTAREN) 1 % Gel APPLY 1 APPLICATION TO  AFFECTED AREA(S) TOPICALLY  TWICE DAILY 300 g 2    dicyclomine (BENTYL) 10 MG capsule Take 1 capsule by mouth 2 (two) times a day.      FARXIGA 5 mg Tab tablet TAKE 1 TABLET BY MOUTH ONCE DAILY 90 tablet 3    ferrous sulfate (FEOSOL) 325 mg (65 mg iron) Tab tablet Take 325 mg by mouth daily with breakfast.      fluticasone propionate (FLONASE) 50 mcg/actuation nasal spray 2 sprays (100 mcg total) by Each Nostril route once daily. 48 g 1    folic acid (FOLVITE) 1 MG tablet Take 1 mg by mouth once daily.      hydroCHLOROthiazide (HYDRODIURIL) 12.5 MG Tab Take 1 tablet (12.5 mg total) by mouth as needed (edema). 30 tablet 0    HYDROcodone-acetaminophen (NORCO)  mg per tablet Take 1 tablet by mouth every 6 (six) hours as needed for Pain. 120 tablet 0    [START ON 5/2/2023] HYDROcodone-acetaminophen (NORCO)  mg per tablet Take 1 tablet by mouth every 6 (six) hours as needed for Pain. 120 tablet 0    [START ON 6/1/2023] HYDROcodone-acetaminophen (NORCO)  mg per tablet Take 1 tablet by mouth every 6 (six) hours as needed for Pain. 120 tablet 0    HYDROcodone-acetaminophen (NORCO) 5-325 mg per tablet Take 5 mg by mouth 3 (three) times daily as needed.      ipratropium (ATROVENT) 21 mcg (0.03 %) nasal spray USE 2 SPRAYS NASALLY EVERY  12 HOURS 90 mL 3    latanoprost 0.005 % ophthalmic solution Place into both eyes.      methotrexate 2.5 MG Tab Take by mouth. Take 4 tablets by mouth every week      omeprazole  (PRILOSEC) 20 MG capsule Take 1 capsule by mouth once daily.      ONGLYZA 5 mg Tab tablet TAKE 1 TABLET BY MOUTH  DAILY 90 tablet 3    pantoprazole (PROTONIX) 40 MG tablet Take 1 tablet by mouth once daily at 6am.      PROLIA 60 mg/mL Syrg Inject into the skin.      rosuvastatin (CRESTOR) 20 MG tablet Take 1 tablet (20 mg total) by mouth every evening. 90 tablet 1    travoprost (TRAVATAN Z) 0.004 % ophthalmic solution 1 drop every evening.      vitamin D (VITAMIN D3) 1000 units Tab Take 1,000 Units by mouth once daily.      clopidogreL (PLAVIX) 75 mg tablet TAKE 1 TABLET BY MOUTH ONCE DAILY (Patient not taking: Reported on 4/10/2023) 90 tablet 3     No current facility-administered medications on file prior to visit.     Immunization History   Administered Date(s) Administered    COVID-19, MRNA, LN-S, PF (Pfizer) (Purple Cap) 01/28/2021, 02/27/2021, 08/27/2021, 04/22/2022    COVID-19, mRNA, LNP-S, bivalent booster, PF (PFIZER OMICRON) 10/07/2022    Influenza (FLUAD) - Quadrivalent - Adjuvanted - PF *Preferred* (65+) 12/12/2022    Influenza - Quadrivalent - High Dose - PF (65 years and older) 11/04/2021    Influenza - Trivalent (ADULT) 02/09/2016    Pneumococcal Conjugate - 13 Valent 11/13/2017    Pneumococcal Polysaccharide - 23 Valent 12/01/2014, 02/09/2016    Zoster Recombinant 07/30/2013       Review of Systems   Constitutional:  Positive for malaise/fatigue. Negative for fever and weight loss.   Respiratory:  Negative for shortness of breath.    Cardiovascular:  Positive for leg swelling. Negative for chest pain and palpitations.   Gastrointestinal:  Negative for nausea and vomiting.   Musculoskeletal:  Positive for back pain, joint pain, myalgias and neck pain. Negative for falls.   Neurological:  Positive for tingling, sensory change and weakness.   Psychiatric/Behavioral:  Negative for depression.       Vitals:    04/10/23 1351   BP: (!) 162/50   Pulse: 68   Resp: 18       Physical Exam  Vitals reviewed.    Constitutional:       Appearance: Normal appearance.   HENT:      Head: Normocephalic.   Eyes:      Extraocular Movements: Extraocular movements intact.      Conjunctiva/sclera: Conjunctivae normal.      Pupils: Pupils are equal, round, and reactive to light.   Neck:      Thyroid: No thyroid mass or thyromegaly.   Cardiovascular:      Rate and Rhythm: Normal rate and regular rhythm.      Heart sounds: Normal heart sounds. No murmur heard.    No gallop.   Pulmonary:      Effort: Pulmonary effort is normal. No respiratory distress.      Breath sounds: Normal breath sounds. No wheezing or rales.   Musculoskeletal:      Comments: She has an antalgic Rollator dependent gait.   Skin:     General: Skin is warm and dry.      Coloration: Skin is not jaundiced or pale.   Neurological:      Mental Status: She is alert.   Psychiatric:         Mood and Affect: Mood normal.         Behavior: Behavior normal.         Thought Content: Thought content normal.         Judgment: Judgment normal.        Assessment and Plan  Type 2 diabetes mellitus without complication, without long-term current use of insulin  -     CBC Auto Differential; Future; Expected date: 04/10/2023  -     Comprehensive Metabolic Panel; Future; Expected date: 04/10/2023  -     Hemoglobin A1C; Future; Expected date: 04/10/2023    Need for prophylactic vaccination against Streptococcus pneumoniae (pneumococcus)  -     (In Office Administered) Pneumococcal Conjugate Vaccine (20 Valent) (IM)    Numbness and tingling of right arm  -     Ambulatory referral/consult to Neurology; Future; Expected date: 04/17/2023            Return to clinic as needed once her lab work is in.    Health Maintenance Topics with due status: Not Due       Topic Last Completion Date    Diabetes Urine Screening 06/17/2022    Foot Exam 06/17/2022    Colorectal Cancer Screening 08/04/2022    Pap Smear 10/18/2022    Lipid Panel 12/12/2022    Hemoglobin A1c 12/12/2022    Mammogram 01/18/2023     High Dose Statin 03/07/2023

## 2023-04-14 ENCOUNTER — TELEPHONE (OUTPATIENT)
Dept: FAMILY MEDICINE | Facility: CLINIC | Age: 73
End: 2023-04-14
Payer: MEDICARE

## 2023-04-14 DIAGNOSIS — D61.818 PANCYTOPENIA: ICD-10-CM

## 2023-04-14 DIAGNOSIS — R77.1 ABNORMALITY OF GLOBULIN: Primary | ICD-10-CM

## 2023-04-14 NOTE — TELEPHONE ENCOUNTER
----- Message from Dequan Bar MD sent at 4/11/2023  7:53 AM CDT -----  Diabetes is well controlled but patient has a pancytopenia.  Please add an SPEP for elevated globulin and refer the patient for an Hematology evaluation.

## 2023-04-14 NOTE — TELEPHONE ENCOUNTER
Pt notified and verbalized understanding. Agreed to hematology eval. Will add spep, transferred to scheduling.

## 2023-04-30 ENCOUNTER — EXTERNAL CHRONIC CARE MANAGEMENT (OUTPATIENT)
Dept: FAMILY MEDICINE | Facility: CLINIC | Age: 73
End: 2023-04-30
Payer: MEDICARE

## 2023-04-30 PROCEDURE — G0511 CCM/BHI BY RHC/FQHC 20MIN MO: HCPCS | Mod: ,,, | Performed by: FAMILY MEDICINE

## 2023-04-30 PROCEDURE — G0511 PR CHRONIC CARE MGMT, RHC OR FQHC ONLY, 20 MINS OR MORE: ICD-10-PCS | Mod: ,,, | Performed by: FAMILY MEDICINE

## 2023-05-05 ENCOUNTER — TELEPHONE (OUTPATIENT)
Dept: FAMILY MEDICINE | Facility: CLINIC | Age: 73
End: 2023-05-05
Payer: MEDICAID

## 2023-05-09 ENCOUNTER — OFFICE VISIT (OUTPATIENT)
Dept: OBSTETRICS AND GYNECOLOGY | Facility: CLINIC | Age: 73
End: 2023-05-09
Payer: MEDICARE

## 2023-05-09 ENCOUNTER — OFFICE VISIT (OUTPATIENT)
Dept: FAMILY MEDICINE | Facility: CLINIC | Age: 73
End: 2023-05-09
Payer: MEDICARE

## 2023-05-09 VITALS
HEIGHT: 65 IN | HEART RATE: 64 BPM | BODY MASS INDEX: 20.66 KG/M2 | OXYGEN SATURATION: 98 % | RESPIRATION RATE: 18 BRPM | SYSTOLIC BLOOD PRESSURE: 128 MMHG | DIASTOLIC BLOOD PRESSURE: 60 MMHG | WEIGHT: 124 LBS | TEMPERATURE: 98 F

## 2023-05-09 VITALS — SYSTOLIC BLOOD PRESSURE: 128 MMHG | DIASTOLIC BLOOD PRESSURE: 76 MMHG

## 2023-05-09 DIAGNOSIS — S30.814D ABRASION OF VAGINA, SUBSEQUENT ENCOUNTER: ICD-10-CM

## 2023-05-09 DIAGNOSIS — E11.9 TYPE 2 DIABETES MELLITUS WITHOUT COMPLICATION, WITHOUT LONG-TERM CURRENT USE OF INSULIN: Primary | ICD-10-CM

## 2023-05-09 DIAGNOSIS — Z46.89 PESSARY MAINTENANCE: Primary | ICD-10-CM

## 2023-05-09 PROCEDURE — 3074F SYST BP LT 130 MM HG: CPT | Mod: ,,, | Performed by: FAMILY MEDICINE

## 2023-05-09 PROCEDURE — 3288F PR FALLS RISK ASSESSMENT DOCUMENTED: ICD-10-PCS | Mod: ,,, | Performed by: FAMILY MEDICINE

## 2023-05-09 PROCEDURE — 1160F RVW MEDS BY RX/DR IN RCRD: CPT | Mod: ,,, | Performed by: FAMILY MEDICINE

## 2023-05-09 PROCEDURE — 99212 PR OFFICE/OUTPT VISIT, EST, LEVL II, 10-19 MIN: ICD-10-PCS | Mod: S$PBB,,, | Performed by: OBSTETRICS & GYNECOLOGY

## 2023-05-09 PROCEDURE — 3044F HG A1C LEVEL LT 7.0%: CPT | Mod: ,,, | Performed by: FAMILY MEDICINE

## 2023-05-09 PROCEDURE — 3074F SYST BP LT 130 MM HG: CPT | Mod: CPTII,,, | Performed by: OBSTETRICS & GYNECOLOGY

## 2023-05-09 PROCEDURE — 99213 PR OFFICE/OUTPT VISIT, EST, LEVL III, 20-29 MIN: ICD-10-PCS | Mod: ,,, | Performed by: FAMILY MEDICINE

## 2023-05-09 PROCEDURE — 1159F MED LIST DOCD IN RCRD: CPT | Mod: ,,, | Performed by: FAMILY MEDICINE

## 2023-05-09 PROCEDURE — 99214 OFFICE O/P EST MOD 30 MIN: CPT | Mod: PBBFAC | Performed by: OBSTETRICS & GYNECOLOGY

## 2023-05-09 PROCEDURE — 1125F PR PAIN SEVERITY QUANTIFIED, PAIN PRESENT: ICD-10-PCS | Mod: ,,, | Performed by: FAMILY MEDICINE

## 2023-05-09 PROCEDURE — 1125F AMNT PAIN NOTED PAIN PRSNT: CPT | Mod: ,,, | Performed by: FAMILY MEDICINE

## 2023-05-09 PROCEDURE — 3078F PR MOST RECENT DIASTOLIC BLOOD PRESSURE < 80 MM HG: ICD-10-PCS | Mod: CPTII,,, | Performed by: OBSTETRICS & GYNECOLOGY

## 2023-05-09 PROCEDURE — 1159F MED LIST DOCD IN RCRD: CPT | Mod: CPTII,,, | Performed by: OBSTETRICS & GYNECOLOGY

## 2023-05-09 PROCEDURE — 99212 OFFICE O/P EST SF 10 MIN: CPT | Mod: S$PBB,,, | Performed by: OBSTETRICS & GYNECOLOGY

## 2023-05-09 PROCEDURE — 3078F PR MOST RECENT DIASTOLIC BLOOD PRESSURE < 80 MM HG: ICD-10-PCS | Mod: ,,, | Performed by: FAMILY MEDICINE

## 2023-05-09 PROCEDURE — 3044F PR MOST RECENT HEMOGLOBIN A1C LEVEL <7.0%: ICD-10-PCS | Mod: ,,, | Performed by: FAMILY MEDICINE

## 2023-05-09 PROCEDURE — 1159F PR MEDICATION LIST DOCUMENTED IN MEDICAL RECORD: ICD-10-PCS | Mod: ,,, | Performed by: FAMILY MEDICINE

## 2023-05-09 PROCEDURE — 3044F HG A1C LEVEL LT 7.0%: CPT | Mod: CPTII,,, | Performed by: OBSTETRICS & GYNECOLOGY

## 2023-05-09 PROCEDURE — 1159F PR MEDICATION LIST DOCUMENTED IN MEDICAL RECORD: ICD-10-PCS | Mod: CPTII,,, | Performed by: OBSTETRICS & GYNECOLOGY

## 2023-05-09 PROCEDURE — 3008F PR BODY MASS INDEX (BMI) DOCUMENTED: ICD-10-PCS | Mod: ,,, | Performed by: FAMILY MEDICINE

## 2023-05-09 PROCEDURE — 99213 OFFICE O/P EST LOW 20 MIN: CPT | Mod: ,,, | Performed by: FAMILY MEDICINE

## 2023-05-09 PROCEDURE — 3074F PR MOST RECENT SYSTOLIC BLOOD PRESSURE < 130 MM HG: ICD-10-PCS | Mod: ,,, | Performed by: FAMILY MEDICINE

## 2023-05-09 PROCEDURE — 3044F PR MOST RECENT HEMOGLOBIN A1C LEVEL <7.0%: ICD-10-PCS | Mod: CPTII,,, | Performed by: OBSTETRICS & GYNECOLOGY

## 2023-05-09 PROCEDURE — 1101F PT FALLS ASSESS-DOCD LE1/YR: CPT | Mod: ,,, | Performed by: FAMILY MEDICINE

## 2023-05-09 PROCEDURE — 1101F PR PT FALLS ASSESS DOC 0-1 FALLS W/OUT INJ PAST YR: ICD-10-PCS | Mod: ,,, | Performed by: FAMILY MEDICINE

## 2023-05-09 PROCEDURE — 1160F PR REVIEW ALL MEDS BY PRESCRIBER/CLIN PHARMACIST DOCUMENTED: ICD-10-PCS | Mod: ,,, | Performed by: FAMILY MEDICINE

## 2023-05-09 PROCEDURE — 3074F PR MOST RECENT SYSTOLIC BLOOD PRESSURE < 130 MM HG: ICD-10-PCS | Mod: CPTII,,, | Performed by: OBSTETRICS & GYNECOLOGY

## 2023-05-09 PROCEDURE — 3008F BODY MASS INDEX DOCD: CPT | Mod: ,,, | Performed by: FAMILY MEDICINE

## 2023-05-09 PROCEDURE — 3288F FALL RISK ASSESSMENT DOCD: CPT | Mod: ,,, | Performed by: FAMILY MEDICINE

## 2023-05-09 PROCEDURE — 3078F DIAST BP <80 MM HG: CPT | Mod: ,,, | Performed by: FAMILY MEDICINE

## 2023-05-09 PROCEDURE — 3078F DIAST BP <80 MM HG: CPT | Mod: CPTII,,, | Performed by: OBSTETRICS & GYNECOLOGY

## 2023-05-09 RX ORDER — FUROSEMIDE 40 MG/1
TABLET ORAL
COMMUNITY
End: 2023-05-09

## 2023-05-09 RX ORDER — PANTOPRAZOLE SODIUM 40 MG/1
TABLET, DELAYED RELEASE ORAL
COMMUNITY
End: 2023-05-09

## 2023-05-09 NOTE — PROGRESS NOTES
Kathryn Marie is a 72 y.o. female seen today for follow-up on her diabetes.  Unfortunately her Onglyza is no longer covered and we discussed a change to Januvia.  This medication is reportedly preferred on her plan.  We will plan on rechecking her A1c in 3 months.  Patient also is at high risk for falls and is requesting a power mobility device.  Patient also has chronic low back pain cervical radiculopathy degenerative joint disease involving many joints lumbar sacral  radiculopathy and spondylosis.  Patient has stenosis of L4 through L5.  Patient currently ambulates with a rolling walker but remains at high risk for falls for serious injury.  Patient is unable to access her kitchen area and long distances in her home within a timely manner. She needs a power mobility device to help her ambulate safely and meet her ADLs both in the home and out of the home. Patient is able to maneuver her device in the home and she is able to operate the device safely.     Past Medical History:   Diagnosis Date    Anemia     Atherosclerotic heart disease of native coronary artery without angina pectoris     Carotid artery stenosis 01/15/2014    CHARO  LICA stenosis    Causalgia of lower limb     Cervical radiculopathy     Chronic low back pain     Chronic pain syndrome     CVA (cerebral vascular accident)     right cerebellar    Degenerative joint disease involving multiple joints     Disorder of parathyroid gland, unspecified     Disorder of sacrum     Essential (primary) hypertension     Gammopathy     GERD (gastroesophageal reflux disease)     Heart murmur     Hyperlipidemia     Hyperparathyroidism     Lumbosacral radiculopathy     Lumbosacral spondylosis     Other long term (current) drug therapy     Pernicious anemia     Sciatica     Spinal stenosis of lumbar region     L4-5    Type 2 diabetes mellitus      Family History   Problem Relation Age of Onset    Diabetes Mother     Heart disease Mother     Hypertension Mother      Heart attack Mother     Hypertension Father     Stroke Father     Stroke Sister     Hypertension Sister     Cancer Brother      Current Outpatient Medications on File Prior to Visit   Medication Sig Dispense Refill    amLODIPine (NORVASC) 10 MG tablet TAKE 1 TABLET BY MOUTH ONCE DAILY 90 tablet 3    aspirin (ECOTRIN) 81 MG EC tablet Take 81 mg by mouth once daily.      blood sugar diagnostic (ONETOUCH VERIO TEST STRIPS MISC) by Misc.(Non-Drug; Combo Route) route. Use 1 strip to check blood glucose every morning, fasting for E11.9      blood-glucose meter (ONETOUCH VERIO METER MISC) by Misc.(Non-Drug; Combo Route) route. Use for glucose checks once daily, E11.9      clopidogreL (PLAVIX) 75 mg tablet TAKE 1 TABLET BY MOUTH ONCE DAILY 90 tablet 3    dapagliflozin (FARXIGA) 5 mg Tab tablet Take 1 tablet (5 mg total) by mouth once daily. 90 tablet 3    diclofenac sodium (VOLTAREN) 1 % Gel APPLY 1 APPLICATION TO  AFFECTED AREA(S) TOPICALLY  TWICE DAILY 300 g 2    ferrous sulfate (FEOSOL) 325 mg (65 mg iron) Tab tablet Take 325 mg by mouth daily with breakfast.      fluticasone propionate (FLONASE) 50 mcg/actuation nasal spray 2 sprays (100 mcg total) by Each Nostril route once daily. 48 g 1    folic acid (FOLVITE) 1 MG tablet Take 1 mg by mouth once daily.      hydroCHLOROthiazide (HYDRODIURIL) 12.5 MG Tab Take 1 tablet (12.5 mg total) by mouth as needed (edema). 30 tablet 0    HYDROcodone-acetaminophen (NORCO)  mg per tablet Take 1 tablet by mouth every 6 (six) hours as needed for Pain. 120 tablet 0    [START ON 6/1/2023] HYDROcodone-acetaminophen (NORCO)  mg per tablet Take 1 tablet by mouth every 6 (six) hours as needed for Pain. 120 tablet 0    ipratropium (ATROVENT) 21 mcg (0.03 %) nasal spray USE 2 SPRAYS NASALLY EVERY  12 HOURS 90 mL 3    methotrexate 2.5 MG Tab 2.5 mg. Take 10 tablets by mouth every week      metoprolol succinate (TOPROL-XL) 25 MG 24 hr tablet Take 0.5 tablets my mouth every day       omeprazole (PRILOSEC) 20 MG capsule Take 1 capsule by mouth once daily.      rosuvastatin (CRESTOR) 20 MG tablet Take 1 tablet (20 mg total) by mouth every evening. 90 tablet 1    vitamin D (VITAMIN D3) 1000 units Tab Take 1,000 Units by mouth once daily.      furosemide (LASIX) 40 MG tablet 1 tablet Orally prn weight gain of 3# or more for 30 days      pantoprazole (PROTONIX) 40 MG tablet 1 tablet Orally Once a day      travoprost (TRAVATAN Z) 0.004 % ophthalmic solution 1 drop every evening.      [DISCONTINUED] SAXagliptin (ONGLYZA) 5 mg Tab tablet Take 1 tablet (5 mg total) by mouth once daily. (Patient not taking: Reported on 5/9/2023) 90 tablet 3     No current facility-administered medications on file prior to visit.     Immunization History   Administered Date(s) Administered    COVID-19, MRNA, LN-S, PF (Pfizer) (Purple Cap) 01/28/2021, 02/27/2021, 08/27/2021, 04/22/2022    COVID-19, mRNA, LNP-S, bivalent booster, PF (PFIZER OMICRON) 10/07/2022, 04/28/2023    Influenza (FLUAD) - Quadrivalent - Adjuvanted - PF *Preferred* (65+) 12/12/2022    Influenza - Quadrivalent - High Dose - PF (65 years and older) 11/04/2021    Influenza - Trivalent (ADULT) 02/09/2016    Pneumococcal Conjugate - 13 Valent 11/13/2017    Pneumococcal Conjugate - 20 Valent 04/10/2023    Pneumococcal Polysaccharide - 23 Valent 12/01/2014, 02/09/2016    Zoster Recombinant 07/30/2013       Review of Systems   Constitutional:  Negative for fever, malaise/fatigue and weight loss.   Respiratory:  Negative for shortness of breath.    Cardiovascular:  Negative for chest pain and palpitations.   Gastrointestinal:  Negative for nausea and vomiting.   Musculoskeletal:  Positive for back pain, joint pain, myalgias and neck pain.   Psychiatric/Behavioral:  Negative for depression.       Vitals:    05/09/23 1532   BP: 128/60   Pulse: 64   Resp: 18   Temp: 98.3 °F (36.8 °C)       Physical Exam  Vitals reviewed.   Constitutional:       Appearance: Normal  appearance.   HENT:      Head: Normocephalic.   Eyes:      Extraocular Movements: Extraocular movements intact.      Conjunctiva/sclera: Conjunctivae normal.      Pupils: Pupils are equal, round, and reactive to light.   Neck:      Thyroid: No thyroid mass or thyromegaly.   Cardiovascular:      Rate and Rhythm: Normal rate and regular rhythm.      Heart sounds: Normal heart sounds. No murmur heard.    No gallop.   Pulmonary:      Effort: Pulmonary effort is normal. No respiratory distress.      Breath sounds: Normal breath sounds. No wheezing or rales.   Musculoskeletal:      Cervical back: Spasms and tenderness present. Decreased range of motion.      Lumbar back: Spasms and tenderness present. Decreased range of motion.      Comments: Patient ambulates very slowly and is slow to rise from a seated position.  She ambulates with a rolling walker.   Skin:     General: Skin is warm and dry.      Coloration: Skin is not jaundiced or pale.   Neurological:      Mental Status: She is alert.   Psychiatric:         Mood and Affect: Mood normal.         Behavior: Behavior normal.         Thought Content: Thought content normal.         Judgment: Judgment normal.        Assessment and Plan  Type 2 diabetes mellitus without complication, without long-term current use of insulin  -     SITagliptin phosphate (JANUVIA) 100 MG Tab; Take 1 tablet (100 mg total) by mouth once daily.  Dispense: 90 tablet; Refill: 3            Return to clinic in 3 months for follow-up on her diabetes or as needed.  We will set the patient up for power mobility device evaluation through physical therapy.    Health Maintenance Topics with due status: Not Due       Topic Last Completion Date    Colorectal Cancer Screening 08/04/2022    Pap Smear 10/18/2022    Lipid Panel 12/12/2022    Mammogram 01/18/2023    Hemoglobin A1c 04/10/2023    High Dose Statin 05/09/2023

## 2023-05-09 NOTE — PROGRESS NOTES
Subjective:       Patient ID: Kathryn Marie is a 72 y.o. female.    Chief Complaint: Follow-up (3 month follow up (pessary was taken out @ last visit))    Presents for follow-up as directed.      Previously pessary was left out secondary to a mild abrasion    She is done well with pessary in the past.  Strongly desires replacement today if possible.        Review of Systems      Objective:      Physical Exam  Genitourinary:     Comments: External normal vaginal vault inspected.  Cervix normal to appearance thorough examination revealed no further abrasions noted in the vaginal vault or vaginal fornix.  Bimanual exam revealed uterus normal size no adnexal masses.      Pessary was lubricated and reinserted.  Excellent support noted.      Assessment:       1. Pessary maintenance    2. Abrasion of vagina, subsequent encounter        Plan:       Patient Instructions   Discussed follow-up with me in 3 months for repeat pessary check.      Patient to discussed with primary care physician regarding routine glucose and cholesterol testing.    Discussed follow-up with Dr. Bar regarding bone density testing and mammography screening.    She is had previous colonoscopy screening will have follow-up as directed.    She states her bone density testing has previously been done at [facility].

## 2023-05-09 NOTE — PATIENT INSTRUCTIONS
Discussed follow-up with me in 3 months for repeat pessary check.      Patient to discussed with primary care physician regarding routine glucose and cholesterol testing.    Discussed follow-up with Dr. Bar regarding bone density testing and mammography screening.    She is had previous colonoscopy screening will have follow-up as directed.    She states her bone density testing has previously been done at [facility].

## 2023-05-11 DIAGNOSIS — M47.817 SPONDYLOSIS OF LUMBOSACRAL REGION WITHOUT MYELOPATHY OR RADICULOPATHY: ICD-10-CM

## 2023-05-11 DIAGNOSIS — M25.50 ARTHRALGIA, UNSPECIFIED JOINT: Primary | ICD-10-CM

## 2023-05-11 RX ORDER — DICLOFENAC SODIUM 10 MG/G
GEL TOPICAL
Qty: 300 G | Refills: 2 | Status: SHIPPED | OUTPATIENT
Start: 2023-05-11

## 2023-05-12 ENCOUNTER — OFFICE VISIT (OUTPATIENT)
Dept: NEUROLOGY | Facility: CLINIC | Age: 73
End: 2023-05-12
Payer: MEDICARE

## 2023-05-12 VITALS
HEART RATE: 68 BPM | WEIGHT: 124 LBS | DIASTOLIC BLOOD PRESSURE: 68 MMHG | BODY MASS INDEX: 20.66 KG/M2 | SYSTOLIC BLOOD PRESSURE: 110 MMHG | OXYGEN SATURATION: 99 % | HEIGHT: 65 IN

## 2023-05-12 DIAGNOSIS — M54.12 CERVICAL RADICULOPATHY: Primary | Chronic | ICD-10-CM

## 2023-05-12 DIAGNOSIS — R20.2 NUMBNESS AND TINGLING OF RIGHT ARM: ICD-10-CM

## 2023-05-12 DIAGNOSIS — R20.0 NUMBNESS AND TINGLING OF RIGHT ARM: ICD-10-CM

## 2023-05-12 PROCEDURE — 3008F PR BODY MASS INDEX (BMI) DOCUMENTED: ICD-10-PCS | Mod: CPTII,,, | Performed by: PSYCHIATRY & NEUROLOGY

## 2023-05-12 PROCEDURE — 1101F PR PT FALLS ASSESS DOC 0-1 FALLS W/OUT INJ PAST YR: ICD-10-PCS | Mod: CPTII,,, | Performed by: PSYCHIATRY & NEUROLOGY

## 2023-05-12 PROCEDURE — 3044F HG A1C LEVEL LT 7.0%: CPT | Mod: CPTII,,, | Performed by: PSYCHIATRY & NEUROLOGY

## 2023-05-12 PROCEDURE — 1159F PR MEDICATION LIST DOCUMENTED IN MEDICAL RECORD: ICD-10-PCS | Mod: CPTII,,, | Performed by: PSYCHIATRY & NEUROLOGY

## 2023-05-12 PROCEDURE — 99214 OFFICE O/P EST MOD 30 MIN: CPT | Mod: S$PBB,,, | Performed by: PSYCHIATRY & NEUROLOGY

## 2023-05-12 PROCEDURE — 99214 PR OFFICE/OUTPT VISIT, EST, LEVL IV, 30-39 MIN: ICD-10-PCS | Mod: S$PBB,,, | Performed by: PSYCHIATRY & NEUROLOGY

## 2023-05-12 PROCEDURE — 3044F PR MOST RECENT HEMOGLOBIN A1C LEVEL <7.0%: ICD-10-PCS | Mod: CPTII,,, | Performed by: PSYCHIATRY & NEUROLOGY

## 2023-05-12 PROCEDURE — 1101F PT FALLS ASSESS-DOCD LE1/YR: CPT | Mod: CPTII,,, | Performed by: PSYCHIATRY & NEUROLOGY

## 2023-05-12 PROCEDURE — 3288F PR FALLS RISK ASSESSMENT DOCUMENTED: ICD-10-PCS | Mod: CPTII,,, | Performed by: PSYCHIATRY & NEUROLOGY

## 2023-05-12 PROCEDURE — 1159F MED LIST DOCD IN RCRD: CPT | Mod: CPTII,,, | Performed by: PSYCHIATRY & NEUROLOGY

## 2023-05-12 PROCEDURE — 3074F SYST BP LT 130 MM HG: CPT | Mod: CPTII,,, | Performed by: PSYCHIATRY & NEUROLOGY

## 2023-05-12 PROCEDURE — 3078F DIAST BP <80 MM HG: CPT | Mod: CPTII,,, | Performed by: PSYCHIATRY & NEUROLOGY

## 2023-05-12 PROCEDURE — 99215 OFFICE O/P EST HI 40 MIN: CPT | Mod: PBBFAC | Performed by: PSYCHIATRY & NEUROLOGY

## 2023-05-12 PROCEDURE — 3008F BODY MASS INDEX DOCD: CPT | Mod: CPTII,,, | Performed by: PSYCHIATRY & NEUROLOGY

## 2023-05-12 PROCEDURE — 3288F FALL RISK ASSESSMENT DOCD: CPT | Mod: CPTII,,, | Performed by: PSYCHIATRY & NEUROLOGY

## 2023-05-12 PROCEDURE — 3074F PR MOST RECENT SYSTOLIC BLOOD PRESSURE < 130 MM HG: ICD-10-PCS | Mod: CPTII,,, | Performed by: PSYCHIATRY & NEUROLOGY

## 2023-05-12 PROCEDURE — 3078F PR MOST RECENT DIASTOLIC BLOOD PRESSURE < 80 MM HG: ICD-10-PCS | Mod: CPTII,,, | Performed by: PSYCHIATRY & NEUROLOGY

## 2023-05-12 NOTE — PATIENT INSTRUCTIONS
Needs Mri Cervical spine r/o disc pathology and herniation  Caution w/ meds espec pain meds   F/u 2-3 months

## 2023-05-12 NOTE — PROGRESS NOTES
Subjective:       Patient ID: Kathryn Marie is a 72 y.o. female     Chief Complaint:    Chief Complaint   Patient presents with    Stroke    Numbness        Allergies:  Patient has no known allergies.    Current Medications:    Outpatient Encounter Medications as of 5/12/2023   Medication Sig Dispense Refill    amLODIPine (NORVASC) 10 MG tablet TAKE 1 TABLET BY MOUTH ONCE DAILY 90 tablet 3    aspirin (ECOTRIN) 81 MG EC tablet Take 81 mg by mouth once daily.      blood sugar diagnostic (ONETOUCH VERIO TEST STRIPS MISC) by Misc.(Non-Drug; Combo Route) route. Use 1 strip to check blood glucose every morning, fasting for E11.9      blood-glucose meter (ONETOUCH VERIO METER MISC) by Misc.(Non-Drug; Combo Route) route. Use for glucose checks once daily, E11.9      clopidogreL (PLAVIX) 75 mg tablet TAKE 1 TABLET BY MOUTH ONCE DAILY 90 tablet 3    dapagliflozin (FARXIGA) 5 mg Tab tablet Take 1 tablet (5 mg total) by mouth once daily. 90 tablet 3    diclofenac sodium (VOLTAREN) 1 % Gel APPLY 1 APPLICATION TO  AFFECTED AREA(S) TOPICALLY  TWICE DAILY 300 g 2    ferrous sulfate (FEOSOL) 325 mg (65 mg iron) Tab tablet Take 325 mg by mouth daily with breakfast.      fluticasone propionate (FLONASE) 50 mcg/actuation nasal spray 2 sprays (100 mcg total) by Each Nostril route once daily. 48 g 1    folic acid (FOLVITE) 1 MG tablet Take 1 mg by mouth once daily.      hydroCHLOROthiazide (HYDRODIURIL) 12.5 MG Tab Take 1 tablet (12.5 mg total) by mouth as needed (edema). 30 tablet 0    HYDROcodone-acetaminophen (NORCO)  mg per tablet Take 1 tablet by mouth every 6 (six) hours as needed for Pain. 120 tablet 0    [START ON 6/1/2023] HYDROcodone-acetaminophen (NORCO)  mg per tablet Take 1 tablet by mouth every 6 (six) hours as needed for Pain. 120 tablet 0    ipratropium (ATROVENT) 21 mcg (0.03 %) nasal spray USE 2 SPRAYS NASALLY EVERY  12 HOURS 90 mL 3    methotrexate 2.5 MG Tab 2.5 mg. Take 10 tablets by mouth every week       metoprolol succinate (TOPROL-XL) 25 MG 24 hr tablet Take 0.5 tablets my mouth every day      omeprazole (PRILOSEC) 20 MG capsule Take 1 capsule by mouth once daily.      rosuvastatin (CRESTOR) 20 MG tablet Take 1 tablet (20 mg total) by mouth every evening. 90 tablet 1    SITagliptin phosphate (JANUVIA) 100 MG Tab Take 1 tablet (100 mg total) by mouth once daily. 90 tablet 3    vitamin D (VITAMIN D3) 1000 units Tab Take 1,000 Units by mouth once daily.      [] HYDROcodone-acetaminophen (NORCO)  mg per tablet Take 1 tablet by mouth every 6 (six) hours as needed for Pain. 120 tablet 0    [DISCONTINUED] diclofenac sodium (VOLTAREN) 1 % Gel APPLY 1 APPLICATION TO  AFFECTED AREA(S) TOPICALLY  TWICE DAILY 300 g 2    [DISCONTINUED] SAXagliptin (ONGLYZA) 5 mg Tab tablet Take 1 tablet (5 mg total) by mouth once daily. (Patient not taking: Reported on 2023) 90 tablet 3    [DISCONTINUED] travoprost (TRAVATAN Z) 0.004 % ophthalmic solution 1 drop every evening.       No facility-administered encounter medications on file as of 2023.       History of Present Illness  73 yo BF referred for cervical radiculopathy and neck pain and UE weakness w/ atrophy of interosseous hand muscles   PT s/p cervical and lumbar surgery in past but unsure of exact details ? Also struggling w/ ambulation and using walker secondary to arthritits   She denies any paresthesias in any ext               Past Medical History:   Diagnosis Date    Anemia     Atherosclerotic heart disease of native coronary artery without angina pectoris     Carotid artery stenosis 01/15/2014    CHARO  LICA stenosis    Causalgia of lower limb     Cervical radiculopathy     Chronic low back pain     Chronic pain syndrome     CVA (cerebral vascular accident)     right cerebellar    Degenerative joint disease involving multiple joints     Disorder of parathyroid gland, unspecified     Disorder of sacrum     Essential (primary) hypertension      Gammopathy     GERD (gastroesophageal reflux disease)     Heart murmur     Hyperlipidemia     Hyperparathyroidism     Lumbosacral radiculopathy     Lumbosacral spondylosis     Other long term (current) drug therapy     Pernicious anemia     Sciatica     Spinal stenosis of lumbar region     L4-5    Type 2 diabetes mellitus        Past Surgical History:   Procedure Laterality Date    CARPAL TUNNEL RELEASE Right     caudal MOIZ  07/03/2018    CHOLECYSTECTOMY  1975    CORONARY ARTERY BYPASS GRAFT      CORONARY ARTERY BYPASS GRAFT (CABG)      triple    HIP SURGERY Right     INJECTION OF ANESTHETIC AGENT AROUND MEDIAL BRANCH NERVES INNERVATING LUMBAR FACET JOINT Bilateral 1/19/2023    Procedure: Block-nerve-medial branch-lumbar, bilateral L4 through S1;  Surgeon: Ashley Piña MD;  Location: [facility];  Service: Pain Management;  Laterality: Bilateral;    L4-S1 Caudal cath guided MOIZ  07/03/2018    Dr Orta    L5-S1 Caudal cath guided MOIZ  09/26/2014 6/26/2014 4/11/2014    Dr Orta    left shoulder repair Left     NECK SURGERY  2018    does not know what kind    right sacral RF Right 12/26/2013 1/24/2012    Dr Orta    right SIJI Right 10/24/2013    Dr Orta       Social History  Ms. Marie  reports that she has never smoked. She has never been exposed to tobacco smoke. She has never used smokeless tobacco. She reports that she does not currently use alcohol. She reports current drug use. Drug: Hydrocodone.    Family History  Ms.'s Marie family history includes Cancer in her brother; Diabetes in her mother; Heart attack in her mother; Heart disease in her mother; Hypertension in her father, mother, and sister; Stroke in her father and sister.    Review of Systems  Review of Systems   Musculoskeletal:  Positive for back pain, joint pain and neck pain.   Neurological:  Positive for focal weakness.   All other systems reviewed and are negative.   Objective:   /68 (BP Location: Left arm, Patient Position: Sitting,  "BP Method: Small (Manual))   Pulse 68   Ht 5' 5" (1.651 m)   Wt 56.2 kg (124 lb)   SpO2 99%   BMI 20.63 kg/m²    NEUROLOGICAL EXAMINATION:     MENTAL STATUS   Oriented to person, place, and time.   Level of consciousness: alert  Knowledge: consistent with education.     CRANIAL NERVES   Cranial nerves II through XII intact.     MOTOR EXAM   Muscle bulk: decreased  Overall muscle tone: decreased       Atrophy of b/l interosseous hand muscles      REFLEXES     Reflexes   Right brachioradialis: 1+  Left brachioradialis: 1+  Right biceps: 1+  Left biceps: 1+  Right triceps: 1+  Left triceps: 1+  Right patellar: 1+  Left patellar: 1+  Right achilles: 1+  Left achilles: 1+  Right : 1+  Left : 1+    GAIT AND COORDINATION        Antalgic gait      Physical Exam  Vitals reviewed.   Constitutional:       Appearance: She is normal weight.   Neurological:      Mental Status: She is alert and oriented to person, place, and time. Mental status is at baseline.      Cranial Nerves: Cranial nerves 2-12 are intact.      Deep Tendon Reflexes:      Reflex Scores:       Tricep reflexes are 1+ on the right side and 1+ on the left side.       Bicep reflexes are 1+ on the right side and 1+ on the left side.       Brachioradialis reflexes are 1+ on the right side and 1+ on the left side.       Patellar reflexes are 1+ on the right side and 1+ on the left side.       Achilles reflexes are 1+ on the right side and 1+ on the left side.       Assessment:     Cervical radiculopathy    Numbness and tingling of right arm  -     Ambulatory referral/consult to Neurology         Primary Diagnosis and ICD10  Cervical radiculopathy [M54.12]    Plan:     Patient Instructions   Needs Mri Cervical spine r/o disc pathology and herniation  Caution w/ meds espec pain meds   F/u 2-3 months    There are no discontinued medications.    Requested Prescriptions      No prescriptions requested or ordered in this encounter       "

## 2023-05-15 ENCOUNTER — TELEPHONE (OUTPATIENT)
Dept: FAMILY MEDICINE | Facility: CLINIC | Age: 73
End: 2023-05-15
Payer: MEDICAID

## 2023-05-15 NOTE — TELEPHONE ENCOUNTER
----- Message from Thalia Garza sent at 5/15/2023 12:53 PM CDT -----  Regarding: Please call her back about upcoming oncology visit.  Contact: Patient  Pt needs:a call her back about upcoming oncology visit. She has questions about it and her labs work.      Phone #:  [phone]

## 2023-05-23 DIAGNOSIS — I63.9 CVA (CEREBRAL VASCULAR ACCIDENT): Primary | ICD-10-CM

## 2023-05-25 ENCOUNTER — CLINICAL SUPPORT (OUTPATIENT)
Dept: REHABILITATION | Facility: HOSPITAL | Age: 73
End: 2023-05-25
Payer: MEDICARE

## 2023-05-25 DIAGNOSIS — I63.9 CVA (CEREBRAL VASCULAR ACCIDENT): ICD-10-CM

## 2023-05-25 DIAGNOSIS — I63.9 CEREBROVASCULAR ACCIDENT (CVA), UNSPECIFIED MECHANISM: Primary | ICD-10-CM

## 2023-05-25 PROCEDURE — 97161 PT EVAL LOW COMPLEX 20 MIN: CPT

## 2023-05-25 NOTE — PLAN OF CARE
"[facility]  ASSISTIVE TECHNOLOGY EVALUATION    Date: 5/25/2023   Name: Kathryn Marie  Clinic Number: [id number]    Therapy Diagnosis:   Encounter Diagnosis   Name Primary?    CVA (cerebral vascular accident)      Physician: Dequan Bar MD    Physician Orders: PT Eval and Treat for PWC  Medical Diagnosis from Referral: CVA  Evaluation Date: 5/25/2023  Visit # / Visits authorized: 1/ 1    Time In: 1110  Time Out: 1140  Total Appointment Time (timed & untimed codes): 30 minutes    Precautions: Standard    Past Medical History  CVA 1999, OA, DM, pacemaker, neck surgery due to DDD, back surgery due to DDD, rotator cuff tear due to a fall with subsequent surgery to repair rotator cuff, carpal tunnel surgery bilaterally, CABG surgery.     Pain at time of evaluation: 5/10 Moderate  Pain at worst: 10/10 Worst Pain Imaginable  Pain at rest: 1/10 Slight    Factors that increase pain:   standing, walking, and bad weather    Factors that decrease pain:  Pain meds, rest    Current Durable Medical Equipment  RW, SC, elevated toilet seat, bathroom is handicapped accessible    Home environment  Patient lives in a one story home with her son  Interior door width: bathroom 24"   Exterior door width: 32"  Comments: RW will not fit through bathroom door    Patient's mobility complaints: patient has a history of falls with most recent approximately 2 months ago.  She has multiple falls with some with injuries including a rotator cuff tear a few years ago.  She also has severe pain with ambulation    Objective Evaluation:    ROM Right upper extremity  Left upper extremity   Right lower extremity  Left lower extremity    Shoulder flexion  95 95 Hip flexion 95 95   Shoulder IR/ER WFL WFL Hip extension  0 0   Shoulder ABD WFL WFL Knee extension  0 0   Elbow flexion/ext WFL WFL Knee flexion  WFL WFL   Wrist flexion/ext WFL WFL Ankle DF 5 5   Finger flexion/ext WFL WFL Ankle PF 25 25             Strength Right upper " extremity  Left upper extremity   Right lower extremity  Left lower extremity    Shoulder flex -3/5 -3/5 Hip flexion  +3/5 +3/5   Shoulder IR/ER 3/5 3/5 Hip extension  3/5 3/5   Shoulder ABD -3/5 -3/5 Knee extension  +3/5 +3/5   Elbow flexion/ext +3/5 +3/5 Knee flexion  +3/5 +3/5   Wrist flexion/ext +3/5 +3/5 Ankle DF +3/5 +3/5   Finger flexion/ext +3/5 +3/5 Ankle PF +3/5 +3/5             Sensation: WFL    History of pressure sores: none    Transfers:  Sit to/from Stand  Contact Guard Assistance  Stand Pivot Transfer Contact Guard Assistance  Supine to/from Sit Contact Guard Assistance    Cognitive Status: WFL    Activities of Daily Living:  Feeding: Independent  Dressing: Minimal Assistance  Bathing: Minimal Assistance  Toileting: Modified Independent    Balance Assessment:  Static Sit Independent  Dynamic Sit Independent  Static Stand Contact Guard Assistance  Dynamic Stand Contact Guard Assistance    Postural Assessment:  Head and Neck: forward head posture  Upper Extremities: protracted scapulae bilaterally  Trunk: decreased lumbar lordosis  Pelvis: WFL  Lower Extremities: WFL    Spasticity: None    Gait Analysis: TUG test in 1 min 10 seconds    Body Measurements(all in inches):  Seat to top of head 30   Hip width 15   Chest width 10   Shoulder width 15   Outer knee NT   Inner knee NT     Right  Left  Shoulder Height 21 21   Axilla to seat 14 14   Elbow to seat 7 7   Thigh Length 18 18   Lower leg length 18 18     DME Provider: [facility]    Nutrition: WFl  Weight loss/gain in past 2 months: No  Difficulty swallowing: No    Assessment and Recommendations:    A walker will not meet this patient's mobility needs secondary to she has fallen with the use of her RW due to generalized weakness and chronic back pain.    A manual wheelchair will not meet this patient's mobility needs because she does not have the UE strength to self propel a manual WC even if it was optimally configured.    A power  wheelchair is needed to meet this patient's mobility needs because: she will be able to complete her household mobility independently without risk of fall and subsequent injury.    The wheelchair recommended is: I recommend a Group 2 PWC with captain's seating for safe transfers. She will need a right side joystick for independent operation.  I do not feel a POV is appropriate due to the fact that she has recently fallen when transferring and I feel her transfers on and off of a POV would not be as safe, as well as her shoulders are very weak and she has a history of a rotator cuff tear on her left shoulder as well as mild right side weakness from a CVA and I feel she would have difficulty functionally operating the tiller on a daily basis.     Electronically signed by:  ALYCIA ACUNA PT, ATP  05/25/2023      *MD cosignature indicates agreement with these recommendations.

## 2023-05-31 ENCOUNTER — EXTERNAL CHRONIC CARE MANAGEMENT (OUTPATIENT)
Dept: FAMILY MEDICINE | Facility: CLINIC | Age: 73
End: 2023-05-31
Payer: MEDICARE

## 2023-05-31 PROCEDURE — G0511 CCM/BHI BY RHC/FQHC 20MIN MO: HCPCS | Mod: ,,, | Performed by: FAMILY MEDICINE

## 2023-05-31 PROCEDURE — G0511 PR CHRONIC CARE MGMT, RHC OR FQHC ONLY, 20 MINS OR MORE: ICD-10-PCS | Mod: ,,, | Performed by: FAMILY MEDICINE

## 2023-06-19 DIAGNOSIS — I10 HYPERTENSION, UNSPECIFIED TYPE: ICD-10-CM

## 2023-06-19 DIAGNOSIS — R60.9 EDEMA, UNSPECIFIED TYPE: Primary | ICD-10-CM

## 2023-06-19 RX ORDER — HYDROCHLOROTHIAZIDE 12.5 MG/1
12.5 TABLET ORAL
Qty: 30 TABLET | Refills: 1 | Status: SHIPPED | OUTPATIENT
Start: 2023-06-19 | End: 2023-08-15 | Stop reason: SDUPTHER

## 2023-06-19 RX ORDER — AMLODIPINE BESYLATE 10 MG/1
10 TABLET ORAL DAILY
Qty: 90 TABLET | Refills: 1 | Status: SHIPPED | OUTPATIENT
Start: 2023-06-19 | End: 2024-03-29 | Stop reason: CLARIF

## 2023-06-19 RX ORDER — METOPROLOL SUCCINATE 25 MG/1
TABLET, EXTENDED RELEASE ORAL
Qty: 45 TABLET | Refills: 1 | Status: SHIPPED | OUTPATIENT
Start: 2023-06-19 | End: 2023-08-15 | Stop reason: SDUPTHER

## 2023-06-19 NOTE — TELEPHONE ENCOUNTER
----- Message from Chastity Dsouza MA sent at 6/16/2023  9:49 AM CDT -----  Please call in blood pressure meds to [facility].

## 2023-06-28 RX ORDER — HYDROCODONE BITARTRATE AND ACETAMINOPHEN 10; 325 MG/1; MG/1
1 TABLET ORAL EVERY 6 HOURS PRN
Qty: 120 TABLET | Refills: 0 | Status: CANCELLED | OUTPATIENT
Start: 2023-06-28 | End: 2023-07-28

## 2023-06-28 NOTE — PROGRESS NOTES
Subjective:         Patient ID: Kathryn Marie is a 72 y.o. female.    Chief Complaint: Low-back Pain        Pain  This is a chronic problem. The current episode started more than 1 year ago. The problem occurs daily. The problem has been unchanged. Associated symptoms include arthralgias and neck pain. Pertinent negatives include no anorexia, chest pain, chills, coughing, diaphoresis, fever, sore throat, vertigo or vomiting.   Review of Systems   Constitutional:  Negative for activity change, appetite change, chills, diaphoresis, fever and unexpected weight change.   HENT:  Negative for drooling, ear discharge, ear pain, facial swelling, nosebleeds, sore throat, trouble swallowing, voice change and goiter.    Eyes:  Negative for photophobia, pain, discharge, redness and visual disturbance.   Respiratory:  Negative for apnea, cough, choking, chest tightness, shortness of breath, wheezing and stridor.    Cardiovascular:  Negative for chest pain, palpitations and leg swelling.   Gastrointestinal:  Negative for abdominal distention, anorexia, diarrhea, rectal pain, vomiting and fecal incontinence.   Endocrine: Negative for cold intolerance, heat intolerance, polydipsia, polyphagia and polyuria.   Genitourinary:  Negative for bladder incontinence, dysuria, flank pain, frequency and hot flashes.   Musculoskeletal:  Positive for arthralgias, back pain, leg pain, neck pain and neck stiffness.   Integumentary:  Negative for color change and pallor.   Allergic/Immunologic: Negative for immunocompromised state.   Neurological:  Negative for dizziness, vertigo, seizures, syncope, facial asymmetry, speech difficulty, light-headedness, coordination difficulties, memory loss and coordination difficulties.   Hematological:  Negative for adenopathy. Does not bruise/bleed easily.   Psychiatric/Behavioral:  Negative for agitation, behavioral problems, confusion, decreased concentration, dysphoric mood, hallucinations, self-injury  and suicidal ideas. The patient is not nervous/anxious and is not hyperactive.          Past Medical History:   Diagnosis Date    Anemia     Atherosclerotic heart disease of native coronary artery without angina pectoris     Carotid artery stenosis 01/15/2014    CHARO  LICA stenosis    Causalgia of lower limb     Cervical radiculopathy     Chronic low back pain     Chronic pain syndrome     CVA (cerebral vascular accident)     right cerebellar    Degenerative joint disease involving multiple joints     Disorder of parathyroid gland, unspecified     Disorder of sacrum     Essential (primary) hypertension     Gammopathy     GERD (gastroesophageal reflux disease)     Heart murmur     Hyperlipidemia     Hyperparathyroidism     Lumbosacral radiculopathy     Lumbosacral spondylosis     Other long term (current) drug therapy     Pernicious anemia     Sciatica     Spinal stenosis of lumbar region     L4-5    Type 2 diabetes mellitus      Past Surgical History:   Procedure Laterality Date    CARPAL TUNNEL RELEASE Right     caudal MOIZ  07/03/2018    CHOLECYSTECTOMY  1975    CORONARY ARTERY BYPASS GRAFT      CORONARY ARTERY BYPASS GRAFT (CABG)      triple    HIP SURGERY Right     INJECTION OF ANESTHETIC AGENT AROUND MEDIAL BRANCH NERVES INNERVATING LUMBAR FACET JOINT Bilateral 1/19/2023    Procedure: Block-nerve-medial branch-lumbar, bilateral L4 through S1;  Surgeon: Ashley Piña MD;  Location: [facility];  Service: Pain Management;  Laterality: Bilateral;    L4-S1 Caudal cath guided MOIZ  07/03/2018    Dr Orta    L5-S1 Caudal cath guided MOIZ  09/26/2014 6/26/2014 4/11/2014    Dr Orta    left shoulder repair Left     NECK SURGERY  2018    does not know what kind    right sacral RF Right 12/26/2013 1/24/2012    Dr Orta    right SIJI Right 10/24/2013    Dr Orta     Social History     Socioeconomic History    Marital status:    Occupational History    Occupation: RETIRED   Tobacco Use    Smoking status: Never     " Passive exposure: Never    Smokeless tobacco: Never   Substance and Sexual Activity    Alcohol use: Not Currently    Drug use: Yes     Types: Hydrocodone     Comment: pain medication with pain treatment     Sexual activity: Not Currently     Family History   Problem Relation Age of Onset    Diabetes Mother     Heart disease Mother     Hypertension Mother     Heart attack Mother     Hypertension Father     Stroke Father     Stroke Sister     Hypertension Sister     Cancer Brother      Review of patient's allergies indicates:  No Known Allergies     Objective:  Vitals:    06/29/23 1342   BP: (!) 121/52   Pulse: 73   Resp: 18   Weight: 58.6 kg (129 lb 3.2 oz)   Height: 5' 5" (1.651 m)   PainSc:   6         Physical Exam  Vitals and nursing note reviewed. Exam conducted with a chaperone present.   Constitutional:       General: She is awake. She is not in acute distress.     Appearance: Normal appearance. She is not ill-appearing or diaphoretic.   HENT:      Head: Normocephalic and atraumatic.      Nose: Nose normal.      Mouth/Throat:      Mouth: Mucous membranes are moist.      Pharynx: Oropharynx is clear.   Eyes:      Conjunctiva/sclera: Conjunctivae normal.      Pupils: Pupils are equal, round, and reactive to light.   Cardiovascular:      Rate and Rhythm: Normal rate.   Pulmonary:      Effort: Pulmonary effort is normal. No respiratory distress.   Abdominal:      Palpations: Abdomen is soft.   Musculoskeletal:      Cervical back: Normal range of motion and neck supple. Tenderness present.      Thoracic back: Tenderness present.      Lumbar back: Tenderness present. Decreased range of motion.   Skin:     General: Skin is warm and dry.      Coloration: Skin is not jaundiced or pale.   Neurological:      General: No focal deficit present.      Mental Status: She is alert and oriented to person, place, and time. Mental status is at baseline.      Cranial Nerves: No cranial nerve deficit (II-XII).   Psychiatric:       "   Mood and Affect: Mood normal.         Behavior: Behavior normal. Behavior is cooperative.         Thought Content: Thought content normal.         FL Fluoro for Pain Management  See OP Notes for results.     IMPRESSION: See OP Notes for results.     This procedure was auto-finalized by: Virtual Radiologist         Office Visit on 04/10/2023   Component Date Value Ref Range Status    Hemoglobin A1C 04/10/2023 6.4  4.5 - 6.6 % Final    Estimated Average Glucose 04/10/2023 127  mg/dL Final    Sodium 04/10/2023 140  136 - 145 mmol/L Final    Potassium 04/10/2023 4.2  3.5 - 5.1 mmol/L Final    Chloride 04/10/2023 107  98 - 107 mmol/L Final    CO2 04/10/2023 27  21 - 32 mmol/L Final    Anion Gap 04/10/2023 10  7 - 16 mmol/L Final    Glucose 04/10/2023 141 (H)  74 - 106 mg/dL Final    BUN 04/10/2023 17  7 - 18 mg/dL Final    Creatinine 04/10/2023 0.97  0.55 - 1.02 mg/dL Final    BUN/Creatinine Ratio 04/10/2023 18  6 - 20 Final    Calcium 04/10/2023 9.6  8.5 - 10.1 mg/dL Final    Total Protein 04/10/2023 8.2  6.4 - 8.2 g/dL Final    Albumin 04/10/2023 3.7  3.5 - 5.0 g/dL Final    Globulin 04/10/2023 4.5 (H)  2.0 - 4.0 g/dL Final    A/G Ratio 04/10/2023 0.8   Final    Bilirubin, Total 04/10/2023 0.5  >0.0 - 1.2 mg/dL Final    Alk Phos 04/10/2023 62  55 - 142 U/L Final    ALT 04/10/2023 20  13 - 56 U/L Final    AST 04/10/2023 27  15 - 37 U/L Final    eGFR 04/10/2023 61  >=60 mL/min/1.73m² Final    WBC 04/10/2023 3.22 (L)  4.50 - 11.00 K/uL Final    RBC 04/10/2023 3.20 (L)  4.20 - 5.40 M/uL Final    Hemoglobin 04/10/2023 10.0 (L)  12.0 - 16.0 g/dL Final    Hematocrit 04/10/2023 30.8 (L)  38.0 - 47.0 % Final    MCV 04/10/2023 96.3 (H)  80.0 - 96.0 fL Final    MCH 04/10/2023 31.3 (H)  27.0 - 31.0 pg Final    MCHC 04/10/2023 32.5  32.0 - 36.0 g/dL Final    RDW 04/10/2023 13.4  11.5 - 14.5 % Final    Platelet Count 04/10/2023 133 (L)  150 - 400 K/uL Final    MPV 04/10/2023 10.8  9.4 - 12.4 fL Final    Neutrophils % 04/10/2023  53.2  53.0 - 65.0 % Final    Lymphocytes % 04/10/2023 32.9  27.0 - 41.0 % Final    Monocytes % 04/10/2023 12.4 (H)  2.0 - 6.0 % Final    Eosinophils % 04/10/2023 0.3 (L)  1.0 - 4.0 % Final    Basophils % 04/10/2023 0.3  0.0 - 1.0 % Final    Immature Granulocytes % 04/10/2023 0.9 (H)  0.0 - 0.4 % Final    nRBC, Auto 04/10/2023 0.0  <=0.0 % Final    Neutrophils, Abs 04/10/2023 1.71 (L)  1.80 - 7.70 K/uL Final    Lymphocytes, Absolute 04/10/2023 1.06  1.00 - 4.80 K/uL Final    Monocytes, Absolute 04/10/2023 0.40  0.00 - 0.80 K/uL Final    Eosinophils, Absolute 04/10/2023 0.01  0.00 - 0.50 K/uL Final    Basophils, Absolute 04/10/2023 0.01  0.00 - 0.20 K/uL Final    Immature Granulocytes, Absolute 04/10/2023 0.03  0.00 - 0.04 K/uL Final    nRBC, Absolute 04/10/2023 0.00  <=0.00 x10e3/uL Final    Diff Type 04/10/2023 Scan Smear   Final    Platelet Morphology 04/10/2023 Few Large Platelets (A)  Normal Final    Ovalocytes 04/10/2023 Few   Final    Teardrop Cells 04/10/2023 Few   Final   Office Visit on 03/29/2023   Component Date Value Ref Range Status    POC Amphetamines 03/29/2023 Negative  Negative, Inconclusive Final    POC Barbiturates 03/29/2023 Negative  Negative, Inconclusive Final    POC Benzodiazepines 03/29/2023 Negative  Negative, Inconclusive Final    POC Cocaine 03/29/2023 Negative  Negative, Inconclusive Final    POC THC 03/29/2023 Negative  Negative, Inconclusive Final    POC Methadone 03/29/2023 Negative  Negative, Inconclusive Final    POC Methamphetamine 03/29/2023 Negative  Negative, Inconclusive Final    POC Opiates 03/29/2023 Presumptive Positive (A)  Negative, Inconclusive Final    POC Oxycodone 03/29/2023 Negative  Negative, Inconclusive Final    POC Phencyclidine 03/29/2023 Negative  Negative, Inconclusive Final    POC Methylenedioxymethamphetamine * 03/29/2023 Negative  Negative, Inconclusive Final    POC Tricyclic Antidepressants 03/29/2023 Negative  Negative, Inconclusive Final    POC  Buprenorphine 03/29/2023 Negative   Final     Acceptable 03/29/2023 Yes   Final    POC Temperature (Urine) 03/29/2023 90   Final         No orders of the defined types were placed in this encounter.        Requested Prescriptions     Signed Prescriptions Disp Refills    HYDROcodone-acetaminophen (NORCO) 7.5-325 mg per tablet 120 tablet 0     Sig: Take 1 tablet by mouth every 6 (six) hours as needed for Pain.    HYDROcodone-acetaminophen (NORCO) 7.5-325 mg per tablet 120 tablet 0     Sig: Take 1 tablet by mouth every 6 (six) hours as needed for Pain.    HYDROcodone-acetaminophen (NORCO) 7.5-325 mg per tablet 120 tablet 0     Sig: Take 1 tablet by mouth every 6 (six) hours as needed for Pain.       Assessment:     1. Lumbosacral spondylosis without myelopathy    2. Cervical radiculopathy    3. Postlaminectomy syndrome of cervical region    4. Disorder of sacrum         A's of Opioid Risk Assessment  Activity:Patient can perform ADL.   Analgesia:Patients pain is partially controlled by current medication. Patient has tried OTC medications such as Tylenol and Ibuprofen with out relief.   Adverse Effects: Patient denies constipation or sedation.  Aberrant Behavior:  reviewed with no aberrant drug seeking/taking behavior.  Overdose reversal drug naloxone discussed    Drug screen reviewed      X-ray lumbar spine [facility] November 28, 2022  Grade 1 anterior listhesis L3/4 multiple level degenerative changes pedicle screws rods posterior L4/5 interbody hardware L4/5  Prominent degenerative changes L2/3 L3/4 moderate facet joint arthropathy throughout    X-ray sacroiliac joints [facility] November 28, 2022 osteoarthritis sacroiliac joints      Plan:    Narcan February 2023    Rheumatoid arthritis [facility]    Using 4 point walker/wheelchair assistance ambulation    Did not receive permission to hold Plavix for repeat procedure    She is requesting to wean her Norco    After discussing options will  decrease [place] 7.51 p.o. q.6 hours as needed    Continue activity as tolerated     Follow-up 3 months    Dr. Piña, January 2024    Bring original prescription medication bottles/container/box with labels to each visit

## 2023-06-29 ENCOUNTER — OFFICE VISIT (OUTPATIENT)
Dept: PAIN MEDICINE | Facility: CLINIC | Age: 73
End: 2023-06-29
Payer: MEDICARE

## 2023-06-29 VITALS
HEIGHT: 65 IN | RESPIRATION RATE: 18 BRPM | DIASTOLIC BLOOD PRESSURE: 52 MMHG | BODY MASS INDEX: 21.52 KG/M2 | SYSTOLIC BLOOD PRESSURE: 121 MMHG | WEIGHT: 129.19 LBS | HEART RATE: 73 BPM

## 2023-06-29 DIAGNOSIS — M53.3 DISORDER OF SACRUM: Chronic | ICD-10-CM

## 2023-06-29 DIAGNOSIS — M54.12 CERVICAL RADICULOPATHY: Chronic | ICD-10-CM

## 2023-06-29 DIAGNOSIS — M96.1 POSTLAMINECTOMY SYNDROME OF CERVICAL REGION: Chronic | ICD-10-CM

## 2023-06-29 DIAGNOSIS — M47.817 LUMBOSACRAL SPONDYLOSIS WITHOUT MYELOPATHY: Primary | Chronic | ICD-10-CM

## 2023-06-29 DIAGNOSIS — Z79.899 OTHER LONG TERM (CURRENT) DRUG THERAPY: ICD-10-CM

## 2023-06-29 PROCEDURE — 3074F SYST BP LT 130 MM HG: CPT | Mod: CPTII,,, | Performed by: PHYSICIAN ASSISTANT

## 2023-06-29 PROCEDURE — 1101F PR PT FALLS ASSESS DOC 0-1 FALLS W/OUT INJ PAST YR: ICD-10-PCS | Mod: CPTII,,, | Performed by: PHYSICIAN ASSISTANT

## 2023-06-29 PROCEDURE — 80305 DRUG TEST PRSMV DIR OPT OBS: CPT | Mod: PBBFAC | Performed by: PHYSICIAN ASSISTANT

## 2023-06-29 PROCEDURE — 1159F MED LIST DOCD IN RCRD: CPT | Mod: CPTII,,, | Performed by: PHYSICIAN ASSISTANT

## 2023-06-29 PROCEDURE — 1125F AMNT PAIN NOTED PAIN PRSNT: CPT | Mod: CPTII,,, | Performed by: PHYSICIAN ASSISTANT

## 2023-06-29 PROCEDURE — 3008F BODY MASS INDEX DOCD: CPT | Mod: CPTII,,, | Performed by: PHYSICIAN ASSISTANT

## 2023-06-29 PROCEDURE — 3288F PR FALLS RISK ASSESSMENT DOCUMENTED: ICD-10-PCS | Mod: CPTII,,, | Performed by: PHYSICIAN ASSISTANT

## 2023-06-29 PROCEDURE — 3074F PR MOST RECENT SYSTOLIC BLOOD PRESSURE < 130 MM HG: ICD-10-PCS | Mod: CPTII,,, | Performed by: PHYSICIAN ASSISTANT

## 2023-06-29 PROCEDURE — 99214 PR OFFICE/OUTPT VISIT, EST, LEVL IV, 30-39 MIN: ICD-10-PCS | Mod: S$PBB,,, | Performed by: PHYSICIAN ASSISTANT

## 2023-06-29 PROCEDURE — 3078F PR MOST RECENT DIASTOLIC BLOOD PRESSURE < 80 MM HG: ICD-10-PCS | Mod: CPTII,,, | Performed by: PHYSICIAN ASSISTANT

## 2023-06-29 PROCEDURE — 1159F PR MEDICATION LIST DOCUMENTED IN MEDICAL RECORD: ICD-10-PCS | Mod: CPTII,,, | Performed by: PHYSICIAN ASSISTANT

## 2023-06-29 PROCEDURE — 3044F HG A1C LEVEL LT 7.0%: CPT | Mod: CPTII,,, | Performed by: PHYSICIAN ASSISTANT

## 2023-06-29 PROCEDURE — 3008F PR BODY MASS INDEX (BMI) DOCUMENTED: ICD-10-PCS | Mod: CPTII,,, | Performed by: PHYSICIAN ASSISTANT

## 2023-06-29 PROCEDURE — 99214 OFFICE O/P EST MOD 30 MIN: CPT | Mod: S$PBB,,, | Performed by: PHYSICIAN ASSISTANT

## 2023-06-29 PROCEDURE — 1101F PT FALLS ASSESS-DOCD LE1/YR: CPT | Mod: CPTII,,, | Performed by: PHYSICIAN ASSISTANT

## 2023-06-29 PROCEDURE — 99215 OFFICE O/P EST HI 40 MIN: CPT | Mod: PBBFAC | Performed by: PHYSICIAN ASSISTANT

## 2023-06-29 PROCEDURE — 1125F PR PAIN SEVERITY QUANTIFIED, PAIN PRESENT: ICD-10-PCS | Mod: CPTII,,, | Performed by: PHYSICIAN ASSISTANT

## 2023-06-29 PROCEDURE — 3078F DIAST BP <80 MM HG: CPT | Mod: CPTII,,, | Performed by: PHYSICIAN ASSISTANT

## 2023-06-29 PROCEDURE — 3288F FALL RISK ASSESSMENT DOCD: CPT | Mod: CPTII,,, | Performed by: PHYSICIAN ASSISTANT

## 2023-06-29 PROCEDURE — 3044F PR MOST RECENT HEMOGLOBIN A1C LEVEL <7.0%: ICD-10-PCS | Mod: CPTII,,, | Performed by: PHYSICIAN ASSISTANT

## 2023-06-29 RX ORDER — HYDROCODONE BITARTRATE AND ACETAMINOPHEN 7.5; 325 MG/1; MG/1
1 TABLET ORAL EVERY 6 HOURS PRN
Qty: 120 TABLET | Refills: 0 | Status: SHIPPED | OUTPATIENT
Start: 2023-08-05 | End: 2023-10-17 | Stop reason: SDUPTHER

## 2023-06-29 RX ORDER — HYDROCODONE BITARTRATE AND ACETAMINOPHEN 7.5; 325 MG/1; MG/1
1 TABLET ORAL EVERY 6 HOURS PRN
Qty: 120 TABLET | Refills: 0 | Status: SHIPPED | OUTPATIENT
Start: 2023-07-06 | End: 2023-10-17 | Stop reason: SDUPTHER

## 2023-06-29 RX ORDER — HYDROCODONE BITARTRATE AND ACETAMINOPHEN 7.5; 325 MG/1; MG/1
1 TABLET ORAL EVERY 6 HOURS PRN
Qty: 120 TABLET | Refills: 0 | Status: SHIPPED | OUTPATIENT
Start: 2023-09-04 | End: 2023-10-17 | Stop reason: SDUPTHER

## 2023-06-30 ENCOUNTER — EXTERNAL CHRONIC CARE MANAGEMENT (OUTPATIENT)
Dept: FAMILY MEDICINE | Facility: CLINIC | Age: 73
End: 2023-06-30
Payer: MEDICARE

## 2023-06-30 PROCEDURE — G0511 CCM/BHI BY RHC/FQHC 20MIN MO: HCPCS | Mod: ,,, | Performed by: FAMILY MEDICINE

## 2023-06-30 PROCEDURE — G0511 PR CHRONIC CARE MGMT, RHC OR FQHC ONLY, 20 MINS OR MORE: ICD-10-PCS | Mod: ,,, | Performed by: FAMILY MEDICINE

## 2023-07-31 ENCOUNTER — EXTERNAL CHRONIC CARE MANAGEMENT (OUTPATIENT)
Dept: FAMILY MEDICINE | Facility: CLINIC | Age: 73
End: 2023-07-31
Payer: MEDICARE

## 2023-07-31 PROCEDURE — G0511 CCM/BHI BY RHC/FQHC 20MIN MO: HCPCS | Mod: ,,, | Performed by: FAMILY MEDICINE

## 2023-07-31 PROCEDURE — G0511 PR CHRONIC CARE MGMT, RHC OR FQHC ONLY, 20 MINS OR MORE: ICD-10-PCS | Mod: ,,, | Performed by: FAMILY MEDICINE

## 2023-08-10 ENCOUNTER — OFFICE VISIT (OUTPATIENT)
Dept: OBSTETRICS AND GYNECOLOGY | Facility: CLINIC | Age: 73
End: 2023-08-10
Payer: MEDICARE

## 2023-08-10 VITALS — DIASTOLIC BLOOD PRESSURE: 64 MMHG | SYSTOLIC BLOOD PRESSURE: 130 MMHG

## 2023-08-10 DIAGNOSIS — S30.814D ABRASION OF VAGINA, SUBSEQUENT ENCOUNTER: Primary | ICD-10-CM

## 2023-08-10 PROCEDURE — 3078F PR MOST RECENT DIASTOLIC BLOOD PRESSURE < 80 MM HG: ICD-10-PCS | Mod: CPTII,,, | Performed by: OBSTETRICS & GYNECOLOGY

## 2023-08-10 PROCEDURE — 3078F DIAST BP <80 MM HG: CPT | Mod: CPTII,,, | Performed by: OBSTETRICS & GYNECOLOGY

## 2023-08-10 PROCEDURE — 3044F PR MOST RECENT HEMOGLOBIN A1C LEVEL <7.0%: ICD-10-PCS | Mod: CPTII,,, | Performed by: OBSTETRICS & GYNECOLOGY

## 2023-08-10 PROCEDURE — 3288F FALL RISK ASSESSMENT DOCD: CPT | Mod: CPTII,,, | Performed by: OBSTETRICS & GYNECOLOGY

## 2023-08-10 PROCEDURE — 3075F PR MOST RECENT SYSTOLIC BLOOD PRESS GE 130-139MM HG: ICD-10-PCS | Mod: CPTII,,, | Performed by: OBSTETRICS & GYNECOLOGY

## 2023-08-10 PROCEDURE — 99213 OFFICE O/P EST LOW 20 MIN: CPT | Mod: PBBFAC | Performed by: OBSTETRICS & GYNECOLOGY

## 2023-08-10 PROCEDURE — 1100F PTFALLS ASSESS-DOCD GE2>/YR: CPT | Mod: CPTII,,, | Performed by: OBSTETRICS & GYNECOLOGY

## 2023-08-10 PROCEDURE — 1100F PR PT FALLS ASSESS DOC 2+ FALLS/FALL W/INJURY/YR: ICD-10-PCS | Mod: CPTII,,, | Performed by: OBSTETRICS & GYNECOLOGY

## 2023-08-10 PROCEDURE — 99213 PR OFFICE/OUTPT VISIT, EST, LEVL III, 20-29 MIN: ICD-10-PCS | Mod: S$PBB,,, | Performed by: OBSTETRICS & GYNECOLOGY

## 2023-08-10 PROCEDURE — 3044F HG A1C LEVEL LT 7.0%: CPT | Mod: CPTII,,, | Performed by: OBSTETRICS & GYNECOLOGY

## 2023-08-10 PROCEDURE — 3288F PR FALLS RISK ASSESSMENT DOCUMENTED: ICD-10-PCS | Mod: CPTII,,, | Performed by: OBSTETRICS & GYNECOLOGY

## 2023-08-10 PROCEDURE — 99213 OFFICE O/P EST LOW 20 MIN: CPT | Mod: S$PBB,,, | Performed by: OBSTETRICS & GYNECOLOGY

## 2023-08-10 PROCEDURE — 3075F SYST BP GE 130 - 139MM HG: CPT | Mod: CPTII,,, | Performed by: OBSTETRICS & GYNECOLOGY

## 2023-08-10 RX ORDER — METRONIDAZOLE 7.5 MG/G
GEL VAGINAL
Qty: 70 G | Refills: 0 | Status: SHIPPED | OUTPATIENT
Start: 2023-08-10 | End: 2024-03-29 | Stop reason: CLARIF

## 2023-08-10 NOTE — PATIENT INSTRUCTIONS
Pessary will be kept clean sterilized and packaged.      Discussed Metrogel vaginal cream nightly 1 applicator full per vagina for 5 nights.      Follow-up with me in 6 weeks.

## 2023-08-10 NOTE — PROGRESS NOTES
Subjective:       Patient ID: Kathryn Marie is a 72 y.o. female.    Chief Complaint: Pessary Check (Here for 3 month pessary check-c/o a pinkish white discharge x 1 month. )    Presents today for pessary check.      Has had minor abrasion in the past which required removing pessary    It was reinserted may 2023.  She is done well except recent onset of discharge.      However she is very pleased with pessary use.    Review of Systems      Objective:      Physical Exam  Genitourinary:     Comments: External normal.  Pessary removed vaginal vault inspected.  There is an obvious abrasion along the left lateral sidewall of the vaginal vault.  Abrasion bleed slightly.  No other abnormality noted bimanual exam revealed uterus normal size no adnexal masses.        Assessment:       1. Abrasion of vagina, subsequent encounter        Plan:       Patient Instructions   Pessary will be kept clean sterilized and packaged.      Discussed Metrogel vaginal cream nightly 1 applicator full per vagina for 5 nights.      Follow-up with me in 6 weeks.

## 2023-08-15 ENCOUNTER — OFFICE VISIT (OUTPATIENT)
Dept: FAMILY MEDICINE | Facility: CLINIC | Age: 73
End: 2023-08-15
Payer: MEDICARE

## 2023-08-15 ENCOUNTER — TELEPHONE (OUTPATIENT)
Dept: FAMILY MEDICINE | Facility: CLINIC | Age: 73
End: 2023-08-15
Payer: MEDICARE

## 2023-08-15 VITALS
WEIGHT: 128 LBS | OXYGEN SATURATION: 98 % | TEMPERATURE: 98 F | RESPIRATION RATE: 19 BRPM | HEIGHT: 65 IN | HEART RATE: 63 BPM | DIASTOLIC BLOOD PRESSURE: 72 MMHG | SYSTOLIC BLOOD PRESSURE: 120 MMHG | BODY MASS INDEX: 21.33 KG/M2

## 2023-08-15 DIAGNOSIS — I10 HYPERTENSION, UNSPECIFIED TYPE: ICD-10-CM

## 2023-08-15 DIAGNOSIS — R60.9 EDEMA, UNSPECIFIED TYPE: ICD-10-CM

## 2023-08-15 DIAGNOSIS — E11.9 TYPE 2 DIABETES MELLITUS WITHOUT COMPLICATION, WITHOUT LONG-TERM CURRENT USE OF INSULIN: Primary | ICD-10-CM

## 2023-08-15 DIAGNOSIS — M35.3 POLYMYALGIA RHEUMATICA: Chronic | ICD-10-CM

## 2023-08-15 LAB
EST. AVERAGE GLUCOSE BLD GHB EST-MCNC: 100 MG/DL
HBA1C MFR BLD HPLC: 5.6 % (ref 4.5–6.6)

## 2023-08-15 PROCEDURE — 1159F MED LIST DOCD IN RCRD: CPT | Mod: ,,, | Performed by: NURSE PRACTITIONER

## 2023-08-15 PROCEDURE — 1160F RVW MEDS BY RX/DR IN RCRD: CPT | Mod: ,,, | Performed by: NURSE PRACTITIONER

## 2023-08-15 PROCEDURE — 83036 HEMOGLOBIN GLYCOSYLATED A1C: CPT | Mod: ,,, | Performed by: CLINICAL MEDICAL LABORATORY

## 2023-08-15 PROCEDURE — 83036 HEMOGLOBIN A1C: ICD-10-PCS | Mod: ,,, | Performed by: CLINICAL MEDICAL LABORATORY

## 2023-08-15 PROCEDURE — 99213 PR OFFICE/OUTPT VISIT, EST, LEVL III, 20-29 MIN: ICD-10-PCS | Mod: ,,, | Performed by: NURSE PRACTITIONER

## 2023-08-15 PROCEDURE — 1125F PR PAIN SEVERITY QUANTIFIED, PAIN PRESENT: ICD-10-PCS | Mod: ,,, | Performed by: NURSE PRACTITIONER

## 2023-08-15 PROCEDURE — 3078F DIAST BP <80 MM HG: CPT | Mod: ,,, | Performed by: NURSE PRACTITIONER

## 2023-08-15 PROCEDURE — 1160F PR REVIEW ALL MEDS BY PRESCRIBER/CLIN PHARMACIST DOCUMENTED: ICD-10-PCS | Mod: ,,, | Performed by: NURSE PRACTITIONER

## 2023-08-15 PROCEDURE — 3044F HG A1C LEVEL LT 7.0%: CPT | Mod: ,,, | Performed by: NURSE PRACTITIONER

## 2023-08-15 PROCEDURE — 3288F PR FALLS RISK ASSESSMENT DOCUMENTED: ICD-10-PCS | Mod: ,,, | Performed by: NURSE PRACTITIONER

## 2023-08-15 PROCEDURE — 3074F SYST BP LT 130 MM HG: CPT | Mod: ,,, | Performed by: NURSE PRACTITIONER

## 2023-08-15 PROCEDURE — 3044F PR MOST RECENT HEMOGLOBIN A1C LEVEL <7.0%: ICD-10-PCS | Mod: ,,, | Performed by: NURSE PRACTITIONER

## 2023-08-15 PROCEDURE — 3288F FALL RISK ASSESSMENT DOCD: CPT | Mod: ,,, | Performed by: NURSE PRACTITIONER

## 2023-08-15 PROCEDURE — 3078F PR MOST RECENT DIASTOLIC BLOOD PRESSURE < 80 MM HG: ICD-10-PCS | Mod: ,,, | Performed by: NURSE PRACTITIONER

## 2023-08-15 PROCEDURE — 1125F AMNT PAIN NOTED PAIN PRSNT: CPT | Mod: ,,, | Performed by: NURSE PRACTITIONER

## 2023-08-15 PROCEDURE — 1101F PT FALLS ASSESS-DOCD LE1/YR: CPT | Mod: ,,, | Performed by: NURSE PRACTITIONER

## 2023-08-15 PROCEDURE — 1159F PR MEDICATION LIST DOCUMENTED IN MEDICAL RECORD: ICD-10-PCS | Mod: ,,, | Performed by: NURSE PRACTITIONER

## 2023-08-15 PROCEDURE — 99213 OFFICE O/P EST LOW 20 MIN: CPT | Mod: ,,, | Performed by: NURSE PRACTITIONER

## 2023-08-15 PROCEDURE — 3008F PR BODY MASS INDEX (BMI) DOCUMENTED: ICD-10-PCS | Mod: ,,, | Performed by: NURSE PRACTITIONER

## 2023-08-15 PROCEDURE — 3008F BODY MASS INDEX DOCD: CPT | Mod: ,,, | Performed by: NURSE PRACTITIONER

## 2023-08-15 PROCEDURE — 1101F PR PT FALLS ASSESS DOC 0-1 FALLS W/OUT INJ PAST YR: ICD-10-PCS | Mod: ,,, | Performed by: NURSE PRACTITIONER

## 2023-08-15 PROCEDURE — 3074F PR MOST RECENT SYSTOLIC BLOOD PRESSURE < 130 MM HG: ICD-10-PCS | Mod: ,,, | Performed by: NURSE PRACTITIONER

## 2023-08-15 RX ORDER — HYDROCHLOROTHIAZIDE 12.5 MG/1
12.5 TABLET ORAL
Qty: 30 TABLET | Refills: 1 | Status: SHIPPED | OUTPATIENT
Start: 2023-08-15 | End: 2024-01-10 | Stop reason: SDUPTHER

## 2023-08-15 RX ORDER — METOPROLOL SUCCINATE 25 MG/1
TABLET, EXTENDED RELEASE ORAL
Qty: 45 TABLET | Refills: 1 | Status: SHIPPED | OUTPATIENT
Start: 2023-08-15 | End: 2024-01-10 | Stop reason: SDUPTHER

## 2023-08-15 NOTE — TELEPHONE ENCOUNTER
----- Message from Dequan Bar MD sent at 8/14/2023 12:23 PM CDT -----  She continues to have a gammopathy so please make sure she has a follow-up with Hematology.

## 2023-08-15 NOTE — TELEPHONE ENCOUNTER
Notified Pt of recent lab results, Pt verbalized understanding and stated she would schedule a FU appointment with her Hematologist.

## 2023-08-15 NOTE — PROGRESS NOTES
[facility]    Chief Complaint      Chief Complaint   Patient presents with    Referral     Needs referral to new rheumatologist, the one she was seeing left. Would like to go to [place]. Pt is not fasting    Health Maintenance     Pt defers care gaps       History of Present Illness      Kathryn Marie is a 72 y.o. female. She  has a past medical history of Anemia, Atherosclerotic heart disease of native coronary artery without angina pectoris, Carotid artery stenosis (01/15/2014), Causalgia of lower limb, Cervical radiculopathy, Chronic low back pain, Chronic pain syndrome, CVA (cerebral vascular accident), Degenerative joint disease involving multiple joints, Disorder of parathyroid gland, unspecified, Disorder of sacrum, Essential (primary) hypertension, Gammopathy, GERD (gastroesophageal reflux disease), Heart murmur, Hyperlipidemia, Hyperparathyroidism, Lumbosacral radiculopathy, Lumbosacral spondylosis, Other long term (current) drug therapy, Pernicious anemia, Sciatica, Spinal stenosis of lumbar region, and Type 2 diabetes mellitus., who presents today for medication refills and requesting referral to new Rheumatologist.      States she had a MRI recently due to the pain and numbness in her hands but can't remember the Dr or Specialty that ordered it. Found in patient's chart that she saw Dr. Lal in May and he ordered the testing.     States she saw a Hematologist in [place] but can not remember any names.     She is also wanting a referral to a new Rheumatologist in [place].     Past Medical History:  Past Medical History:   Diagnosis Date    Anemia     Atherosclerotic heart disease of native coronary artery without angina pectoris     Carotid artery stenosis 01/15/2014    CHARO  LICA stenosis    Causalgia of lower limb     Cervical radiculopathy     Chronic low back pain     Chronic pain syndrome     CVA (cerebral vascular accident)     right cerebellar    Degenerative joint disease involving  multiple joints     Disorder of parathyroid gland, unspecified     Disorder of sacrum     Essential (primary) hypertension     Gammopathy     GERD (gastroesophageal reflux disease)     Heart murmur     Hyperlipidemia     Hyperparathyroidism     Lumbosacral radiculopathy     Lumbosacral spondylosis     Other long term (current) drug therapy     Pernicious anemia     Sciatica     Spinal stenosis of lumbar region     L4-5    Type 2 diabetes mellitus        Past Surgical History:   has a past surgical history that includes Carpal tunnel release (Right); caudal MOIZ (07/03/2018); left shoulder repair (Left); L4-S1 Caudal cath guided MOIZ (07/03/2018); L5-S1 Caudal cath guided MOIZ (09/26/2014 6/26/2014 4/11/2014); right sacral RF (Right, 12/26/2013 1/24/2012); right SIJI (Right, 10/24/2013); Hip surgery (Right); Cholecystectomy (1975); Coronary Artery Bypass Graft (CABG); Neck surgery (2018); Coronary artery bypass graft; and Injection of anesthetic agent around medial branch nerves innervating lumbar facet joint (Bilateral, 1/19/2023).    Social History:  Social History     Tobacco Use    Smoking status: Never     Passive exposure: Never    Smokeless tobacco: Never   Substance Use Topics    Alcohol use: Not Currently    Drug use: Yes     Types: Hydrocodone     Comment: pain medication with pain treatment        I personally reviewed all past medical, surgical, and social.     Review of Systems   Constitutional:  Negative for fatigue.   HENT:  Negative for trouble swallowing.    Eyes:  Negative for pain and visual disturbance.   Respiratory:  Negative for cough and shortness of breath.    Cardiovascular: Negative.    Gastrointestinal:  Negative for abdominal pain, constipation, diarrhea, nausea and vomiting.   Genitourinary:  Negative for difficulty urinating.   Musculoskeletal:  Negative for gait problem.   Skin: Negative.    Neurological:  Negative for dizziness, light-headedness and headaches.         Medications:  Outpatient Encounter Medications as of 8/15/2023   Medication Sig Dispense Refill    amLODIPine (NORVASC) 10 MG tablet Take 1 tablet (10 mg total) by mouth once daily. 90 tablet 1    aspirin (ECOTRIN) 81 MG EC tablet Take 81 mg by mouth once daily.      blood sugar diagnostic (ONETOUCH VERIO TEST STRIPS MISC) by Misc.(Non-Drug; Combo Route) route. Use 1 strip to check blood glucose every morning, fasting for E11.9      blood-glucose meter (ONETOUCH VERIO METER MISC) by Misc.(Non-Drug; Combo Route) route. Use for glucose checks once daily, E11.9      dapagliflozin (FARXIGA) 5 mg Tab tablet Take 1 tablet (5 mg total) by mouth once daily. 90 tablet 3    diclofenac sodium (VOLTAREN) 1 % Gel APPLY 1 APPLICATION TO  AFFECTED AREA(S) TOPICALLY  TWICE DAILY 300 g 2    ELIQUIS 5 mg Tab Take 5 mg by mouth once daily.      ferrous sulfate (FEOSOL) 325 mg (65 mg iron) Tab tablet Take 325 mg by mouth daily with breakfast.      fluticasone propionate (FLONASE) 50 mcg/actuation nasal spray 2 sprays (100 mcg total) by Each Nostril route once daily. 48 g 1    folic acid (FOLVITE) 1 MG tablet Take 1 mg by mouth once daily.      HYDROcodone-acetaminophen (NORCO) 7.5-325 mg per tablet Take 1 tablet by mouth every 6 (six) hours as needed for Pain. 120 tablet 0    HYDROcodone-acetaminophen (NORCO) 7.5-325 mg per tablet Take 1 tablet by mouth every 6 (six) hours as needed for Pain. 120 tablet 0    [START ON 9/4/2023] HYDROcodone-acetaminophen (NORCO) 7.5-325 mg per tablet Take 1 tablet by mouth every 6 (six) hours as needed for Pain. 120 tablet 0    ipratropium (ATROVENT) 21 mcg (0.03 %) nasal spray USE 2 SPRAYS NASALLY EVERY  12 HOURS 90 mL 3    methotrexate 2.5 MG Tab 2.5 mg. Take 10 tablets by mouth every week      metroNIDAZOLE (METROGEL) 0.75 % (37.5mg/5 gram) vaginal gel 1 applicator full per vagina nightly for 5 nights. 70 g 0    omeprazole (PRILOSEC) 20 MG capsule Take 1 capsule by mouth once daily.       rosuvastatin (CRESTOR) 20 MG tablet Take 1 tablet (20 mg total) by mouth every evening. 90 tablet 1    SITagliptin phosphate (JANUVIA) 100 MG Tab Take 1 tablet (100 mg total) by mouth once daily. 90 tablet 3    vitamin D (VITAMIN D3) 1000 units Tab Take 1,000 Units by mouth once daily.      [DISCONTINUED] clopidogreL (PLAVIX) 75 mg tablet TAKE 1 TABLET BY MOUTH ONCE DAILY 90 tablet 3    [DISCONTINUED] hydroCHLOROthiazide (HYDRODIURIL) 12.5 MG Tab Take 1 tablet (12.5 mg total) by mouth as needed (edema). 30 tablet 1    [DISCONTINUED] metoprolol succinate (TOPROL-XL) 25 MG 24 hr tablet Take 1/2 tablet (12.5 mg) by mouth every day. 45 tablet 1    hydroCHLOROthiazide (HYDRODIURIL) 12.5 MG Tab Take 1 tablet (12.5 mg total) by mouth as needed (edema). 30 tablet 1    metoprolol succinate (TOPROL-XL) 25 MG 24 hr tablet Take 1/2 tablet (12.5 mg) by mouth every day. 45 tablet 1     No facility-administered encounter medications on file as of 8/15/2023.       Allergies:  Review of patient's allergies indicates:  No Known Allergies    Health Maintenance:  Immunization History   Administered Date(s) Administered    COVID-19, MRNA, LN-S, PF (Pfizer) (Purple Cap) 01/28/2021, 02/27/2021, 08/27/2021, 04/22/2022    COVID-19, mRNA, LNP-S, bivalent booster, PF (PFIZER OMICRON) 10/07/2022, 04/28/2023    Influenza (FLUAD) - Quadrivalent - Adjuvanted - PF *Preferred* (65+) 12/12/2022    Influenza - Quadrivalent - High Dose - PF (65 years and older) 11/04/2021    Influenza - Trivalent (ADULT) 02/09/2016    Pneumococcal Conjugate - 13 Valent 11/13/2017    Pneumococcal Conjugate - 20 Valent 04/10/2023    Pneumococcal Polysaccharide - 23 Valent 12/01/2014, 02/09/2016    Zoster Recombinant 07/30/2013      Health Maintenance   Topic Date Due    DEXA Scan  Never done    Foot Exam  06/17/2023    TETANUS VACCINE  03/07/2024 (Originally 9/11/1968)    Shingles Vaccine (2 of 2) 03/07/2024 (Originally 9/24/2013)    Lipid Panel  12/12/2023    Mammogram  " 01/18/2024    Hemoglobin A1c  02/15/2024    High Dose Statin  08/15/2024    Pap Smear  10/18/2025    Colorectal Cancer Screening  08/04/2032    Hepatitis C Screening  Completed    Eye Exam  Discontinued        Physical Exam      Vital Signs  Temp: 98.3 °F (36.8 °C)  Temp Source: Oral  Pulse: 63  Resp: 19  SpO2: 98 %  BP: 120/72  BP Location: Left arm  Patient Position: Sitting  Pain Score:   3  Height and Weight  Height: 5' 5" (165.1 cm)  Weight: 58.1 kg (128 lb)  BSA (Calculated - sq m): 1.63 sq meters  BMI (Calculated): 21.3  Weight in (lb) to have BMI = 25: 149.9]    Physical Exam  Vitals and nursing note reviewed.   Constitutional:       Appearance: Normal appearance. She is well-developed.   HENT:      Head: Normocephalic.      Right Ear: Hearing normal.      Left Ear: Hearing normal.      Nose: Nose normal.   Eyes:      General: Lids are normal.      Extraocular Movements: Extraocular movements intact.      Conjunctiva/sclera: Conjunctivae normal.      Pupils: Pupils are equal, round, and reactive to light.   Cardiovascular:      Rate and Rhythm: Normal rate and regular rhythm.      Heart sounds: Normal heart sounds.   Pulmonary:      Effort: Pulmonary effort is normal.      Breath sounds: Normal breath sounds.   Musculoskeletal:         General: Normal range of motion.      Cervical back: Normal range of motion and neck supple.      Right lower leg: No edema.      Left lower leg: No edema.   Skin:     General: Skin is warm and dry.   Neurological:      Mental Status: She is alert and oriented to person, place, and time.      Gait: Gait is intact.   Psychiatric:         Behavior: Behavior is cooperative.          Laboratory:  CBC:  Recent Labs   Lab 07/15/22  1459 12/12/22  1054 04/10/23  1430   WBC 2.92 L 3.98 L 3.22 L   RBC 3.03 L 3.03 L 3.20 L   Hemoglobin 9.8 L 9.5 L 10.0 L   Hematocrit 28.9 L 29.5 L 30.8 L   Platelet Count 144 L 170 133 L   MCV 95.4 97.4 H 96.3 H   MCH 32.3 H 31.4 H 31.3 H   MCHC 33.9 " 32.2 32.5     CMP:  Recent Labs   Lab 07/15/22  1459 12/12/22  1054 04/10/23  1430 08/10/23  1549   Glucose 73 L 109 H 141 H  --    Calcium 9.0 9.2 9.6  --    Albumin 3.6 3.6 3.7  --    Total Protein 7.6 8.1 8.2 7.9   Sodium 137 135 L 140  --    Potassium 4.9 4.8 4.2  --    CO2 28 28 27  --    Chloride 104 102 107  --    BUN 16 29 H 17  --    Alk Phos 57 55 62  --    ALT 34 33 20  --    AST 46 H 47 H 27  --    Bilirubin, Total 0.4 0.4 0.5  --      LIPIDS:  Recent Labs   Lab 05/21/21  1350 11/04/21  1009 05/06/22  1538 12/12/22  1054   TSH  --   --  1.150  --    HDL Cholesterol 70 H 66 H  --  84 H   Cholesterol 160 139  --  179   Triglycerides 52 34 L  --  36   LDL Calculated 80 66  --  88   Cholesterol/HDL Ratio (Risk Factor) 2.3 2.1  --  2.1   Non-HDL 90 73  --  95     TSH:  Recent Labs   Lab 05/06/22  1538   TSH 1.150     A1C:  Recent Labs   Lab 05/14/21  1335 11/04/21  1009 04/07/22  1402 07/15/22  1459 12/12/22  1054 04/10/23  1430 08/15/23  1204   Hemoglobin A1C 6.8 H 6.6 6.2 6.1 5.7 6.4 5.6       Assessment/Plan     Kathryn Marie is a 72 y.o.female with:     1. Hypertension, unspecified type  - metoprolol succinate (TOPROL-XL) 25 MG 24 hr tablet; Take 1/2 tablet (12.5 mg) by mouth every day.  Dispense: 45 tablet; Refill: 1  - hydroCHLOROthiazide (HYDRODIURIL) 12.5 MG Tab; Take 1 tablet (12.5 mg total) by mouth as needed (edema).  Dispense: 30 tablet; Refill: 1    2. Edema, unspecified type  - hydroCHLOROthiazide (HYDRODIURIL) 12.5 MG Tab; Take 1 tablet (12.5 mg total) by mouth as needed (edema).  Dispense: 30 tablet; Refill: 1    3. Type 2 diabetes mellitus without complication, without long-term current use of insulin  - Hemoglobin A1C; Future  - Hemoglobin A1C    4. Polymyalgia rheumatica  - Ambulatory referral/consult to Rheumatology; Future       Total time spent face-to-face and non-face-to-face coordinating care for this encounter was: 20 minutes     Chronic conditions status updated as per HPI.  Other  than changes above, cont current medications and maintain follow up with specialists.  Return to clinic in 3 month(s).    LEO SoodP  [facility]

## 2023-08-17 ENCOUNTER — TELEPHONE (OUTPATIENT)
Dept: FAMILY MEDICINE | Facility: CLINIC | Age: 73
End: 2023-08-17
Payer: MEDICARE

## 2023-08-28 PROBLEM — I63.9 CVA (CEREBRAL VASCULAR ACCIDENT): Status: RESOLVED | Noted: 2022-03-01 | Resolved: 2023-08-28

## 2023-08-31 ENCOUNTER — EXTERNAL CHRONIC CARE MANAGEMENT (OUTPATIENT)
Dept: FAMILY MEDICINE | Facility: CLINIC | Age: 73
End: 2023-08-31
Payer: MEDICARE

## 2023-08-31 PROCEDURE — G0511 CCM/BHI BY RHC/FQHC 20MIN MO: HCPCS | Mod: ,,, | Performed by: FAMILY MEDICINE

## 2023-08-31 PROCEDURE — G0511 PR CHRONIC CARE MGMT, RHC OR FQHC ONLY, 20 MINS OR MORE: ICD-10-PCS | Mod: ,,, | Performed by: FAMILY MEDICINE

## 2023-09-06 ENCOUNTER — TELEPHONE (OUTPATIENT)
Dept: FAMILY MEDICINE | Facility: CLINIC | Age: 73
End: 2023-09-06
Payer: MEDICARE

## 2023-09-06 DIAGNOSIS — E78.5 HYPERLIPIDEMIA, UNSPECIFIED HYPERLIPIDEMIA TYPE: ICD-10-CM

## 2023-09-06 RX ORDER — ROSUVASTATIN CALCIUM 20 MG/1
20 TABLET, COATED ORAL NIGHTLY
Qty: 90 TABLET | Refills: 1 | Status: SHIPPED | OUTPATIENT
Start: 2023-09-06 | End: 2024-01-10 | Stop reason: SDUPTHER

## 2023-09-06 NOTE — TELEPHONE ENCOUNTER
----- Message from Chastity Dsouza MA sent at 9/6/2023  9:02 AM CDT -----  Please give pt a call back concerning her medications.  She is also needing a refill on her crestor sent in to [facility] pharmacy.

## 2023-09-08 ENCOUNTER — TELEPHONE (OUTPATIENT)
Dept: FAMILY MEDICINE | Facility: CLINIC | Age: 73
End: 2023-09-08
Payer: MEDICARE

## 2023-09-08 NOTE — TELEPHONE ENCOUNTER
----- Message from Chastity Dsouza MA sent at 9/6/2023  9:02 AM CDT -----  Please give pt a call back concerning her medications.  She is also needing a refill on her crestor sent in to [facility] pharmacy.

## 2023-09-08 NOTE — TELEPHONE ENCOUNTER
----- Message from Chastity Dsouza MA sent at 9/6/2023  9:02 AM CDT -----  Please give pt a call back concerning her medications.  She is also needing a refill on her crestor sent in to [facility] pharmacy.

## 2023-09-21 ENCOUNTER — OFFICE VISIT (OUTPATIENT)
Dept: OBSTETRICS AND GYNECOLOGY | Facility: CLINIC | Age: 73
End: 2023-09-21
Payer: MEDICARE

## 2023-09-21 VITALS — SYSTOLIC BLOOD PRESSURE: 138 MMHG | DIASTOLIC BLOOD PRESSURE: 72 MMHG

## 2023-09-21 DIAGNOSIS — Z46.89 PESSARY MAINTENANCE: Primary | ICD-10-CM

## 2023-09-21 PROCEDURE — 3075F PR MOST RECENT SYSTOLIC BLOOD PRESS GE 130-139MM HG: ICD-10-PCS | Mod: CPTII,,, | Performed by: OBSTETRICS & GYNECOLOGY

## 2023-09-21 PROCEDURE — 3044F PR MOST RECENT HEMOGLOBIN A1C LEVEL <7.0%: ICD-10-PCS | Mod: CPTII,,, | Performed by: OBSTETRICS & GYNECOLOGY

## 2023-09-21 PROCEDURE — 3288F FALL RISK ASSESSMENT DOCD: CPT | Mod: CPTII,,, | Performed by: OBSTETRICS & GYNECOLOGY

## 2023-09-21 PROCEDURE — 3288F PR FALLS RISK ASSESSMENT DOCUMENTED: ICD-10-PCS | Mod: CPTII,,, | Performed by: OBSTETRICS & GYNECOLOGY

## 2023-09-21 PROCEDURE — 99212 PR OFFICE/OUTPT VISIT, EST, LEVL II, 10-19 MIN: ICD-10-PCS | Mod: S$PBB,,, | Performed by: OBSTETRICS & GYNECOLOGY

## 2023-09-21 PROCEDURE — 3044F HG A1C LEVEL LT 7.0%: CPT | Mod: CPTII,,, | Performed by: OBSTETRICS & GYNECOLOGY

## 2023-09-21 PROCEDURE — 1101F PT FALLS ASSESS-DOCD LE1/YR: CPT | Mod: CPTII,,, | Performed by: OBSTETRICS & GYNECOLOGY

## 2023-09-21 PROCEDURE — 3075F SYST BP GE 130 - 139MM HG: CPT | Mod: CPTII,,, | Performed by: OBSTETRICS & GYNECOLOGY

## 2023-09-21 PROCEDURE — 3078F PR MOST RECENT DIASTOLIC BLOOD PRESSURE < 80 MM HG: ICD-10-PCS | Mod: CPTII,,, | Performed by: OBSTETRICS & GYNECOLOGY

## 2023-09-21 PROCEDURE — 1101F PR PT FALLS ASSESS DOC 0-1 FALLS W/OUT INJ PAST YR: ICD-10-PCS | Mod: CPTII,,, | Performed by: OBSTETRICS & GYNECOLOGY

## 2023-09-21 PROCEDURE — 3078F DIAST BP <80 MM HG: CPT | Mod: CPTII,,, | Performed by: OBSTETRICS & GYNECOLOGY

## 2023-09-21 PROCEDURE — 99212 OFFICE O/P EST SF 10 MIN: CPT | Mod: S$PBB,,, | Performed by: OBSTETRICS & GYNECOLOGY

## 2023-09-21 PROCEDURE — 99213 OFFICE O/P EST LOW 20 MIN: CPT | Mod: PBBFAC | Performed by: OBSTETRICS & GYNECOLOGY

## 2023-09-21 NOTE — PROGRESS NOTES
Subjective:       Patient ID: Kathryn Marie is a 73 y.o. female.    Chief Complaint: Pessary Check (Here for 1 month f/u-pessary left out at last visit due to abrasion. )    Presents for reinsertion of pessary.      Pessary was left out because of abrasion.          Review of Systems      Objective:      Physical Exam  Genitourinary:     Comments: Vaginal vault inspected.  No evidence of recurrent abrasion.  Pessary was lubricated and reinserted.  Excellent support noted.    Bimanual exam revealed uterus normal size no adnexal masses noted.    Discussed follow-up with me in 2 months.        Assessment:       1. Pessary maintenance        Plan:       Patient Instructions   Discussed follow-up with me in 2 months for repeat pessary check.      She will continue following up with Dr. Honeycutt regarding her routine screening including mammography screening bone density screening colonoscopy screening and routine glucose and cholesterol testing.    Presently receiving Prolia every 6 months receiving through primary care provider's office

## 2023-09-21 NOTE — PATIENT INSTRUCTIONS
Discussed follow-up with me in 2 months for repeat pessary check.      She will continue following up with Dr. Honeycutt regarding her routine screening including mammography screening bone density screening colonoscopy screening and routine glucose and cholesterol testing.    Presently receiving Prolia every 6 months receiving through primary care provider's office

## 2023-09-30 ENCOUNTER — EXTERNAL CHRONIC CARE MANAGEMENT (OUTPATIENT)
Dept: FAMILY MEDICINE | Facility: CLINIC | Age: 73
End: 2023-09-30
Payer: MEDICARE

## 2023-09-30 PROCEDURE — G0511 PR CHRONIC CARE MGMT, RHC OR FQHC ONLY, 20 MINS OR MORE: ICD-10-PCS | Mod: ,,, | Performed by: FAMILY MEDICINE

## 2023-09-30 PROCEDURE — G0511 CCM/BHI BY RHC/FQHC 20MIN MO: HCPCS | Mod: ,,, | Performed by: FAMILY MEDICINE

## 2023-10-16 ENCOUNTER — TELEPHONE (OUTPATIENT)
Dept: PAIN MEDICINE | Facility: CLINIC | Age: 73
End: 2023-10-16
Payer: MEDICARE

## 2023-10-16 NOTE — TELEPHONE ENCOUNTER
----- Message from Tali Garg sent at 10/16/2023  8:55 AM CDT -----  [phone] patient missed her appointment wants to know if she can be added to the schedule to be seen soon because she is out of [facility]  ALTA ELMORE   10/16/2023   11:43 AM   Attempted to call patient no answer, unable to leave message.

## 2023-10-17 ENCOUNTER — OFFICE VISIT (OUTPATIENT)
Dept: PAIN MEDICINE | Facility: CLINIC | Age: 73
End: 2023-10-17
Payer: MEDICARE

## 2023-10-17 VITALS
DIASTOLIC BLOOD PRESSURE: 86 MMHG | WEIGHT: 128 LBS | RESPIRATION RATE: 18 BRPM | SYSTOLIC BLOOD PRESSURE: 140 MMHG | BODY MASS INDEX: 21.33 KG/M2 | HEART RATE: 59 BPM | HEIGHT: 65 IN

## 2023-10-17 DIAGNOSIS — M54.12 CERVICAL RADICULOPATHY: Chronic | ICD-10-CM

## 2023-10-17 DIAGNOSIS — M96.1 POSTLAMINECTOMY SYNDROME OF CERVICAL REGION: Chronic | ICD-10-CM

## 2023-10-17 DIAGNOSIS — Z79.899 OTHER LONG TERM (CURRENT) DRUG THERAPY: ICD-10-CM

## 2023-10-17 DIAGNOSIS — M53.3 DISORDER OF SACRUM: Chronic | ICD-10-CM

## 2023-10-17 DIAGNOSIS — M47.817 LUMBOSACRAL SPONDYLOSIS WITHOUT MYELOPATHY: Primary | Chronic | ICD-10-CM

## 2023-10-17 PROCEDURE — 1159F MED LIST DOCD IN RCRD: CPT | Mod: CPTII,,, | Performed by: PHYSICIAN ASSISTANT

## 2023-10-17 PROCEDURE — 3079F DIAST BP 80-89 MM HG: CPT | Mod: CPTII,,, | Performed by: PHYSICIAN ASSISTANT

## 2023-10-17 PROCEDURE — 3288F FALL RISK ASSESSMENT DOCD: CPT | Mod: CPTII,,, | Performed by: PHYSICIAN ASSISTANT

## 2023-10-17 PROCEDURE — 1125F PR PAIN SEVERITY QUANTIFIED, PAIN PRESENT: ICD-10-PCS | Mod: CPTII,,, | Performed by: PHYSICIAN ASSISTANT

## 2023-10-17 PROCEDURE — 3077F SYST BP >= 140 MM HG: CPT | Mod: CPTII,,, | Performed by: PHYSICIAN ASSISTANT

## 2023-10-17 PROCEDURE — 1159F PR MEDICATION LIST DOCUMENTED IN MEDICAL RECORD: ICD-10-PCS | Mod: CPTII,,, | Performed by: PHYSICIAN ASSISTANT

## 2023-10-17 PROCEDURE — 3044F HG A1C LEVEL LT 7.0%: CPT | Mod: CPTII,,, | Performed by: PHYSICIAN ASSISTANT

## 2023-10-17 PROCEDURE — 1125F AMNT PAIN NOTED PAIN PRSNT: CPT | Mod: CPTII,,, | Performed by: PHYSICIAN ASSISTANT

## 2023-10-17 PROCEDURE — 96372 THER/PROPH/DIAG INJ SC/IM: CPT | Mod: PBBFAC | Performed by: PHYSICIAN ASSISTANT

## 2023-10-17 PROCEDURE — 99999PBSHW PR PBB SHADOW TECHNICAL ONLY FILED TO HB: Mod: PBBFAC,,,

## 2023-10-17 PROCEDURE — 80305 DRUG TEST PRSMV DIR OPT OBS: CPT | Mod: PBBFAC | Performed by: PHYSICIAN ASSISTANT

## 2023-10-17 PROCEDURE — 3077F PR MOST RECENT SYSTOLIC BLOOD PRESSURE >= 140 MM HG: ICD-10-PCS | Mod: CPTII,,, | Performed by: PHYSICIAN ASSISTANT

## 2023-10-17 PROCEDURE — 99214 OFFICE O/P EST MOD 30 MIN: CPT | Mod: S$PBB,25,, | Performed by: PHYSICIAN ASSISTANT

## 2023-10-17 PROCEDURE — 99999PBSHW PR PBB SHADOW TECHNICAL ONLY FILED TO HB: ICD-10-PCS | Mod: PBBFAC,,,

## 2023-10-17 PROCEDURE — 3079F PR MOST RECENT DIASTOLIC BLOOD PRESSURE 80-89 MM HG: ICD-10-PCS | Mod: CPTII,,, | Performed by: PHYSICIAN ASSISTANT

## 2023-10-17 PROCEDURE — 3288F PR FALLS RISK ASSESSMENT DOCUMENTED: ICD-10-PCS | Mod: CPTII,,, | Performed by: PHYSICIAN ASSISTANT

## 2023-10-17 PROCEDURE — 99999PBSHW POCT URINE DRUG SCREEN PRESUMP: Mod: PBBFAC,,,

## 2023-10-17 PROCEDURE — 1101F PR PT FALLS ASSESS DOC 0-1 FALLS W/OUT INJ PAST YR: ICD-10-PCS | Mod: CPTII,,, | Performed by: PHYSICIAN ASSISTANT

## 2023-10-17 PROCEDURE — 3044F PR MOST RECENT HEMOGLOBIN A1C LEVEL <7.0%: ICD-10-PCS | Mod: CPTII,,, | Performed by: PHYSICIAN ASSISTANT

## 2023-10-17 PROCEDURE — 99214 PR OFFICE/OUTPT VISIT, EST, LEVL IV, 30-39 MIN: ICD-10-PCS | Mod: S$PBB,25,, | Performed by: PHYSICIAN ASSISTANT

## 2023-10-17 PROCEDURE — 99215 OFFICE O/P EST HI 40 MIN: CPT | Mod: PBBFAC | Performed by: PHYSICIAN ASSISTANT

## 2023-10-17 PROCEDURE — 1101F PT FALLS ASSESS-DOCD LE1/YR: CPT | Mod: CPTII,,, | Performed by: PHYSICIAN ASSISTANT

## 2023-10-17 RX ORDER — HYDROCODONE BITARTRATE AND ACETAMINOPHEN 7.5; 325 MG/1; MG/1
1 TABLET ORAL EVERY 6 HOURS PRN
Qty: 120 TABLET | Refills: 0 | Status: SHIPPED | OUTPATIENT
Start: 2023-10-17 | End: 2024-01-18 | Stop reason: SDUPTHER

## 2023-10-17 RX ORDER — HYDROCODONE BITARTRATE AND ACETAMINOPHEN 7.5; 325 MG/1; MG/1
1 TABLET ORAL EVERY 6 HOURS PRN
Qty: 120 TABLET | Refills: 0 | Status: SHIPPED | OUTPATIENT
Start: 2023-12-16 | End: 2024-01-18 | Stop reason: SDUPTHER

## 2023-10-17 RX ORDER — HYDROCODONE BITARTRATE AND ACETAMINOPHEN 7.5; 325 MG/1; MG/1
1 TABLET ORAL EVERY 6 HOURS PRN
Qty: 120 TABLET | Refills: 0 | Status: SHIPPED | OUTPATIENT
Start: 2023-11-16 | End: 2024-01-18 | Stop reason: SDUPTHER

## 2023-10-17 RX ORDER — KETOROLAC TROMETHAMINE 30 MG/ML
30 INJECTION, SOLUTION INTRAMUSCULAR; INTRAVENOUS
Status: COMPLETED | OUTPATIENT
Start: 2023-10-17 | End: 2023-10-17

## 2023-10-17 RX ADMIN — KETOROLAC TROMETHAMINE 30 MG: 60 INJECTION, SOLUTION INTRAMUSCULAR at 03:10

## 2023-10-17 NOTE — PROGRESS NOTES
Subjective:         Patient ID: Ktahryn Marie is a 73 y.o. female.    Chief Complaint: Low-back Pain        Pain  This is a chronic problem. The current episode started more than 1 year ago. The problem occurs daily. The problem has been waxing and waning. Associated symptoms include arthralgias and neck pain. Pertinent negatives include no anorexia, chest pain, chills, coughing, diaphoresis, fever, sore throat, vertigo or vomiting.     Review of Systems   Constitutional:  Negative for activity change, appetite change, chills, diaphoresis, fever and unexpected weight change.   HENT:  Negative for drooling, ear discharge, ear pain, facial swelling, nosebleeds, sore throat, trouble swallowing, voice change and goiter.    Eyes:  Negative for photophobia, pain, discharge, redness and visual disturbance.   Respiratory:  Negative for apnea, cough, choking, chest tightness, shortness of breath, wheezing and stridor.    Cardiovascular:  Negative for chest pain, palpitations and leg swelling.   Gastrointestinal:  Negative for abdominal distention, anorexia, diarrhea, rectal pain, vomiting and fecal incontinence.   Endocrine: Negative for cold intolerance, heat intolerance, polydipsia, polyphagia and polyuria.   Genitourinary:  Negative for bladder incontinence, dysuria, flank pain, frequency and hot flashes.   Musculoskeletal:  Positive for arthralgias, back pain, leg pain, neck pain and neck stiffness.   Integumentary:  Negative for color change and pallor.   Allergic/Immunologic: Negative for immunocompromised state.   Neurological:  Negative for dizziness, vertigo, seizures, syncope, facial asymmetry, speech difficulty, light-headedness, memory loss and coordination difficulties.   Hematological:  Negative for adenopathy. Does not bruise/bleed easily.   Psychiatric/Behavioral:  Negative for agitation, behavioral problems, confusion, decreased concentration, dysphoric mood, hallucinations, self-injury and suicidal ideas.  ----- Message from MARLI Brower sent at 11/6/2017  4:59 PM CST -----  Notify urine culture was negative for any particular bacteria.  No UTI. Follow up as planned with Elena   The patient is not nervous/anxious and is not hyperactive.            Past Medical History:   Diagnosis Date    Anemia     Atherosclerotic heart disease of native coronary artery without angina pectoris     Carotid artery stenosis 01/15/2014    CHARO  LICA stenosis    Causalgia of lower limb     Cervical radiculopathy     Chronic low back pain     Chronic pain syndrome     CVA (cerebral vascular accident)     right cerebellar    Degenerative joint disease involving multiple joints     Disorder of parathyroid gland, unspecified     Disorder of sacrum     Essential (primary) hypertension     Gammopathy     GERD (gastroesophageal reflux disease)     Heart murmur     Hyperlipidemia     Hyperparathyroidism     Lumbosacral radiculopathy     Lumbosacral spondylosis     Other long term (current) drug therapy     Pernicious anemia     Sciatica     Spinal stenosis of lumbar region     L4-5    Type 2 diabetes mellitus      Past Surgical History:   Procedure Laterality Date    CARPAL TUNNEL RELEASE Right     caudal MOIZ  07/03/2018    CHOLECYSTECTOMY  1975    CORONARY ARTERY BYPASS GRAFT      CORONARY ARTERY BYPASS GRAFT (CABG)      triple    HIP SURGERY Right     INJECTION OF ANESTHETIC AGENT AROUND MEDIAL BRANCH NERVES INNERVATING LUMBAR FACET JOINT Bilateral 1/19/2023    Procedure: Block-nerve-medial branch-lumbar, bilateral L4 through S1;  Surgeon: Ashley Piña MD;  Location: [facility];  Service: Pain Management;  Laterality: Bilateral;    L4-S1 Caudal cath guided MOIZ  07/03/2018    Dr Orta    L5-S1 Caudal cath guided MOIZ  09/26/2014 6/26/2014 4/11/2014    Dr Orta    left shoulder repair Left     NECK SURGERY  2018    does not know what kind    right sacral RF Right 12/26/2013 1/24/2012    Dr Orta    right SIJI Right 10/24/2013    Dr Orta     Social History     Socioeconomic History    Marital status:    Occupational History    Occupation: RETIRED   Tobacco Use     "Smoking status: Never     Passive exposure: Never    Smokeless tobacco: Never   Substance and Sexual Activity    Alcohol use: Not Currently    Drug use: Yes     Types: Hydrocodone     Comment: pain medication with pain treatment     Sexual activity: Not Currently     Family History   Problem Relation Age of Onset    Diabetes Mother     Heart disease Mother     Hypertension Mother     Heart attack Mother     Hypertension Father     Stroke Father     Stroke Sister     Hypertension Sister     Cancer Brother      Review of patient's allergies indicates:  No Known Allergies     Objective:  Vitals:    10/17/23 1405   BP: (!) 140/86   Pulse: (!) 59   Resp: 18   Weight: 58.1 kg (128 lb)   Height: 5' 5" (1.651 m)   PainSc:   9         Physical Exam  Vitals and nursing note reviewed. Exam conducted with a chaperone present.   Constitutional:       General: She is awake. She is not in acute distress.     Appearance: Normal appearance. She is not ill-appearing or diaphoretic.   HENT:      Head: Normocephalic and atraumatic.      Nose: Nose normal.      Mouth/Throat:      Mouth: Mucous membranes are moist.      Pharynx: Oropharynx is clear.   Eyes:      Conjunctiva/sclera: Conjunctivae normal.      Pupils: Pupils are equal, round, and reactive to light.   Cardiovascular:      Rate and Rhythm: Normal rate.   Pulmonary:      Effort: Pulmonary effort is normal. No respiratory distress.   Abdominal:      Palpations: Abdomen is soft.   Musculoskeletal:      Cervical back: Normal range of motion and neck supple. Tenderness present.      Thoracic back: Tenderness present.      Lumbar back: Tenderness present. Decreased range of motion.   Skin:     General: Skin is warm and dry.      Coloration: Skin is not jaundiced or pale.   Neurological:      General: No focal deficit present.      Mental Status: She is alert and oriented to person, place, and time. Mental status is at baseline.      Cranial Nerves: No cranial nerve " deficit (II-XII).   Psychiatric:         Mood and Affect: Mood normal.         Behavior: Behavior normal. Behavior is cooperative.         Thought Content: Thought content normal.         MRI Cervical Spine Without Contrast  Narrative: EXAMINATION:  MRI CERVICAL SPINE WITHOUT CONTRAST    CLINICAL HISTORY:  Neck pain, chronic, degenerative changes on xray;Neck pain, acute, prior cervical surgery;Myelopathy, acute, cervical spine;Myelopathy, chronic, cervical spine; Radiculopathy, cervical region    TECHNIQUE:  Multiplanar, multisequence MR images of the cervical spine were performed without the administration of contrast.    COMPARISON:  2015    FINDINGS:  Alignment: Normal.    Vertebrae: Grade 1 Modic endplate change T1-T2    Discs: Moderate to severe multilevel degenerative disc disease    Cord: Normal.    Skull base and craniocervical junction: Normal.    Degenerative findings:    C2-C3: Posterior disc protrusion, uncovertebral joint and facet change results in mild spinal canal narrowing as well as mild right and moderate left neural foraminal narrowing    C3-C4: Circumferential disc bulge with a central posterior protrusive component, uncovertebral joint and facet change results in severe spinal canal narrowing as well as severe bilateral neural foraminal narrowing    C4-C5: Posterior disc protrusion eccentric to the right, uncovertebral joint and facet change results in severe spinal canal narrowing as well as severe bilateral neural foraminal narrowing    C5-C6: Circumferential disc bulge, uncovertebral joint and facet change results in severe spinal canal narrowing as well as severe bilateral neural foraminal narrowing    C6-C7: Posterior disc protrusion, uncovertebral joint and facet change results in mild spinal canal narrowing as well as moderate bilateral neural foraminal narrowing    C7-T1: Circumferential disc bulge, uncovertebral joint and facet change results in mild spinal canal narrowing as well as  moderate to severe bilateral neural foraminal narrowing    T1-T2: Circumferential disc bulge, uncovertebral joint and facet change results in mild-to-moderate spinal canal narrowing as well as severe bilateral neural foraminal narrowing    Paraspinal muscles & soft tissues: Unremarkable.  Impression: Multilevel degenerative change of the cervical spine.    Electronically signed by: Arnold Lux  Date:    08/02/2023  Time:    13:18         Office Visit on 08/15/2023   Component Date Value Ref Range Status    Hemoglobin A1C 08/15/2023 5.6  4.5 - 6.6 % Final    Estimated Average Glucose 08/15/2023 100  mg/dL Final   Lab Visit on 08/10/2023   Component Date Value Ref Range Status    Total Protein 08/10/2023 7.9  6.4 - 8.2 g/dL Final    Albumin, PE 08/10/2023 4.71  3.5 - 5.2 g/dL Final    Alpha-1-Globulin 08/10/2023 0.2  0.1 - 0.4 g/dL Final    Alpha-2-Globulin 08/10/2023 0.7  0.4 - 1.3 g/dL Final    Beta, PE 08/10/2023 0.7  0.5 - 1.5 g/dL Final    Gamma, PE 08/10/2023 1.7  0.5 - 1.8 g/dL Final    M-Component 1 08/10/2023 1.080  mg/dL Final    Pathologist Interp, Pro Elect, Blo* 08/10/2023 Monoclonal Gammopathy, reflex GOPI results to follow   Final    IgG, GOPI 08/10/2023 Normal   Final    IgG 08/10/2023 869  767 - 1,590 mg/dL Final    IgA, GOPI 08/10/2023 Monoclonal Increase   Final    IgA 08/10/2023 1,870 (H)  61 - 356 mg/dL Final    IgM, GOPI 08/10/2023 Decreased   Final    IgM 08/10/2023 23 (L)  37 - 286 mg/dL Final    Kappa GOPI, Blood 08/10/2023 No Monoclonal Bands Present   Final    [place] 08/10/2023 212.00  170 - 370 Final    Lambda GOPI, Blood 08/10/2023 Monoclonal Band Present   Final    Lambda 08/10/2023 356 (H)  90 - 210 Final    Kappa/Lambda Ratio 08/10/2023 0.60 (L)  1.35 - 2.65 % Final    Pathologist Interp, GOPI, Blood 08/10/2023 IgA Lambda Monoclonal Gammopathy   Final   Office Visit on 06/29/2023   Component Date Value Ref Range Status    POC Amphetamines 06/29/2023 Negative   Negative, Inconclusive Final    POC Barbiturates 06/29/2023 Negative  Negative, Inconclusive Final    POC Benzodiazepines 06/29/2023 Negative  Negative, Inconclusive Final    POC Cocaine 06/29/2023 Negative  Negative, Inconclusive Final    POC THC 06/29/2023 Negative  Negative, Inconclusive Final    POC Methadone 06/29/2023 Negative  Negative, Inconclusive Final    POC Methamphetamine 06/29/2023 Negative  Negative, Inconclusive Final    POC Opiates 06/29/2023 Presumptive Positive (A)  Negative, Inconclusive Final    POC Oxycodone 06/29/2023 Negative  Negative, Inconclusive Final    POC Phencyclidine 06/29/2023 Negative  Negative, Inconclusive Final    POC Methylenedioxymethamphetamine * 06/29/2023 Negative  Negative, Inconclusive Final    POC Tricyclic Antidepressants 06/29/2023 Negative  Negative, Inconclusive Final    POC Buprenorphine 06/29/2023 Negative   Final     Acceptable 06/29/2023 Yes   Final    POC Temperature (Urine) 06/29/2023 90   Final         Orders Placed This Encounter   Procedures    POCT Urine Drug Screen Presump     Interpretive Information:     Negative:  No drug detected at the cut off level.   Positive:  This result represents presumptive positive for the   tested drug, other substances may yield a positive response other   than the analyte of interest. This result should be utilized for   diagnostic purpose only. Confirmation testing will be performed upon physician request only.            Requested Prescriptions     Signed Prescriptions Disp Refills    HYDROcodone-acetaminophen (NORCO) 7.5-325 mg per tablet 120 tablet 0     Sig: Take 1 tablet by mouth every 6 (six) hours as needed for Pain.    HYDROcodone-acetaminophen (NORCO) 7.5-325 mg per tablet 120 tablet 0     Sig: Take 1 tablet by mouth every 6 (six) hours as needed for Pain.    HYDROcodone-acetaminophen (NORCO) 7.5-325 mg per tablet 120 tablet 0     Sig: Take 1 tablet by mouth every 6 (six) hours as  needed for Pain.       Assessment:     1. Lumbosacral spondylosis without myelopathy    2. Cervical radiculopathy    3. Postlaminectomy syndrome of cervical region    4. Disorder of sacrum    5. Other long term (current) drug therapy         A's of Opioid Risk Assessment  Activity:Patient can perform ADL.   Analgesia:Patients pain is partially controlled by current medication. Patient has tried OTC medications such as Tylenol and Ibuprofen with out relief.   Adverse Effects: Patient denies constipation or sedation.  Aberrant Behavior:  reviewed with no aberrant drug seeking/taking behavior.  Overdose reversal drug naloxone discussed    Drug screen reviewed      X-ray lumbar spine [facility] November 28, 2022  Grade 1 anterior listhesis L3/4 multiple level degenerative changes pedicle screws rods posterior L4/5 interbody hardware L4/5  Prominent degenerative changes L2/3 L3/4 moderate facet joint arthropathy throughout    X-ray sacroiliac joints [facility] November 28, 2022 osteoarthritis sacroiliac joints      Plan:    Narcan February 2023    Rheumatoid arthritis     Using 4 point walker/wheelchair assistance ambulation    Did not receive permission to hold Plavix for repeat procedure      Last refill date by  September 9, 2023 Norco 7.5 120 tablets    Complaint back pain joint pain requesting Toradol injection     Toradol 30 mg IM, tolerated well    Otherwise she states she would like to continue with conservative management    Continue current medication    Continue activity as tolerated     Follow-up 3 months    Dr. Piña, January 2024    Bring original prescription medication bottles/container/box with labels to each visit

## 2023-10-31 ENCOUNTER — EXTERNAL CHRONIC CARE MANAGEMENT (OUTPATIENT)
Dept: FAMILY MEDICINE | Facility: CLINIC | Age: 73
End: 2023-10-31
Payer: MEDICARE

## 2023-10-31 PROCEDURE — G0511 CCM/BHI BY RHC/FQHC 20MIN MO: HCPCS | Mod: ,,, | Performed by: FAMILY MEDICINE

## 2023-10-31 PROCEDURE — G0511 PR CHRONIC CARE MGMT, RHC OR FQHC ONLY, 20 MINS OR MORE: ICD-10-PCS | Mod: ,,, | Performed by: FAMILY MEDICINE

## 2023-11-01 ENCOUNTER — OFFICE VISIT (OUTPATIENT)
Dept: FAMILY MEDICINE | Facility: CLINIC | Age: 73
End: 2023-11-01
Payer: MEDICARE

## 2023-11-01 VITALS
BODY MASS INDEX: 21.33 KG/M2 | HEART RATE: 68 BPM | RESPIRATION RATE: 13 BRPM | WEIGHT: 128 LBS | OXYGEN SATURATION: 99 % | HEIGHT: 65 IN | DIASTOLIC BLOOD PRESSURE: 62 MMHG | SYSTOLIC BLOOD PRESSURE: 138 MMHG | TEMPERATURE: 98 F

## 2023-11-01 DIAGNOSIS — Z86.73 HISTORY OF CVA (CEREBROVASCULAR ACCIDENT): ICD-10-CM

## 2023-11-01 DIAGNOSIS — K21.9 GASTROESOPHAGEAL REFLUX DISEASE WITHOUT ESOPHAGITIS: ICD-10-CM

## 2023-11-01 DIAGNOSIS — Z23 NEED FOR IMMUNIZATION AGAINST INFLUENZA: Primary | ICD-10-CM

## 2023-11-01 PROCEDURE — G0008 ADMIN INFLUENZA VIRUS VAC: HCPCS | Mod: ,,, | Performed by: FAMILY MEDICINE

## 2023-11-01 PROCEDURE — 3044F HG A1C LEVEL LT 7.0%: CPT | Mod: ,,, | Performed by: FAMILY MEDICINE

## 2023-11-01 PROCEDURE — 99213 OFFICE O/P EST LOW 20 MIN: CPT | Mod: ,,, | Performed by: FAMILY MEDICINE

## 2023-11-01 PROCEDURE — 99213 PR OFFICE/OUTPT VISIT, EST, LEVL III, 20-29 MIN: ICD-10-PCS | Mod: ,,, | Performed by: FAMILY MEDICINE

## 2023-11-01 PROCEDURE — 1159F PR MEDICATION LIST DOCUMENTED IN MEDICAL RECORD: ICD-10-PCS | Mod: ,,, | Performed by: FAMILY MEDICINE

## 2023-11-01 PROCEDURE — 90694 FLU VACCINE - QUADRIVALENT - ADJUVANTED: ICD-10-PCS | Mod: ,,, | Performed by: FAMILY MEDICINE

## 2023-11-01 PROCEDURE — 1125F PR PAIN SEVERITY QUANTIFIED, PAIN PRESENT: ICD-10-PCS | Mod: ,,, | Performed by: FAMILY MEDICINE

## 2023-11-01 PROCEDURE — 1125F AMNT PAIN NOTED PAIN PRSNT: CPT | Mod: ,,, | Performed by: FAMILY MEDICINE

## 2023-11-01 PROCEDURE — 3078F DIAST BP <80 MM HG: CPT | Mod: ,,, | Performed by: FAMILY MEDICINE

## 2023-11-01 PROCEDURE — 1101F PT FALLS ASSESS-DOCD LE1/YR: CPT | Mod: ,,, | Performed by: FAMILY MEDICINE

## 2023-11-01 PROCEDURE — 1101F PR PT FALLS ASSESS DOC 0-1 FALLS W/OUT INJ PAST YR: ICD-10-PCS | Mod: ,,, | Performed by: FAMILY MEDICINE

## 2023-11-01 PROCEDURE — 3288F FALL RISK ASSESSMENT DOCD: CPT | Mod: ,,, | Performed by: FAMILY MEDICINE

## 2023-11-01 PROCEDURE — 1159F MED LIST DOCD IN RCRD: CPT | Mod: ,,, | Performed by: FAMILY MEDICINE

## 2023-11-01 PROCEDURE — 3078F PR MOST RECENT DIASTOLIC BLOOD PRESSURE < 80 MM HG: ICD-10-PCS | Mod: ,,, | Performed by: FAMILY MEDICINE

## 2023-11-01 PROCEDURE — 3075F PR MOST RECENT SYSTOLIC BLOOD PRESS GE 130-139MM HG: ICD-10-PCS | Mod: ,,, | Performed by: FAMILY MEDICINE

## 2023-11-01 PROCEDURE — 3075F SYST BP GE 130 - 139MM HG: CPT | Mod: ,,, | Performed by: FAMILY MEDICINE

## 2023-11-01 PROCEDURE — 3044F PR MOST RECENT HEMOGLOBIN A1C LEVEL <7.0%: ICD-10-PCS | Mod: ,,, | Performed by: FAMILY MEDICINE

## 2023-11-01 PROCEDURE — G0008 FLU VACCINE - QUADRIVALENT - ADJUVANTED: ICD-10-PCS | Mod: ,,, | Performed by: FAMILY MEDICINE

## 2023-11-01 PROCEDURE — 90694 VACC AIIV4 NO PRSRV 0.5ML IM: CPT | Mod: ,,, | Performed by: FAMILY MEDICINE

## 2023-11-01 PROCEDURE — 3288F PR FALLS RISK ASSESSMENT DOCUMENTED: ICD-10-PCS | Mod: ,,, | Performed by: FAMILY MEDICINE

## 2023-11-01 RX ORDER — PANTOPRAZOLE SODIUM 40 MG/1
40 TABLET, DELAYED RELEASE ORAL DAILY
Qty: 90 TABLET | Refills: 3 | Status: SHIPPED | OUTPATIENT
Start: 2023-11-01 | End: 2024-03-29 | Stop reason: CLARIF

## 2023-11-01 NOTE — PROGRESS NOTES
Kathryn Marie is a 73 y.o. female seen today for follow-up on her diabetes.  Patient's A1c was to goal when checked in August and she will be due a follow-up in mid November.  We discussed coming back to the clinic in about a month or so.  Her A1c was to goal and her blood sugars remain well controlled.  Patient also has a history of CVA and needs follow-up with neurology because she has questions about Plavix.  Patient is also supposed to be on Eliquis per her cardiologist but does not have the medication in her bag.  Patient has a scheduled appointment in December with Dr. Leonardo and will discuss the medication at that time.      Past Medical History:   Diagnosis Date    Anemia     Atherosclerotic heart disease of native coronary artery without angina pectoris     Carotid artery stenosis 01/15/2014    CHARO  LICA stenosis    Causalgia of lower limb     Cervical radiculopathy     Chronic low back pain     Chronic pain syndrome     CVA (cerebral vascular accident)     right cerebellar    Degenerative joint disease involving multiple joints     Disorder of parathyroid gland, unspecified     Disorder of sacrum     Essential (primary) hypertension     Gammopathy     GERD (gastroesophageal reflux disease)     Heart murmur     Hyperlipidemia     Hyperparathyroidism     Lumbosacral radiculopathy     Lumbosacral spondylosis     Other long term (current) drug therapy     Pernicious anemia     Sciatica     Spinal stenosis of lumbar region     L4-5    Type 2 diabetes mellitus      Family History   Problem Relation Age of Onset    Diabetes Mother     Heart disease Mother     Hypertension Mother     Heart attack Mother     Hypertension Father     Stroke Father     Stroke Sister     Hypertension Sister     Cancer Brother      Current Outpatient Medications on File Prior to Visit   Medication Sig Dispense Refill    amLODIPine (NORVASC) 10 MG tablet Take 1 tablet (10 mg total) by mouth once daily. 90 tablet 1    aspirin (ECOTRIN)  81 MG EC tablet Take 81 mg by mouth once daily.      blood sugar diagnostic (ONETOUCH VERIO TEST STRIPS MISC) by Misc.(Non-Drug; Combo Route) route. Use 1 strip to check blood glucose every morning, fasting for E11.9      blood-glucose meter (ONETOUCH VERIO METER MISC) by Misc.(Non-Drug; Combo Route) route. Use for glucose checks once daily, E11.9      dapagliflozin (FARXIGA) 5 mg Tab tablet Take 1 tablet (5 mg total) by mouth once daily. 90 tablet 3    diclofenac sodium (VOLTAREN) 1 % Gel APPLY 1 APPLICATION TO  AFFECTED AREA(S) TOPICALLY  TWICE DAILY 300 g 2    ELIQUIS 5 mg Tab Take 5 mg by mouth once daily.      ferrous sulfate (FEOSOL) 325 mg (65 mg iron) Tab tablet Take 325 mg by mouth daily with breakfast.      fluticasone propionate (FLONASE) 50 mcg/actuation nasal spray 2 sprays (100 mcg total) by Each Nostril route once daily. 48 g 1    folic acid (FOLVITE) 1 MG tablet Take 1 mg by mouth once daily.      hydroCHLOROthiazide (HYDRODIURIL) 12.5 MG Tab Take 1 tablet (12.5 mg total) by mouth as needed (edema). 30 tablet 1    HYDROcodone-acetaminophen (NORCO) 7.5-325 mg per tablet Take 1 tablet by mouth every 6 (six) hours as needed for Pain. 120 tablet 0    [START ON 11/16/2023] HYDROcodone-acetaminophen (NORCO) 7.5-325 mg per tablet Take 1 tablet by mouth every 6 (six) hours as needed for Pain. 120 tablet 0    [START ON 12/16/2023] HYDROcodone-acetaminophen (NORCO) 7.5-325 mg per tablet Take 1 tablet by mouth every 6 (six) hours as needed for Pain. 120 tablet 0    ipratropium (ATROVENT) 21 mcg (0.03 %) nasal spray USE 2 SPRAYS NASALLY EVERY  12 HOURS 90 mL 3    methotrexate 2.5 MG Tab 2.5 mg. Take 10 tablets by mouth every week      metoprolol succinate (TOPROL-XL) 25 MG 24 hr tablet Take 1/2 tablet (12.5 mg) by mouth every day. 45 tablet 1    metroNIDAZOLE (METROGEL) 0.75 % (37.5mg/5 gram) vaginal gel 1 applicator full per vagina nightly for 5 nights. 70 g 0    rosuvastatin (CRESTOR) 20 MG tablet Take 1  tablet (20 mg total) by mouth every evening. 90 tablet 1    SITagliptin phosphate (JANUVIA) 100 MG Tab Take 1 tablet (100 mg total) by mouth once daily. 90 tablet 3    vitamin D (VITAMIN D3) 1000 units Tab Take 1,000 Units by mouth once daily.      [DISCONTINUED] omeprazole (PRILOSEC) 20 MG capsule Take 1 capsule by mouth once daily.       No current facility-administered medications on file prior to visit.     Immunization History   Administered Date(s) Administered    COVID-19, MRNA, LN-S, PF (Pfizer) (Purple Cap) 01/28/2021, 02/27/2021, 08/27/2021, 04/22/2022    COVID-19, mRNA, LNP-S, bivalent booster, PF (PFIZER OMICRON) 10/07/2022, 04/28/2023    Influenza (FLUAD) - Quadrivalent - Adjuvanted - PF *Preferred* (65+) 12/12/2022    Influenza - Quadrivalent - High Dose - PF (65 years and older) 11/04/2021    Influenza - Trivalent (ADULT) 02/09/2016    Pneumococcal Conjugate - 13 Valent 11/13/2017    Pneumococcal Conjugate - 20 Valent 04/10/2023    Pneumococcal Polysaccharide - 23 Valent 12/01/2014, 02/09/2016    Zoster Recombinant 07/30/2013       Review of Systems   Constitutional:  Positive for malaise/fatigue. Negative for fever and weight loss.   Respiratory:  Negative for shortness of breath.    Cardiovascular:  Negative for chest pain and palpitations.   Gastrointestinal:  Negative for nausea and vomiting.   Musculoskeletal:  Positive for joint pain.   Psychiatric/Behavioral:  Negative for depression.         There were no vitals filed for this visit.    Physical Exam  Vitals reviewed.   Constitutional:       Appearance: Normal appearance.   HENT:      Head: Normocephalic.   Eyes:      Extraocular Movements: Extraocular movements intact.      Conjunctiva/sclera: Conjunctivae normal.      Pupils: Pupils are equal, round, and reactive to light.   Neck:      Thyroid: No thyroid mass or thyromegaly.   Cardiovascular:      Rate and Rhythm: Normal rate and regular rhythm.      Heart sounds: Normal heart sounds. No  murmur heard.     No gallop.   Pulmonary:      Effort: Pulmonary effort is normal. No respiratory distress.      Breath sounds: Normal breath sounds. No wheezing or rales.   Skin:     General: Skin is warm and dry.      Coloration: Skin is not jaundiced or pale.   Neurological:      Mental Status: She is alert.   Psychiatric:         Mood and Affect: Mood normal.         Behavior: Behavior normal.         Thought Content: Thought content normal.         Judgment: Judgment normal.          Assessment and Plan  1. Need for immunization against influenza  -     Influenza (FLUAD) - Quadrivalent (Adjuvanted) *Preferred* (65+) (PF)    2. History of CVA (cerebrovascular accident)  -     Ambulatory referral/consult to Neurology; Future; Expected date: 11/08/2023    3. Gastroesophageal reflux disease without esophagitis  -     pantoprazole (PROTONIX) 40 MG tablet; Take 1 tablet (40 mg total) by mouth once daily.  Dispense: 90 tablet; Refill: 3             Return to clinic in 1 month for follow-up lab work or as needed.    Health Maintenance Topics with due status: Not Due       Topic Last Completion Date    Colorectal Cancer Screening 08/04/2022    Pap Smear 10/18/2022    Lipid Panel 12/12/2022    Naloxone Prescription 02/01/2023    Hemoglobin A1c 08/15/2023    High Dose Statin 09/06/2023

## 2023-11-13 RX ORDER — CLOPIDOGREL BISULFATE 75 MG/1
TABLET ORAL
COMMUNITY

## 2023-11-13 RX ORDER — SAXAGLIPTIN 5 MG/1
TABLET, FILM COATED ORAL
COMMUNITY
End: 2024-01-10 | Stop reason: ALTCHOICE

## 2023-11-13 RX ORDER — PREDNISONE 5 MG/1
TABLET ORAL
COMMUNITY

## 2023-11-13 RX ORDER — DENOSUMAB 60 MG/ML
1 INJECTION SUBCUTANEOUS
COMMUNITY
Start: 2023-09-13

## 2023-11-13 RX ORDER — LATANOPROST 50 UG/ML
1 SOLUTION/ DROPS OPHTHALMIC NIGHTLY
COMMUNITY
Start: 2023-10-16

## 2023-11-13 RX ORDER — INSULIN DEGLUDEC 200 U/ML
INJECTION, SOLUTION SUBCUTANEOUS
COMMUNITY

## 2023-11-13 RX ORDER — GABAPENTIN 300 MG/1
CAPSULE ORAL
COMMUNITY

## 2023-11-13 RX ORDER — DICYCLOMINE HYDROCHLORIDE 10 MG/1
CAPSULE ORAL
COMMUNITY

## 2023-11-13 RX ORDER — FUROSEMIDE 40 MG/1
TABLET ORAL
COMMUNITY

## 2023-11-14 ENCOUNTER — OFFICE VISIT (OUTPATIENT)
Dept: NEUROLOGY | Facility: CLINIC | Age: 73
End: 2023-11-14
Payer: MEDICARE

## 2023-11-14 VITALS
DIASTOLIC BLOOD PRESSURE: 96 MMHG | BODY MASS INDEX: 21.33 KG/M2 | WEIGHT: 128 LBS | HEART RATE: 84 BPM | HEIGHT: 65 IN | RESPIRATION RATE: 18 BRPM | OXYGEN SATURATION: 99 % | SYSTOLIC BLOOD PRESSURE: 169 MMHG

## 2023-11-14 DIAGNOSIS — M54.12 CERVICAL RADICULOPATHY: Primary | Chronic | ICD-10-CM

## 2023-11-14 PROCEDURE — 99215 OFFICE O/P EST HI 40 MIN: CPT | Mod: PBBFAC | Performed by: PSYCHIATRY & NEUROLOGY

## 2023-11-14 PROCEDURE — 99214 PR OFFICE/OUTPT VISIT, EST, LEVL IV, 30-39 MIN: ICD-10-PCS | Mod: S$PBB,,, | Performed by: PSYCHIATRY & NEUROLOGY

## 2023-11-14 PROCEDURE — 99214 OFFICE O/P EST MOD 30 MIN: CPT | Mod: S$PBB,,, | Performed by: PSYCHIATRY & NEUROLOGY

## 2023-11-14 NOTE — PROGRESS NOTES
Subjective:       Patient ID: Kathryn Marie is a 73 y.o. female     Chief Complaint:    Chief Complaint   Patient presents with    Follow-up     Cervical radiculopathy        Allergies:  Patient has no known allergies.    Current Medications:    Outpatient Encounter Medications as of 11/14/2023   Medication Sig Dispense Refill    amLODIPine (NORVASC) 10 MG tablet Take 1 tablet (10 mg total) by mouth once daily. 90 tablet 1    aspirin (ECOTRIN) 81 MG EC tablet Take 81 mg by mouth once daily.      blood sugar diagnostic (ONETOUCH VERIO TEST STRIPS MISC) by Misc.(Non-Drug; Combo Route) route. Use 1 strip to check blood glucose every morning, fasting for E11.9      blood-glucose meter (ONETOUCH VERIO METER MISC) by Misc.(Non-Drug; Combo Route) route. Use for glucose checks once daily, E11.9      clopidogreL (PLAVIX) 75 mg tablet Take by mouth.      dapagliflozin (FARXIGA) 5 mg Tab tablet Take 1 tablet (5 mg total) by mouth once daily. 90 tablet 3    denosumab (PROLIA) 60 mg/mL Syrg Inject 1 mL into the skin.      diclofenac sodium (VOLTAREN) 1 % Gel APPLY 1 APPLICATION TO  AFFECTED AREA(S) TOPICALLY  TWICE DAILY 300 g 2    dicyclomine (BENTYL) 10 MG capsule       ELIQUIS 5 mg Tab Take 5 mg by mouth once daily.      ferrous sulfate (FEOSOL) 325 mg (65 mg iron) Tab tablet Take 325 mg by mouth daily with breakfast.      fluticasone propionate (FLONASE) 50 mcg/actuation nasal spray 2 sprays (100 mcg total) by Each Nostril route once daily. 48 g 1    folic acid (FOLVITE) 1 MG tablet Take 1 mg by mouth once daily.      furosemide (LASIX) 40 MG tablet take 1 tablet by oral route  every day as needed weight gain of 3 # or more      gabapentin (NEURONTIN) 300 MG capsule       hydroCHLOROthiazide (HYDRODIURIL) 12.5 MG Tab Take 1 tablet (12.5 mg total) by mouth as needed (edema). 30 tablet 1    HYDROcodone-acetaminophen (NORCO) 7.5-325 mg per tablet Take 1 tablet by mouth every 6 (six) hours as needed for Pain. 120 tablet 0     [START ON 11/16/2023] HYDROcodone-acetaminophen (NORCO) 7.5-325 mg per tablet Take 1 tablet by mouth every 6 (six) hours as needed for Pain. 120 tablet 0    [START ON 12/16/2023] HYDROcodone-acetaminophen (NORCO) 7.5-325 mg per tablet Take 1 tablet by mouth every 6 (six) hours as needed for Pain. 120 tablet 0    insulin degludec (TRESIBA FLEXTOUCH U-200) 200 unit/mL (3 mL) insulin pen Inject into the skin.      ipratropium (ATROVENT) 21 mcg (0.03 %) nasal spray USE 2 SPRAYS NASALLY EVERY  12 HOURS 90 mL 3    latanoprost 0.005 % ophthalmic solution Place 1 drop into both eyes every evening.      methotrexate 2.5 MG Tab 2.5 mg. Take 10 tablets by mouth every week      metoprolol succinate (TOPROL-XL) 25 MG 24 hr tablet Take 1/2 tablet (12.5 mg) by mouth every day. 45 tablet 1    metroNIDAZOLE (METROGEL) 0.75 % (37.5mg/5 gram) vaginal gel 1 applicator full per vagina nightly for 5 nights. 70 g 0    pantoprazole (PROTONIX) 40 MG tablet Take 1 tablet (40 mg total) by mouth once daily. 90 tablet 3    predniSONE (DELTASONE) 5 MG tablet Take by mouth.      rosuvastatin (CRESTOR) 20 MG tablet Take 1 tablet (20 mg total) by mouth every evening. 90 tablet 1    SAXagliptin (ONGLYZA) 5 mg Tab tablet       SITagliptin phosphate (JANUVIA) 100 MG Tab Take 1 tablet (100 mg total) by mouth once daily. 90 tablet 3    vitamin D (VITAMIN D3) 1000 units Tab Take 1,000 Units by mouth once daily.       No facility-administered encounter medications on file as of 11/14/2023.       History of Present Illness  72 yo BF w/ cervical radiculopathy and recent Mri cervical showed multilevel DDD   P ts/p stroke age 48 and on ASPIRIN since then   Also had CABG 15 yrs ago and placed on OAC she cannot recall?  MAR has her taking Eliquis, Plavix and ASPIRIN but she states only taking the latter ? However she may be taking the Eliquis ?   She needs to f/u with [facility] regularly  She desires no eval for surgical intervention of cervical  pathology         Past Medical History:   Diagnosis Date    Anemia     Atherosclerotic heart disease of native coronary artery without angina pectoris     Carotid artery stenosis 01/15/2014    CHARO  LICA stenosis    Causalgia of lower limb     Cervical radiculopathy     Chronic low back pain     Chronic pain syndrome     CVA (cerebral vascular accident)     right cerebellar    Degenerative joint disease involving multiple joints     Disorder of parathyroid gland, unspecified     Disorder of sacrum     Essential (primary) hypertension     Gammopathy     GERD (gastroesophageal reflux disease)     Heart murmur     Hyperlipidemia     Hyperparathyroidism     Lumbosacral radiculopathy     Lumbosacral spondylosis     Other long term (current) drug therapy     Pernicious anemia     Sciatica     Spinal stenosis of lumbar region     L4-5    Type 2 diabetes mellitus        Past Surgical History:   Procedure Laterality Date    CARPAL TUNNEL RELEASE Right     caudal MOIZ  07/03/2018    CHOLECYSTECTOMY  1975    CORONARY ARTERY BYPASS GRAFT      CORONARY ARTERY BYPASS GRAFT (CABG)      triple    HIP SURGERY Right     INJECTION OF ANESTHETIC AGENT AROUND MEDIAL BRANCH NERVES INNERVATING LUMBAR FACET JOINT Bilateral 1/19/2023    Procedure: Block-nerve-medial branch-lumbar, bilateral L4 through S1;  Surgeon: Ashley Piña MD;  Location: [facility];  Service: Pain Management;  Laterality: Bilateral;    L4-S1 Caudal cath guided MOIZ  07/03/2018    Dr Orta    L5-S1 Caudal cath guided MOIZ  09/26/2014 6/26/2014 4/11/2014    Dr Orta    left shoulder repair Left     NECK SURGERY  2018    does not know what kind    right sacral RF Right 12/26/2013 1/24/2012    Dr Orta    right SIJI Right 10/24/2013    Dr Orta       Social History  Ms. Marie  reports that she has never smoked. She has never been exposed to tobacco smoke. She has never used smokeless tobacco. She reports that she does not currently use alcohol. She reports current  "drug use. Drug: Hydrocodone.    Family History  Ms.'s Marie family history includes Cancer in her brother; Diabetes in her mother; Heart attack in her mother; Heart disease in her mother; Hypertension in her father, mother, and sister; Stroke in her father and sister.    Review of Systems  Review of Systems   Musculoskeletal:  Positive for joint pain and neck pain.   Neurological:  Positive for tingling, sensory change and weakness. Negative for headaches.   All other systems reviewed and are negative.     Objective:   BP (!) 169/96 (BP Location: Left arm, Patient Position: Sitting, BP Method: Large (Automatic))   Pulse 84   Resp 18   Ht 5' 5" (1.651 m)   Wt 58.1 kg (128 lb)   SpO2 99%   BMI 21.30 kg/m²    NEUROLOGICAL EXAMINATION:     MENTAL STATUS   Oriented to person, place, and time.   Level of consciousness: alert  Knowledge: consistent with education.     CRANIAL NERVES   Cranial nerves II through XII intact.     MOTOR EXAM     Strength   Strength 5/5 except as noted.     REFLEXES     Reflexes   Right brachioradialis: 1+  Left brachioradialis: 1+  Right biceps: 1+  Left biceps: 1+  Right triceps: 1+  Left triceps: 1+  Right patellar: 1+  Left patellar: 1+  Right achilles: 1+  Left achilles: 1+  Right : 1+  Left : 1+    GAIT AND COORDINATION        Uses walker for ambulation         Physical Exam  Vitals reviewed.   Constitutional:       Appearance: She is normal weight.   Neurological:      Mental Status: She is alert and oriented to person, place, and time. Mental status is at baseline.      Cranial Nerves: Cranial nerves 2-12 are intact.      Deep Tendon Reflexes:      Reflex Scores:       Tricep reflexes are 1+ on the right side and 1+ on the left side.       Bicep reflexes are 1+ on the right side and 1+ on the left side.       Brachioradialis reflexes are 1+ on the right side and 1+ on the left side.       Patellar reflexes are 1+ on the right side and 1+ on the left side.       Achilles " reflexes are 1+ on the right side and 1+ on the left side.         Assessment:     Cervical radiculopathy         Primary Diagnosis and ICD10  Cervical radiculopathy [M54.12]    Plan:     Patient Instructions   Cont ASPIRIN 81 mg dialy  Cont F/u Cardiology regularly- needs to likely cont Eliquis   Phys activity as tolerated  Pt Not desiring any surgical eval at this time as she has had C and L surgery  in past   F/u 6 months    There are no discontinued medications.    Requested Prescriptions      No prescriptions requested or ordered in this encounter

## 2023-11-14 NOTE — PATIENT INSTRUCTIONS
Cont ASPIRIN 81 mg dialy  Cont F/u Cardiology regularly- needs to likely cont Eliquis   Phys activity as tolerated  Pt Not desiring any surgical eval at this time as she has had C and L surgery  in past   F/u 6 months

## 2023-11-30 ENCOUNTER — EXTERNAL CHRONIC CARE MANAGEMENT (OUTPATIENT)
Dept: FAMILY MEDICINE | Facility: CLINIC | Age: 73
End: 2023-11-30
Payer: MEDICARE

## 2023-11-30 PROCEDURE — G0511 CCM/BHI BY RHC/FQHC 20MIN MO: HCPCS | Mod: ,,, | Performed by: FAMILY MEDICINE

## 2023-11-30 PROCEDURE — G0511 PR CHRONIC CARE MGMT, RHC OR FQHC ONLY, 20 MINS OR MORE: ICD-10-PCS | Mod: ,,, | Performed by: FAMILY MEDICINE

## 2023-12-11 ENCOUNTER — OFFICE VISIT (OUTPATIENT)
Dept: FAMILY MEDICINE | Facility: CLINIC | Age: 73
End: 2023-12-11
Payer: MEDICARE

## 2023-12-11 VITALS
WEIGHT: 128 LBS | TEMPERATURE: 97 F | RESPIRATION RATE: 18 BRPM | HEART RATE: 65 BPM | HEIGHT: 65 IN | BODY MASS INDEX: 21.33 KG/M2 | SYSTOLIC BLOOD PRESSURE: 132 MMHG | OXYGEN SATURATION: 99 % | DIASTOLIC BLOOD PRESSURE: 75 MMHG

## 2023-12-11 DIAGNOSIS — D52.8 OTHER FOLATE DEFICIENCY ANEMIAS: ICD-10-CM

## 2023-12-11 DIAGNOSIS — I10 HYPERTENSION, UNSPECIFIED TYPE: ICD-10-CM

## 2023-12-11 DIAGNOSIS — E11.9 TYPE 2 DIABETES MELLITUS WITHOUT COMPLICATION, WITHOUT LONG-TERM CURRENT USE OF INSULIN: Primary | ICD-10-CM

## 2023-12-11 DIAGNOSIS — D51.3 OTHER DIETARY VITAMIN B12 DEFICIENCY ANEMIA: ICD-10-CM

## 2023-12-11 DIAGNOSIS — D64.9 ANEMIA, UNSPECIFIED TYPE: ICD-10-CM

## 2023-12-11 DIAGNOSIS — E83.52 HYPERCALCEMIA: ICD-10-CM

## 2023-12-11 LAB
ALBUMIN SERPL BCP-MCNC: 3.4 G/DL (ref 3.5–5)
ALBUMIN/GLOB SERPL: 0.9 {RATIO}
ALP SERPL-CCNC: 49 U/L (ref 55–142)
ALT SERPL W P-5'-P-CCNC: 19 U/L (ref 13–56)
ANION GAP SERPL CALCULATED.3IONS-SCNC: 10 MMOL/L (ref 7–16)
AST SERPL W P-5'-P-CCNC: 26 U/L (ref 15–37)
BASOPHILS # BLD AUTO: 0.01 K/UL (ref 0–0.2)
BASOPHILS NFR BLD AUTO: 0.4 % (ref 0–1)
BILIRUB SERPL-MCNC: 0.4 MG/DL (ref ?–1.2)
BUN SERPL-MCNC: 14 MG/DL (ref 7–18)
BUN/CREAT SERPL: 16 (ref 6–20)
CALCIUM SERPL-MCNC: 8 MG/DL (ref 8.5–10.1)
CHLORIDE SERPL-SCNC: 111 MMOL/L (ref 98–107)
CO2 SERPL-SCNC: 24 MMOL/L (ref 21–32)
CREAT SERPL-MCNC: 0.85 MG/DL (ref 0.55–1.02)
DIFFERENTIAL METHOD BLD: ABNORMAL
EGFR (NO RACE VARIABLE) (RUSH/TITUS): 72 ML/MIN/1.73M2
EOSINOPHIL # BLD AUTO: 0.01 K/UL (ref 0–0.5)
EOSINOPHIL NFR BLD AUTO: 0.4 % (ref 1–4)
ERYTHROCYTE [DISTWIDTH] IN BLOOD BY AUTOMATED COUNT: 15.2 % (ref 11.5–14.5)
EST. AVERAGE GLUCOSE BLD GHB EST-MCNC: 117 MG/DL
GLOBULIN SER-MCNC: 3.8 G/DL (ref 2–4)
GLUCOSE SERPL-MCNC: 96 MG/DL (ref 74–106)
HBA1C MFR BLD HPLC: 5.7 % (ref 4.5–6.6)
HCT VFR BLD AUTO: 28.6 % (ref 38–47)
HGB BLD-MCNC: 9.4 G/DL (ref 12–16)
IMM GRANULOCYTES # BLD AUTO: 0.02 K/UL (ref 0–0.04)
IMM GRANULOCYTES NFR BLD: 0.8 % (ref 0–0.4)
LYMPHOCYTES # BLD AUTO: 1.17 K/UL (ref 1–4.8)
LYMPHOCYTES NFR BLD AUTO: 46.1 % (ref 27–41)
MCH RBC QN AUTO: 31.5 PG (ref 27–31)
MCHC RBC AUTO-ENTMCNC: 32.9 G/DL (ref 32–36)
MCV RBC AUTO: 96 FL (ref 80–96)
MONOCYTES # BLD AUTO: 0.31 K/UL (ref 0–0.8)
MONOCYTES NFR BLD AUTO: 12.2 % (ref 2–6)
MPC BLD CALC-MCNC: 11.3 FL (ref 9.4–12.4)
NEUTROPHILS # BLD AUTO: 1.02 K/UL (ref 1.8–7.7)
NEUTROPHILS NFR BLD AUTO: 40.1 % (ref 53–65)
NRBC # BLD AUTO: 0 X10E3/UL
NRBC, AUTO (.00): 0 %
PLATELET # BLD AUTO: 134 K/UL (ref 150–400)
POTASSIUM SERPL-SCNC: 4 MMOL/L (ref 3.5–5.1)
PROT SERPL-MCNC: 7.2 G/DL (ref 6.4–8.2)
RBC # BLD AUTO: 2.98 M/UL (ref 4.2–5.4)
SODIUM SERPL-SCNC: 141 MMOL/L (ref 136–145)
WBC # BLD AUTO: 2.54 K/UL (ref 4.5–11)

## 2023-12-11 PROCEDURE — 3075F SYST BP GE 130 - 139MM HG: CPT | Mod: ,,, | Performed by: FAMILY MEDICINE

## 2023-12-11 PROCEDURE — 83036 HEMOGLOBIN GLYCOSYLATED A1C: CPT | Mod: ,,, | Performed by: CLINICAL MEDICAL LABORATORY

## 2023-12-11 PROCEDURE — 3044F PR MOST RECENT HEMOGLOBIN A1C LEVEL <7.0%: ICD-10-PCS | Mod: ,,, | Performed by: FAMILY MEDICINE

## 2023-12-11 PROCEDURE — 80053 COMPREHENSIVE METABOLIC PANEL: ICD-10-PCS | Mod: ,,, | Performed by: CLINICAL MEDICAL LABORATORY

## 2023-12-11 PROCEDURE — 99213 OFFICE O/P EST LOW 20 MIN: CPT | Mod: ,,, | Performed by: FAMILY MEDICINE

## 2023-12-11 PROCEDURE — 99213 PR OFFICE/OUTPT VISIT, EST, LEVL III, 20-29 MIN: ICD-10-PCS | Mod: ,,, | Performed by: FAMILY MEDICINE

## 2023-12-11 PROCEDURE — 1159F PR MEDICATION LIST DOCUMENTED IN MEDICAL RECORD: ICD-10-PCS | Mod: ,,, | Performed by: FAMILY MEDICINE

## 2023-12-11 PROCEDURE — 3044F HG A1C LEVEL LT 7.0%: CPT | Mod: ,,, | Performed by: FAMILY MEDICINE

## 2023-12-11 PROCEDURE — 1160F PR REVIEW ALL MEDS BY PRESCRIBER/CLIN PHARMACIST DOCUMENTED: ICD-10-PCS | Mod: ,,, | Performed by: FAMILY MEDICINE

## 2023-12-11 PROCEDURE — 3078F DIAST BP <80 MM HG: CPT | Mod: ,,, | Performed by: FAMILY MEDICINE

## 2023-12-11 PROCEDURE — 1126F AMNT PAIN NOTED NONE PRSNT: CPT | Mod: ,,, | Performed by: FAMILY MEDICINE

## 2023-12-11 PROCEDURE — 85025 COMPLETE CBC W/AUTO DIFF WBC: CPT | Mod: ,,, | Performed by: CLINICAL MEDICAL LABORATORY

## 2023-12-11 PROCEDURE — 1160F RVW MEDS BY RX/DR IN RCRD: CPT | Mod: ,,, | Performed by: FAMILY MEDICINE

## 2023-12-11 PROCEDURE — 1101F PT FALLS ASSESS-DOCD LE1/YR: CPT | Mod: ,,, | Performed by: FAMILY MEDICINE

## 2023-12-11 PROCEDURE — 85025 CBC WITH DIFFERENTIAL: ICD-10-PCS | Mod: ,,, | Performed by: CLINICAL MEDICAL LABORATORY

## 2023-12-11 PROCEDURE — 80053 COMPREHEN METABOLIC PANEL: CPT | Mod: ,,, | Performed by: CLINICAL MEDICAL LABORATORY

## 2023-12-11 PROCEDURE — 3078F PR MOST RECENT DIASTOLIC BLOOD PRESSURE < 80 MM HG: ICD-10-PCS | Mod: ,,, | Performed by: FAMILY MEDICINE

## 2023-12-11 PROCEDURE — 1159F MED LIST DOCD IN RCRD: CPT | Mod: ,,, | Performed by: FAMILY MEDICINE

## 2023-12-11 PROCEDURE — 3288F FALL RISK ASSESSMENT DOCD: CPT | Mod: ,,, | Performed by: FAMILY MEDICINE

## 2023-12-11 PROCEDURE — 3288F PR FALLS RISK ASSESSMENT DOCUMENTED: ICD-10-PCS | Mod: ,,, | Performed by: FAMILY MEDICINE

## 2023-12-11 PROCEDURE — 1126F PR PAIN SEVERITY QUANTIFIED, NO PAIN PRESENT: ICD-10-PCS | Mod: ,,, | Performed by: FAMILY MEDICINE

## 2023-12-11 PROCEDURE — 83036 HEMOGLOBIN A1C: ICD-10-PCS | Mod: ,,, | Performed by: CLINICAL MEDICAL LABORATORY

## 2023-12-11 PROCEDURE — 3075F PR MOST RECENT SYSTOLIC BLOOD PRESS GE 130-139MM HG: ICD-10-PCS | Mod: ,,, | Performed by: FAMILY MEDICINE

## 2023-12-11 PROCEDURE — 1101F PR PT FALLS ASSESS DOC 0-1 FALLS W/OUT INJ PAST YR: ICD-10-PCS | Mod: ,,, | Performed by: FAMILY MEDICINE

## 2023-12-11 RX ORDER — OMEPRAZOLE 20 MG/1
20 CAPSULE, DELAYED RELEASE ORAL DAILY
COMMUNITY
Start: 2023-12-05

## 2023-12-11 NOTE — PROGRESS NOTES
Kathryn Marie is a 73 y.o. female seen today for follow-up on her diabetes and hypertension.  Patient reports no chest pain or shortness a breath and she is active in meeting her ADLs.  Patient is up-to-date on her flu vaccine.  Patient has seen Neurology regarding her history of CVA and will follow-up with Cardiology later this month.      Past Medical History:   Diagnosis Date    Anemia     Atherosclerotic heart disease of native coronary artery without angina pectoris     Carotid artery stenosis 01/15/2014    CHARO  LICA stenosis    Causalgia of lower limb     Cervical radiculopathy     Chronic low back pain     Chronic pain syndrome     CVA (cerebral vascular accident)     right cerebellar    Degenerative joint disease involving multiple joints     Disorder of parathyroid gland, unspecified     Disorder of sacrum     Essential (primary) hypertension     Gammopathy     GERD (gastroesophageal reflux disease)     Heart murmur     Hyperlipidemia     Hyperparathyroidism     Lumbosacral radiculopathy     Lumbosacral spondylosis     Other long term (current) drug therapy     Pernicious anemia     Sciatica     Spinal stenosis of lumbar region     L4-5    Type 2 diabetes mellitus      Family History   Problem Relation Age of Onset    Diabetes Mother     Heart disease Mother     Hypertension Mother     Heart attack Mother     Hypertension Father     Stroke Father     Stroke Sister     Hypertension Sister     Cancer Brother      Current Outpatient Medications on File Prior to Visit   Medication Sig Dispense Refill    amLODIPine (NORVASC) 10 MG tablet Take 1 tablet (10 mg total) by mouth once daily. 90 tablet 1    aspirin (ECOTRIN) 81 MG EC tablet Take 81 mg by mouth once daily.      dapagliflozin (FARXIGA) 5 mg Tab tablet Take 1 tablet (5 mg total) by mouth once daily. 90 tablet 3    hydroCHLOROthiazide (HYDRODIURIL) 12.5 MG Tab Take 1 tablet (12.5 mg total) by mouth as needed (edema). 30 tablet 1    latanoprost 0.005 %  ophthalmic solution Place 1 drop into both eyes every evening.      methotrexate 2.5 MG Tab 2.5 mg. Take 10 tablets by mouth every week      metoprolol succinate (TOPROL-XL) 25 MG 24 hr tablet Take 1/2 tablet (12.5 mg) by mouth every day. 45 tablet 1    omeprazole (PRILOSEC) 20 MG capsule Take 20 mg by mouth once daily.      pantoprazole (PROTONIX) 40 MG tablet Take 1 tablet (40 mg total) by mouth once daily. 90 tablet 3    predniSONE (DELTASONE) 5 MG tablet Take by mouth.      rosuvastatin (CRESTOR) 20 MG tablet Take 1 tablet (20 mg total) by mouth every evening. 90 tablet 1    SAXagliptin (ONGLYZA) 5 mg Tab tablet       SITagliptin phosphate (JANUVIA) 100 MG Tab Take 1 tablet (100 mg total) by mouth once daily. 90 tablet 3    vitamin D (VITAMIN D3) 1000 units Tab Take 1,000 Units by mouth once daily.      blood sugar diagnostic (ONETOUCH VERIO TEST STRIPS MISC) by Misc.(Non-Drug; Combo Route) route. Use 1 strip to check blood glucose every morning, fasting for E11.9      blood-glucose meter (ONETOUCH VERIO METER MISC) by Misc.(Non-Drug; Combo Route) route. Use for glucose checks once daily, E11.9      clopidogreL (PLAVIX) 75 mg tablet Take by mouth.      denosumab (PROLIA) 60 mg/mL Syrg Inject 1 mL into the skin.      diclofenac sodium (VOLTAREN) 1 % Gel APPLY 1 APPLICATION TO  AFFECTED AREA(S) TOPICALLY  TWICE DAILY 300 g 2    dicyclomine (BENTYL) 10 MG capsule       ELIQUIS 5 mg Tab Take 5 mg by mouth once daily.      ferrous sulfate (FEOSOL) 325 mg (65 mg iron) Tab tablet Take 325 mg by mouth daily with breakfast.      fluticasone propionate (FLONASE) 50 mcg/actuation nasal spray 2 sprays (100 mcg total) by Each Nostril route once daily. 48 g 1    folic acid (FOLVITE) 1 MG tablet Take 1 mg by mouth once daily.      furosemide (LASIX) 40 MG tablet take 1 tablet by oral route  every day as needed weight gain of 3 # or more      gabapentin (NEURONTIN) 300 MG capsule       HYDROcodone-acetaminophen (NORCO) 7.5-325  mg per tablet Take 1 tablet by mouth every 6 (six) hours as needed for Pain. 120 tablet 0    HYDROcodone-acetaminophen (NORCO) 7.5-325 mg per tablet Take 1 tablet by mouth every 6 (six) hours as needed for Pain. 120 tablet 0    [START ON 12/16/2023] HYDROcodone-acetaminophen (NORCO) 7.5-325 mg per tablet Take 1 tablet by mouth every 6 (six) hours as needed for Pain. 120 tablet 0    insulin degludec (TRESIBA FLEXTOUCH U-200) 200 unit/mL (3 mL) insulin pen Inject into the skin.      ipratropium (ATROVENT) 21 mcg (0.03 %) nasal spray USE 2 SPRAYS NASALLY EVERY  12 HOURS 90 mL 3    metroNIDAZOLE (METROGEL) 0.75 % (37.5mg/5 gram) vaginal gel 1 applicator full per vagina nightly for 5 nights. (Patient not taking: Reported on 12/11/2023) 70 g 0     No current facility-administered medications on file prior to visit.     Immunization History   Administered Date(s) Administered    COVID-19, MRNA, LN-S, PF (Pfizer) (Purple Cap) 01/28/2021, 02/27/2021, 08/27/2021, 04/22/2022    COVID-19, mRNA, LNP-S, bivalent booster, PF (PFIZER OMICRON) 10/07/2022, 04/28/2023    Influenza (FLUAD) - Quadrivalent - Adjuvanted - PF *Preferred* (65+) 12/12/2022, 11/01/2023    Influenza - Quadrivalent - High Dose - PF (65 years and older) 11/04/2021    Influenza - Trivalent (ADULT) 02/09/2016    Pneumococcal Conjugate - 13 Valent 11/13/2017    Pneumococcal Conjugate - 20 Valent 04/10/2023    Pneumococcal Polysaccharide - 23 Valent 12/01/2014, 02/09/2016    Zoster Recombinant 07/30/2013       Review of Systems   Constitutional:  Negative for fever, malaise/fatigue and weight loss.   Respiratory:  Negative for shortness of breath.    Cardiovascular:  Negative for chest pain and palpitations.   Gastrointestinal:  Negative for nausea and vomiting.   Musculoskeletal:  Positive for joint pain and myalgias. Negative for falls.   Psychiatric/Behavioral:  Negative for depression.         Vitals:    12/11/23 1427   BP: 132/75   Pulse: 65   Resp: 18   Temp:  97.1 °F (36.2 °C)       Physical Exam  Vitals reviewed.   Constitutional:       Appearance: Normal appearance.   HENT:      Head: Normocephalic.   Eyes:      Extraocular Movements: Extraocular movements intact.      Conjunctiva/sclera: Conjunctivae normal.      Pupils: Pupils are equal, round, and reactive to light.   Neck:      Thyroid: No thyroid mass or thyromegaly.   Cardiovascular:      Rate and Rhythm: Normal rate and regular rhythm.      Heart sounds: Normal heart sounds. No murmur heard.     No gallop.   Pulmonary:      Effort: Pulmonary effort is normal. No respiratory distress.      Breath sounds: Normal breath sounds. No wheezing or rales.   Musculoskeletal:      Lumbar back: Spasms present. Decreased range of motion.      Comments: She is slow to rise from a seated position and has an antalgic walker dependent gait.   Skin:     General: Skin is warm and dry.      Coloration: Skin is not jaundiced or pale.   Neurological:      Mental Status: She is alert.   Psychiatric:         Mood and Affect: Mood normal.         Behavior: Behavior normal.         Thought Content: Thought content normal.         Judgment: Judgment normal.          Assessment and Plan  1. Type 2 diabetes mellitus without complication, without long-term current use of insulin  Overview:  Formatting of this note might be different from the original.  type ii    Orders:  -     Hemoglobin A1C; Future; Expected date: 12/11/2023    2. Hypertension, unspecified type  -     CBC Auto Differential; Future; Expected date: 12/11/2023  -     Comprehensive Metabolic Panel; Future; Expected date: 12/11/2023             Return to clinic in 2 weeks for follow-up on her lab work are as needed.    Health Maintenance Topics with due status: Not Due       Topic Last Completion Date    Colorectal Cancer Screening 08/04/2022    Pap Smear 10/18/2022    Naloxone Prescription 02/01/2023    Hemoglobin A1c 08/15/2023    High Dose Statin 11/14/2023

## 2023-12-12 DIAGNOSIS — E83.52 HYPERCALCEMIA: ICD-10-CM

## 2023-12-12 DIAGNOSIS — D52.8 OTHER FOLATE DEFICIENCY ANEMIAS: ICD-10-CM

## 2023-12-12 DIAGNOSIS — D64.9 ANEMIA, UNSPECIFIED TYPE: Primary | ICD-10-CM

## 2023-12-12 DIAGNOSIS — D51.3 OTHER DIETARY VITAMIN B12 DEFICIENCY ANEMIA: ICD-10-CM

## 2023-12-12 LAB
FERRITIN SERPL-MCNC: 150 NG/ML (ref 8–252)
FOLATE SERPL-MCNC: 13.1 NG/ML (ref 3.1–17.5)
HCYS SERPL-SCNC: 20 ΜMOL/L (ref 3.2–10.7)
IRON SATN MFR SERPL: 28 % (ref 14–50)
IRON SERPL-MCNC: 67 ΜG/DL (ref 50–170)
TIBC SERPL-MCNC: 236 ΜG/DL (ref 250–450)
VIT B12 SERPL-MCNC: 3674 PG/ML (ref 193–986)

## 2023-12-12 PROCEDURE — 83550 IRON BINDING TEST: CPT | Mod: ,,, | Performed by: CLINICAL MEDICAL LABORATORY

## 2023-12-12 PROCEDURE — 83540 ASSAY OF IRON: CPT | Mod: ,,, | Performed by: CLINICAL MEDICAL LABORATORY

## 2023-12-12 PROCEDURE — 82728 FERRITIN: ICD-10-PCS | Mod: ,,, | Performed by: CLINICAL MEDICAL LABORATORY

## 2023-12-12 PROCEDURE — 82306 VITAMIN D: ICD-10-PCS | Mod: ,,, | Performed by: CLINICAL MEDICAL LABORATORY

## 2023-12-12 PROCEDURE — 82306 VITAMIN D 25 HYDROXY: CPT | Mod: ,,, | Performed by: CLINICAL MEDICAL LABORATORY

## 2023-12-12 PROCEDURE — 83090 HOMOCYSTEINE, SERUM: ICD-10-PCS | Mod: ,,, | Performed by: CLINICAL MEDICAL LABORATORY

## 2023-12-12 PROCEDURE — 83550 IRON AND TIBC: ICD-10-PCS | Mod: ,,, | Performed by: CLINICAL MEDICAL LABORATORY

## 2023-12-12 PROCEDURE — 82607 VITAMIN B-12: CPT | Mod: ,,, | Performed by: CLINICAL MEDICAL LABORATORY

## 2023-12-12 PROCEDURE — 83090 ASSAY OF HOMOCYSTEINE: CPT | Mod: ,,, | Performed by: CLINICAL MEDICAL LABORATORY

## 2023-12-12 PROCEDURE — 82728 ASSAY OF FERRITIN: CPT | Mod: ,,, | Performed by: CLINICAL MEDICAL LABORATORY

## 2023-12-12 PROCEDURE — 82746 ASSAY OF FOLIC ACID SERUM: CPT | Mod: ,,, | Performed by: CLINICAL MEDICAL LABORATORY

## 2023-12-12 PROCEDURE — 83540 IRON AND TIBC: ICD-10-PCS | Mod: ,,, | Performed by: CLINICAL MEDICAL LABORATORY

## 2023-12-12 PROCEDURE — 82746 VITAMIN B12/FOLATE, SERUM PANEL: ICD-10-PCS | Mod: ,,, | Performed by: CLINICAL MEDICAL LABORATORY

## 2023-12-12 PROCEDURE — 82607 VITAMIN B12/FOLATE, SERUM PANEL: ICD-10-PCS | Mod: ,,, | Performed by: CLINICAL MEDICAL LABORATORY

## 2023-12-13 LAB — 25(OH)D3 SERPL-MCNC: 47.3 NG/ML

## 2023-12-18 ENCOUNTER — TELEPHONE (OUTPATIENT)
Dept: FAMILY MEDICINE | Facility: CLINIC | Age: 73
End: 2023-12-18
Payer: MEDICARE

## 2023-12-19 NOTE — TELEPHONE ENCOUNTER
----- Message from Dequan Bar MD sent at 12/13/2023  8:03 AM CST -----  Patient's labs suggests a functional B12 deficiency.  She should take over-the-counter B12

## 2023-12-19 NOTE — TELEPHONE ENCOUNTER
----- Message from Dequan Bar MD sent at 12/12/2023  7:52 AM CST -----  Good A1c but chronic anemia.  Recommend iron studies and B12 and folate and homocystine levels for anemia.  Also recommend a fasting vitamin-D level for low calcium

## 2023-12-22 ENCOUNTER — TELEPHONE (OUTPATIENT)
Dept: FAMILY MEDICINE | Facility: CLINIC | Age: 73
End: 2023-12-22
Payer: MEDICARE

## 2023-12-24 ENCOUNTER — HOSPITAL ENCOUNTER (EMERGENCY)
Facility: HOSPITAL | Age: 73
Discharge: HOME | End: 2023-12-24
Attending: EMERGENCY MEDICINE
Payer: MEDICAID

## 2023-12-24 VITALS
BODY MASS INDEX: 22.99 KG/M2 | HEIGHT: 65 IN | OXYGEN SATURATION: 99 % | HEART RATE: 77 BPM | RESPIRATION RATE: 20 BRPM | DIASTOLIC BLOOD PRESSURE: 58 MMHG | WEIGHT: 138 LBS | TEMPERATURE: 98 F | SYSTOLIC BLOOD PRESSURE: 164 MMHG

## 2023-12-24 DIAGNOSIS — S90.31XA CONTUSION OF RIGHT FOOT, INITIAL ENCOUNTER: ICD-10-CM

## 2023-12-24 DIAGNOSIS — R52 PAIN: ICD-10-CM

## 2023-12-24 DIAGNOSIS — M79.674 PAIN OF TOE OF RIGHT FOOT: Primary | ICD-10-CM

## 2023-12-24 PROCEDURE — 99283 EMERGENCY DEPT VISIT LOW MDM: CPT | Performed by: EMERGENCY MEDICINE

## 2023-12-24 PROCEDURE — 25000003 PHARM REV CODE 250: Performed by: EMERGENCY MEDICINE

## 2023-12-24 PROCEDURE — 99283 EMERGENCY DEPT VISIT LOW MDM: CPT

## 2023-12-24 RX ADMIN — BACITRACIN ZINC, NEOMYCIN, POLYMYXIN B SULFAT 1 EACH: 5000; 3.5; 4 OINTMENT TOPICAL at 10:12

## 2023-12-24 NOTE — ED TRIAGE NOTES
C/o pain in right little toe after having a corn removed on 12/11 by foot dr and she bumped it on a wheelchair a couple days ago and has been hurting real bad since. She has a padded dressing as she says her shoe hurts it.

## 2023-12-24 NOTE — ED PROVIDER NOTES
Encounter Date: 12/24/2023       History     Chief Complaint   Patient presents with    Foot Injury     PT IS A 73 YR OLD WITH R 5 TH TOE PAIN S/P RESECTION OF CORN PER PODIATRIST 12/11/2023. PT STATES SHE RAN OVER FOOT WITH WHEELCHAIR.  PT STATES PAIN IS INCREASED WITH ROM AND DECREASED WITH REMAINING STATIONARY      Past Medical History      Diagnosis Date Comments  Anemia [D64.9]    Atherosclerotic heart disease of native coronary artery without angina pectoris [I25.10]    Carotid artery stenosis [I65.29] 01/15/2014 CHARO  LICA stenosis  Causalgia of lower limb [G57.70]    CVA (cerebral vascular accident) [I63.9]  right cerebellar  Cervical radiculopathy [M54.12]    Chronic low back pain [M54.50, G89.29]    Chronic pain syndrome [G89.4]    Degenerative joint disease involving multiple joints [M15.9]    Disorder of parathyroid gland, unspecified [E21.5]    Disorder of sacrum [M53.3]    Essential (primary) hypertension [I10]    Gammopathy [D47.2]    GERD (gastroesophageal reflux disease) [K21.9]    Heart murmur [R01.1]    Hyperlipidemia [E78.5]    Hyperparathyroidism [E21.3]    Other long term (current) drug therapy [Z79.899]    Lumbosacral radiculopathy [M54.17]    Lumbosacral spondylosis [M47.817]    Pernicious anemia [D51.0]    Sciatica [M54.30]    Spinal stenosis of lumbar region [M48.061]  L4-5  Type 2 diabetes mellitus [E11.9]          The history is provided by the patient.     Review of patient's allergies indicates:  No Known Allergies  Past Medical History:   Diagnosis Date    Anemia     Atherosclerotic heart disease of native coronary artery without angina pectoris     Carotid artery stenosis 01/15/2014    CHARO  LICA stenosis    Causalgia of lower limb     Cervical radiculopathy     Chronic low back pain     Chronic pain syndrome     CVA (cerebral vascular accident)     right cerebellar    Degenerative joint disease involving multiple joints     Disorder of parathyroid gland, unspecified      Disorder of sacrum     Essential (primary) hypertension     Gammopathy     GERD (gastroesophageal reflux disease)     Heart murmur     Hyperlipidemia     Hyperparathyroidism     Lumbosacral radiculopathy     Lumbosacral spondylosis     Other long term (current) drug therapy     Pernicious anemia     Sciatica     Spinal stenosis of lumbar region     L4-5    Type 2 diabetes mellitus      Past Surgical History:   Procedure Laterality Date    CARPAL TUNNEL RELEASE Right     caudal MOIZ  07/03/2018    CHOLECYSTECTOMY  1975    CORONARY ARTERY BYPASS GRAFT      CORONARY ARTERY BYPASS GRAFT (CABG)      triple    HIP SURGERY Right     INJECTION OF ANESTHETIC AGENT AROUND MEDIAL BRANCH NERVES INNERVATING LUMBAR FACET JOINT Bilateral 1/19/2023    Procedure: Block-nerve-medial branch-lumbar, bilateral L4 through S1;  Surgeon: Ashley Piña MD;  Location: [facility];  Service: Pain Management;  Laterality: Bilateral;    L4-S1 Caudal cath guided MOIZ  07/03/2018    Dr Orta    L5-S1 Caudal cath guided MOIZ  09/26/2014 6/26/2014 4/11/2014    Dr Orta    left shoulder repair Left     NECK SURGERY  2018    does not know what kind    right sacral RF Right 12/26/2013 1/24/2012    Dr Orta    right SIJI Right 10/24/2013    Dr Orta     Family History   Problem Relation Age of Onset    Diabetes Mother     Heart disease Mother     Hypertension Mother     Heart attack Mother     Hypertension Father     Stroke Father     Stroke Sister     Hypertension Sister     Cancer Brother      Social History     Tobacco Use    Smoking status: Never     Passive exposure: Never    Smokeless tobacco: Never   Substance Use Topics    Alcohol use: Not Currently    Drug use: Yes     Types: Hydrocodone     Comment: pain medication with pain treatment      Review of Systems   Constitutional:  Negative for appetite change and fatigue.   HENT: Negative.     Eyes: Negative.    Respiratory: Negative.     Endocrine: Negative.    Genitourinary: Negative.     Musculoskeletal:  Positive for arthralgias (FOOT PAIN).   Skin:  Positive for wound.   Neurological: Negative.  Negative for weakness.   Hematological: Negative.    Psychiatric/Behavioral: Negative.     All other systems reviewed and are negative.      Physical Exam     Initial Vitals [12/24/23 0959]   BP Pulse Resp Temp SpO2   (!) 172/59 77 20 97.8 °F (36.6 °C) 99 %      MAP       --         Physical Exam    Nursing note and vitals reviewed.  Constitutional: She appears well-developed and well-nourished. She is cooperative. No distress.   HENT:   Head: Normocephalic and atraumatic.   Right Ear: External ear normal.   Left Ear: External ear normal.   Nose: Nose normal.   Mouth/Throat: Oropharynx is clear and moist.   Eyes: Conjunctivae and EOM are normal. Pupils are equal, round, and reactive to light.   Neck: Trachea normal. Neck supple.   Cardiovascular:  Normal rate, regular rhythm, normal heart sounds and intact distal pulses.           Pulses:       Radial pulses are 3+ on the right side and 3+ on the left side.        Dorsalis pedis pulses are 3+ on the right side and 3+ on the left side.   Pulmonary/Chest: Breath sounds normal.   Abdominal: Abdomen is soft.   Musculoskeletal:         General: Normal range of motion.      Right shoulder: Normal.      Left shoulder: Normal.      Right upper arm: Normal.      Left upper arm: Normal.      Right elbow: Normal.      Left elbow: Normal.      Right forearm: Normal.      Left forearm: Normal.      Right wrist: Normal.      Left wrist: Normal.      Right hand: Normal.      Left hand: Normal.      Cervical back: Neck supple.     Lymphadenopathy:     She has no cervical adenopathy.     She has no axillary adenopathy.   Neurological: She is alert and oriented to person, place, and time. She has normal strength. No cranial nerve deficit or sensory deficit. She displays a negative Romberg sign. GCS eye subscore is 4. GCS verbal subscore is 5. GCS motor subscore is 6.    Reflex Scores:       Bicep reflexes are 2+ on the right side and 2+ on the left side.       Patellar reflexes are 2+ on the right side and 2+ on the left side.  Skin: Skin is warm and dry. Capillary refill takes less than 2 seconds.   R FOOT  5 TH TOE POST OP HEALING WELL   Psychiatric: She has a normal mood and affect. Her speech is normal and behavior is normal. Judgment and thought content normal. Cognition and memory are normal.         Medical Screening Exam   See Full Note    ED Course   Procedures  Labs Reviewed - No data to display       Imaging Results              X-Ray Foot 2 View Right (Final result)  Result time 12/24/23 10:54:11   Procedure changed from X-Ray Foot Complete Right     Final result by Jovanny James MD (12/24/23 10:54:11)                   Impression:      No acute fracture seen.  Osteopenia      Electronically signed by: Jovanny James  Date:    12/24/2023  Time:    10:54               Narrative:    EXAMINATION:  XR FOOT 2 VIEW RIGHT    CLINICAL HISTORY:  Pain of toe of right foot.  Contusion of right foot, initial encounter    COMPARISON:  No previous similar    TECHNIQUE:  AP and lateral views right foot    FINDINGS:  There is osteopenia.  No acute fracture or dislocation is seen.  There is some minimal osteophyte formation and eburnation of the 1st MTP joint.  There is mild atherosclerotic small vessel arterial calcification of the foot                                       Medications   neomycin-bacitracnZn-polymyxnB packet (1 each Topical (Top) Given 12/24/23 1050)     Medical Decision Making  PT IS A 73 YR OLD WITH R 5 TH TOE PAIN S/P RESECTION OF CORN PER PODIATRIST 12/11/2023. PT STATES SHE RAN OVER FOOT WITH WHEELCHAIR.  PT STATES PAIN IS INCREASED WITH ROM AND DECREASED WITH REMAINING STATIONARY      Amount and/or Complexity of Data Reviewed  Radiology: ordered.     Details: New Results - Imaging    Updated   Order   12/24/23 1056  X-Ray Foot 2 View Right  Performed:  12/24/23 1030  Final         Impression: No acute fracture seen. Osteopenia Electronically signed by: Jovanny James Date: 12/24/2023 Time: 10:54        Discussion of management or test interpretation with external provider(s): EXAM  XRAY NEG, POST OP WOUND HEALING WELL  DC HOME IN STABLE CONDITION    Risk  OTC drugs.                                      Clinical Impression:   Final diagnoses:  [R52] Pain  [M79.674] Pain of toe of right foot - 5 TH (Primary)  [S90.31XA] Contusion of right foot, initial encounter        ED Disposition Condition    Discharge Stable          ED Prescriptions    None       Follow-up Information       Follow up With Specialties Details Why Contact Info    Dequan Bar MD Family Medicine In 1 week If symptoms worsen SOONER [address] [facility].  [facility] [id number] [phone]      PODIATRIST  In 1 week               Jadyn Ortega MD  01/23/24 [id number]

## 2023-12-24 NOTE — ED NOTES
Neosporin oint to area on right little toe, telfa dressing and wrapped with conform gauze. Assisted with putting shoe back on.

## 2023-12-31 ENCOUNTER — EXTERNAL CHRONIC CARE MANAGEMENT (OUTPATIENT)
Dept: FAMILY MEDICINE | Facility: CLINIC | Age: 73
End: 2023-12-31
Payer: MEDICARE

## 2023-12-31 PROCEDURE — G0511 CCM/BHI BY RHC/FQHC 20MIN MO: HCPCS | Mod: ,,, | Performed by: FAMILY MEDICINE

## 2024-01-09 ENCOUNTER — OFFICE VISIT (OUTPATIENT)
Dept: OBSTETRICS AND GYNECOLOGY | Facility: CLINIC | Age: 74
End: 2024-01-09
Payer: MEDICARE

## 2024-01-09 VITALS — DIASTOLIC BLOOD PRESSURE: 72 MMHG | SYSTOLIC BLOOD PRESSURE: 130 MMHG

## 2024-01-09 DIAGNOSIS — Z46.89 PESSARY MAINTENANCE: Primary | ICD-10-CM

## 2024-01-09 PROCEDURE — 99212 OFFICE O/P EST SF 10 MIN: CPT | Mod: S$PBB,,, | Performed by: OBSTETRICS & GYNECOLOGY

## 2024-01-09 PROCEDURE — 99213 OFFICE O/P EST LOW 20 MIN: CPT | Mod: PBBFAC | Performed by: OBSTETRICS & GYNECOLOGY

## 2024-01-09 NOTE — PATIENT INSTRUCTIONS
She will continue follow-up with primary care provider.    Receives Prolia through primary care provider.      Routine glucose cholesterol testing through primary care provider.    She has had colonoscopy screening will follow-up as directed by Gastroenterology.    Follow-up with me in approximately 4 months

## 2024-01-09 NOTE — PROGRESS NOTES
Subjective:       Patient ID: Kathryn Marie is a 73 y.o. female.    Chief Complaint: Pessary Check (Here for 4 month pessary check. )    Presents for pessary maintenance     She has no specific complaints.    States she often forgets pessary is even present.      Review of Systems      Objective:      Physical Exam  Genitourinary:     Comments: External genitalia normal.  Pessary removed vaginal vault inspected no evidence of abrasions noted.    No significant discharge noted.  Bimanual exam revealed uterus small no pelvic masses noted.        Assessment:       1. Pessary maintenance        Plan:       Patient Instructions   She will continue follow-up with primary care provider.    Receives Prolia through primary care provider.      Routine glucose cholesterol testing through primary care provider.    She has had colonoscopy screening will follow-up as directed by Gastroenterology.    Follow-up with me in approximately 4 months

## 2024-01-10 ENCOUNTER — OFFICE VISIT (OUTPATIENT)
Dept: FAMILY MEDICINE | Facility: CLINIC | Age: 74
End: 2024-01-10
Payer: MEDICARE

## 2024-01-10 VITALS
RESPIRATION RATE: 18 BRPM | HEIGHT: 65 IN | BODY MASS INDEX: 22.82 KG/M2 | TEMPERATURE: 99 F | SYSTOLIC BLOOD PRESSURE: 146 MMHG | HEART RATE: 61 BPM | DIASTOLIC BLOOD PRESSURE: 70 MMHG | WEIGHT: 137 LBS | OXYGEN SATURATION: 100 %

## 2024-01-10 DIAGNOSIS — E11.9 TYPE 2 DIABETES MELLITUS WITHOUT COMPLICATION, WITHOUT LONG-TERM CURRENT USE OF INSULIN: ICD-10-CM

## 2024-01-10 DIAGNOSIS — M79.671 RIGHT FOOT PAIN: Primary | ICD-10-CM

## 2024-01-10 DIAGNOSIS — L03.039 CELLULITIS OF FIFTH TOE: ICD-10-CM

## 2024-01-10 DIAGNOSIS — E78.5 HYPERLIPIDEMIA, UNSPECIFIED HYPERLIPIDEMIA TYPE: ICD-10-CM

## 2024-01-10 DIAGNOSIS — R60.9 EDEMA, UNSPECIFIED TYPE: ICD-10-CM

## 2024-01-10 DIAGNOSIS — I10 HYPERTENSION, UNSPECIFIED TYPE: ICD-10-CM

## 2024-01-10 PROCEDURE — 99213 OFFICE O/P EST LOW 20 MIN: CPT | Mod: ,,, | Performed by: FAMILY MEDICINE

## 2024-01-10 PROCEDURE — 96372 THER/PROPH/DIAG INJ SC/IM: CPT | Mod: ,,, | Performed by: FAMILY MEDICINE

## 2024-01-10 RX ORDER — CEPHALEXIN 250 MG/1
250 CAPSULE ORAL EVERY 6 HOURS
Qty: 40 CAPSULE | Refills: 0 | Status: SHIPPED | OUTPATIENT
Start: 2024-01-10

## 2024-01-10 RX ORDER — HYDROCHLOROTHIAZIDE 12.5 MG/1
12.5 TABLET ORAL
Qty: 30 TABLET | Refills: 1 | Status: SHIPPED | OUTPATIENT
Start: 2024-01-10

## 2024-01-10 RX ORDER — ROSUVASTATIN CALCIUM 20 MG/1
20 TABLET, COATED ORAL NIGHTLY
Qty: 90 TABLET | Refills: 1 | Status: SHIPPED | OUTPATIENT
Start: 2024-01-10 | End: 2024-01-23 | Stop reason: SDUPTHER

## 2024-01-10 RX ORDER — KETOROLAC TROMETHAMINE 30 MG/ML
30 INJECTION, SOLUTION INTRAMUSCULAR; INTRAVENOUS
Status: COMPLETED | OUTPATIENT
Start: 2024-01-10 | End: 2024-01-10

## 2024-01-10 RX ORDER — DAPAGLIFLOZIN 5 MG/1
5 TABLET, FILM COATED ORAL DAILY
Qty: 90 TABLET | Refills: 1 | Status: SHIPPED | OUTPATIENT
Start: 2024-01-10 | End: 2024-01-23 | Stop reason: SDUPTHER

## 2024-01-10 RX ORDER — METOPROLOL SUCCINATE 25 MG/1
TABLET, EXTENDED RELEASE ORAL
Qty: 45 TABLET | Refills: 1 | Status: SHIPPED | OUTPATIENT
Start: 2024-01-10 | End: 2024-01-23 | Stop reason: SDUPTHER

## 2024-01-10 RX ORDER — CEFTRIAXONE 1 G/1
1 INJECTION, POWDER, FOR SOLUTION INTRAMUSCULAR; INTRAVENOUS
Status: COMPLETED | OUTPATIENT
Start: 2024-01-10 | End: 2024-01-10

## 2024-01-10 RX ORDER — DEXAMETHASONE SODIUM PHOSPHATE 4 MG/ML
4 INJECTION, SOLUTION INTRA-ARTICULAR; INTRALESIONAL; INTRAMUSCULAR; INTRAVENOUS; SOFT TISSUE
Status: DISCONTINUED | OUTPATIENT
Start: 2024-01-10 | End: 2024-01-10

## 2024-01-10 RX ADMIN — KETOROLAC TROMETHAMINE 30 MG: 30 INJECTION, SOLUTION INTRAMUSCULAR; INTRAVENOUS at 02:01

## 2024-01-10 RX ADMIN — CEFTRIAXONE 1 G: 1 INJECTION, POWDER, FOR SOLUTION INTRAMUSCULAR; INTRAVENOUS at 02:01

## 2024-01-10 NOTE — PROGRESS NOTES
Kathryn Marie is a 73 y.o. female seen today for a follow-up.  Patient's labs were to goal except for mild anemia and a low calcium.  Follow-up iron studies and B12 and folate studies were normal.  Patient's vitamin-D level was also normal.  Patient is also status post an ER evaluation for right 5th toe discomfort and swelling after corn was removed.  Patient reports the symptoms are persisting.      Past Medical History:   Diagnosis Date    Anemia     Atherosclerotic heart disease of native coronary artery without angina pectoris     Carotid artery stenosis 01/15/2014    CHARO  LICA stenosis    Causalgia of lower limb     Cervical radiculopathy     Chronic low back pain     Chronic pain syndrome     CVA (cerebral vascular accident)     right cerebellar    Degenerative joint disease involving multiple joints     Disorder of parathyroid gland, unspecified     Disorder of sacrum     Essential (primary) hypertension     Gammopathy     GERD (gastroesophageal reflux disease)     Heart murmur     Hyperlipidemia     Hyperparathyroidism     Lumbosacral radiculopathy     Lumbosacral spondylosis     Other long term (current) drug therapy     Pernicious anemia     Sciatica     Spinal stenosis of lumbar region     L4-5    Type 2 diabetes mellitus      Family History   Problem Relation Age of Onset    Diabetes Mother     Heart disease Mother     Hypertension Mother     Heart attack Mother     Hypertension Father     Stroke Father     Stroke Sister     Hypertension Sister     Cancer Brother      Current Outpatient Medications on File Prior to Visit   Medication Sig Dispense Refill    aspirin (ECOTRIN) 81 MG EC tablet Take 81 mg by mouth once daily.      blood sugar diagnostic (ONETOUCH VERIO TEST STRIPS MISC) by Misc.(Non-Drug; Combo Route) route. Use 1 strip to check blood glucose every morning, fasting for E11.9      blood-glucose meter (ONETOUCH VERIO METER MISC) by Misc.(Non-Drug; Combo Route) route. Use for glucose checks  once daily, E11.9      dapagliflozin (FARXIGA) 5 mg Tab tablet Take 1 tablet (5 mg total) by mouth once daily. 90 tablet 3    diclofenac sodium (VOLTAREN) 1 % Gel APPLY 1 APPLICATION TO  AFFECTED AREA(S) TOPICALLY  TWICE DAILY 300 g 2    ELIQUIS 5 mg Tab Take 5 mg by mouth once daily.      ferrous sulfate (FEOSOL) 325 mg (65 mg iron) Tab tablet Take 325 mg by mouth daily with breakfast.      fluticasone propionate (FLONASE) 50 mcg/actuation nasal spray 2 sprays (100 mcg total) by Each Nostril route once daily. 48 g 1    folic acid (FOLVITE) 1 MG tablet Take 1 mg by mouth once daily.      hydroCHLOROthiazide (HYDRODIURIL) 12.5 MG Tab Take 1 tablet (12.5 mg total) by mouth as needed (edema). 30 tablet 1    HYDROcodone-acetaminophen (NORCO) 7.5-325 mg per tablet Take 1 tablet by mouth every 6 (six) hours as needed for Pain. 120 tablet 0    insulin degludec (TRESIBA FLEXTOUCH U-200) 200 unit/mL (3 mL) insulin pen Inject into the skin.      ipratropium (ATROVENT) 21 mcg (0.03 %) nasal spray USE 2 SPRAYS NASALLY EVERY  12 HOURS 90 mL 3    latanoprost 0.005 % ophthalmic solution Place 1 drop into both eyes every evening.      methotrexate 2.5 MG Tab 2.5 mg. Take 10 tablets by mouth every week      metoprolol succinate (TOPROL-XL) 25 MG 24 hr tablet Take 1/2 tablet (12.5 mg) by mouth every day. 45 tablet 1    omeprazole (PRILOSEC) 20 MG capsule Take 20 mg by mouth once daily.      rosuvastatin (CRESTOR) 20 MG tablet Take 1 tablet (20 mg total) by mouth every evening. 90 tablet 1    SITagliptin phosphate (JANUVIA) 100 MG Tab Take 1 tablet (100 mg total) by mouth once daily. 90 tablet 3    amLODIPine (NORVASC) 10 MG tablet Take 1 tablet (10 mg total) by mouth once daily. (Patient not taking: Reported on 1/10/2024) 90 tablet 1    clopidogreL (PLAVIX) 75 mg tablet Take by mouth.      denosumab (PROLIA) 60 mg/mL Syrg Inject 1 mL into the skin.      dicyclomine (BENTYL) 10 MG capsule       furosemide (LASIX) 40 MG tablet take 1  tablet by oral route  every day as needed weight gain of 3 # or more      gabapentin (NEURONTIN) 300 MG capsule       HYDROcodone-acetaminophen (NORCO) 7.5-325 mg per tablet Take 1 tablet by mouth every 6 (six) hours as needed for Pain. (Patient not taking: Reported on 1/10/2024) 120 tablet 0    HYDROcodone-acetaminophen (NORCO) 7.5-325 mg per tablet Take 1 tablet by mouth every 6 (six) hours as needed for Pain. (Patient not taking: Reported on 1/10/2024) 120 tablet 0    metroNIDAZOLE (METROGEL) 0.75 % (37.5mg/5 gram) vaginal gel 1 applicator full per vagina nightly for 5 nights. (Patient not taking: Reported on 12/11/2023) 70 g 0    pantoprazole (PROTONIX) 40 MG tablet Take 1 tablet (40 mg total) by mouth once daily. (Patient not taking: Reported on 1/10/2024) 90 tablet 3    predniSONE (DELTASONE) 5 MG tablet Take by mouth.      SAXagliptin (ONGLYZA) 5 mg Tab tablet       vitamin D (VITAMIN D3) 1000 units Tab Take 1,000 Units by mouth once daily.       No current facility-administered medications on file prior to visit.     Immunization History   Administered Date(s) Administered    COVID-19, MRNA, LN-S, PF (Pfizer) (Purple Cap) 01/28/2021, 02/27/2021, 08/27/2021, 04/22/2022    COVID-19, mRNA, LNP-S, bivalent booster, PF (PFIZER OMICRON) 10/07/2022, 04/28/2023    Influenza (FLUAD) - Quadrivalent - Adjuvanted - PF *Preferred* (65+) 12/12/2022, 11/01/2023    Influenza - Quadrivalent - High Dose - PF (65 years and older) 11/04/2021    Influenza - Trivalent (ADULT) 02/09/2016    Pneumococcal Conjugate - 13 Valent 11/13/2017    Pneumococcal Conjugate - 20 Valent 04/10/2023    Pneumococcal Polysaccharide - 23 Valent 12/01/2014, 02/09/2016    Zoster Recombinant 07/30/2013       Review of Systems   Constitutional:  Negative for fever, malaise/fatigue and weight loss.   Respiratory:  Negative for shortness of breath.    Cardiovascular:  Negative for chest pain and palpitations.   Gastrointestinal:  Negative for nausea and  vomiting.   Musculoskeletal:  Positive for joint pain.   Psychiatric/Behavioral:  Negative for depression.         Vitals:    01/10/24 1212   BP: (!) 146/70   Pulse:    Resp:    Temp:        Physical Exam  Vitals reviewed.   Eyes:      Conjunctiva/sclera: Conjunctivae normal.   Pulmonary:      Effort: Pulmonary effort is normal.   Musculoskeletal:        Feet:    Feet:      Right foot:      Skin integrity: Erythema present. No ulcer.      Comments: Patient has tender erythema and swelling involving the 5th digit on the right foot  Neurological:      Mental Status: She is alert.   Psychiatric:         Mood and Affect: Mood normal.         Behavior: Behavior normal.         Thought Content: Thought content normal.         Judgment: Judgment normal.          Assessment and Plan  1. Right foot pain  -     ketorolac injection 30 mg  -     Discontinue: dexAMETHasone injection 4 mg    2. Cellulitis of fifth toe  -     cephALEXin (KEFLEX) 250 MG capsule; Take 1 capsule (250 mg total) by mouth every 6 (six) hours.  Dispense: 40 capsule; Refill: 0    Other orders  -     cefTRIAXone injection 1 g             Return to clinic in 3 months or as needed.  This patient will make a follow-up appointment with her podiatrist today.    Health Maintenance Topics with due status: Not Due       Topic Last Completion Date    Colorectal Cancer Screening 08/04/2022    Pap Smear 10/18/2022    Hemoglobin A1c 12/11/2023    High Dose Statin 12/24/2023

## 2024-01-18 NOTE — PROGRESS NOTES
Subjective:         Patient ID: Kathryn Marie is a 73 y.o. female.    Chief Complaint: Low-back Pain, Arm Pain, and Leg Pain        Pain  This is a chronic problem. The current episode started more than 1 year ago. The problem occurs daily. The problem has been unchanged. Associated symptoms include arthralgias and neck pain. Pertinent negatives include no anorexia, chest pain, chills, coughing, diaphoresis, fever, sore throat, vertigo or vomiting.     Review of Systems   Constitutional:  Negative for activity change, appetite change, chills, diaphoresis, fever and unexpected weight change.   HENT:  Negative for drooling, ear discharge, ear pain, facial swelling, nosebleeds, sore throat, trouble swallowing, voice change and goiter.    Eyes:  Negative for photophobia, pain, discharge, redness and visual disturbance.   Respiratory:  Negative for apnea, cough, choking, chest tightness, shortness of breath, wheezing and stridor.    Cardiovascular:  Negative for chest pain, palpitations and leg swelling.   Gastrointestinal:  Negative for abdominal distention, anorexia, diarrhea, rectal pain, vomiting and fecal incontinence.   Endocrine: Negative for cold intolerance, heat intolerance, polydipsia, polyphagia and polyuria.   Genitourinary:  Negative for bladder incontinence, dysuria, flank pain, frequency and hot flashes.   Musculoskeletal:  Positive for arthralgias, back pain, leg pain, neck pain and neck stiffness.   Integumentary:  Negative for color change and pallor.   Allergic/Immunologic: Negative for immunocompromised state.   Neurological:  Negative for dizziness, vertigo, seizures, syncope, facial asymmetry, speech difficulty, light-headedness, memory loss and coordination difficulties.   Hematological:  Negative for adenopathy. Does not bruise/bleed easily.   Psychiatric/Behavioral:  Negative for agitation, behavioral problems, confusion, decreased concentration, dysphoric mood, hallucinations, self-injury and  suicidal ideas. The patient is not nervous/anxious and is not hyperactive.            Past Medical History:   Diagnosis Date    Anemia     Atherosclerotic heart disease of native coronary artery without angina pectoris     Carotid artery stenosis 01/15/2014    CHARO  LICA stenosis    Causalgia of lower limb     Cervical radiculopathy     Chronic low back pain     Chronic pain syndrome     CVA (cerebral vascular accident)     right cerebellar    Degenerative joint disease involving multiple joints     Disorder of parathyroid gland, unspecified     Disorder of sacrum     Essential (primary) hypertension     Gammopathy     GERD (gastroesophageal reflux disease)     Heart murmur     Hyperlipidemia     Hyperparathyroidism     Lumbosacral radiculopathy     Lumbosacral spondylosis     Other long term (current) drug therapy     Pernicious anemia     Sciatica     Spinal stenosis of lumbar region     L4-5    Type 2 diabetes mellitus      Past Surgical History:   Procedure Laterality Date    CARPAL TUNNEL RELEASE Right     caudal MOIZ  07/03/2018    CHOLECYSTECTOMY  1975    CORONARY ARTERY BYPASS GRAFT      CORONARY ARTERY BYPASS GRAFT (CABG)      triple    HIP SURGERY Right     INJECTION OF ANESTHETIC AGENT AROUND MEDIAL BRANCH NERVES INNERVATING LUMBAR FACET JOINT Bilateral 1/19/2023    Procedure: Block-nerve-medial branch-lumbar, bilateral L4 through S1;  Surgeon: Ashley Piña MD;  Location: [facility];  Service: Pain Management;  Laterality: Bilateral;    L4-S1 Caudal cath guided MOIZ  07/03/2018    Dr Orta    L5-S1 Caudal cath guided MOIZ  09/26/2014 6/26/2014 4/11/2014    Dr Orta    left shoulder repair Left     NECK SURGERY  2018    does not know what kind    right sacral RF Right 12/26/2013 1/24/2012    Dr Orta    right SIJI Right 10/24/2013    Dr Orta     Social History     Socioeconomic History    Marital status:    Occupational History    Occupation: RETIRED   Tobacco Use    Smoking status: Never      "Passive exposure: Never    Smokeless tobacco: Never   Substance and Sexual Activity    Alcohol use: Not Currently    Drug use: Yes     Types: Hydrocodone     Comment: pain medication with pain treatment     Sexual activity: Not Currently     Family History   Problem Relation Age of Onset    Diabetes Mother     Heart disease Mother     Hypertension Mother     Heart attack Mother     Hypertension Father     Stroke Father     Stroke Sister     Hypertension Sister     Cancer Brother      Review of patient's allergies indicates:  No Known Allergies     Objective:  Vitals:    01/22/24 1001   BP: (!) 172/58   Pulse: 60   Resp: 20   Weight: 64.9 kg (143 lb)   Height: 5' 5" (1.651 m)   PainSc:   9         Physical Exam  Vitals and nursing note reviewed. Exam conducted with a chaperone present.   Constitutional:       General: She is awake. She is not in acute distress.     Appearance: Normal appearance. She is not ill-appearing or diaphoretic.   HENT:      Head: Normocephalic and atraumatic.      Nose: Nose normal.      Mouth/Throat:      Mouth: Mucous membranes are moist.      Pharynx: Oropharynx is clear.   Eyes:      Conjunctiva/sclera: Conjunctivae normal.      Pupils: Pupils are equal, round, and reactive to light.   Cardiovascular:      Rate and Rhythm: Normal rate.   Pulmonary:      Effort: Pulmonary effort is normal. No respiratory distress.   Abdominal:      Palpations: Abdomen is soft.   Musculoskeletal:      Cervical back: Normal range of motion and neck supple. Tenderness present.      Thoracic back: Tenderness present.      Lumbar back: Tenderness present. Decreased range of motion.   Skin:     General: Skin is warm and dry.      Coloration: Skin is not jaundiced or pale.   Neurological:      General: No focal deficit present.      Mental Status: She is alert and oriented to person, place, and time. Mental status is at baseline.      Cranial Nerves: No cranial nerve deficit (II-XII).   Psychiatric:         Mood " and Affect: Mood normal.         Behavior: Behavior normal. Behavior is cooperative.         Thought Content: Thought content normal.           X-Ray Foot 2 View Right  Narrative: EXAMINATION:  XR FOOT 2 VIEW RIGHT    CLINICAL HISTORY:  Pain of toe of right foot.  Contusion of right foot, initial encounter    COMPARISON:  No previous similar    TECHNIQUE:  AP and lateral views right foot    FINDINGS:  There is osteopenia.  No acute fracture or dislocation is seen.  There is some minimal osteophyte formation and eburnation of the 1st MTP joint.  There is mild atherosclerotic small vessel arterial calcification of the foot  Impression: No acute fracture seen.  Osteopenia    Electronically signed by: Jovanny James  Date:    12/24/2023  Time:    10:54         Office Visit on 12/11/2023   Component Date Value Ref Range Status    Hemoglobin A1C 12/11/2023 5.7  4.5 - 6.6 % Final    Estimated Average Glucose 12/11/2023 117  mg/dL Final    Sodium 12/11/2023 141  136 - 145 mmol/L Final    Potassium 12/11/2023 4.0  3.5 - 5.1 mmol/L Final    Chloride 12/11/2023 111 (H)  98 - 107 mmol/L Final    CO2 12/11/2023 24  21 - 32 mmol/L Final    Anion Gap 12/11/2023 10  7 - 16 mmol/L Final    Glucose 12/11/2023 96  74 - 106 mg/dL Final    BUN 12/11/2023 14  7 - 18 mg/dL Final    Creatinine 12/11/2023 0.85  0.55 - 1.02 mg/dL Final    BUN/Creatinine Ratio 12/11/2023 16  6 - 20 Final    Calcium 12/11/2023 8.0 (L)  8.5 - 10.1 mg/dL Final    Total Protein 12/11/2023 7.2  6.4 - 8.2 g/dL Final    Albumin 12/11/2023 3.4 (L)  3.5 - 5.0 g/dL Final    Globulin 12/11/2023 3.8  2.0 - 4.0 g/dL Final    A/G Ratio 12/11/2023 0.9   Final    Bilirubin, Total 12/11/2023 0.4  >0.0 - 1.2 mg/dL Final    Alk Phos 12/11/2023 49 (L)  55 - 142 U/L Final    ALT 12/11/2023 19  13 - 56 U/L Final    AST 12/11/2023 26  15 - 37 U/L Final    eGFR 12/11/2023 72  >=60 mL/min/1.73m2 Final    WBC 12/11/2023 2.54 (L)  4.50 - 11.00 K/uL Final    RBC 12/11/2023 2.98 (L)   4.20 - 5.40 M/uL Final    Hemoglobin 12/11/2023 9.4 (L)  12.0 - 16.0 g/dL Final    Hematocrit 12/11/2023 28.6 (L)  38.0 - 47.0 % Final    MCV 12/11/2023 96.0  80.0 - 96.0 fL Final    MCH 12/11/2023 31.5 (H)  27.0 - 31.0 pg Final    MCHC 12/11/2023 32.9  32.0 - 36.0 g/dL Final    RDW 12/11/2023 15.2 (H)  11.5 - 14.5 % Final    Platelet Count 12/11/2023 134 (L)  150 - 400 K/uL Final    MPV 12/11/2023 11.3  9.4 - 12.4 fL Final    Neutrophils % 12/11/2023 40.1 (L)  53.0 - 65.0 % Final    Lymphocytes % 12/11/2023 46.1 (H)  27.0 - 41.0 % Final    Monocytes % 12/11/2023 12.2 (H)  2.0 - 6.0 % Final    Eosinophils % 12/11/2023 0.4 (L)  1.0 - 4.0 % Final    Basophils % 12/11/2023 0.4  0.0 - 1.0 % Final    Immature Granulocytes % 12/11/2023 0.8 (H)  0.0 - 0.4 % Final    nRBC, Auto 12/11/2023 0.0  <=0.0 % Final    Neutrophils, Abs 12/11/2023 1.02 (L)  1.80 - 7.70 K/uL Final    Lymphocytes, Absolute 12/11/2023 1.17  1.00 - 4.80 K/uL Final    Monocytes, Absolute 12/11/2023 0.31  0.00 - 0.80 K/uL Final    Eosinophils, Absolute 12/11/2023 0.01  0.00 - 0.50 K/uL Final    Basophils, Absolute 12/11/2023 0.01  0.00 - 0.20 K/uL Final    Immature Granulocytes, Absolute 12/11/2023 0.02  0.00 - 0.04 K/uL Final    nRBC, Absolute 12/11/2023 0.00  <=0.00 x10e3/uL Final    Diff Type 12/11/2023 Auto   Final    Vitamin B12 12/12/2023 3,674 (H)  193 - 986 pg/mL Final    Folate 12/12/2023 13.1  3.1 - 17.5 ng/mL Final    Ferritin 12/12/2023 150  8 - 252 ng/mL Final    Iron 12/12/2023 67  50 - 170 µg/dL Final    Iron Saturation 12/12/2023 28  14 - 50 % Final    TIBC 12/12/2023 236 (L)  250 - 450 µg/dL Final    Homocysteine 12/12/2023 20.0 (H)  3.2 - 10.7 µmol/L Final    Vitamin D 25-Hydroxy, Blood 12/12/2023 47.3  ng/mL Final   Office Visit on 10/17/2023   Component Date Value Ref Range Status    POC Amphetamines 10/17/2023 Negative  Negative, Inconclusive Final    POC Barbiturates 10/17/2023 Negative  Negative, Inconclusive Final    POC  Benzodiazepines 10/17/2023 Negative  Negative, Inconclusive Final    POC Cocaine 10/17/2023 Negative  Negative, Inconclusive Final    POC THC 10/17/2023 Negative  Negative, Inconclusive Final    POC Methadone 10/17/2023 Negative  Negative, Inconclusive Final    POC Methamphetamine 10/17/2023 Negative  Negative, Inconclusive Final    POC Opiates 10/17/2023 Presumptive Positive (A)  Negative, Inconclusive Final    POC Oxycodone 10/17/2023 Negative  Negative, Inconclusive Final    POC Phencyclidine 10/17/2023 Negative  Negative, Inconclusive Final    POC Methylenedioxymethamphetamine * 10/17/2023 Negative  Negative, Inconclusive Final    POC Tricyclic Antidepressants 10/17/2023 Negative  Negative, Inconclusive Final    POC Buprenorphine 10/17/2023 Negative   Final     Acceptable 10/17/2023 Yes   Final    POC Temperature (Urine) 10/17/2023 92   Final   Office Visit on 08/15/2023   Component Date Value Ref Range Status    Hemoglobin A1C 08/15/2023 5.6  4.5 - 6.6 % Final    Estimated Average Glucose 08/15/2023 100  mg/dL Final   Lab Visit on 08/10/2023   Component Date Value Ref Range Status    Total Protein 08/10/2023 7.9  6.4 - 8.2 g/dL Final    Albumin, PE 08/10/2023 4.71  3.5 - 5.2 g/dL Final    Alpha-1-Globulin 08/10/2023 0.2  0.1 - 0.4 g/dL Final    Alpha-2-Globulin 08/10/2023 0.7  0.4 - 1.3 g/dL Final    Beta, PE 08/10/2023 0.7  0.5 - 1.5 g/dL Final    Gamma, PE 08/10/2023 1.7  0.5 - 1.8 g/dL Final    M-Component 1 08/10/2023 1.080  mg/dL Final    Pathologist Interp, Pro Elect, Blo* 08/10/2023 Monoclonal Gammopathy, reflex GOPI results to follow   Final    IgG, GOPI 08/10/2023 Normal   Final    IgG 08/10/2023 869  767 - 1,590 mg/dL Final    IgA, GOPI 08/10/2023 Monoclonal Increase   Final    IgA 08/10/2023 1,870 (H)  61 - 356 mg/dL Final    IgM, GOPI 08/10/2023 Decreased   Final    IgM 08/10/2023 23 (L)  37 - 286 mg/dL Final    Kappa GOPI, Blood 08/10/2023 No Monoclonal Bands Present   Final    Kappa  08/10/2023 212.00  170 - 370 Final    Lambda GOPI, Blood 08/10/2023 Monoclonal Band Present   Final    Lambda 08/10/2023 356 (H)  90 - 210 Final    Kappa/Lambda Ratio 08/10/2023 0.60 (L)  1.35 - 2.65 % Final    Pathologist Carmen, GOPI, Blood 08/10/2023 IgA Lambda Monoclonal Gammopathy   Final         Orders Placed This Encounter   Procedures    POCT Urine Drug Screen Presump     Interpretive Information:     Negative:  No drug detected at the cut off level.   Positive:  This result represents presumptive positive for the   tested drug, other substances may yield a positive response other   than the analyte of interest. This result should be utilized for   diagnostic purpose only. Confirmation testing will be performed upon physician request only.            Requested Prescriptions     Signed Prescriptions Disp Refills    HYDROcodone-acetaminophen (NORCO) 7.5-325 mg per tablet 120 tablet 0     Sig: Take 1 tablet by mouth every 6 (six) hours as needed for Pain.    HYDROcodone-acetaminophen (NORCO) 7.5-325 mg per tablet 120 tablet 0     Sig: Take 1 tablet by mouth every 6 (six) hours as needed for Pain.    HYDROcodone-acetaminophen (NORCO) 7.5-325 mg per tablet 120 tablet 0     Sig: Take 1 tablet by mouth every 6 (six) hours as needed for Pain.       Assessment:     1. Lumbosacral spondylosis without myelopathy    2. Cervical radiculopathy    3. Postlaminectomy syndrome of cervical region    4. Disorder of sacrum    5. Other long term (current) drug therapy         A's of Opioid Risk Assessment  Activity:Patient can perform ADL.   Analgesia:Patients pain is partially controlled by current medication. Patient has tried OTC medications such as Tylenol and Ibuprofen with out relief.   Adverse Effects: Patient denies constipation or sedation.  Aberrant Behavior:  reviewed with no aberrant drug seeking/taking behavior.  Overdose reversal drug naloxone discussed    Drug screen reviewed      X-ray lumbar spine [facility]  November 28, 2022  Grade 1 anterior listhesis L3/4 multiple level degenerative changes pedicle screws rods posterior L4/5 interbody hardware L4/5  Prominent degenerative changes L2/3 L3/4 moderate facet joint arthropathy throughout    X-ray sacroiliac joints [facility] November 28, 2022 osteoarthritis sacroiliac joints      Plan:    Narcan February 2023    Rheumatoid arthritis     Using 4 point walker/wheelchair assistance ambulation    Do not have permission to hold Plavix     Complaint joint back pain she states current medication does help with her discomfort she would like to continue with current treatment plan    Continue current medication    Continue activity as tolerated     Follow-up 3 months    Dr. Piña, January 2024    Bring original prescription medication bottles/container/box with labels to each visit

## 2024-01-22 ENCOUNTER — OFFICE VISIT (OUTPATIENT)
Dept: PAIN MEDICINE | Facility: CLINIC | Age: 74
End: 2024-01-22
Payer: MEDICARE

## 2024-01-22 VITALS
HEIGHT: 65 IN | HEART RATE: 60 BPM | DIASTOLIC BLOOD PRESSURE: 58 MMHG | SYSTOLIC BLOOD PRESSURE: 172 MMHG | WEIGHT: 143 LBS | RESPIRATION RATE: 20 BRPM | BODY MASS INDEX: 23.82 KG/M2

## 2024-01-22 DIAGNOSIS — Z79.899 OTHER LONG TERM (CURRENT) DRUG THERAPY: ICD-10-CM

## 2024-01-22 DIAGNOSIS — M96.1 POSTLAMINECTOMY SYNDROME OF CERVICAL REGION: Chronic | ICD-10-CM

## 2024-01-22 DIAGNOSIS — M53.3 DISORDER OF SACRUM: Chronic | ICD-10-CM

## 2024-01-22 DIAGNOSIS — M47.817 LUMBOSACRAL SPONDYLOSIS WITHOUT MYELOPATHY: Primary | Chronic | ICD-10-CM

## 2024-01-22 DIAGNOSIS — M54.12 CERVICAL RADICULOPATHY: Chronic | ICD-10-CM

## 2024-01-22 PROCEDURE — 99214 OFFICE O/P EST MOD 30 MIN: CPT | Mod: S$PBB,,, | Performed by: PHYSICIAN ASSISTANT

## 2024-01-22 PROCEDURE — 99213 OFFICE O/P EST LOW 20 MIN: CPT | Mod: PBBFAC | Performed by: PHYSICIAN ASSISTANT

## 2024-01-22 PROCEDURE — 99999PBSHW POCT URINE DRUG SCREEN PRESUMP: Mod: PBBFAC,,,

## 2024-01-22 PROCEDURE — 80305 DRUG TEST PRSMV DIR OPT OBS: CPT | Mod: PBBFAC | Performed by: PHYSICIAN ASSISTANT

## 2024-01-22 RX ORDER — HYDROCODONE BITARTRATE AND ACETAMINOPHEN 7.5; 325 MG/1; MG/1
1 TABLET ORAL EVERY 6 HOURS PRN
Qty: 120 TABLET | Refills: 0 | Status: SHIPPED | OUTPATIENT
Start: 2024-01-22 | End: 2024-05-08

## 2024-01-22 RX ORDER — HYDROCODONE BITARTRATE AND ACETAMINOPHEN 7.5; 325 MG/1; MG/1
1 TABLET ORAL EVERY 6 HOURS PRN
Qty: 120 TABLET | Refills: 0 | Status: SHIPPED | OUTPATIENT
Start: 2024-03-22 | End: 2024-05-08

## 2024-01-22 RX ORDER — HYDROCODONE BITARTRATE AND ACETAMINOPHEN 7.5; 325 MG/1; MG/1
1 TABLET ORAL EVERY 6 HOURS PRN
Qty: 120 TABLET | Refills: 0 | Status: SHIPPED | OUTPATIENT
Start: 2024-02-21 | End: 2024-05-08

## 2024-01-23 DIAGNOSIS — I10 HYPERTENSION, UNSPECIFIED TYPE: ICD-10-CM

## 2024-01-23 DIAGNOSIS — E78.5 HYPERLIPIDEMIA, UNSPECIFIED HYPERLIPIDEMIA TYPE: ICD-10-CM

## 2024-01-23 DIAGNOSIS — E11.9 TYPE 2 DIABETES MELLITUS WITHOUT COMPLICATION, WITHOUT LONG-TERM CURRENT USE OF INSULIN: ICD-10-CM

## 2024-01-23 RX ORDER — ROSUVASTATIN CALCIUM 20 MG/1
20 TABLET, COATED ORAL NIGHTLY
Qty: 90 TABLET | Refills: 1 | Status: SHIPPED | OUTPATIENT
Start: 2024-01-23

## 2024-01-23 RX ORDER — METOPROLOL SUCCINATE 25 MG/1
TABLET, EXTENDED RELEASE ORAL
Qty: 45 TABLET | Refills: 1 | Status: SHIPPED | OUTPATIENT
Start: 2024-01-23

## 2024-01-23 RX ORDER — DAPAGLIFLOZIN 5 MG/1
5 TABLET, FILM COATED ORAL DAILY
Qty: 90 TABLET | Refills: 1 | Status: SHIPPED | OUTPATIENT
Start: 2024-01-23

## 2024-01-31 ENCOUNTER — EXTERNAL CHRONIC CARE MANAGEMENT (OUTPATIENT)
Dept: FAMILY MEDICINE | Facility: CLINIC | Age: 74
End: 2024-01-31
Payer: MEDICARE

## 2024-01-31 PROCEDURE — G0511 CCM/BHI BY RHC/FQHC 20MIN MO: HCPCS | Mod: ,,, | Performed by: FAMILY MEDICINE

## 2024-02-29 ENCOUNTER — EXTERNAL CHRONIC CARE MANAGEMENT (OUTPATIENT)
Dept: FAMILY MEDICINE | Facility: CLINIC | Age: 74
End: 2024-02-29
Payer: MEDICARE

## 2024-02-29 PROCEDURE — G0511 CCM/BHI BY RHC/FQHC 20MIN MO: HCPCS | Mod: ,,, | Performed by: FAMILY MEDICINE

## 2024-03-08 DIAGNOSIS — Z12.31 ENCOUNTER FOR SCREENING MAMMOGRAM FOR MALIGNANT NEOPLASM OF BREAST: Primary | ICD-10-CM

## 2024-03-19 ENCOUNTER — HOSPITAL ENCOUNTER (OUTPATIENT)
Dept: RADIOLOGY | Facility: HOSPITAL | Age: 74
Discharge: HOME | End: 2024-03-19
Attending: OBSTETRICS & GYNECOLOGY
Payer: MEDICARE

## 2024-03-19 VITALS — WEIGHT: 134 LBS | BODY MASS INDEX: 22.33 KG/M2 | HEIGHT: 65 IN

## 2024-03-19 DIAGNOSIS — Z12.31 ENCOUNTER FOR SCREENING MAMMOGRAM FOR MALIGNANT NEOPLASM OF BREAST: ICD-10-CM

## 2024-03-19 PROCEDURE — 77067 SCR MAMMO BI INCL CAD: CPT | Mod: TC

## 2024-03-29 ENCOUNTER — HOSPITAL ENCOUNTER (EMERGENCY)
Facility: HOSPITAL | Age: 74
Discharge: HOME | End: 2024-03-29
Payer: MEDICAID

## 2024-03-29 VITALS
DIASTOLIC BLOOD PRESSURE: 61 MMHG | TEMPERATURE: 98 F | OXYGEN SATURATION: 94 % | WEIGHT: 138 LBS | BODY MASS INDEX: 22.99 KG/M2 | SYSTOLIC BLOOD PRESSURE: 169 MMHG | RESPIRATION RATE: 20 BRPM | HEART RATE: 67 BPM | HEIGHT: 65 IN

## 2024-03-29 DIAGNOSIS — N30.00 ACUTE CYSTITIS WITHOUT HEMATURIA: Primary | ICD-10-CM

## 2024-03-29 DIAGNOSIS — G89.29 CHRONIC BILATERAL LOW BACK PAIN WITHOUT SCIATICA: ICD-10-CM

## 2024-03-29 DIAGNOSIS — R21 RASH OF BACK: ICD-10-CM

## 2024-03-29 DIAGNOSIS — M54.50 CHRONIC BILATERAL LOW BACK PAIN WITHOUT SCIATICA: ICD-10-CM

## 2024-03-29 LAB
BACTERIA #/AREA URNS HPF: ABNORMAL /HPF
BILIRUB UR QL STRIP: NEGATIVE
CLARITY UR: CLEAR
COLOR UR: YELLOW
GLUCOSE UR STRIP-MCNC: 500 MG/DL
KETONES UR STRIP-SCNC: NEGATIVE MG/DL
LEUKOCYTE ESTERASE UR QL STRIP: ABNORMAL
NITRITE UR QL STRIP: NEGATIVE
PH UR STRIP: 6.5 PH UNITS
PROT UR QL STRIP: NEGATIVE
RBC # UR STRIP: ABNORMAL /UL
RBC #/AREA URNS HPF: ABNORMAL /HPF
SP GR UR STRIP: 1.01
SQUAMOUS #/AREA URNS LPF: ABNORMAL /LPF
UROBILINOGEN UR STRIP-ACNC: 0.2 MG/DL
WBC #/AREA URNS HPF: ABNORMAL /HPF

## 2024-03-29 PROCEDURE — 81003 URINALYSIS AUTO W/O SCOPE: CPT

## 2024-03-29 PROCEDURE — 99283 EMERGENCY DEPT VISIT LOW MDM: CPT

## 2024-03-29 PROCEDURE — 99284 EMERGENCY DEPT VISIT MOD MDM: CPT | Mod: GF

## 2024-03-29 PROCEDURE — 87086 URINE CULTURE/COLONY COUNT: CPT

## 2024-03-29 RX ORDER — LANOLIN ALCOHOL/MO/W.PET/CERES
2500 CREAM (GRAM) TOPICAL DAILY
COMMUNITY

## 2024-03-29 RX ORDER — CEPHALEXIN 500 MG/1
500 CAPSULE ORAL EVERY 12 HOURS
Qty: 10 CAPSULE | Refills: 0 | Status: SHIPPED | OUTPATIENT
Start: 2024-03-29 | End: 2024-04-03

## 2024-03-29 NOTE — DISCHARGE INSTRUCTIONS
Follow up with primary care provider when antibiotics are complete.   Increase water intake.   Complete full course of antibiotics.   Use over the counter hydrocortisone 1% to itching to bilateral back area.  Return to emergency department for any new or worsening symptoms.

## 2024-03-29 NOTE — ED TRIAGE NOTES
C/o back pain that is chronic but has had a flare up in past couple of days. States she took 2 pain pills and 2 steroids pta and it has helped some.

## 2024-03-29 NOTE — ED PROVIDER NOTES
Encounter Date: 3/29/2024       History     Chief Complaint   Patient presents with    Back Pain     73-year-old female patient complaining of low back pain times 2-3 days.  States she took 2 pain pills and 2 steroids prior to arrival and has had some relief.  Patient sees [facility] for chronic pain management with last visit on 01/22/2024 for lumbosacral spondylosis without myelopathy and cervical radiculopathy.  She received a refill of her Norco 7.5 mg at that time and had a presumptive opioid positive drug screen.  Today this is where she reports her pain is at the lumbar area.  Denies any injury or fall.  She states her next appointment is already scheduled for pain management in a couple of weeks.  Reports that she has been told by her gynecologist in the past that she needs to have a hysterectomy for prolapse but unable to due to her extensive medical history.  Patient was last seen by Dr. Ferrer on 01/09/2024 for maintenance check of her pessary device in had no problems at that time.  She states her next appointment with Dr. Ferrer is next week.  She does say she has an odor to her urine but denies dysuria or suprapubic pain.  There is no CVA tenderness.  She uses a walker and ambulated into the ED without difficulty and states she drove herself today.  Also complaining of itchiness to bilateral lower back area where there is redness with no warmth or pain.  Does report using new body wash that her friend gave her.  She denies fever, chills, headache, blurry vision, pain, diarrhea, or abdominal pain.              The history is provided by the patient and medical records.     Review of patient's allergies indicates:  No Known Allergies  Past Medical History:   Diagnosis Date    Anemia     Atherosclerotic heart disease of native coronary artery without angina pectoris     Carotid artery stenosis 01/15/2014    CHARO  LICA stenosis    Causalgia of lower limb     Cervical radiculopathy     Chronic low back  pain     Chronic pain syndrome     CVA (cerebral vascular accident)     right cerebellar    Degenerative joint disease involving multiple joints     Disorder of parathyroid gland, unspecified     Disorder of sacrum     Essential (primary) hypertension     Gammopathy     GERD (gastroesophageal reflux disease)     Heart murmur     Hyperlipidemia     Hyperparathyroidism     Lumbosacral radiculopathy     Lumbosacral spondylosis     Other long term (current) drug therapy     Pernicious anemia     Sciatica     Spinal stenosis of lumbar region     L4-5    Type 2 diabetes mellitus      Past Surgical History:   Procedure Laterality Date    CARPAL TUNNEL RELEASE Right     caudal MOIZ  07/03/2018    CHOLECYSTECTOMY  1975    CORONARY ARTERY BYPASS GRAFT      CORONARY ARTERY BYPASS GRAFT (CABG)      triple    HIP SURGERY Right     INJECTION OF ANESTHETIC AGENT AROUND MEDIAL BRANCH NERVES INNERVATING LUMBAR FACET JOINT Bilateral 1/19/2023    Procedure: Block-nerve-medial branch-lumbar, bilateral L4 through S1;  Surgeon: Ashley Piña MD;  Location: [facility];  Service: Pain Management;  Laterality: Bilateral;    L4-S1 Caudal cath guided MOIZ  07/03/2018    Dr Orta    L5-S1 Caudal cath guided MOIZ  09/26/2014 6/26/2014 4/11/2014    Dr Orta    left shoulder repair Left     NECK SURGERY  2018    does not know what kind    right sacral RF Right 12/26/2013 1/24/2012    Dr Orta    right SIJI Right 10/24/2013    Dr Orta     Family History   Problem Relation Age of Onset    Diabetes Mother     Heart disease Mother     Hypertension Mother     Heart attack Mother     Hypertension Father     Stroke Father     Stroke Sister     Hypertension Sister     Cancer Brother      Social History     Tobacco Use    Smoking status: Never     Passive exposure: Never    Smokeless tobacco: Never   Substance Use Topics    Alcohol use: Not Currently    Drug use: Yes     Types: Hydrocodone     Comment: pain medication with pain treatment      Review of  Systems   Constitutional:  Negative for appetite change, chills, fatigue and fever.   HENT:  Negative for congestion, rhinorrhea, sneezing and sore throat.    Eyes:  Negative for visual disturbance.   Respiratory:  Negative for cough and shortness of breath.    Cardiovascular:  Negative for chest pain and leg swelling.   Gastrointestinal:  Negative for abdominal pain, constipation, diarrhea, nausea and vomiting.   Genitourinary:  Negative for dysuria, flank pain and urgency.        Malodorous urine   Musculoskeletal:  Positive for back pain (Bilateral low back pain). Negative for neck pain and neck stiffness.   Skin:  Positive for rash.   Neurological:  Negative for dizziness, light-headedness and headaches.   Psychiatric/Behavioral:  Negative for suicidal ideas.        Physical Exam     Initial Vitals [03/29/24 1021]   BP Pulse Resp Temp SpO2   (!) 169/61 67 20 98.3 °F (36.8 °C) (!) 94 %      MAP       --         Physical Exam    Nursing note and vitals reviewed.  Constitutional: She appears well-developed and well-nourished. No distress.   HENT:   Head: Normocephalic and atraumatic.   Mouth/Throat: Oropharynx is clear and moist.   Eyes: Conjunctivae and EOM are normal. Pupils are equal, round, and reactive to light.   Neck:   Normal range of motion.  Cardiovascular:  Normal rate, regular rhythm, normal heart sounds and intact distal pulses.           No murmur heard.  Pulmonary/Chest: Breath sounds normal. No respiratory distress.   Abdominal: Abdomen is soft. Bowel sounds are normal. She exhibits no distension. There is no abdominal tenderness.   Musculoskeletal:         General: No edema. Normal range of motion.      Cervical back: Normal range of motion.      Comments: Ambulates with a walker and able to drive herself to the hospital today     Neurological: She is alert and oriented to person, place, and time. She has normal strength. GCS score is 15. GCS eye subscore is 4. GCS verbal subscore is 5. GCS motor  subscore is 6.   Skin: Skin is warm and dry. Capillary refill takes less than 2 seconds. There is erythema (Redness to bilateral flank area that patient has been scratching).   Psychiatric: She has a normal mood and affect.         Medical Screening Exam   See Full Note    ED Course   Procedures  Labs Reviewed   URINALYSIS, REFLEX TO URINE CULTURE - Abnormal; Notable for the following components:       Result Value    Leukocytes, UA Small (*)     Glucose,  (*)     Blood, UA Moderate (*)     All other components within normal limits   URINALYSIS, MICROSCOPIC - Abnormal; Notable for the following components:    WBC, UA 11-15 (*)     RBC, UA 15-25 (*)     Bacteria, UA Many (*)     Squamous Epithelial Cells, UA Few (*)     All other components within normal limits   CULTURE, URINE          Imaging Results    None          Medications - No data to display  Medical Decision Making  MDM    Patient presents for emergent evaluation of acute low back pain that poses a threat to life and/or bodily function.    In the ED patient found to have acute UTI.    I ordered labs and personally reviewed them.  Labs significant for urinalysis moderate blood and small leukocytes.    Discharge MDM      Patient prescribed Keflex for UTI and told to apply over-the-counter hydrocortisone 1% to itchy redness on lower back as well as to stop using the new body wash.  Patient was discharged in stable condition.  Detailed return precautions discussed.     Amount and/or Complexity of Data Reviewed  Independent Historian:      Details: 73-year-old female patient complaining of low back pain times 2-3 days.  States she took 2 pain pills and 2 steroids prior to arrival and has had some relief.  Patient sees [facility] for chronic pain management with last visit on 01/22/2024 for lumbosacral spondylosis without myelopathy and cervical radiculopathy.  She received a refill of her Norco 7.5 mg at that time and had a presumptive opioid positive  drug screen.  Today this is where she reports her pain is at the lumbar area.  Denies any injury or fall.  She states her next appointment is already scheduled for pain management in a couple of weeks.  Reports that she has been told by her gynecologist in the past that she needs to have a hysterectomy for prolapse but unable to due to her extensive medical history.  Patient was last seen by Dr. Ferrer on 01/09/2024 for maintenance check of her pessary device in had no problems at that time.  She states her next appointment with Dr. Ferrer is next week.  She does say she has an odor to her urine but denies dysuria or suprapubic pain.  There is no CVA tenderness.  She uses a walker and ambulated into the ED without difficulty and states she drove herself today.  Also complaining of itchiness to bilateral lower back area where there is redness with no warmth or pain.  Does report using new body wash that her friend gave her.  She denies fever, chills, headache, blurry vision, pain, diarrhea, or abdominal pain.    Labs:      Details: Urinalysis-moderate blood and small leukocytes                                      Clinical Impression:   Final diagnoses:  [N30.00] Acute cystitis without hematuria (Primary)  [M54.50, G89.29] Chronic bilateral low back pain without sciatica  [R21] Rash of back        ED Disposition Condition    Discharge Stable          ED Prescriptions       Medication Sig Dispense Start Date End Date Auth. Provider    cephALEXin (KEFLEX) 500 MG capsule Take 1 capsule (500 mg total) by mouth every 12 (twelve) hours. for 5 days 10 capsule 3/29/2024 4/3/2024 Isela Calabrese NP          Follow-up Information    None          Isela Calabrese NP  03/29/24 1588

## 2024-03-31 ENCOUNTER — EXTERNAL CHRONIC CARE MANAGEMENT (OUTPATIENT)
Dept: FAMILY MEDICINE | Facility: CLINIC | Age: 74
End: 2024-03-31
Payer: MEDICARE

## 2024-03-31 PROCEDURE — G0511 CCM/BHI BY RHC/FQHC 20MIN MO: HCPCS | Mod: ,,, | Performed by: FAMILY MEDICINE

## 2024-04-01 LAB — UA COMPLETE W REFLEX CULTURE PNL UR: ABNORMAL

## 2024-04-02 ENCOUNTER — OFFICE VISIT (OUTPATIENT)
Dept: OBSTETRICS AND GYNECOLOGY | Facility: CLINIC | Age: 74
End: 2024-04-02
Payer: MEDICARE

## 2024-04-02 VITALS — DIASTOLIC BLOOD PRESSURE: 72 MMHG | SYSTOLIC BLOOD PRESSURE: 130 MMHG

## 2024-04-02 DIAGNOSIS — N76.0 ACUTE VAGINITIS: Primary | ICD-10-CM

## 2024-04-02 PROCEDURE — 99213 OFFICE O/P EST LOW 20 MIN: CPT | Mod: PBBFAC | Performed by: OBSTETRICS & GYNECOLOGY

## 2024-04-02 PROCEDURE — 99213 OFFICE O/P EST LOW 20 MIN: CPT | Mod: S$PBB,,, | Performed by: OBSTETRICS & GYNECOLOGY

## 2024-04-02 RX ORDER — METRONIDAZOLE 7.5 MG/G
GEL VAGINAL
Qty: 70 G | Refills: 0 | Status: SHIPPED | OUTPATIENT
Start: 2024-04-02

## 2024-04-02 NOTE — PATIENT INSTRUCTIONS
Discussed pessary was cleaned and package.  It was given to patient to bring back to my office.      Discussed beginning Metrogel vaginal cream 1 applicator full nightly for 5 nights.      Follow-up appointment with me in 6 weeks    We will reinspect and if abrasion has resolve replace pessary at that time.    She will continue following with primary care provider as directed.

## 2024-04-02 NOTE — PROGRESS NOTES
Subjective:       Patient ID: Kathryn Marie is a 73 y.o. female.    Chief Complaint: Vaginal Bleeding (Pt presents c/o vaginal odor/vaginal spotting-she uses a pessary-stated she was tx for vag infection in [place]. )    Presents today for pessary check.      However she complains of some vaginal discharge.  Some odor.          Review of Systems      Objective:      Physical Exam  Genitourinary:     Comments: Pessary was removed vaginal vault inspected mucoid vaginal discharge.  On careful examination there appears to be a vaginal erosion on the right vaginal fornix.  Bleeding noted.  Bimanual exam unremarkable.              Assessment:       1. Acute vaginitis        Plan:       Patient Instructions   Discussed pessary was cleaned and package.  It was given to patient to bring back to my office.      Discussed beginning Metrogel vaginal cream 1 applicator full nightly for 5 nights.      Follow-up appointment with me in 6 weeks    We will reinspect and if abrasion has resolve replace pessary at that time.    She will continue following with primary care provider as directed.

## 2024-04-04 ENCOUNTER — TELEPHONE (OUTPATIENT)
Dept: OBSTETRICS AND GYNECOLOGY | Facility: CLINIC | Age: 74
End: 2024-04-04
Payer: MEDICAID

## 2024-04-04 NOTE — TELEPHONE ENCOUNTER
----- Message from Khadijah Sneed sent at 4/4/2024  8:24 AM CDT -----  Patient said she haven't spotted do she still need to use medicine for yeast infection. Please call her @ [phone]

## 2024-04-17 ENCOUNTER — OFFICE VISIT (OUTPATIENT)
Dept: PAIN MEDICINE | Facility: CLINIC | Age: 74
End: 2024-04-17
Payer: MEDICARE

## 2024-04-17 VITALS
HEIGHT: 65 IN | SYSTOLIC BLOOD PRESSURE: 147 MMHG | WEIGHT: 138 LBS | RESPIRATION RATE: 18 BRPM | DIASTOLIC BLOOD PRESSURE: 44 MMHG | HEART RATE: 66 BPM | BODY MASS INDEX: 22.99 KG/M2

## 2024-04-17 DIAGNOSIS — M96.1 POSTLAMINECTOMY SYNDROME OF CERVICAL REGION: ICD-10-CM

## 2024-04-17 DIAGNOSIS — M54.12 CERVICAL RADICULOPATHY: ICD-10-CM

## 2024-04-17 DIAGNOSIS — M06.9 RHEUMATOID ARTHRITIS INVOLVING MULTIPLE SITES, UNSPECIFIED WHETHER RHEUMATOID FACTOR PRESENT: ICD-10-CM

## 2024-04-17 DIAGNOSIS — Z79.899 ENCOUNTER FOR LONG-TERM (CURRENT) USE OF OTHER MEDICATIONS: Primary | ICD-10-CM

## 2024-04-17 DIAGNOSIS — M47.817 LUMBOSACRAL SPONDYLOSIS WITHOUT MYELOPATHY: ICD-10-CM

## 2024-04-17 DIAGNOSIS — M35.3 POLYMYALGIA RHEUMATICA: ICD-10-CM

## 2024-04-17 DIAGNOSIS — B02.9 HERPES ZOSTER WITHOUT COMPLICATION: ICD-10-CM

## 2024-04-17 LAB
CTP QC/QA: YES
POC (AMP) AMPHETAMINE: NEGATIVE
POC (BAR) BARBITURATES: NEGATIVE
POC (BUP) BUPRENORPHINE: NEGATIVE
POC (BZO) BENZODIAZEPINES: NEGATIVE
POC (COC) COCAINE: NEGATIVE
POC (MDMA) METHYLENEDIOXYMETHAMPHETAMINE 3,4: NEGATIVE
POC (MET) METHAMPHETAMINE: NEGATIVE
POC (MOP) OPIATES: ABNORMAL
POC (MTD) METHADONE: NEGATIVE
POC (OXY) OXYCODONE: NEGATIVE
POC (PCP) PHENCYCLIDINE: NEGATIVE
POC (TCA) TRICYCLIC ANTIDEPRESSANTS: NEGATIVE
POC TEMPERATURE (URINE): ABNORMAL
POC THC: NEGATIVE

## 2024-04-17 PROCEDURE — 1159F MED LIST DOCD IN RCRD: CPT | Mod: CPTII,,, | Performed by: PAIN MEDICINE

## 2024-04-17 PROCEDURE — 1125F AMNT PAIN NOTED PAIN PRSNT: CPT | Mod: CPTII,,, | Performed by: PAIN MEDICINE

## 2024-04-17 PROCEDURE — 99999PBSHW POCT URINE DRUG SCREEN PRESUMP: Mod: PBBFAC,,,

## 2024-04-17 PROCEDURE — 99215 OFFICE O/P EST HI 40 MIN: CPT | Mod: PBBFAC | Performed by: PAIN MEDICINE

## 2024-04-17 PROCEDURE — 1101F PT FALLS ASSESS-DOCD LE1/YR: CPT | Mod: CPTII,,, | Performed by: PAIN MEDICINE

## 2024-04-17 PROCEDURE — 99214 OFFICE O/P EST MOD 30 MIN: CPT | Mod: S$PBB,,, | Performed by: PAIN MEDICINE

## 2024-04-17 PROCEDURE — 3077F SYST BP >= 140 MM HG: CPT | Mod: CPTII,,, | Performed by: PAIN MEDICINE

## 2024-04-17 PROCEDURE — 3078F DIAST BP <80 MM HG: CPT | Mod: CPTII,,, | Performed by: PAIN MEDICINE

## 2024-04-17 PROCEDURE — 3288F FALL RISK ASSESSMENT DOCD: CPT | Mod: CPTII,,, | Performed by: PAIN MEDICINE

## 2024-04-17 PROCEDURE — 80305 DRUG TEST PRSMV DIR OPT OBS: CPT | Mod: PBBFAC | Performed by: PAIN MEDICINE

## 2024-04-17 RX ORDER — ACYCLOVIR 800 MG/1
800 TABLET ORAL
Qty: 35 TABLET | Refills: 0 | Status: SHIPPED | OUTPATIENT
Start: 2024-04-17 | End: 2024-04-24

## 2024-04-17 RX ORDER — HYDROCODONE BITARTRATE AND ACETAMINOPHEN 7.5; 325 MG/1; MG/1
1 TABLET ORAL EVERY 6 HOURS PRN
Qty: 120 TABLET | Refills: 0 | Status: SHIPPED | OUTPATIENT
Start: 2024-04-20 | End: 2024-06-19

## 2024-04-17 RX ORDER — HYDROCODONE BITARTRATE AND ACETAMINOPHEN 7.5; 325 MG/1; MG/1
1 TABLET ORAL EVERY 6 HOURS PRN
Qty: 120 TABLET | Refills: 0 | Status: SHIPPED | OUTPATIENT
Start: 2024-05-20 | End: 2024-06-19

## 2024-04-17 RX ORDER — LIDOCAINE 50 MG/G
1 PATCH TOPICAL 2 TIMES DAILY
Qty: 60 PATCH | Refills: 0 | Status: SHIPPED | OUTPATIENT
Start: 2024-04-17 | End: 2024-05-17

## 2024-04-17 RX ORDER — GABAPENTIN 300 MG/1
300 CAPSULE ORAL 3 TIMES DAILY
Qty: 90 CAPSULE | Refills: 1 | Status: SHIPPED | OUTPATIENT
Start: 2024-04-17 | End: 2024-05-08

## 2024-04-17 NOTE — PROGRESS NOTES
She Disclaimer: This note has been generated using voice-recognition software. There may be typographical errors that have been missed during proof-reading        Patient ID: Kathryn Marie is a 73 y.o. female.      Chief Complaint: Low-back Pain        73-year-old female returns for re-evaluation of severe left lower back pain and itching for greater than 1 week.  She was evaluated in the emergency department and prescribed antibiotics.  She reports a skin rash with severe discomfort but was not treated for shingles. This pain is currently more severe than her chronic lower back and post-laminectomy pain.  Her current medications are providing minimal relief.  Upon further examination, she was found to have active herpetic lesions without scarring. I will start acyclovir, Neurontin and Lidoderm patch for shingles.  She will continue Norco  for chronic lower back and post-laminectomy pain syndrome.                  Pain Assessment  Pain Assessment: 0-10  Pain Score: 10-Worst pain ever  Pain Location: Back  Pain Descriptors: Aching  Pain Frequency: Intermittent  Pain Onset: Awakened from sleep  Clinical Progression: Not changed  Aggravating Factors: Other (Comment) (weather)  Pain Intervention(s): Medication (See eMAR), Home medication, Heat applied      A's of Opioid Risk Assessment  Activity:Patient is unable to  perform ADL.   Analgesia:Patients pain is not controlled by current medication.   Adverse Effects: Patient denies constipation or sedation.  Aberrant Behavior:  reviewed with no aberrant drug seeking/taking behavior.      Patient denies any suicidal or homicidal ideations    Physical Therapy/Home Exercise: no            Review of Systems   Constitutional: Negative.    HENT: Negative.     Eyes: Negative.    Respiratory: Negative.     Cardiovascular: Negative.    Gastrointestinal: Negative.    Endocrine: Negative.    Genitourinary: Negative.    Musculoskeletal:  Positive for arthralgias, back pain and gait  problem.   Integumentary:  Positive for rash (Left flank).   Hematological: Negative.    Psychiatric/Behavioral: Negative.               Past Medical History:   Diagnosis Date    Anemia     Atherosclerotic heart disease of native coronary artery without angina pectoris     Carotid artery stenosis 01/15/2014    CHARO  LICA stenosis    Causalgia of lower limb     Cervical radiculopathy     Chronic low back pain     Chronic pain syndrome     CVA (cerebral vascular accident)     right cerebellar    Degenerative joint disease involving multiple joints     Disorder of parathyroid gland, unspecified     Disorder of sacrum     Essential (primary) hypertension     Gammopathy     GERD (gastroesophageal reflux disease)     Heart murmur     Hyperlipidemia     Hyperparathyroidism     Lumbosacral radiculopathy     Lumbosacral spondylosis     Other long term (current) drug therapy     Pernicious anemia     Sciatica     Spinal stenosis of lumbar region     L4-5    Type 2 diabetes mellitus      Past Surgical History:   Procedure Laterality Date    CARPAL TUNNEL RELEASE Right     caudal MOIZ  07/03/2018    CHOLECYSTECTOMY  1975    CORONARY ARTERY BYPASS GRAFT      CORONARY ARTERY BYPASS GRAFT (CABG)      triple    HIP SURGERY Right     INJECTION OF ANESTHETIC AGENT AROUND MEDIAL BRANCH NERVES INNERVATING LUMBAR FACET JOINT Bilateral 1/19/2023    Procedure: Block-nerve-medial branch-lumbar, bilateral L4 through S1;  Surgeon: Ashley Piña MD;  Location: [facility];  Service: Pain Management;  Laterality: Bilateral;    L4-S1 Caudal cath guided MOIZ  07/03/2018    Dr Orta    L5-S1 Caudal cath guided MOIZ  09/26/2014 6/26/2014 4/11/2014    Dr Orta    left shoulder repair Left     NECK SURGERY  2018    does not know what kind    right sacral RF Right 12/26/2013 1/24/2012    Dr Orta    right SIJI Right 10/24/2013    Dr Orta     Social History     Socioeconomic History    Marital status:    Occupational History    Occupation:  RETIRED   Tobacco Use    Smoking status: Never     Passive exposure: Never    Smokeless tobacco: Never   Substance and Sexual Activity    Alcohol use: Not Currently    Drug use: Yes     Types: Hydrocodone     Comment: pain medication with pain treatment     Sexual activity: Not Currently     Family History   Problem Relation Name Age of Onset    Diabetes Mother      Heart disease Mother      Hypertension Mother      Heart attack Mother      Hypertension Father      Stroke Father      Stroke Sister      Hypertension Sister      Cancer Brother       Review of patient's allergies indicates:  No Known Allergies  has a current medication list which includes the following prescription(s): aspirin, blood sugar diagnostic, blood-glucose meter, clopidogrel, cyanocobalamin, farxiga, prolia, diclofenac sodium, dicyclomine, eliquis, ferrous sulfate, fluticasone propionate, folic acid, furosemide, gabapentin, hydrochlorothiazide, hydrocodone-acetaminophen, insulin degludec, ipratropium, latanoprost, methotrexate, metoprolol succinate, metronidazole, omeprazole, prednisone, rosuvastatin, sitagliptin phosphate, vitamin d, acyclovir, gabapentin, hydrocodone-acetaminophen, hydrocodone-acetaminophen, [START ON 5/20/2024] hydrocodone-acetaminophen, [START ON 4/20/2024] hydrocodone-acetaminophen, and lidocaine.      Objective:  Vitals:    04/17/24 1317   BP: (!) 147/44   Pulse: 66   Resp: 18        Physical Exam  Vitals and nursing note reviewed.   Constitutional:       General: She is not in acute distress.     Appearance: Normal appearance. She is not ill-appearing, toxic-appearing or diaphoretic.   HENT:      Head: Normocephalic and atraumatic.      Nose: Nose normal.      Mouth/Throat:      Mouth: Mucous membranes are moist.   Eyes:      Extraocular Movements: Extraocular movements intact.      Pupils: Pupils are equal, round, and reactive to light.   Cardiovascular:      Rate and Rhythm: Normal rate and regular rhythm.      Heart  sounds: Normal heart sounds.   Pulmonary:      Effort: Pulmonary effort is normal. No respiratory distress.      Breath sounds: Normal breath sounds. No stridor. No wheezing or rhonchi.   Abdominal:      General: Bowel sounds are normal.      Palpations: Abdomen is soft.   Musculoskeletal:         General: No swelling or deformity.      Cervical back: Normal and normal range of motion. No spasms or tenderness. No pain with movement. Normal range of motion.      Thoracic back: Normal.      Lumbar back: Spasms, tenderness and bony tenderness present. Decreased range of motion. Negative right straight leg raise test and negative left straight leg raise test. No scoliosis.      Right lower leg: No edema.      Left lower leg: No edema.   Skin:     General: Skin is warm.      Findings: Erythema (Left flank) and rash (Left flank) present. Rash is vesicular.   Neurological:      General: No focal deficit present.      Mental Status: She is alert and oriented to person, place, and time. Mental status is at baseline.      Cranial Nerves: No cranial nerve deficit.      Sensory: Sensation is intact. No sensory deficit.      Motor: No weakness.      Coordination: Coordination normal.      Gait: Gait normal.      Deep Tendon Reflexes: Reflexes are normal and symmetric.   Psychiatric:         Mood and Affect: Mood normal.         Behavior: Behavior normal.         Assessment:      1. Encounter for long-term (current) use of other medications    2. Lumbosacral spondylosis without myelopathy    3. Postlaminectomy syndrome of cervical region    4. Cervical radiculopathy    5. Rheumatoid arthritis involving multiple sites, unspecified whether rheumatoid factor present    6. Polymyalgia rheumatica    7. Herpes zoster without complication            Plan:  1. reviewed  2.Addiction, Dependency, Tolerance, Opioid abuse-misuse, Death, Diversion Discussed. Overdose reversal drug Naloxone discussed.Patient is prescribed opiates for  chronic nonmalignant pain pathology.  Patient is receiving opiates which require greater than a 72 hour supply of therapy.  Patient was educated on potential dependency associated with long-term opioid use as well as decreasing efficacy with prolonged use.  Patient was advised of risks, benefits and side effects and how to utilize each medication.  Patient was also informed that any deviation from therapy protocol will  lead to discontinuation of opiates.  It is reasonable to prescribe opioid analgesics for patient based on positive response to opioid medications, lack of side effects and  limited aberrant behavior.    3.Refill/Continue medications for pain control and function.  Start gabapentin, Lidoderm patch and acyclovir for shingles.  Continue Norco as previously prescribed       Requested Prescriptions     Signed Prescriptions Disp Refills    gabapentin (NEURONTIN) 300 MG capsule 90 capsule 1     Sig: Take 1 capsule (300 mg total) by mouth 3 (three) times daily.    LIDOcaine (LIDODERM) 5 % 60 patch 0     Sig: Place 1 patch onto the skin 2 (two) times a day. Remove & Discard patch within 12 hours or as directed by MD    acyclovir (ZOVIRAX) 800 MG Tab 35 tablet 0     Sig: Take 1 tablet (800 mg total) by mouth 5 (five) times daily. for 7 days    HYDROcodone-acetaminophen (NORCO) 7.5-325 mg per tablet 120 tablet 0     Sig: Take 1 tablet by mouth every 6 (six) hours as needed for Pain.    HYDROcodone-acetaminophen (NORCO) 7.5-325 mg per tablet 120 tablet 0     Sig: Take 1 tablet by mouth every 6 (six) hours as needed for Pain.     4.Urine drug screen point of care obtained and consistent with prescribed medications and medication refill date.  Drug screen is to monitor compliance with prescribed opiates and to ensure that there was no misuse/abuse of other non-prescribed opioids or illicit drugs.  From this information I will determine if patient is suitable for opioid therapy    Orders Placed This Encounter    Procedures    POCT Urine Drug Screen ([facility])     Interpretive Information:     Negative:  No drug detected at the cut off level.   Positive:  This result represents presumptive positive for the   tested drug, other substances may yield a positive response other   than the analyte of interest. This result should be utilized for   diagnostic purpose only. Confirmation testing will be performed upon physician request only.         5.Follow with AMANDA Rodriguez in 1 month for re-evaluation and medication refill     report:  Reviewed and consistent with medication use as prescribed.      The total time spent for evaluation and management on 04/17/2024 including reviewing separately obtained history, performing a medically appropriate exam and evaluation, documenting clinical information in the health record, independently interpreting results and communicating them to the patient/family/caregiver, and ordering medications/tests/procedures was between 15-29 minutes.    The above plan and management options were discussed at length with patient. Patient is in agreement with the above and verbalized understanding. It will be communicated with the referring physician via electronic record, fax, or mail.

## 2024-04-22 ENCOUNTER — OUTSIDE PLACE OF SERVICE (OUTPATIENT)
Dept: VASCULAR SURGERY | Facility: CLINIC | Age: 74
End: 2024-04-22
Payer: MEDICARE

## 2024-04-22 PROCEDURE — 99222 1ST HOSP IP/OBS MODERATE 55: CPT | Mod: ,,, | Performed by: NURSE PRACTITIONER

## 2024-04-30 ENCOUNTER — EXTERNAL CHRONIC CARE MANAGEMENT (OUTPATIENT)
Dept: FAMILY MEDICINE | Facility: CLINIC | Age: 74
End: 2024-04-30
Payer: MEDICARE

## 2024-04-30 PROCEDURE — G0511 CCM/BHI BY RHC/FQHC 20MIN MO: HCPCS | Mod: ,,, | Performed by: FAMILY MEDICINE

## 2024-05-08 NOTE — PROGRESS NOTES
Subjective:         Patient ID: Kathryn Marie is a 73 y.o. female.    Chief Complaint: Neck Pain, Back Pain, and Leg Pain        Pain  This is a chronic problem. The current episode started more than 1 year ago. The problem occurs daily. The problem has been waxing and waning. Associated symptoms include arthralgias and neck pain. Pertinent negatives include no anorexia, chest pain, chills, coughing, diaphoresis, fever, sore throat, vertigo or vomiting.     Review of Systems   Constitutional:  Negative for activity change, appetite change, chills, diaphoresis, fever and unexpected weight change.   HENT:  Negative for drooling, ear discharge, ear pain, facial swelling, nosebleeds, sore throat, trouble swallowing, voice change and goiter.    Eyes:  Negative for photophobia, pain, discharge, redness and visual disturbance.   Respiratory:  Negative for apnea, cough, choking, chest tightness, shortness of breath, wheezing and stridor.    Cardiovascular:  Negative for chest pain, palpitations and leg swelling.   Gastrointestinal:  Negative for abdominal distention, anorexia, diarrhea, rectal pain, vomiting and fecal incontinence.   Endocrine: Negative for cold intolerance, heat intolerance, polydipsia, polyphagia and polyuria.   Genitourinary:  Negative for bladder incontinence, dysuria, flank pain, frequency and hot flashes.   Musculoskeletal:  Positive for arthralgias, back pain, leg pain, neck pain and neck stiffness.   Integumentary:  Negative for color change and pallor.   Allergic/Immunologic: Negative for immunocompromised state.   Neurological:  Negative for dizziness, vertigo, seizures, syncope, facial asymmetry, speech difficulty, light-headedness, memory loss and coordination difficulties.   Hematological:  Negative for adenopathy. Does not bruise/bleed easily.   Psychiatric/Behavioral:  Negative for agitation, behavioral problems, confusion, decreased concentration, dysphoric mood, hallucinations,  self-injury and suicidal ideas. The patient is not nervous/anxious and is not hyperactive.            Past Medical History:   Diagnosis Date    Anemia     Atherosclerotic heart disease of native coronary artery without angina pectoris     Carotid artery stenosis 01/15/2014    CHARO  LICA stenosis    Causalgia of lower limb     Cervical radiculopathy     Chronic low back pain     Chronic pain syndrome     CVA (cerebral vascular accident)     right cerebellar    Degenerative joint disease involving multiple joints     Disorder of parathyroid gland, unspecified     Disorder of sacrum     Essential (primary) hypertension     Gammopathy     GERD (gastroesophageal reflux disease)     Heart murmur     Hyperlipidemia     Hyperparathyroidism     Lumbosacral radiculopathy     Lumbosacral spondylosis     Other long term (current) drug therapy     Pernicious anemia     Sciatica     Spinal stenosis of lumbar region     L4-5    Type 2 diabetes mellitus      Past Surgical History:   Procedure Laterality Date    CARPAL TUNNEL RELEASE Right     caudal MOIZ  07/03/2018    CHOLECYSTECTOMY  1975    CORONARY ARTERY BYPASS GRAFT      CORONARY ARTERY BYPASS GRAFT (CABG)      triple    HIP SURGERY Right     INJECTION OF ANESTHETIC AGENT AROUND MEDIAL BRANCH NERVES INNERVATING LUMBAR FACET JOINT Bilateral 1/19/2023    Procedure: Block-nerve-medial branch-lumbar, bilateral L4 through S1;  Surgeon: Ashley Piña MD;  Location: [facility];  Service: Pain Management;  Laterality: Bilateral;    L4-S1 Caudal cath guided MOIZ  07/03/2018    Dr Orta    L5-S1 Caudal cath guided MOIZ  09/26/2014 6/26/2014 4/11/2014    Dr Orta    left shoulder repair Left     NECK SURGERY  2018    does not know what kind    right sacral RF Right 12/26/2013 1/24/2012    Dr Orta    right SIJI Right 10/24/2013    Dr Orta     Social History     Socioeconomic History    Marital status:    Occupational History     "Occupation: RETIRED   Tobacco Use    Smoking status: Never     Passive exposure: Never    Smokeless tobacco: Never   Substance and Sexual Activity    Alcohol use: Not Currently    Drug use: Yes     Types: Hydrocodone     Comment: pain medication with pain treatment     Sexual activity: Not Currently     Family History   Problem Relation Name Age of Onset    Diabetes Mother      Heart disease Mother      Hypertension Mother      Heart attack Mother      Hypertension Father      Stroke Father      Stroke Sister      Hypertension Sister      Cancer Brother       Review of patient's allergies indicates:  No Known Allergies     Objective:  Vitals:    05/15/24 1245   BP: (!) 107/54   Pulse: 64   Resp: 18   Weight: 63.5 kg (140 lb)   Height: 5' 5" (1.651 m)   PainSc:   8           Physical Exam  Vitals and nursing note reviewed. Exam conducted with a chaperone present.   Constitutional:       General: She is awake. She is not in acute distress.     Appearance: Normal appearance. She is not ill-appearing or diaphoretic.   HENT:      Head: Normocephalic and atraumatic.      Nose: Nose normal.      Mouth/Throat:      Mouth: Mucous membranes are moist.      Pharynx: Oropharynx is clear.   Eyes:      Conjunctiva/sclera: Conjunctivae normal.      Pupils: Pupils are equal, round, and reactive to light.   Cardiovascular:      Rate and Rhythm: Normal rate.   Pulmonary:      Effort: Pulmonary effort is normal. No respiratory distress.   Abdominal:      Palpations: Abdomen is soft.   Musculoskeletal:      Cervical back: Normal range of motion and neck supple. Tenderness present.      Thoracic back: Tenderness present.      Lumbar back: Tenderness present. Decreased range of motion.   Skin:     General: Skin is warm and dry.      Coloration: Skin is not jaundiced or pale.   Neurological:      General: No focal deficit present.      Mental Status: She is alert and oriented to person, place, and time. Mental status is at " baseline.      Cranial Nerves: No cranial nerve deficit (II-XII).   Psychiatric:         Mood and Affect: Mood normal.         Behavior: Behavior normal. Behavior is cooperative.         Thought Content: Thought content normal.         Mammo Digital Screening Bilat w/ Paulo  Narrative: Result:   Mammo Digital Screening Bilat w/ Paulo     History:  Patient is 73 y.o. and is seen for a screening mammogram.    Films Compared:  Compared to: 01/18/2023 Mammo Digital Screening Bilat and 12/15/2021 Mammo   Digital Screening Bilat     Findings:  This procedure was performed using tomosynthesis. Computer-aided detection   was utilized in the interpretation of this examination.  The breasts have scattered areas of fibroglandular density. There is no   evidence of suspicious masses, calcifications, or other abnormal findings.  Impression: Bilateral  There is no mammographic evidence of malignancy.    BI-RADS Category:   Overall: 2 - Benign       Recommendation:  Routine screening mammogram in 1 year is recommended.    Your estimated lifetime risk of breast cancer (to age 85) based on   Tyrer-Cuzick risk assessment model is Tyrer-Cuzick: 1.44 %. According to   the American Cancer Society, patients with a lifetime breast cancer risk   of 20% or higher might benefit from supplemental screening tests.         Office Visit on 04/17/2024   Component Date Value Ref Range Status    POC Amphetamines 04/17/2024 Negative  Negative, Inconclusive Final    POC Barbiturates 04/17/2024 Negative  Negative, Inconclusive Final    POC Benzodiazepines 04/17/2024 Negative  Negative, Inconclusive Final    POC Cocaine 04/17/2024 Negative  Negative, Inconclusive Final    POC THC 04/17/2024 Negative  Negative, Inconclusive Final    POC Methadone 04/17/2024 Negative  Negative, Inconclusive Final    POC Methamphetamine 04/17/2024 Negative  Negative, Inconclusive Final    POC Opiates 04/17/2024 Presumptive Positive (A)  Negative, Inconclusive Final     POC Oxycodone 04/17/2024 Negative  Negative, Inconclusive Final    POC Phencyclidine 04/17/2024 Negative  Negative, Inconclusive Final    POC Methylenedioxymethamphetamine * 04/17/2024 Negative  Negative, Inconclusive Final    POC Tricyclic Antidepressants 04/17/2024 Negative  Negative, Inconclusive Final    POC Buprenorphine 04/17/2024 Negative   Final     Acceptable 04/17/2024 Yes   Final   Admission on 03/29/2024, Discharged on 03/29/2024   Component Date Value Ref Range Status    Color, UA 03/29/2024 Yellow  Colorless, Straw, Yellow, Light Yellow, Dark Yellow Final    Clarity, UA 03/29/2024 Clear  Clear Final    pH, UA 03/29/2024 6.5  5.0 to 8.0 pH Units Final    Leukocytes, UA 03/29/2024 Small (A)  Negative Final    Nitrites, UA 03/29/2024 Negative  Negative Final    Protein, UA 03/29/2024 Negative  Negative Final    Glucose, UA 03/29/2024 500 (A)  Normal mg/dL Final    Ketones, UA 03/29/2024 Negative  Negative mg/dL Final    Urobilinogen, UA 03/29/2024 0.2  0.2, 1.0, Normal mg/dL Final    Bilirubin, UA 03/29/2024 Negative  Negative Final    Blood, UA 03/29/2024 Moderate (A)  Negative Final    Specific [place], UA 03/29/2024 1.015  <=1.005, 1.010, 1.015, 1.020, 1.025, 1.030 Final    WBC, UA 03/29/2024 11-15 (A)  None Seen, 0-5 /hpf Final    RBC, UA 03/29/2024 15-25 (A)  None Seen, 0-3 /hpf Final    Bacteria, UA 03/29/2024 Many (A)  None Seen /hpf Final    Squamous Epithelial Cells, UA 03/29/2024 Few (A)  None Seen, Rare, None Seen To Occasional /lpf Final    Culture, Urine 03/29/2024 8,000 Enterococcus faecalis (A)   Final   Office Visit on 01/22/2024   Component Date Value Ref Range Status    POC Amphetamines 01/22/2024 Negative  Negative, Inconclusive Final    POC Barbiturates 01/22/2024 Negative  Negative, Inconclusive Final    POC Benzodiazepines 01/22/2024 Negative  Negative, Inconclusive Final    POC Cocaine 01/22/2024 Negative  Negative, Inconclusive Final     POC THC 01/22/2024 Negative  Negative, Inconclusive Final    POC Methadone 01/22/2024 Negative  Negative, Inconclusive Final    POC Methamphetamine 01/22/2024 Negative  Negative, Inconclusive Final    POC Opiates 01/22/2024 Presumptive Positive (A)  Negative, Inconclusive Final    POC Oxycodone 01/22/2024 Negative  Negative, Inconclusive Final    POC Phencyclidine 01/22/2024 Negative  Negative, Inconclusive Final    POC Methylenedioxymethamphetamine * 01/22/2024 Negative  Negative, Inconclusive Final    POC Tricyclic Antidepressants 01/22/2024 Negative  Negative, Inconclusive Final    POC Buprenorphine 01/22/2024 Negative   Final     Acceptable 01/22/2024 Yes   Final    POC Temperature (Urine) 01/22/2024 90   Final   Office Visit on 12/11/2023   Component Date Value Ref Range Status    Hemoglobin A1C 12/11/2023 5.7  4.5 - 6.6 % Final    Estimated Average Glucose 12/11/2023 117  mg/dL Final    Sodium 12/11/2023 141  136 - 145 mmol/L Final    Potassium 12/11/2023 4.0  3.5 - 5.1 mmol/L Final    Chloride 12/11/2023 111 (H)  98 - 107 mmol/L Final    CO2 12/11/2023 24  21 - 32 mmol/L Final    Anion Gap 12/11/2023 10  7 - 16 mmol/L Final    Glucose 12/11/2023 96  74 - 106 mg/dL Final    BUN 12/11/2023 14  7 - 18 mg/dL Final    Creatinine 12/11/2023 0.85  0.55 - 1.02 mg/dL Final    BUN/Creatinine Ratio 12/11/2023 16  6 - 20 Final    Calcium 12/11/2023 8.0 (L)  8.5 - 10.1 mg/dL Final    Total Protein 12/11/2023 7.2  6.4 - 8.2 g/dL Final    Albumin 12/11/2023 3.4 (L)  3.5 - 5.0 g/dL Final    Globulin 12/11/2023 3.8  2.0 - 4.0 g/dL Final    A/G Ratio 12/11/2023 0.9   Final    Bilirubin, Total 12/11/2023 0.4  >0.0 - 1.2 mg/dL Final    Alk Phos 12/11/2023 49 (L)  55 - 142 U/L Final    ALT 12/11/2023 19  13 - 56 U/L Final    AST 12/11/2023 26  15 - 37 U/L Final    eGFR 12/11/2023 72  >=60 mL/min/1.73m2 Final    WBC 12/11/2023 2.54 (L)  4.50 - 11.00 K/uL Final    RBC 12/11/2023 2.98  (L)  4.20 - 5.40 M/uL Final    Hemoglobin 12/11/2023 9.4 (L)  12.0 - 16.0 g/dL Final    Hematocrit 12/11/2023 28.6 (L)  38.0 - 47.0 % Final    MCV 12/11/2023 96.0  80.0 - 96.0 fL Final    MCH 12/11/2023 31.5 (H)  27.0 - 31.0 pg Final    MCHC 12/11/2023 32.9  32.0 - 36.0 g/dL Final    RDW 12/11/2023 15.2 (H)  11.5 - 14.5 % Final    Platelet Count 12/11/2023 134 (L)  150 - 400 K/uL Final    MPV 12/11/2023 11.3  9.4 - 12.4 fL Final    Neutrophils % 12/11/2023 40.1 (L)  53.0 - 65.0 % Final    Lymphocytes % 12/11/2023 46.1 (H)  27.0 - 41.0 % Final    Monocytes % 12/11/2023 12.2 (H)  2.0 - 6.0 % Final    Eosinophils % 12/11/2023 0.4 (L)  1.0 - 4.0 % Final    Basophils % 12/11/2023 0.4  0.0 - 1.0 % Final    Immature Granulocytes % 12/11/2023 0.8 (H)  0.0 - 0.4 % Final    nRBC, Auto 12/11/2023 0.0  <=0.0 % Final    Neutrophils, Abs 12/11/2023 1.02 (L)  1.80 - 7.70 K/uL Final    Lymphocytes, Absolute 12/11/2023 1.17  1.00 - 4.80 K/uL Final    Monocytes, Absolute 12/11/2023 0.31  0.00 - 0.80 K/uL Final    Eosinophils, Absolute 12/11/2023 0.01  0.00 - 0.50 K/uL Final    Basophils, Absolute 12/11/2023 0.01  0.00 - 0.20 K/uL Final    Immature Granulocytes, Absolute 12/11/2023 0.02  0.00 - 0.04 K/uL Final    nRBC, Absolute 12/11/2023 0.00  <=0.00 x10e3/uL Final    Diff Type 12/11/2023 Auto   Final    Vitamin B12 12/12/2023 3,674 (H)  193 - 986 pg/mL Final    Folate 12/12/2023 13.1  3.1 - 17.5 ng/mL Final    Ferritin 12/12/2023 150  8 - 252 ng/mL Final    Iron 12/12/2023 67  50 - 170 µg/dL Final    Iron Saturation 12/12/2023 28  14 - 50 % Final    TIBC 12/12/2023 236 (L)  250 - 450 µg/dL Final    Homocysteine 12/12/2023 20.0 (H)  3.2 - 10.7 µmol/L Final    Vitamin D 25-Hydroxy, Blood 12/12/2023 47.3  ng/mL Final         No orders of the defined types were placed in this encounter.        Requested Prescriptions     Signed Prescriptions Disp Refills    gabapentin (NEURONTIN) 300 MG capsule 90 capsule  1     Sig: Take 1 capsule (300 mg total) by mouth 3 (three) times daily.    HYDROcodone-acetaminophen (NORCO) 7.5-325 mg per tablet 120 tablet 0     Sig: Take 1 tablet by mouth every 6 (six) hours as needed for Pain.    HYDROcodone-acetaminophen (NORCO) 7.5-325 mg per tablet 120 tablet 0     Sig: Take 1 tablet by mouth every 6 (six) hours as needed for Pain.       Assessment:     1. Lumbosacral spondylosis without myelopathy    2. Cervical radiculopathy    3. Postlaminectomy syndrome of cervical region    4. Disorder of sacrum           A's of Opioid Risk Assessment  Activity:Patient can perform ADL.   Analgesia:Patients pain is partially controlled by current medication. Patient has tried OTC medications such as Tylenol and Ibuprofen with out relief.   Adverse Effects: Patient denies constipation or sedation.  Aberrant Behavior:  reviewed with no aberrant drug seeking/taking behavior.  Overdose reversal drug naloxone discussed    Drug screen reviewed      X-ray lumbar spine [facility] November 28, 2022  Grade 1 anterior listhesis L3/4 multiple level degenerative changes pedicle screws rods posterior L4/5 interbody hardware L4/5  Prominent degenerative changes L2/3 L3/4 moderate facet joint arthropathy throughout    X-ray sacroiliac joints [facility] November 28, 2022 osteoarthritis sacroiliac joints      Plan:    Narcan February 2023    Rheumatoid arthritis     Using 4 point walker/wheelchair assistance ambulation    Do not have permission to hold Plavix     Chronic right shoulder pain, chronic intrinsic muscle wasting bilateral hands    Requesting Toradol injection     Toradol 30 mg IM, tolerated well     Otherwise she would like to continue conservative management    Continue current medication    Continue activity as tolerated     Follow-up 3 months    Dr. Piña, April 2025    Bring original prescription medication bottles/container/box with labels to each visit

## 2024-05-09 ENCOUNTER — OFFICE VISIT (OUTPATIENT)
Dept: OBSTETRICS AND GYNECOLOGY | Facility: CLINIC | Age: 74
End: 2024-05-09
Payer: MEDICARE

## 2024-05-09 VITALS — SYSTOLIC BLOOD PRESSURE: 136 MMHG | DIASTOLIC BLOOD PRESSURE: 64 MMHG

## 2024-05-09 DIAGNOSIS — N81.4 CYSTOCELE WITH UTERINE PROLAPSE: Primary | ICD-10-CM

## 2024-05-09 DIAGNOSIS — N95.2 ATROPHIC VAGINITIS: ICD-10-CM

## 2024-05-09 PROCEDURE — 99213 OFFICE O/P EST LOW 20 MIN: CPT | Mod: S$PBB,,, | Performed by: OBSTETRICS & GYNECOLOGY

## 2024-05-09 PROCEDURE — 99213 OFFICE O/P EST LOW 20 MIN: CPT | Mod: PBBFAC | Performed by: OBSTETRICS & GYNECOLOGY

## 2024-05-09 RX ORDER — ESTRADIOL 0.1 MG/G
CREAM VAGINAL
Qty: 42.5 G | Refills: 1 | Status: SHIPPED | OUTPATIENT
Start: 2024-05-09

## 2024-05-09 NOTE — PROGRESS NOTES
Subjective:       Patient ID: Kathryn Marie is a 73 y.o. female.    Chief Complaint: Follow-up (Here for 6 week f/u-pessary left out at last visit due to some vaginal erosion (see last note))    Patient has used pessary in the past with good success.      However intermittently she is developed some vaginal abrasions which were acquired leaving pessary out.      Most recently she was checked April 2nd and did have an erosion in the posterior vaginal fornix area.      Pessary was left out.  She is used Estrace vaginal cream and extra lubrication as directed.    She is symptomatic from uterine  and cystocele prolapse.    Would like pessary replaced if possible.      Review of Systems      Objective:      Physical Exam  Genitourinary:     Comments:   External normal on speculum examination and atrophic cervix was noted.  However on close inspection there is still a significant abrasion along the entire posterior aspects of the vaginal fornix.  It is not bleeding as it was on April 2nd.  However there was still a significant erosion.      With Valsalva There is protrusion of the cervix through the vaginal introitus.  Bulging cystocele noted.            Assessment:       1. Cystocele with uterine prolapse    2. Atrophic vaginitis        Plan:       Patient Instructions   Discussed that I could not replace pessary at present.      Discussed continuing Estrace vaginal cream 1 applicator full per vagina every other week.    She will continue Vaseline petroleum lubrication of the vaginal vault nightly.      She will push prolapse upward in the vaginal vault As needed.      I have discussed appointment with Dr. Solis  urogynecologist in [place].  Consult  requested.      Follow-up appointment given with me in 4 weeks

## 2024-05-09 NOTE — PATIENT INSTRUCTIONS
Discussed that I could not replace pessary at present.      Discussed continuing Estrace vaginal cream 1 applicator full per vagina every other week.    She will continue Vaseline petroleum lubrication of the vaginal vault nightly.      She will push prolapse upward in the vaginal vault As needed.      I have discussed appointment with Dr. Solis  urogynecologist in [place].  Consult  requested.      Follow-up appointment given with me in 4 weeks

## 2024-05-13 DIAGNOSIS — M35.3 POLYMYALGIA RHEUMATICA: Primary | ICD-10-CM

## 2024-05-15 ENCOUNTER — OFFICE VISIT (OUTPATIENT)
Dept: PAIN MEDICINE | Facility: CLINIC | Age: 74
End: 2024-05-15
Payer: MEDICARE

## 2024-05-15 VITALS
DIASTOLIC BLOOD PRESSURE: 54 MMHG | BODY MASS INDEX: 23.32 KG/M2 | WEIGHT: 140 LBS | SYSTOLIC BLOOD PRESSURE: 107 MMHG | HEART RATE: 64 BPM | RESPIRATION RATE: 18 BRPM | HEIGHT: 65 IN

## 2024-05-15 DIAGNOSIS — M54.12 CERVICAL RADICULOPATHY: Chronic | ICD-10-CM

## 2024-05-15 DIAGNOSIS — M96.1 POSTLAMINECTOMY SYNDROME OF CERVICAL REGION: Chronic | ICD-10-CM

## 2024-05-15 DIAGNOSIS — M47.817 LUMBOSACRAL SPONDYLOSIS WITHOUT MYELOPATHY: Primary | Chronic | ICD-10-CM

## 2024-05-15 DIAGNOSIS — M53.3 DISORDER OF SACRUM: Chronic | ICD-10-CM

## 2024-05-15 PROCEDURE — 99213 OFFICE O/P EST LOW 20 MIN: CPT | Mod: PBBFAC | Performed by: PHYSICIAN ASSISTANT

## 2024-05-15 PROCEDURE — 99214 OFFICE O/P EST MOD 30 MIN: CPT | Mod: S$PBB,,, | Performed by: PHYSICIAN ASSISTANT

## 2024-05-15 PROCEDURE — 99999PBSHW PR PBB SHADOW TECHNICAL ONLY FILED TO HB: Mod: PBBFAC,,,

## 2024-05-15 PROCEDURE — 96372 THER/PROPH/DIAG INJ SC/IM: CPT | Mod: PBBFAC | Performed by: PHYSICIAN ASSISTANT

## 2024-05-15 RX ORDER — HYDROCODONE BITARTRATE AND ACETAMINOPHEN 7.5; 325 MG/1; MG/1
1 TABLET ORAL EVERY 6 HOURS PRN
Qty: 120 TABLET | Refills: 0 | Status: SHIPPED | OUTPATIENT
Start: 2024-06-28

## 2024-05-15 RX ORDER — GABAPENTIN 300 MG/1
300 CAPSULE ORAL 3 TIMES DAILY
Qty: 90 CAPSULE | Refills: 1 | Status: SHIPPED | OUTPATIENT
Start: 2024-05-15 | End: 2024-07-14

## 2024-05-15 RX ORDER — HYDROCODONE BITARTRATE AND ACETAMINOPHEN 7.5; 325 MG/1; MG/1
1 TABLET ORAL EVERY 6 HOURS PRN
Qty: 120 TABLET | Refills: 0 | Status: SHIPPED | OUTPATIENT
Start: 2024-07-28

## 2024-05-15 RX ORDER — KETOROLAC TROMETHAMINE 30 MG/ML
30 INJECTION, SOLUTION INTRAMUSCULAR; INTRAVENOUS
Status: COMPLETED | OUTPATIENT
Start: 2024-05-15 | End: 2024-05-15

## 2024-05-15 RX ADMIN — KETOROLAC TROMETHAMINE 30 MG: 30 INJECTION, SOLUTION INTRAMUSCULAR at 01:05

## 2024-05-31 ENCOUNTER — EXTERNAL CHRONIC CARE MANAGEMENT (OUTPATIENT)
Dept: FAMILY MEDICINE | Facility: CLINIC | Age: 74
End: 2024-05-31
Payer: MEDICARE

## 2024-05-31 PROCEDURE — G0511 CCM/BHI BY RHC/FQHC 20MIN MO: HCPCS | Mod: ,,, | Performed by: FAMILY MEDICINE

## 2024-06-06 ENCOUNTER — OFFICE VISIT (OUTPATIENT)
Dept: OBSTETRICS AND GYNECOLOGY | Facility: CLINIC | Age: 74
End: 2024-06-06
Payer: MEDICARE

## 2024-06-06 VITALS — DIASTOLIC BLOOD PRESSURE: 76 MMHG | SYSTOLIC BLOOD PRESSURE: 128 MMHG

## 2024-06-06 DIAGNOSIS — R31.9 HEMATURIA, UNSPECIFIED TYPE: ICD-10-CM

## 2024-06-06 DIAGNOSIS — N39.41 URGE INCONTINENCE OF URINE: ICD-10-CM

## 2024-06-06 DIAGNOSIS — N81.4 CYSTOCELE WITH UTERINE PROLAPSE: Primary | ICD-10-CM

## 2024-06-06 DIAGNOSIS — R35.0 URINARY FREQUENCY: ICD-10-CM

## 2024-06-06 LAB
BILIRUB UR QL STRIP: NEGATIVE
CLARITY UR: CLEAR
COLOR UR: ABNORMAL
GLUCOSE UR STRIP-MCNC: 500 MG/DL
HYALINE CASTS #/AREA URNS LPF: ABNORMAL /LPF
KETONES UR STRIP-SCNC: NEGATIVE MG/DL
LEUKOCYTE ESTERASE UR QL STRIP: NEGATIVE
MUCOUS, UA: ABNORMAL /LPF
NITRITE UR QL STRIP: NEGATIVE
PH UR STRIP: 5.5 PH UNITS
PROT UR QL STRIP: NEGATIVE
RBC # UR STRIP: ABNORMAL /UL
RBC #/AREA URNS HPF: 1 /HPF
SP GR UR STRIP: 1.01
SQUAMOUS #/AREA URNS LPF: ABNORMAL /HPF
UROBILINOGEN UR STRIP-ACNC: NORMAL MG/DL
WBC #/AREA URNS HPF: <1 /HPF

## 2024-06-06 PROCEDURE — 87086 URINE CULTURE/COLONY COUNT: CPT | Mod: ,,, | Performed by: CLINICAL MEDICAL LABORATORY

## 2024-06-06 PROCEDURE — 81001 URINALYSIS AUTO W/SCOPE: CPT | Mod: ,,, | Performed by: CLINICAL MEDICAL LABORATORY

## 2024-06-06 PROCEDURE — 99213 OFFICE O/P EST LOW 20 MIN: CPT | Mod: PBBFAC | Performed by: OBSTETRICS & GYNECOLOGY

## 2024-06-06 PROCEDURE — 99499 UNLISTED E&M SERVICE: CPT | Mod: S$PBB,,, | Performed by: OBSTETRICS & GYNECOLOGY

## 2024-06-06 NOTE — PROGRESS NOTES
Subjective:       Patient ID: Kathryn Marie is a 73 y.o. female.    Chief Complaint: Follow-up (Here for 1 month f/u-hx of vaginal abrasion-pessary has been left out since April -she states she is up and down at night and its hanging out and some bleeding noted. )     Presents for follow-up.    Patient has developed abrasion at the inferior aspect of the cervix.  Therefore pessary has been left out.      She now has significant procidentia of the uterus and cystocele.    Review of Systems      Objective:      Physical Exam  Genitourinary:     Comments:   With Valsalva cervix protrudes through the vaginal introitus.  There still a large abrasion along the posterior lip of the cervix.  Not bleeding at present     Fourth degree cystocele noted with almost complete procidentia of the   Uterus.  Uterus and cervix were lubricated and reinserted.        Assessment:       1. Cystocele with uterine prolapse    2. Urinary frequency    3. Urge incontinence of urine    4. Hematuria, unspecified type        Plan:       Patient Instructions    Patient instructed that consult will be made with Dr. Solis urogynecologist.  She will notify me  if she does not receive appointment    Discussed frequent lubrication of the vaginal vault and pushing prolapse upward within vaginal vault.

## 2024-06-06 NOTE — PATIENT INSTRUCTIONS
Patient instructed that consult will be made with Dr. Solis urogynecologist.  She will notify me  if she does not receive appointment    Discussed frequent lubrication of the vaginal vault and pushing prolapse upward within vaginal vault.

## 2024-06-08 LAB — UA COMPLETE W REFLEX CULTURE PNL UR: NO GROWTH

## 2024-06-17 ENCOUNTER — TELEPHONE (OUTPATIENT)
Dept: OBSTETRICS AND GYNECOLOGY | Facility: CLINIC | Age: 74
End: 2024-06-17
Payer: MEDICARE

## 2024-06-17 NOTE — TELEPHONE ENCOUNTER
Patient called asking about her referral to , Called  office to confirm appointment. Patient is scheduled for 08/26/2024 @ 13:45. Patient has been made aware.

## 2024-06-30 ENCOUNTER — EXTERNAL CHRONIC CARE MANAGEMENT (OUTPATIENT)
Dept: FAMILY MEDICINE | Facility: CLINIC | Age: 74
End: 2024-06-30
Payer: MEDICARE

## 2024-07-01 ENCOUNTER — TELEPHONE (OUTPATIENT)
Dept: OBSTETRICS AND GYNECOLOGY | Facility: CLINIC | Age: 74
End: 2024-07-01
Payer: MEDICARE

## 2024-07-01 NOTE — TELEPHONE ENCOUNTER
----- Message from Dolores Arana sent at 6/28/2024 10:43 AM CDT -----  Pt was given an appt for UROGYN for Aug and she states that's too long to wait with the probs she's having. Asking if there is anybody else. [phone].  Who Called: Kathryn G Burton    Caller is requesting assistance/information from provider's office.      Preferred Method of Contact: Phone Call  Patient's Preferred Phone Number on File: [phone]   Best Call Back Number, if different:  Additional Information:

## 2024-07-31 ENCOUNTER — EXTERNAL CHRONIC CARE MANAGEMENT (OUTPATIENT)
Dept: FAMILY MEDICINE | Facility: CLINIC | Age: 74
End: 2024-07-31
Payer: MEDICARE

## 2024-07-31 PROCEDURE — G0511 CCM/BHI BY RHC/FQHC 20MIN MO: HCPCS | Mod: ,,, | Performed by: FAMILY MEDICINE

## 2024-08-05 ENCOUNTER — OFFICE VISIT (OUTPATIENT)
Dept: PAIN MEDICINE | Facility: CLINIC | Age: 74
End: 2024-08-05
Payer: MEDICARE

## 2024-08-05 VITALS
HEIGHT: 65 IN | BODY MASS INDEX: 21.99 KG/M2 | HEART RATE: 78 BPM | SYSTOLIC BLOOD PRESSURE: 120 MMHG | WEIGHT: 132 LBS | DIASTOLIC BLOOD PRESSURE: 65 MMHG

## 2024-08-05 DIAGNOSIS — M54.12 CERVICAL RADICULOPATHY: Chronic | ICD-10-CM

## 2024-08-05 DIAGNOSIS — M47.817 LUMBOSACRAL SPONDYLOSIS WITHOUT MYELOPATHY: Primary | Chronic | ICD-10-CM

## 2024-08-05 DIAGNOSIS — M96.1 POSTLAMINECTOMY SYNDROME OF CERVICAL REGION: Chronic | ICD-10-CM

## 2024-08-05 DIAGNOSIS — M53.3 DISORDER OF SACRUM: Chronic | ICD-10-CM

## 2024-08-05 DIAGNOSIS — Z79.899 ENCOUNTER FOR LONG-TERM (CURRENT) USE OF OTHER MEDICATIONS: ICD-10-CM

## 2024-08-05 PROCEDURE — 99999PBSHW POCT URINE DRUG SCREEN PRESUMP: Mod: PBBFAC,,,

## 2024-08-05 PROCEDURE — 3078F DIAST BP <80 MM HG: CPT | Mod: CPTII,,, | Performed by: PHYSICIAN ASSISTANT

## 2024-08-05 PROCEDURE — 99999 PR PBB SHADOW E&M-EST. PATIENT-LVL III: CPT | Mod: PBBFAC,,, | Performed by: PHYSICIAN ASSISTANT

## 2024-08-05 PROCEDURE — 1101F PT FALLS ASSESS-DOCD LE1/YR: CPT | Mod: CPTII,,, | Performed by: PHYSICIAN ASSISTANT

## 2024-08-05 PROCEDURE — 3288F FALL RISK ASSESSMENT DOCD: CPT | Mod: CPTII,,, | Performed by: PHYSICIAN ASSISTANT

## 2024-08-05 PROCEDURE — 99213 OFFICE O/P EST LOW 20 MIN: CPT | Mod: PBBFAC | Performed by: PHYSICIAN ASSISTANT

## 2024-08-05 PROCEDURE — 1125F AMNT PAIN NOTED PAIN PRSNT: CPT | Mod: CPTII,,, | Performed by: PHYSICIAN ASSISTANT

## 2024-08-05 PROCEDURE — 80305 DRUG TEST PRSMV DIR OPT OBS: CPT | Mod: PBBFAC | Performed by: PHYSICIAN ASSISTANT

## 2024-08-05 PROCEDURE — 1159F MED LIST DOCD IN RCRD: CPT | Mod: CPTII,,, | Performed by: PHYSICIAN ASSISTANT

## 2024-08-05 PROCEDURE — 3074F SYST BP LT 130 MM HG: CPT | Mod: CPTII,,, | Performed by: PHYSICIAN ASSISTANT

## 2024-08-05 PROCEDURE — 99214 OFFICE O/P EST MOD 30 MIN: CPT | Mod: S$PBB,,, | Performed by: PHYSICIAN ASSISTANT

## 2024-08-05 RX ORDER — HYDROCODONE BITARTRATE AND ACETAMINOPHEN 7.5; 325 MG/1; MG/1
1 TABLET ORAL EVERY 6 HOURS PRN
Qty: 120 TABLET | Refills: 0 | Status: SHIPPED | OUTPATIENT
Start: 2024-09-28

## 2024-08-05 RX ORDER — HYDROCODONE BITARTRATE AND ACETAMINOPHEN 7.5; 325 MG/1; MG/1
1 TABLET ORAL EVERY 6 HOURS PRN
Qty: 120 TABLET | Refills: 0 | Status: SHIPPED | OUTPATIENT
Start: 2024-10-28

## 2024-08-05 RX ORDER — HYDROCODONE BITARTRATE AND ACETAMINOPHEN 7.5; 325 MG/1; MG/1
1 TABLET ORAL EVERY 6 HOURS PRN
Qty: 120 TABLET | Refills: 0 | Status: SHIPPED | OUTPATIENT
Start: 2024-08-29

## 2024-08-05 RX ORDER — GABAPENTIN 300 MG/1
300 CAPSULE ORAL 3 TIMES DAILY
Qty: 90 CAPSULE | Refills: 1 | Status: SHIPPED | OUTPATIENT
Start: 2024-08-05 | End: 2024-10-04

## 2024-08-31 ENCOUNTER — EXTERNAL CHRONIC CARE MANAGEMENT (OUTPATIENT)
Dept: FAMILY MEDICINE | Facility: CLINIC | Age: 74
End: 2024-08-31
Payer: MEDICARE

## 2024-08-31 PROCEDURE — G0511 CCM/BHI BY RHC/FQHC 20MIN MO: HCPCS | Mod: ,,, | Performed by: FAMILY MEDICINE

## 2024-09-30 ENCOUNTER — EXTERNAL CHRONIC CARE MANAGEMENT (OUTPATIENT)
Dept: FAMILY MEDICINE | Facility: CLINIC | Age: 74
End: 2024-09-30
Payer: MEDICARE

## 2024-09-30 PROCEDURE — G0511 CCM/BHI BY RHC/FQHC 20MIN MO: HCPCS | Mod: ,,, | Performed by: FAMILY MEDICINE

## 2024-10-23 ENCOUNTER — TELEPHONE (OUTPATIENT)
Dept: OBSTETRICS AND GYNECOLOGY | Facility: CLINIC | Age: 74
End: 2024-10-23
Payer: MEDICARE

## 2024-10-23 NOTE — TELEPHONE ENCOUNTER
----- Message from Tech Laturina sent at 10/23/2024 10:28 AM CDT -----  Who Called: Kathryn G Burton    Caller is requesting assistance/information from provider's office.    Symptoms (please be specific): following up on what can be done about her bladder being weak          Preferred Method of Contact: Phone Call  Patient's Preferred Phone Number on File: [phone]   Best Call Back Number, if different:  Additional Information:

## 2024-10-23 NOTE — TELEPHONE ENCOUNTER
Call patient back and gave number to Dr. Solis office, she had forgot when her follow up appointment was. She thanked me for providing her with the number.

## 2024-10-31 ENCOUNTER — EXTERNAL CHRONIC CARE MANAGEMENT (OUTPATIENT)
Dept: FAMILY MEDICINE | Facility: CLINIC | Age: 74
End: 2024-10-31
Payer: MEDICARE

## 2024-10-31 PROCEDURE — G0511 CCM/BHI BY RHC/FQHC 20MIN MO: HCPCS | Mod: ,,, | Performed by: FAMILY MEDICINE

## 2024-11-07 NOTE — ANESTHESIA PREPROCEDURE EVALUATION
01/19/2023  Kathryn Marie is a 72 y.o., female.      Pre-op Assessment    I have reviewed the Patient Summary Reports.     I have reviewed the Nursing Notes. I have reviewed the NPO Status.   I have reviewed the Medications.     Review of Systems  Cardiovascular:   Hypertension CAD asymptomatic Dysrhythmias     Hepatic/GI:   GERD    Musculoskeletal:   Arthritis     Neurological:   CVA Neuromuscular Disease,    Endocrine:   Diabetes, type 2        Physical Exam  General: Well nourished, Cooperative, Alert and Oriented    Airway:  Mallampati: II   Mouth Opening: Normal  TM Distance: Normal  Tongue: Normal  Neck ROM: Normal ROM        Anesthesia Plan  Type of Anesthesia, risks & benefits discussed:    Anesthesia Type: Gen Natural Airway  Intra-op Monitoring Plan: Standard ASA Monitors  Post Op Pain Control Plan: multimodal analgesia  Induction:  IV  Airway Plan: , Awake  Informed Consent: Informed consent signed with the Patient and all parties understand the risks and agree with anesthesia plan.  All questions answered. Patient consented to blood products? Yes  ASA Score: 3  Day of Surgery Review of History & Physical: H&P Update referred to the surgeon/provider.    Ready For Surgery From Anesthesia Perspective.     .       Price (Do Not Change): 0.00 Detail Level: Simple Instructions: This plan will send the code FBSD to the PM system.  DO NOT or CHANGE the price.

## 2024-11-12 NOTE — PROGRESS NOTES
Subjective:         Patient ID: Kathryn Marie is a 74 y.o. female.    Chief Complaint: Low-back Pain        Pain  This is a chronic problem. The current episode started more than 1 year ago. The problem occurs daily. The problem has been waxing and waning. Associated symptoms include arthralgias and neck pain. Pertinent negatives include no anorexia, chest pain, chills, coughing, diaphoresis, fever, sore throat, vertigo or vomiting.     Review of Systems   Constitutional:  Negative for activity change, appetite change, chills, diaphoresis, fever and unexpected weight change.   HENT:  Negative for drooling, ear discharge, ear pain, facial swelling, nosebleeds, sore throat, trouble swallowing, voice change and goiter.    Eyes:  Negative for photophobia, pain, discharge, redness and visual disturbance.   Respiratory:  Negative for apnea, cough, choking, chest tightness, shortness of breath, wheezing and stridor.    Cardiovascular:  Negative for chest pain, palpitations and leg swelling.   Gastrointestinal:  Negative for abdominal distention, anorexia, diarrhea, rectal pain, vomiting and fecal incontinence.   Endocrine: Negative for cold intolerance, heat intolerance, polydipsia, polyphagia and polyuria.   Genitourinary:  Negative for bladder incontinence, dysuria, flank pain, frequency and hot flashes.   Musculoskeletal:  Positive for arthralgias, back pain, leg pain, neck pain and neck stiffness.   Integumentary:  Negative for color change and pallor.   Allergic/Immunologic: Negative for immunocompromised state.   Neurological:  Negative for dizziness, vertigo, seizures, syncope, facial asymmetry, speech difficulty, light-headedness, memory loss and coordination difficulties.   Hematological:  Negative for adenopathy. Does not bruise/bleed easily.   Psychiatric/Behavioral:  Negative for agitation, behavioral problems, confusion, decreased concentration, dysphoric mood, hallucinations, self-injury and suicidal ideas.  The patient is not nervous/anxious and is not hyperactive.            Past Medical History:   Diagnosis Date    Anemia     Atherosclerotic heart disease of native coronary artery without angina pectoris     Carotid artery stenosis 01/15/2014    CHARO  LICA stenosis    Causalgia of lower limb     Cervical radiculopathy     Chronic low back pain     Chronic pain syndrome     CVA (cerebral vascular accident)     right cerebellar    Degenerative joint disease involving multiple joints     Disorder of parathyroid gland, unspecified     Disorder of sacrum     Essential (primary) hypertension     Gammopathy     GERD (gastroesophageal reflux disease)     Heart murmur     Hyperlipidemia     Hyperparathyroidism     Lumbosacral radiculopathy     Lumbosacral spondylosis     Other long term (current) drug therapy     Pernicious anemia     Sciatica     Spinal stenosis of lumbar region     L4-5    Type 2 diabetes mellitus      Past Surgical History:   Procedure Laterality Date    CARPAL TUNNEL RELEASE Right     caudal MOIZ  07/03/2018    CHOLECYSTECTOMY  1975    CORONARY ARTERY BYPASS GRAFT      CORONARY ARTERY BYPASS GRAFT (CABG)      triple    HIP SURGERY Right     INJECTION OF ANESTHETIC AGENT AROUND MEDIAL BRANCH NERVES INNERVATING LUMBAR FACET JOINT Bilateral 1/19/2023    Procedure: Block-nerve-medial branch-lumbar, bilateral L4 through S1;  Surgeon: Ashley Piña MD;  Location: [facility];  Service: Pain Management;  Laterality: Bilateral;    L4-S1 Caudal cath guided MOIZ  07/03/2018    Dr Orta    L5-S1 Caudal cath guided MOIZ  09/26/2014 6/26/2014 4/11/2014    Dr Orta    left shoulder repair Left     NECK SURGERY  2018    does not know what kind    right sacral RF Right 12/26/2013 1/24/2012    Dr Orta    right SIJI Right 10/24/2013    Dr Orta     Social History     Socioeconomic History    Marital status:    Occupational History    Occupation: RETIRED   Tobacco Use    Smoking status: Never     Passive  "exposure: Never    Smokeless tobacco: Never   Substance and Sexual Activity    Alcohol use: Not Currently    Drug use: Yes     Types: Hydrocodone     Comment: pain medication with pain treatment     Sexual activity: Not Currently     Family History   Problem Relation Name Age of Onset    Diabetes Mother      Heart disease Mother      Hypertension Mother      Heart attack Mother      Hypertension Father      Stroke Father      Stroke Sister      Hypertension Sister      Cancer Brother       Review of patient's allergies indicates:  No Known Allergies     Objective:  Vitals:    11/21/24 1259   BP: (!) 131/52   Pulse: 78   Resp: 18   Weight: 59.9 kg (132 lb)   Height: 5' 5" (1.651 m)   PainSc:   5   PainLoc: Back               Physical Exam  Vitals and nursing note reviewed. Exam conducted with a chaperone present.   Constitutional:       General: She is awake. She is not in acute distress.     Appearance: Normal appearance. She is not ill-appearing or diaphoretic.   HENT:      Head: Normocephalic and atraumatic.      Nose: Nose normal.      Mouth/Throat:      Mouth: Mucous membranes are moist.      Pharynx: Oropharynx is clear.   Eyes:      Conjunctiva/sclera: Conjunctivae normal.      Pupils: Pupils are equal, round, and reactive to light.   Cardiovascular:      Rate and Rhythm: Normal rate.   Pulmonary:      Effort: Pulmonary effort is normal. No respiratory distress.   Abdominal:      Palpations: Abdomen is soft.   Musculoskeletal:      Cervical back: Normal range of motion and neck supple. Tenderness present.      Thoracic back: Tenderness present.      Lumbar back: Tenderness present. Decreased range of motion.   Skin:     General: Skin is warm and dry.      Coloration: Skin is not jaundiced or pale.   Neurological:      General: No focal deficit present.      Mental Status: She is alert and oriented to person, place, and time. Mental status is at baseline.      Cranial Nerves: No cranial nerve deficit (II-XII). "   Psychiatric:         Mood and Affect: Mood normal.         Behavior: Behavior normal. Behavior is cooperative.         Thought Content: Thought content normal.           Mammo Digital Screening Bilat w/ Paulo  Narrative: Result:   Mammo Digital Screening Bilat w/ Paulo     History:  Patient is 73 y.o. and is seen for a screening mammogram.    Films Compared:  Compared to: 01/18/2023 Mammo Digital Screening Bilat and 12/15/2021 Mammo   Digital Screening Bilat     Findings:  This procedure was performed using tomosynthesis. Computer-aided detection   was utilized in the interpretation of this examination.  The breasts have scattered areas of fibroglandular density. There is no   evidence of suspicious masses, calcifications, or other abnormal findings.  Impression: Bilateral  There is no mammographic evidence of malignancy.    BI-RADS Category:   Overall: 2 - Benign       Recommendation:  Routine screening mammogram in 1 year is recommended.    Your estimated lifetime risk of breast cancer (to age 85) based on   Tyrer-Cuzick risk assessment model is Tyrer-Cuzick: 1.44 %. According to   the American [employer] Society, patients with a lifetime breast cancer risk   of 20% or higher might benefit from supplemental screening tests.         Office Visit on 08/05/2024   Component Date Value Ref Range Status    POC Amphetamines 08/05/2024 Negative  Negative, Inconclusive Final    POC Barbiturates 08/05/2024 Negative  Negative, Inconclusive Final    POC Benzodiazepines 08/05/2024 Negative  Negative, Inconclusive Final    POC Cocaine 08/05/2024 Negative  Negative, Inconclusive Final    POC THC 08/05/2024 Negative  Negative, Inconclusive Final    POC Methadone 08/05/2024 Negative  Negative, Inconclusive Final    POC Methamphetamine 08/05/2024 Negative  Negative, Inconclusive Final    POC Opiates 08/05/2024 Presumptive Positive (A)  Negative, Inconclusive Final    POC Oxycodone 08/05/2024 Negative  Negative, Inconclusive Final     POC Phencyclidine 08/05/2024 Negative  Negative, Inconclusive Final    POC Methylenedioxymethamphetamine * 08/05/2024 Negative  Negative, Inconclusive Final    POC Tricyclic Antidepressants 08/05/2024 Negative  Negative, Inconclusive Final    POC Buprenorphine 08/05/2024 Negative   Final     Acceptable 08/05/2024 Yes   Final    POC Temperature (Urine) 08/05/2024 90   Final   Office Visit on 06/06/2024   Component Date Value Ref Range Status    Culture, Urine 06/06/2024 No Growth   Final    Color, UA 06/06/2024 Light-Yellow  Colorless, Straw, Yellow, Light Yellow, Dark Yellow Final    Clarity, UA 06/06/2024 Clear  Clear Final    pH, UA 06/06/2024 5.5  5.0 to 8.0 pH Units Final    Leukocytes, UA 06/06/2024 Negative  Negative Final    Nitrites, UA 06/06/2024 Negative  Negative Final    Protein, UA 06/06/2024 Negative  Negative Final    Glucose, UA 06/06/2024 500 (A)  Normal mg/dL Final    Ketones, UA 06/06/2024 Negative  Negative mg/dL Final    Urobilinogen, UA 06/06/2024 Normal  0.2, 1.0, Normal mg/dL Final    Bilirubin, UA 06/06/2024 Negative  Negative Final    Blood, UA 06/06/2024 Trace (A)  Negative Final    Specific Gravity, UA 06/06/2024 1.013  <=1.030 Final    WBC, UA 06/06/2024 <1  <=5 /hpf Final    RBC, UA 06/06/2024 1  <=3 /hpf Final    Squamous Epithelial Cells, UA 06/06/2024 Occasional (A)  None Seen /HPF Final    Hyaline Casts, UA 06/06/2024 0-2 (A)  None Seen /lpf Final    Mucous 06/06/2024 Occasional (A)  None Seen /LPF Final         Orders Placed This Encounter   Procedures    Miscellaneous Test, Sendout serum drug screen     Standing Status:   Future     Number of Occurrences:   1     Standing Expiration Date:   1/20/2026     Order Specific Question:   What is the name of the test you wish to order? (Note: Please provide the reference lab test code if possible. If you have any questions, call the Lab.)     Answer:   serum drug screen         Requested Prescriptions     Signed  Prescriptions Disp Refills    gabapentin (NEURONTIN) 300 MG capsule 90 capsule 2     Sig: Take 1 capsule (300 mg total) by mouth 3 (three) times daily.    HYDROcodone-acetaminophen (NORCO) 7.5-325 mg per tablet 120 tablet 0     Sig: Take 1 tablet by mouth every 6 (six) hours as needed for Pain.    HYDROcodone-acetaminophen (NORCO) 7.5-325 mg per tablet 120 tablet 0     Sig: Take 1 tablet by mouth every 6 (six) hours as needed for Pain.    HYDROcodone-acetaminophen (NORCO) 7.5-325 mg per tablet 120 tablet 0     Sig: Take 1 tablet by mouth every 6 (six) hours as needed for Pain.       Assessment:     1. Lumbosacral spondylosis without myelopathy    2. Cervical radiculopathy    3. Postlaminectomy syndrome of cervical region    4. Disorder of sacrum    5. Encounter for long-term (current) use of medications               A's of Opioid Risk Assessment  Activity:Patient can perform ADL.   Analgesia:Patients pain is partially controlled by current medication. Patient has tried OTC medications such as Tylenol and Ibuprofen with out relief.   Adverse Effects: Patient denies constipation or sedation.  Aberrant Behavior:  reviewed with no aberrant drug seeking/taking behavior.  Overdose reversal drug naloxone discussed    Drug screen reviewed      X-ray lumbar spine [facility] November 28, 2022  Grade 1 anterior listhesis L3/4 multiple level degenerative changes pedicle screws rods posterior L4/5 interbody hardware L4/5  Prominent degenerative changes L2/3 L3/4 moderate facet joint arthropathy throughout    X-ray sacroiliac joints [facility] November 28, 2022 osteoarthritis sacroiliac joints    MRI cervical spine [facility] August 2, 2023  C2-C3: Posterior disc protrusion, uncovertebral joint and facet change results in mild spinal canal narrowing as well as mild right and moderate left neural foraminal narrowing     C3-C4: Circumferential disc bulge with a central posterior protrusive component, uncovertebral  joint and facet change results in severe spinal canal narrowing as well as severe bilateral neural foraminal narrowing     C4-C5: Posterior disc protrusion eccentric to the right, uncovertebral joint and facet change results in severe spinal canal narrowing as well as severe bilateral neural foraminal narrowing     C5-C6: Circumferential disc bulge, uncovertebral joint and facet change results in severe spinal canal narrowing as well as severe bilateral neural foraminal narrowing     C6-C7: Posterior disc protrusion, uncovertebral joint and facet change results in mild spinal canal narrowing as well as moderate bilateral neural foraminal narrowing     C7-T1: Circumferential disc bulge, uncovertebral joint and facet change results in mild spinal canal narrowing as well as moderate to severe bilateral neural foraminal narrowing     T1-T2: Circumferential disc bulge, uncovertebral joint and facet change results in mild-to-moderate spinal canal narrowing as well as severe bilateral neural foraminal narrowing      Plan:    Narcan February 2023    Rheumatoid arthritis     Using 4 point walker/wheelchair assistance ambulation    Do not have permission to hold Plavix     Chronic right shoulder pain, chronic intrinsic muscle wasting bilateral hands limited range of motion shoulders right greater than left    Chronic cervical radiculopathy back pain joint pain upper lower extremities     She would like to continue with current medication she states it does help with her discomfort    Continue current medication    Continue activity as tolerated     Follow-up 3 months    Dr. Piña, April 2025    Bring original prescription medication bottles/container/box with labels to each visit

## 2024-11-21 ENCOUNTER — OFFICE VISIT (OUTPATIENT)
Dept: PAIN MEDICINE | Facility: CLINIC | Age: 74
End: 2024-11-21
Payer: MEDICARE

## 2024-11-21 VITALS
HEIGHT: 65 IN | HEART RATE: 78 BPM | BODY MASS INDEX: 21.99 KG/M2 | WEIGHT: 132 LBS | DIASTOLIC BLOOD PRESSURE: 52 MMHG | SYSTOLIC BLOOD PRESSURE: 131 MMHG | RESPIRATION RATE: 18 BRPM

## 2024-11-21 DIAGNOSIS — M47.817 LUMBOSACRAL SPONDYLOSIS WITHOUT MYELOPATHY: Primary | Chronic | ICD-10-CM

## 2024-11-21 DIAGNOSIS — M53.3 DISORDER OF SACRUM: Chronic | ICD-10-CM

## 2024-11-21 DIAGNOSIS — M96.1 POSTLAMINECTOMY SYNDROME OF CERVICAL REGION: Chronic | ICD-10-CM

## 2024-11-21 DIAGNOSIS — M54.12 CERVICAL RADICULOPATHY: Chronic | ICD-10-CM

## 2024-11-21 DIAGNOSIS — Z79.899 ENCOUNTER FOR LONG-TERM (CURRENT) USE OF MEDICATIONS: ICD-10-CM

## 2024-11-21 PROCEDURE — 1159F MED LIST DOCD IN RCRD: CPT | Mod: CPTII,,, | Performed by: PHYSICIAN ASSISTANT

## 2024-11-21 PROCEDURE — 99213 OFFICE O/P EST LOW 20 MIN: CPT | Mod: PBBFAC | Performed by: PHYSICIAN ASSISTANT

## 2024-11-21 PROCEDURE — 1101F PT FALLS ASSESS-DOCD LE1/YR: CPT | Mod: CPTII,,, | Performed by: PHYSICIAN ASSISTANT

## 2024-11-21 PROCEDURE — 1125F AMNT PAIN NOTED PAIN PRSNT: CPT | Mod: CPTII,,, | Performed by: PHYSICIAN ASSISTANT

## 2024-11-21 PROCEDURE — 3288F FALL RISK ASSESSMENT DOCD: CPT | Mod: CPTII,,, | Performed by: PHYSICIAN ASSISTANT

## 2024-11-21 PROCEDURE — 99999 PR PBB SHADOW E&M-EST. PATIENT-LVL III: CPT | Mod: PBBFAC,,, | Performed by: PHYSICIAN ASSISTANT

## 2024-11-21 PROCEDURE — 99214 OFFICE O/P EST MOD 30 MIN: CPT | Mod: S$PBB,,, | Performed by: PHYSICIAN ASSISTANT

## 2024-11-21 PROCEDURE — 3078F DIAST BP <80 MM HG: CPT | Mod: CPTII,,, | Performed by: PHYSICIAN ASSISTANT

## 2024-11-21 PROCEDURE — 3075F SYST BP GE 130 - 139MM HG: CPT | Mod: CPTII,,, | Performed by: PHYSICIAN ASSISTANT

## 2024-11-21 RX ORDER — HYDROCODONE BITARTRATE AND ACETAMINOPHEN 7.5; 325 MG/1; MG/1
1 TABLET ORAL EVERY 6 HOURS PRN
Qty: 120 TABLET | Refills: 0 | Status: SHIPPED | OUTPATIENT
Start: 2024-11-29

## 2024-11-21 RX ORDER — HYDROCODONE BITARTRATE AND ACETAMINOPHEN 7.5; 325 MG/1; MG/1
1 TABLET ORAL EVERY 6 HOURS PRN
Qty: 120 TABLET | Refills: 0 | Status: SHIPPED | OUTPATIENT
Start: 2025-01-28

## 2024-11-21 RX ORDER — GABAPENTIN 300 MG/1
300 CAPSULE ORAL 3 TIMES DAILY
Qty: 90 CAPSULE | Refills: 2 | Status: SHIPPED | OUTPATIENT
Start: 2024-11-21

## 2024-11-21 RX ORDER — HYDROCODONE BITARTRATE AND ACETAMINOPHEN 7.5; 325 MG/1; MG/1
1 TABLET ORAL EVERY 6 HOURS PRN
Qty: 120 TABLET | Refills: 0 | Status: SHIPPED | OUTPATIENT
Start: 2024-12-29

## 2024-11-30 ENCOUNTER — EXTERNAL CHRONIC CARE MANAGEMENT (OUTPATIENT)
Dept: FAMILY MEDICINE | Facility: CLINIC | Age: 74
End: 2024-11-30
Payer: MEDICARE

## 2024-11-30 PROCEDURE — G0511 CCM/BHI BY RHC/FQHC 20MIN MO: HCPCS | Mod: ,,, | Performed by: FAMILY MEDICINE

## 2025-01-31 ENCOUNTER — EXTERNAL CHRONIC CARE MANAGEMENT (OUTPATIENT)
Dept: FAMILY MEDICINE | Facility: CLINIC | Age: 75
End: 2025-01-31
Payer: MEDICARE

## 2025-01-31 PROCEDURE — G0511 CCM/BHI BY RHC/FQHC 20MIN MO: HCPCS | Mod: ,,, | Performed by: FAMILY MEDICINE

## 2025-02-17 NOTE — PROGRESS NOTES
Subjective:         Patient ID: Kathryn Marie is a 74 y.o. female.    Chief Complaint: Low-back Pain, Neck Pain, Mid-back Pain, and Hand Pain        Pain  This is a chronic problem. The current episode started more than 1 year ago. The problem occurs daily. The problem has been unchanged. Associated symptoms include arthralgias and neck pain. Pertinent negatives include no anorexia, chest pain, chills, coughing, diaphoresis, fever, sore throat, vertigo or vomiting.     Review of Systems   Constitutional:  Negative for activity change, appetite change, chills, diaphoresis, fever and unexpected weight change.   HENT:  Negative for drooling, ear discharge, ear pain, facial swelling, nosebleeds, sore throat, trouble swallowing, voice change and goiter.    Eyes:  Negative for photophobia, pain, discharge, redness and visual disturbance.   Respiratory:  Negative for apnea, cough, choking, chest tightness, shortness of breath, wheezing and stridor.    Cardiovascular:  Negative for chest pain, palpitations and leg swelling.   Gastrointestinal:  Negative for abdominal distention, anorexia, diarrhea, rectal pain, vomiting and fecal incontinence.   Endocrine: Negative for cold intolerance, heat intolerance, polydipsia, polyphagia and polyuria.   Genitourinary:  Negative for bladder incontinence, dysuria, flank pain, frequency and hot flashes.   Musculoskeletal:  Positive for arthralgias, back pain, leg pain, neck pain and neck stiffness.   Integumentary:  Negative for color change and pallor.   Allergic/Immunologic: Negative for immunocompromised state.   Neurological:  Negative for dizziness, vertigo, seizures, syncope, facial asymmetry, speech difficulty, light-headedness, memory loss and coordination difficulties.   Hematological:  Negative for adenopathy. Does not bruise/bleed easily.   Psychiatric/Behavioral:  Negative for agitation, behavioral problems, confusion, decreased concentration, dysphoric mood,  hallucinations, self-injury and suicidal ideas. The patient is not nervous/anxious and is not hyperactive.            Past Medical History:   Diagnosis Date    Anemia     Atherosclerotic heart disease of native coronary artery without angina pectoris     Carotid artery stenosis 01/15/2014    CHARO  LICA stenosis    Causalgia of lower limb     Cervical radiculopathy     Chronic low back pain     Chronic pain syndrome     CVA (cerebral vascular accident)     right cerebellar    Degenerative joint disease involving multiple joints     Disorder of parathyroid gland, unspecified     Disorder of sacrum     Essential (primary) hypertension     Gammopathy     GERD (gastroesophageal reflux disease)     Heart murmur     Hyperlipidemia     Hyperparathyroidism     Lumbosacral radiculopathy     Lumbosacral spondylosis     Other long term (current) drug therapy     Pernicious anemia     Sciatica     Spinal stenosis of lumbar region     L4-5    Type 2 diabetes mellitus      Past Surgical History:   Procedure Laterality Date    CARPAL TUNNEL RELEASE Right     caudal MOIZ  07/03/2018    CHOLECYSTECTOMY  1975    CORONARY ARTERY BYPASS GRAFT      CORONARY ARTERY BYPASS GRAFT (CABG)      triple    HIP SURGERY Right     INJECTION OF ANESTHETIC AGENT AROUND MEDIAL BRANCH NERVES INNERVATING LUMBAR FACET JOINT Bilateral 1/19/2023    Procedure: Block-nerve-medial branch-lumbar, bilateral L4 through S1;  Surgeon: Ashley Piña MD;  Location: [facility];  Service: Pain Management;  Laterality: Bilateral;    L4-S1 Caudal cath guided MOIZ  07/03/2018    Dr Orta    L5-S1 Caudal cath guided MOIZ  09/26/2014 6/26/2014 4/11/2014    Dr Orta    left shoulder repair Left     NECK SURGERY  2018    does not know what kind    right sacral RF Right 12/26/2013 1/24/2012    Dr Orta    right SIJI Right 10/24/2013    Dr Orta     Social History     Socioeconomic History    Marital status:    Occupational History    Occupation: RETIRED   Tobacco  "Use    Smoking status: Never     Passive exposure: Never    Smokeless tobacco: Never   Substance and Sexual Activity    Alcohol use: Not Currently    Drug use: Yes     Types: Hydrocodone     Comment: pain medication with pain treatment     Sexual activity: Not Currently     Family History   Problem Relation Name Age of Onset    Diabetes Mother      Heart disease Mother      Hypertension Mother      Heart attack Mother      Hypertension Father      Stroke Father      Stroke Sister      Hypertension Sister      Cancer Brother       Review of patient's allergies indicates:  No Known Allergies     Objective:  Vitals:    02/24/25 1305 02/24/25 1307   BP: (!) 138/40    Pulse: 63    Resp: 20    Weight: 64.4 kg (142 lb)    Height: 5' 5" (1.651 m)    PainSc:   8   8                 Physical Exam  Vitals and nursing note reviewed. Exam conducted with a chaperone present.   Constitutional:       General: She is awake. She is not in acute distress.     Appearance: Normal appearance. She is not ill-appearing or diaphoretic.   HENT:      Head: Normocephalic and atraumatic.      Nose: Nose normal.      Mouth/Throat:      Mouth: Mucous membranes are moist.      Pharynx: Oropharynx is clear.   Eyes:      Conjunctiva/sclera: Conjunctivae normal.      Pupils: Pupils are equal, round, and reactive to light.   Cardiovascular:      Rate and Rhythm: Normal rate.   Pulmonary:      Effort: Pulmonary effort is normal. No respiratory distress.   Abdominal:      Palpations: Abdomen is soft.   Musculoskeletal:      Cervical back: Normal range of motion and neck supple. Tenderness present.      Thoracic back: Tenderness present.      Lumbar back: Tenderness present. Decreased range of motion.   Skin:     General: Skin is warm and dry.      Coloration: Skin is not jaundiced or pale.   Neurological:      General: No focal deficit present.      Mental Status: She is alert and oriented to person, place, and time. Mental status is at baseline.      " Cranial Nerves: No cranial nerve deficit (II-XII).   Psychiatric:         Mood and Affect: Mood normal.         Behavior: Behavior normal. Behavior is cooperative.         Thought Content: Thought content normal.           Mammo Digital Screening Bilat w/ Paulo  Narrative: Result:   Mammo Digital Screening Bilat w/ Paulo     History:  Patient is 73 y.o. and is seen for a screening mammogram.    Films Compared:  Compared to: 01/18/2023 Mammo Digital Screening Bilat and 12/15/2021 Mammo   Digital Screening Bilat     Findings:  This procedure was performed using tomosynthesis. Computer-aided detection   was utilized in the interpretation of this examination.  The breasts have scattered areas of fibroglandular density. There is no   evidence of suspicious masses, calcifications, or other abnormal findings.  Impression: Bilateral  There is no mammographic evidence of malignancy.    BI-RADS Category:   Overall: 2 - Benign       Recommendation:  Routine screening mammogram in 1 year is recommended.    Your estimated lifetime risk of breast cancer (to age 85) based on   Tyrer-Cuzick risk assessment model is Tyrer-Cuzick: 1.44 %. According to   the American Cancer Society, patients with a lifetime breast cancer risk   of 20% or higher might benefit from supplemental screening tests.         Lab Visit on 11/21/2024   Component Date Value Ref Range Status    OPIATES 11/21/2024 See Comment  ng/mL Final    COCAINE/METABOLITES 11/21/2024 None Detected  ng/mL Final    BENZODIAZEPINES 11/21/2024 None Detected  ng/mL Final    CANNABINOIDS 11/21/2024 None Detected  ng/mL Final    AMPHETAMINES 11/21/2024 None Detected  ng/mL Final    BARBITURATES 11/21/2024 None Detected  mcg/mL Final    METHADONE/METABOLITE 11/21/2024 None Detected  ng/mL Final    PHENCYCLIDINE 11/21/2024 None Detected  ng/mL Final    METHAMPHETAMINE/MDMA 11/21/2024 None Detected  ng/mL Final    OXYCODONE/OXYMORPHONE 11/21/2024 See Comment  ng/mL Final     FENTANYL/ACETYL FENTANYL 11/21/2024 None Detected  ng/mL Final    M DIHYDROCODEINE/HYDROCODOL-FREE 11/21/2024 None Detected  ng/mL Final    M CODEINE-FREE 11/21/2024 None Detected  ng/mL Final    M MORPHINE-FREE 11/21/2024 None Detected  ng/mL Final    M HYDROCODONE-FREE 11/21/2024 37  ng/mL Final    M 6-GEORGIANA-FREE 11/21/2024 None Detected  ng/mL Final    M HYDROMORPHONE-FREE 11/21/2024 None Detected  ng/mL Final    M OXYCODONE-FREE 11/21/2024 None Detected  ng/mL Final    M OXYMORPHONE-FREE 11/21/2024 None Detected  ng/mL Final         Orders Placed This Encounter   Procedures    Controlled Substance Monitoring Panel, Random, Urine     Standing Status:   Future     Number of Occurrences:   1     Expected Date:   2/24/2025     Expiration Date:   4/25/2026    POCT Urine Drug Screen Presump     Interpretive Information:     Negative:  No drug detected at the cut off level.   Positive:  This result represents presumptive positive for the   tested drug, other substances may yield a positive response other   than the analyte of interest. This result should be utilized for   diagnostic purpose only. Confirmation testing will be performed upon physician request only.            Requested Prescriptions     Signed Prescriptions Disp Refills    gabapentin (NEURONTIN) 300 MG capsule 90 capsule 2     Sig: Take 1 capsule (300 mg total) by mouth 3 (three) times daily.    HYDROcodone-acetaminophen (NORCO) 7.5-325 mg per tablet 120 tablet 0     Sig: Take 1 tablet by mouth every 6 (six) hours as needed for Pain.    HYDROcodone-acetaminophen (NORCO) 7.5-325 mg per tablet 120 tablet 0     Sig: Take 1 tablet by mouth every 6 (six) hours as needed for Pain.    HYDROcodone-acetaminophen (NORCO) 7.5-325 mg per tablet 120 tablet 0     Sig: Take 1 tablet by mouth every 6 (six) hours as needed for Pain.       Assessment:     1. Lumbosacral spondylosis without myelopathy    2. Cervical radiculopathy    3. Postlaminectomy syndrome of cervical  region    4. Disorder of sacrum    5. Encounter for long-term (current) use of medications                 A's of Opioid Risk Assessment  Activity:Patient can perform ADL.   Analgesia:Patients pain is partially controlled by current medication. Patient has tried OTC medications such as Tylenol and Ibuprofen with out relief.   Adverse Effects: Patient denies constipation or sedation.  Aberrant Behavior:  reviewed with no aberrant drug seeking/taking behavior.  Overdose reversal drug naloxone discussed    Drug screen reviewed      X-ray lumbar spine [facility] November 28, 2022  Grade 1 anterior listhesis L3/4 multiple level degenerative changes pedicle screws rods posterior L4/5 interbody hardware L4/5  Prominent degenerative changes L2/3 L3/4 moderate facet joint arthropathy throughout    X-ray sacroiliac joints [facility] November 28, 2022 osteoarthritis sacroiliac joints    MRI cervical spine [facility] August 2, 2023  C2-C3: Posterior disc protrusion, uncovertebral joint and facet change results in mild spinal canal narrowing as well as mild right and moderate left neural foraminal narrowing     C3-C4: Circumferential disc bulge with a central posterior protrusive component, uncovertebral joint and facet change results in severe spinal canal narrowing as well as severe bilateral neural foraminal narrowing     C4-C5: Posterior disc protrusion eccentric to the right, uncovertebral joint and facet change results in severe spinal canal narrowing as well as severe bilateral neural foraminal narrowing     C5-C6: Circumferential disc bulge, uncovertebral joint and facet change results in severe spinal canal narrowing as well as severe bilateral neural foraminal narrowing     C6-C7: Posterior disc protrusion, uncovertebral joint and facet change results in mild spinal canal narrowing as well as moderate bilateral neural foraminal narrowing     C7-T1: Circumferential disc bulge, uncovertebral joint and facet  change results in mild spinal canal narrowing as well as moderate to severe bilateral neural foraminal narrowing     T1-T2: Circumferential disc bulge, uncovertebral joint and facet change results in mild-to-moderate spinal canal narrowing as well as severe bilateral neural foraminal narrowing      Plan:    Pill count correct  February 24, 2025 32 hydrocodone        Narcan February 2023    Rheumatoid arthritis     Using 4 point walker/wheelchair assistance ambulation    Do not have permission to hold Plavix     Chronic right shoulder pain, chronic intrinsic muscle wasting bilateral hands limited range of motion shoulders right greater than left    Chronic cervical radiculopathy back pain joint pain upper lower extremities     In regards her last drug screen she states her pain becomes so severe at times she does take an additional hydrocodone tablets every once in awhile    She verbalized understanding narcotics agreement     She would like to continue with current medication does help with her discomfort     She has upcoming evaluation with spine surgeon     She states she does not wish to have any further spine surgery    Continue current medication    Continue activity as tolerated     Follow-up 3 months    Dr. Piña, April 2025    Bring original prescription medication bottles/container/box with labels to each visit

## 2025-02-24 ENCOUNTER — OFFICE VISIT (OUTPATIENT)
Dept: PAIN MEDICINE | Facility: CLINIC | Age: 75
End: 2025-02-24
Payer: MEDICARE

## 2025-02-24 VITALS
HEART RATE: 63 BPM | DIASTOLIC BLOOD PRESSURE: 40 MMHG | BODY MASS INDEX: 23.66 KG/M2 | SYSTOLIC BLOOD PRESSURE: 138 MMHG | WEIGHT: 142 LBS | RESPIRATION RATE: 20 BRPM | HEIGHT: 65 IN

## 2025-02-24 DIAGNOSIS — M53.3 DISORDER OF SACRUM: Chronic | ICD-10-CM

## 2025-02-24 DIAGNOSIS — M96.1 POSTLAMINECTOMY SYNDROME OF CERVICAL REGION: Chronic | ICD-10-CM

## 2025-02-24 DIAGNOSIS — M54.12 CERVICAL RADICULOPATHY: Chronic | ICD-10-CM

## 2025-02-24 DIAGNOSIS — M47.817 LUMBOSACRAL SPONDYLOSIS WITHOUT MYELOPATHY: Primary | Chronic | ICD-10-CM

## 2025-02-24 DIAGNOSIS — Z79.899 ENCOUNTER FOR LONG-TERM (CURRENT) USE OF MEDICATIONS: ICD-10-CM

## 2025-02-24 PROCEDURE — 3078F DIAST BP <80 MM HG: CPT | Mod: CPTII,,, | Performed by: PHYSICIAN ASSISTANT

## 2025-02-24 PROCEDURE — 99215 OFFICE O/P EST HI 40 MIN: CPT | Mod: PBBFAC | Performed by: PHYSICIAN ASSISTANT

## 2025-02-24 PROCEDURE — 99214 OFFICE O/P EST MOD 30 MIN: CPT | Mod: S$PBB,,, | Performed by: PHYSICIAN ASSISTANT

## 2025-02-24 PROCEDURE — 1159F MED LIST DOCD IN RCRD: CPT | Mod: CPTII,,, | Performed by: PHYSICIAN ASSISTANT

## 2025-02-24 PROCEDURE — 99999PBSHW POCT URINE DRUG SCREEN PRESUMP: Mod: PBBFAC,,,

## 2025-02-24 PROCEDURE — 1125F AMNT PAIN NOTED PAIN PRSNT: CPT | Mod: CPTII,,, | Performed by: PHYSICIAN ASSISTANT

## 2025-02-24 PROCEDURE — 80305 DRUG TEST PRSMV DIR OPT OBS: CPT | Mod: PBBFAC | Performed by: PHYSICIAN ASSISTANT

## 2025-02-24 PROCEDURE — 1101F PT FALLS ASSESS-DOCD LE1/YR: CPT | Mod: CPTII,,, | Performed by: PHYSICIAN ASSISTANT

## 2025-02-24 PROCEDURE — 3288F FALL RISK ASSESSMENT DOCD: CPT | Mod: CPTII,,, | Performed by: PHYSICIAN ASSISTANT

## 2025-02-24 PROCEDURE — 99999 PR PBB SHADOW E&M-EST. PATIENT-LVL V: CPT | Mod: PBBFAC,,, | Performed by: PHYSICIAN ASSISTANT

## 2025-02-24 PROCEDURE — 3075F SYST BP GE 130 - 139MM HG: CPT | Mod: CPTII,,, | Performed by: PHYSICIAN ASSISTANT

## 2025-02-24 RX ORDER — GABAPENTIN 300 MG/1
300 CAPSULE ORAL 3 TIMES DAILY
Qty: 90 CAPSULE | Refills: 2 | Status: SHIPPED | OUTPATIENT
Start: 2025-02-24

## 2025-02-24 RX ORDER — HYDROCODONE BITARTRATE AND ACETAMINOPHEN 7.5; 325 MG/1; MG/1
1 TABLET ORAL EVERY 6 HOURS PRN
Qty: 120 TABLET | Refills: 0 | Status: SHIPPED | OUTPATIENT
Start: 2025-03-05

## 2025-02-24 RX ORDER — HYDROCODONE BITARTRATE AND ACETAMINOPHEN 7.5; 325 MG/1; MG/1
1 TABLET ORAL EVERY 6 HOURS PRN
Qty: 120 TABLET | Refills: 0 | Status: SHIPPED | OUTPATIENT
Start: 2025-04-04 | End: 2025-02-24

## 2025-02-24 RX ORDER — HYDROCODONE BITARTRATE AND ACETAMINOPHEN 7.5; 325 MG/1; MG/1
1 TABLET ORAL EVERY 6 HOURS PRN
Qty: 120 TABLET | Refills: 0 | Status: SHIPPED | OUTPATIENT
Start: 2025-04-04

## 2025-02-24 RX ORDER — HYDROCODONE BITARTRATE AND ACETAMINOPHEN 7.5; 325 MG/1; MG/1
1 TABLET ORAL EVERY 6 HOURS PRN
Qty: 120 TABLET | Refills: 0 | Status: SHIPPED | OUTPATIENT
Start: 2025-05-04 | End: 2025-02-24

## 2025-02-24 RX ORDER — HYDROCODONE BITARTRATE AND ACETAMINOPHEN 7.5; 325 MG/1; MG/1
1 TABLET ORAL EVERY 6 HOURS PRN
Qty: 120 TABLET | Refills: 0 | Status: SHIPPED | OUTPATIENT
Start: 2025-05-04

## 2025-02-24 RX ORDER — HYDROCODONE BITARTRATE AND ACETAMINOPHEN 7.5; 325 MG/1; MG/1
1 TABLET ORAL EVERY 6 HOURS PRN
Qty: 120 TABLET | Refills: 0 | Status: SHIPPED | OUTPATIENT
Start: 2025-03-05 | End: 2025-02-24

## 2025-02-28 ENCOUNTER — EXTERNAL CHRONIC CARE MANAGEMENT (OUTPATIENT)
Dept: FAMILY MEDICINE | Facility: CLINIC | Age: 75
End: 2025-02-28
Payer: MEDICARE

## 2025-02-28 PROCEDURE — G0511 CCM/BHI BY RHC/FQHC 20MIN MO: HCPCS | Mod: ,,, | Performed by: FAMILY MEDICINE

## 2025-03-31 ENCOUNTER — EXTERNAL CHRONIC CARE MANAGEMENT (OUTPATIENT)
Dept: FAMILY MEDICINE | Facility: CLINIC | Age: 75
End: 2025-03-31
Payer: MEDICARE

## 2025-03-31 PROCEDURE — G0511 CCM/BHI BY RHC/FQHC 20MIN MO: HCPCS | Mod: ,,, | Performed by: FAMILY MEDICINE

## 2025-04-03 DIAGNOSIS — M96.1 POSTLAMINECTOMY SYNDROME OF CERVICAL REGION: Chronic | ICD-10-CM

## 2025-04-03 DIAGNOSIS — M54.12 CERVICAL RADICULOPATHY: Chronic | ICD-10-CM

## 2025-04-03 DIAGNOSIS — M47.817 LUMBOSACRAL SPONDYLOSIS WITHOUT MYELOPATHY: Chronic | ICD-10-CM

## 2025-04-03 DIAGNOSIS — M53.3 DISORDER OF SACRUM: Chronic | ICD-10-CM

## 2025-04-03 RX ORDER — HYDROCODONE BITARTRATE AND ACETAMINOPHEN 7.5; 325 MG/1; MG/1
1 TABLET ORAL EVERY 6 HOURS PRN
Qty: 120 TABLET | Refills: 0 | Status: SHIPPED | OUTPATIENT
Start: 2025-05-04

## 2025-04-03 NOTE — TELEPHONE ENCOUNTER
----- Message from Socorro sent at 4/2/2025 11:30 AM CDT -----  Regarding: [facility] called and asked for verbal consent for ths patient to receive her hydrocodone 325; pt also ask to be notified when the prescription has been refilled  Who Called: [facility] , [phone] and Rx number: [id number] is requesting assistance/information from provider's office.Preferred Method of Contact: Phone CallPatient's Preferred Phone Number on File: [phone] Best Call Back Number, if different:Additional Information: [facility] , [phone] and Rx number: [id number]; [facility] called and asked for verbal consent for ths patient to receive her hydrocodone 325; pt also ask to be notified when the prescription has been refilled  ----- Message -----  From: Socorro Stevens  Sent: 4/2/2025  11:33 AM CDT  To: Jose VELASQUEZ Staff  Subject: [facility] called and asked for verbal consent #    Who Called: [facility] , [phone] and Rx number: [id number] is requesting assistance/information from provider's office.Preferred Method of Contact: Phone CallPatient's Preferred Phone Number on File: [phone] Best Call Back Number, if different:Additional Information: [facility] , [phone] and Rx number: [id number]; [facility] called and asked for verbal consent for ths patient to receive her hydrocodone 325; pt also ask to be notified when the prescription has been refilled    SOCORRO ELMORE   04/03/2025   9:40 AM

## 2025-04-04 ENCOUNTER — OFFICE VISIT (OUTPATIENT)
Dept: NEUROLOGY | Facility: CLINIC | Age: 75
End: 2025-04-04
Payer: MEDICARE

## 2025-04-04 VITALS
DIASTOLIC BLOOD PRESSURE: 54 MMHG | BODY MASS INDEX: 24.16 KG/M2 | RESPIRATION RATE: 18 BRPM | OXYGEN SATURATION: 100 % | HEIGHT: 65 IN | HEART RATE: 74 BPM | WEIGHT: 145 LBS | SYSTOLIC BLOOD PRESSURE: 122 MMHG

## 2025-04-04 DIAGNOSIS — M54.40 CHRONIC LOW BACK PAIN WITH SCIATICA, SCIATICA LATERALITY UNSPECIFIED, UNSPECIFIED BACK PAIN LATERALITY: ICD-10-CM

## 2025-04-04 DIAGNOSIS — N81.4 UTEROVAGINAL PROLAPSE: ICD-10-CM

## 2025-04-04 DIAGNOSIS — G89.29 CHRONIC LOW BACK PAIN WITH SCIATICA, SCIATICA LATERALITY UNSPECIFIED, UNSPECIFIED BACK PAIN LATERALITY: ICD-10-CM

## 2025-04-04 DIAGNOSIS — I67.1 INTRACRANIAL ANEURYSM: ICD-10-CM

## 2025-04-04 DIAGNOSIS — M47.817 LUMBOSACRAL SPONDYLOSIS WITHOUT MYELOPATHY: Chronic | ICD-10-CM

## 2025-04-04 DIAGNOSIS — M54.12 CERVICAL RADICULOPATHY: Primary | Chronic | ICD-10-CM

## 2025-04-04 DIAGNOSIS — M96.1 POSTLAMINECTOMY SYNDROME OF CERVICAL REGION: Chronic | ICD-10-CM

## 2025-04-04 PROCEDURE — 99999 PR PBB SHADOW E&M-EST. PATIENT-LVL V: CPT | Mod: PBBFAC,,, | Performed by: PSYCHIATRY & NEUROLOGY

## 2025-04-04 PROCEDURE — 3074F SYST BP LT 130 MM HG: CPT | Mod: CPTII,,, | Performed by: PSYCHIATRY & NEUROLOGY

## 2025-04-04 PROCEDURE — 3078F DIAST BP <80 MM HG: CPT | Mod: CPTII,,, | Performed by: PSYCHIATRY & NEUROLOGY

## 2025-04-04 PROCEDURE — 99215 OFFICE O/P EST HI 40 MIN: CPT | Mod: PBBFAC | Performed by: PSYCHIATRY & NEUROLOGY

## 2025-04-04 PROCEDURE — 1101F PT FALLS ASSESS-DOCD LE1/YR: CPT | Mod: CPTII,,, | Performed by: PSYCHIATRY & NEUROLOGY

## 2025-04-04 PROCEDURE — 1125F AMNT PAIN NOTED PAIN PRSNT: CPT | Mod: CPTII,,, | Performed by: PSYCHIATRY & NEUROLOGY

## 2025-04-04 PROCEDURE — 3288F FALL RISK ASSESSMENT DOCD: CPT | Mod: CPTII,,, | Performed by: PSYCHIATRY & NEUROLOGY

## 2025-04-04 PROCEDURE — 1159F MED LIST DOCD IN RCRD: CPT | Mod: CPTII,,, | Performed by: PSYCHIATRY & NEUROLOGY

## 2025-04-04 PROCEDURE — 99214 OFFICE O/P EST MOD 30 MIN: CPT | Mod: S$PBB,,, | Performed by: PSYCHIATRY & NEUROLOGY

## 2025-04-04 RX ORDER — AMLODIPINE BESYLATE 5 MG/1
1 TABLET ORAL DAILY
COMMUNITY
Start: 2024-06-06

## 2025-04-04 NOTE — PROGRESS NOTES
Subjective:       Patient ID: Kathryn Marie is a 74 y.o. female     Chief Complaint:    Chief Complaint   Patient presents with    cervical radiculopathy     Pt. States neck pain.        Allergies:  Patient has no known allergies.    Current Medications:  Encounter Medications[1]    History of Present Illness  75 yo BF w/ signif PMH prev cervical surgery, chronic pains, mult medical problems, compliance issues   Last month had Acute R side hemiparesis but CT head and MRI brain negative for stroke   Feels her Neno making her too sleepy  Needs to be compliant with Eliquis and ASPIRIN for cardiac issues   She has not desired surgical evaluation in last few years but cervical neck pain w/ radiculopathy into RUE worse   She is getting PT/Rehab but unsure of the intensity as at house          Past Medical History:   Diagnosis Date    Anemia     Atherosclerotic heart disease of native coronary artery without angina pectoris     Carotid artery stenosis 01/15/2014    CHARO  LICA stenosis    Causalgia of lower limb     Cervical radiculopathy     Chronic low back pain     Chronic pain syndrome     CVA (cerebral vascular accident)     right cerebellar    Degenerative joint disease involving multiple joints     Disorder of parathyroid gland, unspecified     Disorder of sacrum     Essential (primary) hypertension     Gammopathy     GERD (gastroesophageal reflux disease)     Heart murmur     Hyperlipidemia     Hyperparathyroidism     Lumbosacral radiculopathy     Lumbosacral spondylosis     Other long term (current) drug therapy     Pernicious anemia     Sciatica     Spinal stenosis of lumbar region     L4-5    Type 2 diabetes mellitus        Past Surgical History:   Procedure Laterality Date    CARPAL TUNNEL RELEASE Right     caudal MOIZ  07/03/2018    CHOLECYSTECTOMY  1975    CORONARY ARTERY BYPASS GRAFT      CORONARY ARTERY BYPASS GRAFT (CABG)      triple    HIP SURGERY Right     INJECTION OF ANESTHETIC AGENT AROUND MEDIAL  "BRANCH NERVES INNERVATING LUMBAR FACET JOINT Bilateral 1/19/2023    Procedure: Block-nerve-medial branch-lumbar, bilateral L4 through S1;  Surgeon: Ashley Piña MD;  Location: [facility];  Service: Pain Management;  Laterality: Bilateral;    L4-S1 Caudal cath guided MOIZ  07/03/2018    Dr Orta    L5-S1 Caudal cath guided MOIZ  09/26/2014 6/26/2014 4/11/2014    Dr Orta    left shoulder repair Left     NECK SURGERY  2018    does not know what kind    right sacral RF Right 12/26/2013 1/24/2012    Dr Orta    right SIJI Right 10/24/2013    Dr Orta       Social History  Ms. Marie  reports that she has never smoked. She has never been exposed to tobacco smoke. She has never used smokeless tobacco. She reports that she does not currently use alcohol. She reports current drug use. Drug: Hydrocodone.    Family History  Ms.'s Marie family history includes Cancer in her brother; Diabetes in her mother; Heart attack in her mother; Heart disease in her mother; Hypertension in her father, mother, and sister; Stroke in her father and sister.    Review of Systems  Review of Systems   Musculoskeletal:  Positive for back pain, joint pain and neck pain.   Neurological:  Positive for tingling, sensory change and weakness.   All other systems reviewed and are negative.     Objective:   BP (!) 122/54 (BP Location: Right arm, Patient Position: Sitting)   Pulse 74   Resp 18   Ht 5' 5" (1.651 m)   Wt 65.8 kg (145 lb)   SpO2 100%   BMI 24.13 kg/m²    NEUROLOGICAL EXAMINATION:     MENTAL STATUS   Oriented to person, place, and time.   Level of consciousness: drowsy  Knowledge: consistent with education.     CRANIAL NERVES   Cranial nerves II through XII intact.     MOTOR EXAM        Weakness RUE and RLE     SENSORY EXAM        Paresthesias in distal ext's      GAIT AND COORDINATION        Using walker         Physical Exam  Vitals reviewed.   Constitutional:       Appearance: She is normal weight.   Neurological:      Mental " Status: She is alert and oriented to person, place, and time. Mental status is at baseline.      Cranial Nerves: Cranial nerves 2-12 are intact.          Assessment:     Cervical radiculopathy    Intracranial aneurysm    Lumbosacral spondylosis without myelopathy    Chronic low back pain with sciatica, sciatica laterality unspecified, unspecified back pain laterality    Postlaminectomy syndrome of cervical region    Uterovaginal prolapse         Primary Diagnosis and ICD10  Cervical radiculopathy [M54.12]    Plan:     There are no Patient Instructions on file for this visit.    There are no discontinued medications.    Requested Prescriptions      No prescriptions requested or ordered in this encounter            [1]   Outpatient Encounter Medications as of 4/4/2025   Medication Sig Dispense Refill    amLODIPine (NORVASC) 5 MG tablet Take 1 tablet by mouth once daily.      aspirin (ECOTRIN) 81 MG EC tablet Take 81 mg by mouth once daily.      blood sugar diagnostic (ONETOUCH VERIO TEST STRIPS MISC) by Misc.(Non-Drug; Combo Route) route. Use 1 strip to check blood glucose every morning, fasting for E11.9      blood-glucose meter (ONETOUCH VERIO METER MISC) by Misc.(Non-Drug; Combo Route) route. Use for glucose checks once daily, E11.9      clopidogreL (PLAVIX) 75 mg tablet Take by mouth.      cyanocobalamin (VITAMIN B-12) 1000 MCG tablet Take 2,500 mcg by mouth once daily.      dapagliflozin propanediol (FARXIGA) 5 mg Tab tablet Take 1 tablet (5 mg total) by mouth once daily. 90 tablet 1    denosumab (PROLIA) 60 mg/mL Syrg Inject 1 mL into the skin.      diclofenac sodium (VOLTAREN) 1 % Gel APPLY 1 APPLICATION TO  AFFECTED AREA(S) TOPICALLY  TWICE DAILY 300 g 2    dicyclomine (BENTYL) 10 MG capsule       ELIQUIS 5 mg Tab Take 5 mg by mouth once daily.      estradioL (ESTRACE) 0.01 % (0.1 mg/gram) vaginal cream 1/2 applicatorful vaginally once or twice monthly as needed for vaginal dryness 42.5 g 1    ferrous sulfate  (FEOSOL) 325 mg (65 mg iron) Tab tablet Take 325 mg by mouth daily with breakfast.      fluticasone propionate (FLONASE) 50 mcg/actuation nasal spray 2 sprays (100 mcg total) by Each Nostril route once daily. 48 g 1    folic acid (FOLVITE) 1 MG tablet Take 1 mg by mouth once daily.      furosemide (LASIX) 40 MG tablet take 1 tablet by oral route  every day as needed weight gain of 3 # or more      gabapentin (NEURONTIN) 300 MG capsule Take 1 capsule (300 mg total) by mouth 3 (three) times daily. 90 capsule 2    hydroCHLOROthiazide (HYDRODIURIL) 12.5 MG Tab Take 1 tablet (12.5 mg total) by mouth as needed (edema). 30 tablet 1    HYDROcodone-acetaminophen (NORCO) 7.5-325 mg per tablet Take 1 tablet by mouth every 6 (six) hours as needed for Pain. 120 tablet 0    HYDROcodone-acetaminophen (NORCO) 7.5-325 mg per tablet Take 1 tablet by mouth every 6 (six) hours as needed for Pain. 120 tablet 0    [START ON 5/4/2025] HYDROcodone-acetaminophen (NORCO) 7.5-325 mg per tablet Take 1 tablet by mouth every 6 (six) hours as needed for Pain. 120 tablet 0    insulin degludec (TRESIBA FLEXTOUCH U-200) 200 unit/mL (3 mL) insulin pen Inject into the skin.      ipratropium (ATROVENT) 21 mcg (0.03 %) nasal spray USE 2 SPRAYS NASALLY EVERY  12 HOURS 90 mL 3    latanoprost 0.005 % ophthalmic solution Place 1 drop into both eyes every evening.      methotrexate 2.5 MG Tab 2.5 mg. Take 10 tablets by mouth every week      metoprolol succinate (TOPROL-XL) 25 MG 24 hr tablet Take 1/2 tablet (12.5 mg) by mouth every day. 45 tablet 1    metroNIDAZOLE (METROGEL) 0.75 % (37.5mg/5 gram) vaginal gel 1 applicator full per vagina nightly for 5 nights. 70 g 0    omeprazole (PRILOSEC) 20 MG capsule Take 20 mg by mouth once daily.      predniSONE (DELTASONE) 5 MG tablet Take by mouth.      rosuvastatin (CRESTOR) 20 MG tablet Take 1 tablet (20 mg total) by mouth every evening. 90 tablet 1    vitamin D (VITAMIN D3) 1000 units Tab Take 5,000 Units by  mouth once daily.      acyclovir (ZOVIRAX) 800 MG Tab Take 1 tablet (800 mg total) by mouth 5 (five) times daily. for 7 days 35 tablet 0    SITagliptin phosphate (JANUVIA) 100 MG Tab Take 1 tablet (100 mg total) by mouth once daily. 90 tablet 1    [DISCONTINUED] HYDROcodone-acetaminophen (NORCO) 7.5-325 mg per tablet Take 1 tablet by mouth every 6 (six) hours as needed for Pain. 120 tablet 0     No facility-administered encounter medications on file as of 4/4/2025.

## 2025-04-22 ENCOUNTER — OFFICE VISIT (OUTPATIENT)
Dept: FAMILY MEDICINE | Facility: CLINIC | Age: 75
End: 2025-04-22
Payer: MEDICARE

## 2025-04-22 VITALS
BODY MASS INDEX: 23.22 KG/M2 | TEMPERATURE: 98 F | OXYGEN SATURATION: 100 % | SYSTOLIC BLOOD PRESSURE: 130 MMHG | HEIGHT: 65 IN | HEART RATE: 60 BPM | RESPIRATION RATE: 15 BRPM | WEIGHT: 139.38 LBS | DIASTOLIC BLOOD PRESSURE: 66 MMHG

## 2025-04-22 DIAGNOSIS — D64.9 ANEMIA, UNSPECIFIED TYPE: ICD-10-CM

## 2025-04-22 DIAGNOSIS — E78.5 HYPERLIPIDEMIA, UNSPECIFIED HYPERLIPIDEMIA TYPE: ICD-10-CM

## 2025-04-22 DIAGNOSIS — M19.90 ARTHRITIS: ICD-10-CM

## 2025-04-22 DIAGNOSIS — K21.9 GASTROESOPHAGEAL REFLUX DISEASE, UNSPECIFIED WHETHER ESOPHAGITIS PRESENT: ICD-10-CM

## 2025-04-22 DIAGNOSIS — I10 PRIMARY HYPERTENSION: Primary | ICD-10-CM

## 2025-04-22 DIAGNOSIS — E11.9 TYPE 2 DIABETES MELLITUS WITHOUT COMPLICATION, WITHOUT LONG-TERM CURRENT USE OF INSULIN: ICD-10-CM

## 2025-04-22 PROCEDURE — 1159F MED LIST DOCD IN RCRD: CPT | Mod: ,,, | Performed by: NURSE PRACTITIONER

## 2025-04-22 PROCEDURE — 99214 OFFICE O/P EST MOD 30 MIN: CPT | Mod: ,,, | Performed by: NURSE PRACTITIONER

## 2025-04-22 PROCEDURE — 1160F RVW MEDS BY RX/DR IN RCRD: CPT | Mod: ,,, | Performed by: NURSE PRACTITIONER

## 2025-04-22 PROCEDURE — 1125F AMNT PAIN NOTED PAIN PRSNT: CPT | Mod: ,,, | Performed by: NURSE PRACTITIONER

## 2025-04-22 PROCEDURE — 3078F DIAST BP <80 MM HG: CPT | Mod: ,,, | Performed by: NURSE PRACTITIONER

## 2025-04-22 PROCEDURE — 1100F PTFALLS ASSESS-DOCD GE2>/YR: CPT | Mod: ,,, | Performed by: NURSE PRACTITIONER

## 2025-04-22 PROCEDURE — 3075F SYST BP GE 130 - 139MM HG: CPT | Mod: ,,, | Performed by: NURSE PRACTITIONER

## 2025-04-22 PROCEDURE — 3288F FALL RISK ASSESSMENT DOCD: CPT | Mod: ,,, | Performed by: NURSE PRACTITIONER

## 2025-04-22 RX ORDER — APIXABAN 5 MG/1
5 TABLET, FILM COATED ORAL 2 TIMES DAILY
Qty: 180 TABLET | Refills: 1 | Status: CANCELLED | OUTPATIENT
Start: 2025-04-22

## 2025-04-22 RX ORDER — AMLODIPINE BESYLATE 5 MG/1
5 TABLET ORAL DAILY
Qty: 90 TABLET | Refills: 1 | Status: CANCELLED | OUTPATIENT
Start: 2025-04-22

## 2025-04-22 RX ORDER — METOPROLOL SUCCINATE 25 MG/1
TABLET, EXTENDED RELEASE ORAL
Qty: 45 TABLET | Refills: 1 | Status: CANCELLED | OUTPATIENT
Start: 2025-04-22

## 2025-04-22 RX ORDER — ROSUVASTATIN CALCIUM 20 MG/1
20 TABLET, COATED ORAL NIGHTLY
Qty: 90 TABLET | Refills: 1 | Status: CANCELLED | OUTPATIENT
Start: 2025-04-22

## 2025-04-22 RX ORDER — DAPAGLIFLOZIN 5 MG/1
5 TABLET, FILM COATED ORAL DAILY
Qty: 90 TABLET | Refills: 1 | Status: CANCELLED | OUTPATIENT
Start: 2025-04-22

## 2025-04-22 RX ORDER — OMEPRAZOLE 20 MG/1
20 CAPSULE, DELAYED RELEASE ORAL DAILY
Qty: 90 CAPSULE | Refills: 1 | Status: CANCELLED | OUTPATIENT
Start: 2025-04-22

## 2025-04-22 NOTE — PROGRESS NOTES
Clinic Note    Kathryn Marie is a 74 y.o. female     Chief Complaint:   Chief Complaint   Patient presents with    [facility]    Health Maintenance     TETANUS VACCINE Never done  Naloxone Prescription Never done  DEXA Scan Never done  Shingles Vaccine(2 of 2) due on 09/24/2013  Diabetes Urine Screening due on 06/17/2023  RSV Vaccine (Age 60+ and Pregnant patients)(1 - 1-dose 75+ series) Never done  Influenza Vaccine(1) due on 09/01/2024  COVID-19 Vaccine(7 - 2024-25 season) due on 09/01/2024  Mammogram due on 03/19/2025         Subjective:    Patient comes in today to establish care. Patient states previous pcp was Dr. Flynn.  Patient has [facility]. Patient is poor historian. Patient has a previous AVS after hospitalization in March 2025 but does not have med bottles. Patient is unsure if she needs refills. States some medications go to mail order while others to [facility] but cannot remember which.   Patient reports she is doing well on current medication.   Complains of chronic pain and arthritis. Patient states she had recent mri. Reports she is established with pain treatment and rheumatology.  Patient states she sees Dr. Leonardo at [facility]. Reports she has a pacemaker.   Patient reports bp and glucose well controlled. Glucose typically not higher than 120.  Otherwise denies any complaints or concerns.       Allergies:   Review of patient's allergies indicates:  No Known Allergies     Past Medical History:  Past Medical History:   Diagnosis Date    Anemia     Atherosclerotic heart disease of native coronary artery without angina pectoris     Carotid artery stenosis 01/15/2014    CHARO  LICA stenosis    Causalgia of lower limb     Cervical radiculopathy     Chronic low back pain     Chronic pain syndrome     CVA (cerebral vascular accident)     right cerebellar    Degenerative joint disease involving multiple joints     Disorder of parathyroid gland, unspecified     Disorder of sacrum     Essential (primary)  "hypertension     Gammopathy     GERD (gastroesophageal reflux disease)     Heart murmur     Hyperlipidemia     Hyperparathyroidism     Lumbosacral radiculopathy     Lumbosacral spondylosis     Other long term (current) drug therapy     Pernicious anemia     Sciatica     Spinal stenosis of lumbar region     L4-5    Type 2 diabetes mellitus         Current Medications:  Current Medications[1]       Review of Systems   Constitutional:  Negative for fever.   Respiratory:  Negative for cough and shortness of breath.    Cardiovascular:  Negative for chest pain, palpitations and leg swelling.   Gastrointestinal:  Negative for abdominal pain, constipation, diarrhea, nausea and vomiting.   Genitourinary:  Negative for dysuria.   Musculoskeletal:  Positive for arthralgias, back pain and gait problem.   Neurological:  Negative for headaches.          Objective:    /66 (BP Location: Left arm, Patient Position: Sitting)   Pulse 60   Temp 98.1 °F (36.7 °C) (Oral)   Resp 15   Ht 5' 5" (1.651 m)   Wt 63.2 kg (139 lb 6.4 oz)   SpO2 100%   BMI 23.20 kg/m²      Physical Exam  Constitutional:       Appearance: Normal appearance.   Eyes:      Extraocular Movements: Extraocular movements intact.   Cardiovascular:      Rate and Rhythm: Normal rate and regular rhythm.      Pulses: Normal pulses.      Heart sounds: Normal heart sounds.   Pulmonary:      Effort: Pulmonary effort is normal.      Breath sounds: Normal breath sounds.   Abdominal:      Palpations: Abdomen is soft.      Tenderness: There is no abdominal tenderness. There is no guarding or rebound.   Musculoskeletal:      Right lower leg: No edema.      Left lower leg: No edema.      Comments: walker   Skin:     General: Skin is warm and dry.   Neurological:      Mental Status: She is alert and oriented to person, place, and time.      Gait: Gait abnormal.   Psychiatric:         Mood and Affect: Mood normal.          Assessment and Plan:    1. Primary hypertension  "   2. Type 2 diabetes mellitus without complication, without long-term current use of insulin    3. Hyperlipidemia, unspecified hyperlipidemia type    4. Arthritis    5. Anemia, unspecified type    6. Gastroesophageal reflux disease, unspecified whether esophagitis present         Primary hypertension  -continue current treatment plan  -f/u with Dr. Leonardo as scheduled    Type 2 diabetes mellitus without complication, without long-term current use of insulin  -continue farxiga  -diabetic diet    Hyperlipidemia, unspecified hyperlipidemia type  -continue crestor  -low cholesterol diet    Arthritis  -f/u with [facility] as scheduled (reports appt in May)  -f/u with pain treatment D. Shows as scheduled    Anemia, unspecified type  -continue supplements.   -will monitor lab values at f/u visit    Gastroesophageal reflux disease, unspecified whether esophagitis present  -continue prilosec      -offered refills of meds but patient states she will call back. Unsure which pharmacy she desires  -states she had dexa scan last year she thinks. Will check for report    There are no Patient Instructions on file for this visit.   Follow up in about 3 months (around 7/22/2025).          [1]   Current Outpatient Medications:     amLODIPine (NORVASC) 5 MG tablet, Take 1 tablet by mouth once daily., Disp: , Rfl:     aspirin (ECOTRIN) 81 MG EC tablet, Take 81 mg by mouth once daily., Disp: , Rfl:     blood sugar diagnostic (ONETOUCH VERIO TEST STRIPS MISC), by Misc.(Non-Drug; Combo Route) route. Use 1 strip to check blood glucose every morning, fasting for E11.9, Disp: , Rfl:     blood-glucose meter (ONETOUCH VERIO METER MISC), by Misc.(Non-Drug; Combo Route) route. Use for glucose checks once daily, E11.9, Disp: , Rfl:     cyanocobalamin (VITAMIN B-12) 1000 MCG tablet, Take 2,500 mcg by mouth once daily., Disp: , Rfl:     dapagliflozin propanediol (FARXIGA) 5 mg Tab tablet, Take 1 tablet (5 mg total) by mouth once daily.,  Disp: 90 tablet, Rfl: 1    denosumab (PROLIA) 60 mg/mL Syrg, Inject 1 mL into the skin., Disp: , Rfl:     diclofenac sodium (VOLTAREN) 1 % Gel, APPLY 1 APPLICATION TO  AFFECTED AREA(S) TOPICALLY  TWICE DAILY, Disp: 300 g, Rfl: 2    ELIQUIS 5 mg Tab, Take 5 mg by mouth 2 (two) times daily., Disp: , Rfl:     ferrous sulfate (FEOSOL) 325 mg (65 mg iron) Tab tablet, Take 325 mg by mouth daily with breakfast., Disp: , Rfl:     folic acid (FOLVITE) 1 MG tablet, Take 1 mg by mouth once daily., Disp: , Rfl:     gabapentin (NEURONTIN) 300 MG capsule, Take 1 capsule (300 mg total) by mouth 3 (three) times daily., Disp: 90 capsule, Rfl: 2    HYDROcodone-acetaminophen (NORCO) 7.5-325 mg per tablet, Take 1 tablet by mouth every 6 (six) hours as needed for Pain., Disp: 120 tablet, Rfl: 0    latanoprost 0.005 % ophthalmic solution, Place 1 drop into both eyes every evening., Disp: , Rfl:     metoprolol succinate (TOPROL-XL) 25 MG 24 hr tablet, Take 1/2 tablet (12.5 mg) by mouth every day., Disp: 45 tablet, Rfl: 1    omeprazole (PRILOSEC) 20 MG capsule, Take 20 mg by mouth once daily., Disp: , Rfl:     vitamin D (VITAMIN D3) 1000 units Tab, Take 5,000 Units by mouth once daily., Disp: , Rfl:     estradioL (ESTRACE) 0.01 % (0.1 mg/gram) vaginal cream, 1/2 applicatorful vaginally once or twice monthly as needed for vaginal dryness, Disp: 42.5 g, Rfl: 1    HYDROcodone-acetaminophen (NORCO) 7.5-325 mg per tablet, Take 1 tablet by mouth every 6 (six) hours as needed for Pain. (Patient not taking: Reported on 4/22/2025), Disp: 120 tablet, Rfl: 0    [START ON 5/4/2025] HYDROcodone-acetaminophen (NORCO) 7.5-325 mg per tablet, Take 1 tablet by mouth every 6 (six) hours as needed for Pain. (Patient not taking: Reported on 4/22/2025), Disp: 120 tablet, Rfl: 0    methotrexate 2.5 MG Tab, 2.5 mg. Take 10 tablets by mouth every week, Disp: , Rfl:     metroNIDAZOLE (METROGEL) 0.75 % (37.5mg/5 gram) vaginal gel, 1 applicator full per vagina  nightly for 5 nights., Disp: 70 g, Rfl: 0    rosuvastatin (CRESTOR) 20 MG tablet, Take 1 tablet (20 mg total) by mouth every evening., Disp: 90 tablet, Rfl: 1

## 2025-04-23 ENCOUNTER — PATIENT OUTREACH (OUTPATIENT)
Facility: HOSPITAL | Age: 75
End: 2025-04-23
Payer: MEDICARE

## 2025-04-23 DIAGNOSIS — E11.9 TYPE 2 DIABETES MELLITUS WITHOUT COMPLICATION, WITHOUT LONG-TERM CURRENT USE OF INSULIN: ICD-10-CM

## 2025-04-23 DIAGNOSIS — M47.817 LUMBOSACRAL SPONDYLOSIS WITHOUT MYELOPATHY: Chronic | ICD-10-CM

## 2025-04-23 DIAGNOSIS — M54.12 CERVICAL RADICULOPATHY: Chronic | ICD-10-CM

## 2025-04-23 DIAGNOSIS — M53.3 DISORDER OF SACRUM: Chronic | ICD-10-CM

## 2025-04-23 DIAGNOSIS — M96.1 POSTLAMINECTOMY SYNDROME OF CERVICAL REGION: Chronic | ICD-10-CM

## 2025-04-23 RX ORDER — HYDROCODONE BITARTRATE AND ACETAMINOPHEN 7.5; 325 MG/1; MG/1
1 TABLET ORAL EVERY 6 HOURS PRN
Qty: 120 TABLET | Refills: 0 | OUTPATIENT
Start: 2025-04-23

## 2025-04-23 RX ORDER — DAPAGLIFLOZIN 5 MG/1
5 TABLET, FILM COATED ORAL DAILY
Qty: 90 TABLET | Refills: 1 | Status: SHIPPED | OUTPATIENT
Start: 2025-04-23

## 2025-04-23 RX ORDER — FERROUS SULFATE 325(65) MG
325 TABLET ORAL
Qty: 90 TABLET | Refills: 1 | Status: SHIPPED | OUTPATIENT
Start: 2025-04-23

## 2025-04-23 NOTE — LETTER
AUTHORIZATION FOR RELEASE OF   CONFIDENTIAL INFORMATION    Dear [facility],    We are seeing Kathryn Marie, date of birth 1950, in the clinic at [facility]. Rox Burks FNP is the patient's PCP. Kathryn Marie has an outstanding lab/procedure at the time we reviewed her chart. In order to help keep her health information updated, she has authorized us to request the following medical record(s):        (  )  MAMMOGRAM                                      (  )  COLONOSCOPY      (  )  PAP SMEAR                                          (  )  OUTSIDE LAB RESULTS     ( x )  DEXA SCAN                                          (  )  EYE EXAM            (  )  FOOT EXAM                                          (  )  ENTIRE RECORD     (  )  OUTSIDE IMMUNIZATIONS                 (  )  _______________         Please fax records to [facility], Mallary Harvey, LPN Care Coordinator, [phone].      If you have any questions, please contact Connie at [phone]. .           Patient Name: Kathryn Marie  : 1950  Patient Phone #: [phone]                Kathryn Marie  MRN: [id number]  : 1950  Age: 74 y.o.  Sex: female         Patient/Legal Guardian Signature  This signature was collected at 2025    SELF       _______________________________   Printed Name/Relationship to Patient      Consent for Examination and Treatment: I hereby authorize the providers and employees of [facility] ([facility]) to provide medical treatment/services which includes, but is not limited to, performing and administering tests and diagnostic procedures that are deemed necessary, including, but not limited to, imaging examinations, blood tests and other laboratory procedures as may be required by the hospital, clinic, or may be ordered by my physician(s) or persons working under the general and/or special instructions of my physician(s).      I understand and agree that this consent covers all  authorized persons, including but not limited to physicians, residents, nurse practitioners, physicians' assistants, specialists, consultants, student nurses, and independently contracted physicians, who are called upon by the physician in charge, to carry out the diagnostic procedures and medical or surgical treatment.     I hereby authorize [facility] to retain or dispose of any specimens or tissue, should there be such remaining from any test or procedure.     I hereby authorize and give consent for [facility] providers and employees to take photographs, images or videotapes of such diagnostic, surgical or treatment procedures of Patient as may be required by [facility] or as may be ordered by a physician. I further acknowledge and agree that [facility] may use cameras or other devices for patient monitoring.     I am aware that the practice of medicine is not an exact science, and I acknowledge that no guarantees have been made to me as to the outcome of any tests, procedures or treatment.     Authorization for Release of Information: I understand that my insurance company and/or their agents may need information necessary to make determinations about payment/reimbursement. I hereby provide authorization to release to all insurance companies, their successors, assignees, other parties with whom they may have contracted, or others acting on their behalf, that are involved with payment for any hospital and/or clinic charges incurred by the patient, any information that they request and deem necessary for payment/reimbursement, and/or quality review.  I further authorize the release of my health information to physicians or other health care practitioners on staff who are involved in my health care now and in the future, and to other health care providers, entities, or institutions for the purpose of my continued care and treatment, including referrals.     REGISTRATION AUTHORIZATION  Form No. 98817 (Rev. 3/25/2024)    Page 1  of 3                       Medicare Patient's Certification and Authorization to Release Information and Payment Request:  I certify that the information given by me in applying for payment under Title XVIII of the Social Security Act is correct. I authorize any peña of medical or other information about me to release to the Social SecurityAdministration, or its intermediaries or carriers, any information needed for this or a related Medicare claim. I request that payment of authorized benefits be made on my behalf.     Assignment of Insurance Benefits:   I hereby authorize any and all insurance companies, health plans, defined   benefit plans, health insurers or any entity that is or may be responsible for payment of my medical expenses to pay all hospital and medical benefits now due, and to become due and payable to me under any hospital benefits, sick benefits, injury benefits or any other benefit for services rendered to me, including Major Medical Benefits, direct to [facility] and all independently contracted physicians. I assign any and all rights that I may have against any and all insurance companies, health plans, defined benefit plans, health insurers or any entity that is or may be responsible for payment of my medical expenses, including, but not limited to any right to appeal a denial of a claim, any right to bring any action, lawsuit, administrative proceeding, or other cause of action on my behalf. I specifically assign my right to pursue litigation against any and all insurance companies, health plans, defined benefit plans, health insurers or any entity that is or may be responsible for payment of my medical expenses based upon a refusal to pay charges.            E. Valuables: It is understood and agreed that [facility] is not liable for the damage to or loss of any money, jewelry,   documents, dentures, eye glasses, hearing aids, prosthetics, or other property of value.     F. Computer Equipment: I  understand and agree that should I choose to use computer equipment owned by [facility] or if I choose to access the Internet via [facility]s network, I do so at my own risk. [facility] is not responsible for any damage to my computer equipment or to any damages of any type that might arise from my loss of equipment or data.     G. Acceptance of Financial Responsibility:  I agree that in consideration of the services and   supplies that have been   or will be furnished to the patient, I am hereby obligated to pay all charges made for or on the account of the patient according to the standard rates (in effect at the time the services and supplies are delivered) established by [facility], including its Patient Financial Assistance Policy to the extent it is applicable. I understand that I am responsible for all charges, or portions thereof, not covered by insurance or other sources. Patient refunds will be distributed only after balances at all [facility] facilities are paid.     H. Communication Authorization:  I hereby authorize [facility] and its representatives, along with any billing service   or  who may work on their behalf, to contact me on   my cell phone and/or home phone using pre- recorded messages, artificial voice messages, automatic telephone dialing devices or other computer assisted technology, or by electronic      mail, text messaging, or by any other form of electronic communication. This includes, but is not limited to, appointment reminders, yearly physical exam reminders, preventive care reminders, patient campaigns, welcome calls, and calls about account balances on my account or any account on which I am listed as a guarantor. I understand I have the right to opt out of these communications at any time.      Relationship  Between  Facility and  Provider:      I understand that some, but not all, providers furnishing services to the patient are not employees or agents of [facility]. The patient is  under the care and supervision of his/her attending physician, and it is the responsibility of the facility and its nursing staff to carry out the instructions of such physicians. It is the responsibility of the patient's physician/designee to obtain the patient's informed consent, when required, for medical or surgical treatment, special diagnostic or therapeutic procedures, or hospital services rendered for the patient under the special instructions of the physician/designee.           REGISTRATION AUTHORIZATION  Form No. 52553 (Rev. 3/25/2024)    Page 2 of 3                       Immunizations: [facility] shares immunization information with state sponsored health departments to help you and your doctor keep track of your immunization records. By signing, you consent to have this information shared with the health department in your state:                                [place] - LINKS ([place] Immunization Network for Kids Statewide)                                [place] - MIIX ([place] Immunization Information eXchange)                                [place] - ImmPRINT (Immunization Patient Registry with Integrated Technology)     TERM: This authorization is valid for this and subsequent care/treatment I receive at [facility] and will remain valid unless/until revoked in writing by me.     [facility]: As used in this document, [facility] means all [facility] owned and managed facilities, including, but not limited to, all health centers, surgery centers, clinics, urgent care centers, and hospitals.         [facility] complies with applicable Federal civil rights laws and does not discriminate on the basis of race, color, national origin, age, disability, or sex.  ATENCIÓN: si habla español, tiene a moise disposición servicios gratuitos de asistencia lingüística. Sujey alvarez [phone].  [facility] [place] Ý: N?u b?n nói Ti?ng Vi?t, có các d?ch v? h? tr? ngôn ng? mi?n phí dành cho b?n. G?i s?  [phone].        REGISTRATION AUTHORIZATION  Form No. 70731 (Rev. 3/25/2024)   Page 3 of 3     Patient

## 2025-04-23 NOTE — PROGRESS NOTES
Population Health Chart Review & Patient Outreach Details    Updates Requested / Reviewed:  [x]  Care Team Updated  [x]  Care Everywhere Updated & Reviewed  [x]   Reviewed      Health Maintenance Topics Addressed and Outreach Outcomes / Actions Taken:  DEXA [x] DIONTE faxed to [facility] for Dexa.                   Pt port accessed , good blood return , labs drawn . Specimen to lab

## 2025-04-24 ENCOUNTER — PATIENT OUTREACH (OUTPATIENT)
Facility: HOSPITAL | Age: 75
End: 2025-04-24
Payer: MEDICARE

## 2025-04-24 NOTE — PROGRESS NOTES
Population Health Chart Review & Patient Outreach Details        Health Maintenance Topics Addressed and Outreach Outcomes / Actions Taken:  DEXA [x] HM updated with DEXA at [facility] on 11/22/2022. Results sent to PCP to review.     [x] Osteopenia added to the patient's history.

## 2025-04-30 ENCOUNTER — EXTERNAL CHRONIC CARE MANAGEMENT (OUTPATIENT)
Dept: FAMILY MEDICINE | Facility: CLINIC | Age: 75
End: 2025-04-30
Payer: MEDICARE

## 2025-05-01 ENCOUNTER — TELEPHONE (OUTPATIENT)
Dept: NEUROLOGY | Facility: CLINIC | Age: 75
End: 2025-05-01
Payer: MEDICARE

## 2025-05-01 DIAGNOSIS — M54.12 CERVICAL RADICULOPATHY: Chronic | ICD-10-CM

## 2025-05-01 DIAGNOSIS — M96.1 POSTLAMINECTOMY SYNDROME OF CERVICAL REGION: Primary | Chronic | ICD-10-CM

## 2025-05-14 NOTE — PROGRESS NOTES
Subjective:         Patient ID: Kathryn Marie is a 74 y.o. female.    Chief Complaint: Neck Pain, Back Pain, Leg Pain, Groin Pain, and Follow-up        Pain  This is a chronic problem. The current episode started more than 1 year ago. The problem occurs daily. The problem has been waxing and waning. Associated symptoms include arthralgias and neck pain. Pertinent negatives include no anorexia, chest pain, chills, coughing, diaphoresis, fever, sore throat, vertigo or vomiting.     Review of Systems   Constitutional:  Negative for activity change, appetite change, chills, diaphoresis, fever and unexpected weight change.   HENT:  Negative for drooling, ear discharge, ear pain, facial swelling, nosebleeds, sore throat, trouble swallowing, voice change and goiter.    Eyes:  Negative for photophobia, pain, discharge, redness and visual disturbance.   Respiratory:  Negative for apnea, cough, choking, chest tightness, shortness of breath, wheezing and stridor.    Cardiovascular:  Negative for chest pain, palpitations and leg swelling.   Gastrointestinal:  Negative for abdominal distention, anorexia, diarrhea, rectal pain, vomiting and fecal incontinence.   Endocrine: Negative for cold intolerance, heat intolerance, polydipsia, polyphagia and polyuria.   Genitourinary:  Negative for bladder incontinence, dysuria, flank pain, frequency and hot flashes.   Musculoskeletal:  Positive for arthralgias, back pain, leg pain, neck pain and neck stiffness.   Integumentary:  Negative for color change and pallor.   Allergic/Immunologic: Negative for immunocompromised state.   Neurological:  Negative for dizziness, vertigo, seizures, syncope, facial asymmetry, speech difficulty, light-headedness, coordination difficulties and memory loss.   Hematological:  Negative for adenopathy. Does not bruise/bleed easily.   Psychiatric/Behavioral:  Negative for agitation, behavioral problems, confusion, decreased concentration, dysphoric mood,  hallucinations, self-injury and suicidal ideas. The patient is not nervous/anxious and is not hyperactive.            Past Medical History:   Diagnosis Date    Anemia     Atherosclerotic heart disease of native coronary artery without angina pectoris     Carotid artery stenosis 01/15/2014    CHARO  LICA stenosis    Causalgia of lower limb     Cervical radiculopathy     Chronic low back pain     Chronic pain syndrome     CVA (cerebral vascular accident)     right cerebellar    Degenerative joint disease involving multiple joints     Disorder of parathyroid gland, unspecified     Disorder of sacrum     Essential (primary) hypertension     Gammopathy     GERD (gastroesophageal reflux disease)     Heart murmur     Hyperlipidemia     Hyperparathyroidism     Lumbosacral radiculopathy     Lumbosacral spondylosis     Osteopenia 11/22/2022    See DEXA on 11/22/2022    Other long term (current) drug therapy     Pernicious anemia     Sciatica     Spinal stenosis of lumbar region     L4-5    Type 2 diabetes mellitus      Past Surgical History:   Procedure Laterality Date    CARPAL TUNNEL RELEASE Right     caudal MOIZ  07/03/2018    CHOLECYSTECTOMY  1975    CORONARY ARTERY BYPASS GRAFT      CORONARY ARTERY BYPASS GRAFT (CABG)      triple    HIP SURGERY Right     INJECTION OF ANESTHETIC AGENT AROUND MEDIAL BRANCH NERVES INNERVATING LUMBAR FACET JOINT Bilateral 1/19/2023    Procedure: Block-nerve-medial branch-lumbar, bilateral L4 through S1;  Surgeon: Ashley Piña MD;  Location: [facility];  Service: Pain Management;  Laterality: Bilateral;    L4-S1 Caudal cath guided MOIZ  07/03/2018    Dr Orta    L5-S1 Caudal cath guided MOIZ  09/26/2014 6/26/2014 4/11/2014    Dr Orta    left shoulder repair Left     NECK SURGERY  2018    does not know what kind    right sacral RF Right 12/26/2013 1/24/2012    Dr Orta    right SIJI Right 10/24/2013    Dr Orta     Social History     Socioeconomic History    Marital status:   "  Occupational History    Occupation: RETIRED   Tobacco Use    Smoking status: Never     Passive exposure: Never    Smokeless tobacco: Never   Substance and Sexual Activity    Alcohol use: Not Currently    Drug use: Yes     Types: Hydrocodone     Comment: pain medication with pain treatment     Sexual activity: Not Currently     Social Drivers of Health     Financial Resource Strain: Not At Risk (3/17/2025)    Received from [facility]    BOC Financial Resource Needs     Food/Housing/Utility/Transport/Financial Concerns : Unrecognized value   Food Insecurity: Not At Risk (3/17/2025)    Received from [facility]    BOC Food Insecurity     Food/Housing/Utility/Transport/Financial Concerns : Unrecognized value   Transportation Needs: Not At Risk (3/17/2025)    Received from [facility]    BOC Transportation Needs     Food/Housing/Utility/Transport/Financial Concerns : Unrecognized value   Housing Stability: Not At Risk (3/17/2025)    Received from [facility]    BOC Housing Stability Source     Food/Housing/Utility/Transport/Financial Concerns : Unrecognized value     Family History   Problem Relation Name Age of Onset    Diabetes Mother      Heart disease Mother      Hypertension Mother      Heart attack Mother      Hypertension Father      Stroke Father      Stroke Sister      Hypertension Sister      Cancer Brother       Review of patient's allergies indicates:  No Known Allergies     Objective:  Vitals:    05/22/25 1345   BP: 133/83   Pulse: 64   Resp: 18   Weight: 64.9 kg (143 lb)   Height: 5' 5" (1.651 m)   PainSc:   9                   Physical Exam  Vitals and nursing note reviewed. Exam conducted with a chaperone present.   Constitutional:       General: She is awake. She is not in acute distress.     Appearance: Normal appearance. She is not ill-appearing or diaphoretic.   HENT:      Head: " Normocephalic and atraumatic.      Nose: Nose normal.      Mouth/Throat:      Mouth: Mucous membranes are moist.      Pharynx: Oropharynx is clear.   Eyes:      Conjunctiva/sclera: Conjunctivae normal.      Pupils: Pupils are equal, round, and reactive to light.   Cardiovascular:      Rate and Rhythm: Normal rate.   Pulmonary:      Effort: Pulmonary effort is normal. No respiratory distress.   Abdominal:      Palpations: Abdomen is soft.   Musculoskeletal:      Cervical back: Normal range of motion and neck supple. Tenderness present.      Thoracic back: Tenderness present.      Lumbar back: Tenderness present. Decreased range of motion.   Skin:     General: Skin is warm and dry.      Coloration: Skin is not jaundiced or pale.   Neurological:      General: No focal deficit present.      Mental Status: She is alert and oriented to person, place, and time. Mental status is at baseline.      Cranial Nerves: No cranial nerve deficit (II-XII).   Psychiatric:         Mood and Affect: Mood normal.         Behavior: Behavior normal. Behavior is cooperative.         Thought Content: Thought content normal.           MRI Cervical Spine Without Contrast  Narrative: EXAMINATION:  MRI CERVICAL SPINE WITHOUT CONTRAST    CLINICAL HISTORY:  Neck pain, acute, prior cervical surgery;Myelopathy, chronic, cervical spine;.  Radiculopathy, cervical region    TECHNIQUE:  Multiplanar, multisequence MR images of the cervical spine were acquired without the administration of contrast.    COMPARISON:  MRI of the cervical spine 08/02/2023.    FINDINGS:  Morphology: Vertebral body heights are preserved.  No fractures or aggressive lesions.  Prominent endplate centered osteophytes throughout as well as scattered small chronic appearing Schmorl's nodes.    Alignment: Straightening of the expected normal cervical lordosis without spondylolisthesis..    Cord: Multilevel ventral cord compression grossly similar to the prior examination from C3-C4 to  C6-C7.  Stable focus of focal signal intensity and volume loss C5-C6 consistent with myelomalacia changes.  No new cord signal abnormality..    Craniocervical Junction: Cerebellar tonsils are normally positioned. The visualized portions of the posterior fossa are unremarkable. The regional osseous anatomy is normal.    Disc levels:    C2-C3: Severe degenerative disc height loss and broad-based disc osteophyte complex mildly narrows the spinal canal.  Uncovertebral spurring and facet arthrosis contributes to moderate left and mild-to-moderate right foraminal narrowing..    C3-C4: Severe degenerative disc height loss and broad-based disc protrusion/osteophyte complex.  Evidence of previous right-sided posterior decompression which can be correlated with patient's clinical history.  Moderate to severe residual spinal canal narrowing asymmetric to the right.  Uncovertebral spurring and facet arthrosis contribute to severe bilateral foraminal narrowing..    C4-C5: Severe degenerative disc height loss.  Evidence of previous right hemilaminectomy changes.  Broad-based disc protrusion/osteophyte complex contributes to moderate to severe narrowing of the spinal canal asymmetric to the right.  Uncovertebral spurring and facet arthrosis produces severe left and moderate right foraminal narrowing.    C5-C6: Severe degenerative disc height loss and evidence of previous right hemilaminectomy.  Broad-based disc protrusion/osteophyte complex contributes to residual moderate to severe narrowing of the spinal canal.  Uncovertebral spurring and facet arthrosis produces severe bilateral foraminal narrowing..    C6-C7: Severe degenerative disc height loss and broad-based disc osteophyte complex/protrusion with decompressive laminectomy changes.  Mild to moderate residual spinal canal narrowing.  Uncovertebral spurring contributes to severe left and moderate to severe right foraminal narrowing..    C7-T1: Shallow disc osteophyte complex  and ligamentum flavum thickening producing mild narrowing of the spinal canal with evidence of previous right hemilaminectomy changes.  Uncovertebral spurring and facet arthrosis produce severe left and moderate right foraminal narrowing..    Soft tissues: The visualized paraspinal soft tissues are within normal limits.  Impression: 1. Redemonstrated multilevel posterior decompression without evidence of an acute process.  Advanced multifactorial degenerative changes including spinal canal narrowing and cord compression with stable myelomalacia changes at C5-C6.  2. Moderate/severe foraminal narrowing throughout as detailed above.    Electronically signed by: Shai Branch  Date:    04/25/2025  Time:    12:47         Patient Outreach on 04/24/2025   Component Date Value Ref Range Status    BMD Recommendation External 11/22/2022 Repeat BMD in 2 years   Final   Office Visit on 02/24/2025   Component Date Value Ref Range Status    POC Amphetamines 02/24/2025 Negative  Negative, Inconclusive Final    POC Barbiturates 02/24/2025 Negative  Negative, Inconclusive Final    POC Benzodiazepines 02/24/2025 Negative  Negative, Inconclusive Final    POC Cocaine 02/24/2025 Negative  Negative, Inconclusive Final    POC THC 02/24/2025 Negative  Negative, Inconclusive Final    POC Methadone 02/24/2025 Negative  Negative, Inconclusive Final    POC Methamphetamine 02/24/2025 Negative  Negative, Inconclusive Final    POC Opiates 02/24/2025 Presumptive Positive (A)  Negative, Inconclusive Final    POC Oxycodone 02/24/2025 Negative  Negative, Inconclusive Final    POC Phencyclidine 02/24/2025 Negative  Negative, Inconclusive Final    POC Methylenedioxymethamphetamine * 02/24/2025 Negative  Negative, Inconclusive Final    POC Tricyclic Antidepressants 02/24/2025 Negative  Negative, Inconclusive Final    POC Buprenorphine 02/24/2025 Negative   Final     Acceptable 02/24/2025 Yes   Final    POC Temperature (Urine) 02/24/2025 90    Final    Creatinine, U 02/24/2025 59.2  mg/dL Final    Specific Gravity 02/24/2025 1.013   Final    pH 02/24/2025 5.3   Final    Oxidants 02/24/2025 Negative  Cutoff: 200 mg/L Final    Comment 02/24/2025 Normal   Final    Codeine 02/24/2025 Not Detected  Cutoff: 25 ng/mL Final    C6BG 02/24/2025 Not Detected  Cutoff: 100 ng/mL Final    Morphine 02/24/2025 Not Detected  Cutoff: 25 ng/mL Final    M6BG 02/24/2025 Not Detected  Cutoff: 100 ng/mL Final    6 Monoacetylmorphine 02/24/2025 Not Detected  Cutoff: 25 ng/mL Final    Hydrocodone 02/24/2025 Present (A)  Cutoff: 25 ng/mL Final    Norhydrocodone 02/24/2025 Present (A)  Cutoff: 25 ng/mL Final    Dihydrocodeine 02/24/2025 Present (A)  Cutoff: 25 ng/mL Final    Hydromorphone 02/24/2025 Not Detected  Cutoff: 25 ng/mL Final    Hydromorphone-3-beta-glucuronide 02/24/2025 Not Detected  Cutoff: 100 ng/mL Final    Oxycodone 02/24/2025 Not Detected  Cutoff: 25 ng/mL Final    Noroxycodone 02/24/2025 Not Detected  Cutoff: 25 ng/mL Final    Oxymorphone 02/24/2025 Not Detected  Cutoff: 25 ng/mL Final    Oxymorphone-3-beta-glucuronid 02/24/2025 Not Detected  Cutoff: 100 ng/mL Final    Noroxymorphone 02/24/2025 Not Detected  Cutoff: 25 ng/mL Final    Fentanyl 02/24/2025 Not Detected  Cutoff: 2 ng/mL Final    Norfentanyl 02/24/2025 Not Detected  Cutoff: 2 ng/mL Final    Meperidine 02/24/2025 Not Detected  Cutoff: 25 ng/mL Final    Normeperidine 02/24/2025 Not Detected  Cutoff: 25 ng/mL Final    Naloxone 02/24/2025 Not Detected  Cutoff: 25 ng/mL Final    Naloxone-3-beta-glucuronide 02/24/2025 Not Detected  Cutoff: 100 ng/mL Final    Methadone 02/24/2025 Not Detected  Cutoff: 25 ng/mL Final    EDDP 02/24/2025 Not Detected  Cutoff: 25 ng/mL Final    Propoxyphene 02/24/2025 Not Detected  Cutoff: 25 ng/mL Final    Norpropoxyphene 02/24/2025 Not Detected  Cutoff: 25 ng/mL Final    Tramadol 02/24/2025 Not Detected  Cutoff: 25 ng/mL Final    O-desmethyltramadol 02/24/2025 Not Detected   Cutoff: 25 ng/mL Final    Tapentadol 02/24/2025 Not Detected  Cutoff: 25 ng/mL Final    N-Desmethyltapentadol 02/24/2025 Not Detected  Cutoff: 50 ng/mL Final    M TAPENTADOL-BETA-GLUCURONIDE 02/24/2025 Not Detected  Cutoff: 100 ng/mL Final    Buprenorphine 02/24/2025 Not Detected  Cutoff: 5 ng/mL Final    Norbuprenorphine 02/24/2025 Not Detected  Cutoff: 5 ng/mL Final    Norbuprenorphine glucuronide 02/24/2025 Not Detected  Cutoff: 20 ng/mL Final    Opioid Interpretation 02/24/2025 SEE COMMENTS   Final    Cocaine 02/24/2025 Negative  Cutoff: 150 ng/mL Final    Tetrahydrocannabinol 02/24/2025 Negative  Cutoff: 50 ng/mL Final    Alprazolam 02/24/2025 Not Detected  Cutoff: 10 ng/mL Final    Alpha-Hydroxyalprazolam 02/24/2025 Not Detected  Cutoff: 10 ng/mL Final    Alpha-Hydroxyalprazolam Glucuronide 02/24/2025 Not Detected  Cutoff: 50 ng/mL Final    Chlordiazepoxide 02/24/2025 Not Detected  Cutoff: 10 ng/mL Final    Clobazam 02/24/2025 Not Detected  Cutoff: 10 ng/mL Final    N-Desmethylclobazam 02/24/2025 Not Detected  Cutoff: 200 ng/mL Final    Clonazepam 02/24/2025 Not Detected  Cutoff: 10 ng/mL Final    7-aminoclonazepam 02/24/2025 Not Detected  Cutoff: 10 ng/mL Final    Diazepam 02/24/2025 Not Detected  Cutoff: 10 ng/mL Final    Nordiazepam 02/24/2025 Not Detected  Cutoff: 10 ng/mL Final    Flunitrazepam 02/24/2025 Not Detected  Cutoff: 10 ng/mL Final    7-aminoflunitrazepam 02/24/2025 Not Detected  Cutoff: 10 ng/mL Final    Flurazepam 02/24/2025 Not Detected  Cutoff: 10 ng/mL Final    2-Hydroxy Ethyl Flurazepam 02/24/2025 Not Detected  Cutoff: 10 ng/mL Final    Lorazepam 02/24/2025 Not Detected  Cutoff: 10 ng/mL Final    Lorazepam Glucuronide 02/24/2025 Not Detected  Cutoff: 50 ng/mL Final    Midazolam 02/24/2025 Not Detected  Cutoff: 10 ng/mL Final    Alpha-Hydroxy Midazolam 02/24/2025 Not Detected  Cutoff: 10 ng/mL Final    Oxazepam 02/24/2025 Not Detected  Cutoff: 10 ng/mL Final    Oxazepam Glucuronide  02/24/2025 Not Detected  Cutoff: 50 ng/mL Final    Prazepam 02/24/2025 Not Detected  Cutoff: 10 ng/mL Final    Temazepam 02/24/2025 Not Detected  Cutoff: 10 ng/mL Final    Temazepam Glucuronide 02/24/2025 Not Detected  Cutoff: 50 ng/mL Final    Triazolam 02/24/2025 Not Detected  Cutoff: 10 ng/mL Final    Alpha-Hydroxy Triazolam 02/24/2025 Not Detected  Cutoff: 10 ng/mL Final    Zolpidem 02/24/2025 Not Detected  Cutoff: 10 ng/mL Final    Zolpidem Phenyl-4-Carboxylic acid 02/24/2025 Not Detected  Cutoff: 10 ng/mL Final    Benzodiazepine Interpretation 02/24/2025 SEE COMMENTS   Final    List Patient's Current Medications 02/24/2025 ----   Final    Methamphetamine 02/24/2025 Not Detected  Cutoff: 100 ng/mL Final    Amphetamine 02/24/2025 Not Detected  Cutoff: 100 ng/mL Final    3,4-methylenedioxymethamphetamine * 02/24/2025 Not Detected  Cutoff: 100 ng/mL Final    3,9-fgvffomabodizi-O-ethylamphetam* 02/24/2025 Not Detected  Cutoff: 100 ng/mL Final    3,4-methylenedioxyamphetamine (MDA) 02/24/2025 Not Detected  Cutoff: 100 ng/mL Final    Ephedrine 02/24/2025 Not Detected  Cutoff: 100 ng/mL Final    Pseudoephedrine 02/24/2025 Not Detected  Cutoff: 100 ng/mL Final    Phentermine 02/24/2025 Not Detected  Cutoff: 100 ng/mL Final    Phencyclidine (PCP) 02/24/2025 Not Detected  Cutoff: 20 ng/mL Final    Methylphenidate 02/24/2025 Not Detected  Cutoff: 20 ng/mL Final    Ritalinic acid 02/24/2025 Not Detected  Cutoff: 100 ng/mL Final    Stimulant Interpretation 02/24/2025 SEE COMMENTS   Final    Barbiturates 02/24/2025 Negative  Cutoff: 200 ng/mL Final         Orders Placed This Encounter   Procedures    POCT Urine Drug Screen Presump     Interpretive Information:     Negative:  No drug detected at the cut off level.   Positive:  This result represents presumptive positive for the   tested drug, other substances may yield a positive response other   than the analyte of interest. This result should be utilized for   diagnostic  purpose only. Confirmation testing will be performed upon physician request only.            Requested Prescriptions     Signed Prescriptions Disp Refills    gabapentin (NEURONTIN) 300 MG capsule 90 capsule 2     Sig: Take 1 capsule (300 mg total) by mouth 3 (three) times daily.    HYDROcodone-acetaminophen (NORCO)  mg per tablet 120 tablet 0     Sig: Take 1 tablet by mouth every 6 (six) hours as needed for Pain.    HYDROcodone-acetaminophen (NORCO)  mg per tablet 120 tablet 0     Sig: Take 1 tablet by mouth every 6 (six) hours as needed for Pain.    HYDROcodone-acetaminophen (NORCO)  mg per tablet 120 tablet 0     Sig: Take 1 tablet by mouth every 6 (six) hours as needed for Pain.       Assessment:     1. Lumbosacral spondylosis without myelopathy    2. Cervical radiculopathy    3. Postlaminectomy syndrome of cervical region    4. Disorder of sacrum    5. Encounter for long-term (current) use of medications                   A's of Opioid Risk Assessment  Activity:Patient can perform ADL.   Analgesia:Patients pain is partially controlled by current medication. Patient has tried OTC medications such as Tylenol and Ibuprofen with out relief.   Adverse Effects: Patient denies constipation or sedation.  Aberrant Behavior:  reviewed with no aberrant drug seeking/taking behavior.  Overdose reversal drug naloxone discussed    Drug screen reviewed      X-ray lumbar spine [facility] November 28, 2022  Grade 1 anterior listhesis L3/4 multiple level degenerative changes pedicle screws rods posterior L4/5 interbody hardware L4/5  Prominent degenerative changes L2/3 L3/4 moderate facet joint arthropathy throughout    X-ray sacroiliac joints [facility] November 28, 2022 osteoarthritis sacroiliac joints    MRI cervical spine [facility] April 24, 2025  FINDINGS:  Morphology: Vertebral body heights are preserved.  No fractures or aggressive lesions.  Prominent endplate centered  osteophytes throughout as well as scattered small chronic appearing Schmorl's nodes.     Alignment: Straightening of the expected normal cervical lordosis without spondylolisthesis..     Cord: Multilevel ventral cord compression grossly similar to the prior examination from C3-C4 to C6-C7.  Stable focus of focal signal intensity and volume loss C5-C6 consistent with myelomalacia changes.  No new cord signal abnormality..     Craniocervical Junction: Cerebellar tonsils are normally positioned. The visualized portions of the posterior fossa are unremarkable. The regional osseous anatomy is normal.        Disc levels:        C2-C3: Severe degenerative disc height loss and broad-based disc osteophyte complex mildly narrows the spinal canal.  Uncovertebral spurring and facet arthrosis contributes to moderate left and mild-to-moderate right foraminal narrowing..     C3-C4: Severe degenerative disc height loss and broad-based disc protrusion/osteophyte complex.  Evidence of previous right-sided posterior decompression which can be correlated with patient's clinical history.  Moderate to severe residual spinal canal narrowing asymmetric to the right.  Uncovertebral spurring and facet arthrosis contribute to severe bilateral foraminal narrowing..     C4-C5: Severe degenerative disc height loss.  Evidence of previous right hemilaminectomy changes.  Broad-based disc protrusion/osteophyte complex contributes to moderate to severe narrowing of the spinal canal asymmetric to the right.  Uncovertebral spurring and facet arthrosis produces severe left and moderate right foraminal narrowing.     C5-C6: Severe degenerative disc height loss and evidence of previous right hemilaminectomy.  Broad-based disc protrusion/osteophyte complex contributes to residual moderate to severe narrowing of the spinal canal.  Uncovertebral spurring and facet arthrosis produces severe bilateral foraminal narrowing..     C6-C7: Severe degenerative disc  height loss and broad-based disc osteophyte complex/protrusion with decompressive laminectomy changes.  Mild to moderate residual spinal canal narrowing.  Uncovertebral spurring contributes to severe left and moderate to severe right foraminal narrowing..     C7-T1: Shallow disc osteophyte complex and ligamentum flavum thickening producing mild narrowing of the spinal canal with evidence of previous right hemilaminectomy changes.  Uncovertebral spurring and facet arthrosis produce severe left and moderate right foraminal narrowing..     Soft tissues: The visualized paraspinal soft tissues are within normal limits.    Impression:     1. Redemonstrated multilevel posterior decompression without evidence of an acute process.  Advanced multifactorial degenerative changes including spinal canal narrowing and cord compression with stable myelomalacia changes at C5-C6.  2. Moderate/severe foraminal narrowing throughout as detailed above.               Plan:    Pill count correct  February 24, 2025 32 hydrocodone        Narcan February 2023    Rheumatoid arthritis   Posterior cervical decompression multiple level  Cervical myelopathy muscle wasting intrinsic muscle wasting bilateral upper extremities  Lumbar fusion L4/5 hardware in place posterior    Following Neurology [facility]      Using 4 wheelchair assistance ambulation/4 point rolling walker    Do not have permission to hold Plavix     Chronic right shoulder pain, chronic intrinsic muscle wasting bilateral hands limited range of motion shoulders right greater than left    Chronic cervical radiculopathy back pain lumbar radiculopathy pain    She states her pain is increasing hydrocodone 7.5 no longer controlling her discomfort she would like to discuss options     She states neuro surgery [facility] told her her only options further cervical spine surgery for more decompression    She has been reluctant to have any further surgery to date    Increase hydrocodone 10 1 p.o.  q.6 hours, as needed, 120 tablets    Continue current medication    Continue activity as tolerated     Follow-up 3 months    Dr. Piña, April 2025    Bring original prescription medication bottles/container/box with labels to each visit

## 2025-05-22 ENCOUNTER — OFFICE VISIT (OUTPATIENT)
Dept: PAIN MEDICINE | Facility: CLINIC | Age: 75
End: 2025-05-22
Payer: MEDICARE

## 2025-05-22 VITALS
WEIGHT: 143 LBS | HEART RATE: 64 BPM | BODY MASS INDEX: 23.82 KG/M2 | DIASTOLIC BLOOD PRESSURE: 83 MMHG | HEIGHT: 65 IN | RESPIRATION RATE: 18 BRPM | SYSTOLIC BLOOD PRESSURE: 133 MMHG

## 2025-05-22 DIAGNOSIS — M54.12 CERVICAL RADICULOPATHY: Chronic | ICD-10-CM

## 2025-05-22 DIAGNOSIS — Z79.899 ENCOUNTER FOR LONG-TERM (CURRENT) USE OF MEDICATIONS: ICD-10-CM

## 2025-05-22 DIAGNOSIS — M53.3 DISORDER OF SACRUM: Chronic | ICD-10-CM

## 2025-05-22 DIAGNOSIS — M96.1 POSTLAMINECTOMY SYNDROME OF CERVICAL REGION: Chronic | ICD-10-CM

## 2025-05-22 DIAGNOSIS — M47.817 LUMBOSACRAL SPONDYLOSIS WITHOUT MYELOPATHY: Primary | Chronic | ICD-10-CM

## 2025-05-22 PROCEDURE — 99215 OFFICE O/P EST HI 40 MIN: CPT | Mod: PBBFAC | Performed by: PHYSICIAN ASSISTANT

## 2025-05-22 PROCEDURE — 3075F SYST BP GE 130 - 139MM HG: CPT | Mod: CPTII,,, | Performed by: PHYSICIAN ASSISTANT

## 2025-05-22 PROCEDURE — 80305 DRUG TEST PRSMV DIR OPT OBS: CPT | Mod: PBBFAC | Performed by: PHYSICIAN ASSISTANT

## 2025-05-22 PROCEDURE — 1125F AMNT PAIN NOTED PAIN PRSNT: CPT | Mod: CPTII,,, | Performed by: PHYSICIAN ASSISTANT

## 2025-05-22 PROCEDURE — 1159F MED LIST DOCD IN RCRD: CPT | Mod: CPTII,,, | Performed by: PHYSICIAN ASSISTANT

## 2025-05-22 PROCEDURE — 99214 OFFICE O/P EST MOD 30 MIN: CPT | Mod: S$PBB,,, | Performed by: PHYSICIAN ASSISTANT

## 2025-05-22 PROCEDURE — 99999 PR PBB SHADOW E&M-EST. PATIENT-LVL V: CPT | Mod: PBBFAC,,, | Performed by: PHYSICIAN ASSISTANT

## 2025-05-22 PROCEDURE — 99999PBSHW POCT URINE DRUG SCREEN PRESUMP: Mod: PBBFAC,,,

## 2025-05-22 PROCEDURE — 3079F DIAST BP 80-89 MM HG: CPT | Mod: CPTII,,, | Performed by: PHYSICIAN ASSISTANT

## 2025-05-22 RX ORDER — HYDROCODONE BITARTRATE AND ACETAMINOPHEN 10; 325 MG/1; MG/1
1 TABLET ORAL EVERY 6 HOURS PRN
Qty: 120 TABLET | Refills: 0 | Status: SHIPPED | OUTPATIENT
Start: 2025-06-05 | End: 2025-07-05

## 2025-05-22 RX ORDER — HYDROCODONE BITARTRATE AND ACETAMINOPHEN 7.5; 325 MG/1; MG/1
1 TABLET ORAL EVERY 6 HOURS PRN
Qty: 120 TABLET | Refills: 0 | Status: CANCELLED | OUTPATIENT
Start: 2025-05-22

## 2025-05-22 RX ORDER — GABAPENTIN 300 MG/1
300 CAPSULE ORAL 3 TIMES DAILY
Qty: 90 CAPSULE | Refills: 2 | Status: SHIPPED | OUTPATIENT
Start: 2025-05-22

## 2025-05-22 RX ORDER — HYDROCODONE BITARTRATE AND ACETAMINOPHEN 10; 325 MG/1; MG/1
1 TABLET ORAL EVERY 6 HOURS PRN
Qty: 120 TABLET | Refills: 0 | Status: SHIPPED | OUTPATIENT
Start: 2025-08-04 | End: 2025-09-03

## 2025-05-22 RX ORDER — HYDROCODONE BITARTRATE AND ACETAMINOPHEN 10; 325 MG/1; MG/1
1 TABLET ORAL EVERY 6 HOURS PRN
Qty: 120 TABLET | Refills: 0 | Status: SHIPPED | OUTPATIENT
Start: 2025-07-05 | End: 2025-08-04

## 2025-05-31 ENCOUNTER — EXTERNAL CHRONIC CARE MANAGEMENT (OUTPATIENT)
Dept: FAMILY MEDICINE | Facility: CLINIC | Age: 75
End: 2025-05-31
Payer: MEDICARE

## 2025-05-31 PROCEDURE — 99490 CHRNC CARE MGMT STAFF 1ST 20: CPT | Mod: ,,, | Performed by: FAMILY MEDICINE

## 2025-06-17 DIAGNOSIS — I65.29 CAROTID ARTERY STENOSIS: Primary | ICD-10-CM

## 2025-06-19 ENCOUNTER — INITIAL CONSULT (OUTPATIENT)
Dept: VASCULAR SURGERY | Facility: CLINIC | Age: 75
End: 2025-06-19
Payer: MEDICARE

## 2025-06-19 VITALS — BODY MASS INDEX: 23.83 KG/M2 | HEIGHT: 65 IN | WEIGHT: 143.06 LBS

## 2025-06-19 DIAGNOSIS — I65.29 CAROTID ARTERY STENOSIS: Primary | ICD-10-CM

## 2025-06-19 DIAGNOSIS — I65.21 ASYMPTOMATIC STENOSIS OF RIGHT CAROTID ARTERY WITHOUT INFARCTION: ICD-10-CM

## 2025-06-19 DIAGNOSIS — I65.22 OCCLUSION OF LEFT CAROTID ARTERY: ICD-10-CM

## 2025-06-19 DIAGNOSIS — I65.21 CAROTID OCCLUSION, RIGHT: ICD-10-CM

## 2025-06-19 PROCEDURE — 99215 OFFICE O/P EST HI 40 MIN: CPT | Mod: PBBFAC | Performed by: NURSE PRACTITIONER

## 2025-06-19 PROCEDURE — 99999 PR PBB SHADOW E&M-EST. PATIENT-LVL V: CPT | Mod: PBBFAC,,, | Performed by: NURSE PRACTITIONER

## 2025-06-19 NOTE — PROGRESS NOTES
Subjective:       Patient ID: Kathryn Marie is a 74 y.o. female.    Chief Complaint: Carotid Artery Disease  New patient referral from Dr. Leonardo for evaluation for carotid artery disease  Known right carotid artery occlusion with remote history of CVA, recent MRI brain negative for CVA her neurologist is Dr. Lal   CTA neck with approximately 50% carotid artery stenos (by radiology report 50-60% stenosis)   Patient is a nonsmoker   history of triple-vessel coronary artery disease with CABG done at [facility] diabetes  family history includes Cancer in her brother; Diabetes in her mother; Heart attack in her mother; Heart disease in her mother; Hypertension in her father, mother, and sister; Stroke in her father and sister.  Past Medical History:   Diagnosis Date    Anemia     Atherosclerotic heart disease of native coronary artery without angina pectoris     Carotid artery stenosis 01/15/2014    CHARO  LICA stenosis    Causalgia of lower limb     Cervical radiculopathy     Chronic low back pain     Chronic pain syndrome     CVA (cerebral vascular accident)     right cerebellar    Degenerative joint disease involving multiple joints     Disorder of parathyroid gland, unspecified     Disorder of sacrum     Essential (primary) hypertension     Gammopathy     GERD (gastroesophageal reflux disease)     Heart murmur     Hyperlipidemia     Hyperparathyroidism     Lumbosacral radiculopathy     Lumbosacral spondylosis     Osteopenia 11/22/2022    See DEXA on 11/22/2022    Other long term (current) drug therapy     Pernicious anemia     Sciatica     Spinal stenosis of lumbar region     L4-5    Type 2 diabetes mellitus       Past Surgical History:   Procedure Laterality Date    CARPAL TUNNEL RELEASE Right     caudal MOIZ  07/03/2018    CHOLECYSTECTOMY  1975    CORONARY ARTERY BYPASS GRAFT      CORONARY ARTERY BYPASS GRAFT (CABG)      triple    HIP SURGERY Right     INJECTION OF ANESTHETIC AGENT AROUND MEDIAL BRANCH NERVES  "INNERVATING LUMBAR FACET JOINT Bilateral 1/19/2023    Procedure: Block-nerve-medial branch-lumbar, bilateral L4 through S1;  Surgeon: Ashley Piña MD;  Location: [facility];  Service: Pain Management;  Laterality: Bilateral;    L4-S1 Caudal cath guided MOIZ  07/03/2018    Dr Orta    L5-S1 Caudal cath guided MOIZ  09/26/2014 6/26/2014 4/11/2014    Dr Orta    left shoulder repair Left     NECK SURGERY  2018    does not know what kind    right sacral RF Right 12/26/2013 1/24/2012    Dr Orta    right SIJI Right 10/24/2013    Dr Orta       reports that she has never smoked. She has never been exposed to tobacco smoke. She has never used smokeless tobacco. She reports that she does not currently use alcohol. She reports current drug use. Drug: Hydrocodone.   HPI  Review of Systems   Constitutional:  Positive for activity change.        Falls   Musculoskeletal:  Positive for gait problem, neck pain and neck stiffness.         Objective:      Ht 5' 5" (1.651 m)   Wt 64.9 kg (143 lb 1.3 oz)   BMI 23.81 kg/m²    Physical Exam  Vitals reviewed.   HENT:      Head: Normocephalic.      Mouth/Throat:      Mouth: Mucous membranes are moist.   Cardiovascular:      Rate and Rhythm: Normal rate.   Pulmonary:      Effort: Pulmonary effort is normal.   Abdominal:      Palpations: Abdomen is soft.   Musculoskeletal:      Comments: Ambulates with walker   Skin:     General: Skin is warm and dry.   Neurological:      Mental Status: She is alert and oriented to person, place, and time. Mental status is at baseline.      Motor: Weakness present.      Comments: Chronic right upper extremity weakness   Psychiatric:         Mood and Affect: Mood normal.           Assessment:       1. Carotid artery stenosis    2. Carotid occlusion, right        Plan:       Educated patient on carotid artery disease CT findings and symptoms of stroke  Dr. Wood discussed with patient would not recommend carotid endarterectomy we will repeat carotid duplex " in 1 year  Continue current medication

## 2025-06-25 ENCOUNTER — OFFICE VISIT (OUTPATIENT)
Dept: FAMILY MEDICINE | Facility: CLINIC | Age: 75
End: 2025-06-25
Payer: MEDICARE

## 2025-06-25 VITALS
SYSTOLIC BLOOD PRESSURE: 132 MMHG | WEIGHT: 131 LBS | TEMPERATURE: 98 F | HEIGHT: 65 IN | HEART RATE: 63 BPM | OXYGEN SATURATION: 98 % | DIASTOLIC BLOOD PRESSURE: 62 MMHG | BODY MASS INDEX: 21.83 KG/M2 | RESPIRATION RATE: 18 BRPM

## 2025-06-25 DIAGNOSIS — E11.9 TYPE 2 DIABETES MELLITUS WITHOUT COMPLICATION, WITHOUT LONG-TERM CURRENT USE OF INSULIN: ICD-10-CM

## 2025-06-25 DIAGNOSIS — I10 PRIMARY HYPERTENSION: Primary | ICD-10-CM

## 2025-06-25 DIAGNOSIS — M19.90 ARTHRITIS: ICD-10-CM

## 2025-06-25 DIAGNOSIS — K21.9 GASTROESOPHAGEAL REFLUX DISEASE, UNSPECIFIED WHETHER ESOPHAGITIS PRESENT: ICD-10-CM

## 2025-06-25 LAB
ALBUMIN SERPL BCP-MCNC: 4 G/DL (ref 3.4–4.8)
ALBUMIN/GLOB SERPL: 0.8 {RATIO}
ALP SERPL-CCNC: 74 U/L (ref 40–150)
ALT SERPL W P-5'-P-CCNC: 21 U/L
ANION GAP SERPL CALCULATED.3IONS-SCNC: 14 MMOL/L (ref 7–16)
AST SERPL W P-5'-P-CCNC: 44 U/L (ref 11–45)
BASOPHILS # BLD AUTO: 0 K/UL (ref 0–0.2)
BASOPHILS NFR BLD AUTO: 0 % (ref 0–1)
BILIRUB SERPL-MCNC: 0.4 MG/DL
BUN SERPL-MCNC: 27 MG/DL (ref 10–20)
BUN/CREAT SERPL: 21 (ref 6–20)
CALCIUM SERPL-MCNC: 9.3 MG/DL (ref 8.4–10.2)
CHLORIDE SERPL-SCNC: 106 MMOL/L (ref 98–107)
CHOLEST SERPL-MCNC: 138 MG/DL
CHOLEST/HDLC SERPL: 2.8 {RATIO}
CO2 SERPL-SCNC: 22 MMOL/L (ref 23–31)
CREAT SERPL-MCNC: 1.27 MG/DL (ref 0.55–1.02)
CREAT UR-MCNC: 102 MG/DL (ref 15–325)
DIFFERENTIAL METHOD BLD: ABNORMAL
EGFR (NO RACE VARIABLE) (RUSH/TITUS): 44 ML/MIN/1.73M2
EOSINOPHIL # BLD AUTO: 0.01 K/UL (ref 0–0.5)
EOSINOPHIL NFR BLD AUTO: 0.3 % (ref 1–4)
ERYTHROCYTE [DISTWIDTH] IN BLOOD BY AUTOMATED COUNT: 14.6 % (ref 11.5–14.5)
EST. AVERAGE GLUCOSE BLD GHB EST-MCNC: 128 MG/DL
GLOBULIN SER-MCNC: 4.9 G/DL (ref 2–4)
GLUCOSE SERPL-MCNC: 80 MG/DL (ref 82–115)
HBA1C MFR BLD HPLC: 6.1 %
HCT VFR BLD AUTO: 33.2 % (ref 38–47)
HDLC SERPL-MCNC: 49 MG/DL (ref 35–60)
HGB BLD-MCNC: 10.7 G/DL (ref 12–16)
IMM GRANULOCYTES # BLD AUTO: 0.02 K/UL (ref 0–0.04)
IMM GRANULOCYTES NFR BLD: 0.7 % (ref 0–0.4)
LDLC SERPL CALC-MCNC: 80 MG/DL
LDLC/HDLC SERPL: 1.6 {RATIO}
LYMPHOCYTES # BLD AUTO: 0.93 K/UL (ref 1–4.8)
LYMPHOCYTES NFR BLD AUTO: 31.4 % (ref 27–41)
MCH RBC QN AUTO: 29.5 PG (ref 27–31)
MCHC RBC AUTO-ENTMCNC: 32.2 G/DL (ref 32–36)
MCV RBC AUTO: 91.5 FL (ref 80–96)
MICROALBUMIN UR-MCNC: 1.1 MG/DL
MICROALBUMIN/CREAT RATIO PNL UR: 10.8 MG/G (ref 0–30)
MONOCYTES # BLD AUTO: 0.42 K/UL (ref 0–0.8)
MONOCYTES NFR BLD AUTO: 14.2 % (ref 2–6)
MPC BLD CALC-MCNC: 12.3 FL (ref 9.4–12.4)
NEUTROPHILS # BLD AUTO: 1.58 K/UL (ref 1.8–7.7)
NEUTROPHILS NFR BLD AUTO: 53.4 % (ref 53–65)
NONHDLC SERPL-MCNC: 89 MG/DL
NRBC # BLD AUTO: 0 X10E3/UL
NRBC, AUTO (.00): 0 %
PLATELET # BLD AUTO: 146 K/UL (ref 150–400)
POTASSIUM SERPL-SCNC: 4.4 MMOL/L (ref 3.5–5.1)
PROT SERPL-MCNC: 8.9 G/DL (ref 5.8–7.6)
RBC # BLD AUTO: 3.63 M/UL (ref 4.2–5.4)
SODIUM SERPL-SCNC: 138 MMOL/L (ref 136–145)
TRIGL SERPL-MCNC: 47 MG/DL (ref 37–140)
UREA BREATH TEST QL: NEGATIVE
VLDLC SERPL-MCNC: 9 MG/DL
WBC # BLD AUTO: 2.96 K/UL (ref 4.5–11)

## 2025-06-25 PROCEDURE — 1101F PT FALLS ASSESS-DOCD LE1/YR: CPT | Mod: ,,, | Performed by: NURSE PRACTITIONER

## 2025-06-25 PROCEDURE — 3288F FALL RISK ASSESSMENT DOCD: CPT | Mod: ,,, | Performed by: NURSE PRACTITIONER

## 2025-06-25 PROCEDURE — 83014 H PYLORI DRUG ADMIN: CPT | Mod: ,,, | Performed by: CLINICAL MEDICAL LABORATORY

## 2025-06-25 PROCEDURE — 3075F SYST BP GE 130 - 139MM HG: CPT | Mod: ,,, | Performed by: NURSE PRACTITIONER

## 2025-06-25 PROCEDURE — 1159F MED LIST DOCD IN RCRD: CPT | Mod: ,,, | Performed by: NURSE PRACTITIONER

## 2025-06-25 PROCEDURE — 85025 COMPLETE CBC W/AUTO DIFF WBC: CPT | Mod: ,,, | Performed by: CLINICAL MEDICAL LABORATORY

## 2025-06-25 PROCEDURE — 83013 H PYLORI (C-13) BREATH: CPT | Mod: ,,, | Performed by: CLINICAL MEDICAL LABORATORY

## 2025-06-25 PROCEDURE — 83036 HEMOGLOBIN GLYCOSYLATED A1C: CPT | Mod: ,,, | Performed by: CLINICAL MEDICAL LABORATORY

## 2025-06-25 PROCEDURE — 3078F DIAST BP <80 MM HG: CPT | Mod: ,,, | Performed by: NURSE PRACTITIONER

## 2025-06-25 PROCEDURE — 82570 ASSAY OF URINE CREATININE: CPT | Mod: ,,, | Performed by: CLINICAL MEDICAL LABORATORY

## 2025-06-25 PROCEDURE — 82043 UR ALBUMIN QUANTITATIVE: CPT | Mod: ,,, | Performed by: CLINICAL MEDICAL LABORATORY

## 2025-06-25 PROCEDURE — 99214 OFFICE O/P EST MOD 30 MIN: CPT | Mod: ,,, | Performed by: NURSE PRACTITIONER

## 2025-06-25 PROCEDURE — 80053 COMPREHEN METABOLIC PANEL: CPT | Mod: ,,, | Performed by: CLINICAL MEDICAL LABORATORY

## 2025-06-25 PROCEDURE — 1160F RVW MEDS BY RX/DR IN RCRD: CPT | Mod: ,,, | Performed by: NURSE PRACTITIONER

## 2025-06-25 PROCEDURE — 80061 LIPID PANEL: CPT | Mod: ,,, | Performed by: CLINICAL MEDICAL LABORATORY

## 2025-06-25 RX ORDER — OMEPRAZOLE 20 MG/1
20 CAPSULE, DELAYED RELEASE ORAL DAILY
Qty: 90 CAPSULE | Refills: 1 | Status: SHIPPED | OUTPATIENT
Start: 2025-06-25

## 2025-06-25 NOTE — PROGRESS NOTES
Clinic Note    Kathryn Marie is a 74 y.o. female     Chief Complaint:   Chief Complaint   Patient presents with    Follow-up     3 month f/u    Health Maintenance     TETANUS VACCINE denies  Shingles Vaccine(2 of 2) due on 09/24/2013  Diabetes Urine Screening due on 06/17/2023  RSV Vaccine (Age 60+ and Pregnant patients)(1 - 1-dose 75+ series) denies  DEXA Scan due on 11/22/2024  COVID-19 Vaccine(8 - 2024-25 season) due on 12/13/2024  Mammogram due on 03/19/2025         Subjective:    Patient comes in today alone. Patient reports son lives with her. Patient states she thinks she still has home health. Again patient does not bring any medications with her.   Patient reports she saw specialists last week. Sees pain treatment, rheumatology, and cardio (Dr. Leonardo).   Patient reports glucose well controlled. Glucose was 107.   Patient admits to gerd. States she takes prilosec bid but still has some acid reflux.   States she is having to reschedule dexa scan.   Otherwise denies any complaints.       Allergies:   Review of patient's allergies indicates:  No Known Allergies     Past Medical History:  Past Medical History:   Diagnosis Date    Anemia     Atherosclerotic heart disease of native coronary artery without angina pectoris     Carotid artery stenosis 01/15/2014    CHARO  LICA stenosis    Causalgia of lower limb     Cervical radiculopathy     Chronic low back pain     Chronic pain syndrome     CVA (cerebral vascular accident)     right cerebellar    Degenerative joint disease involving multiple joints     Disorder of parathyroid gland, unspecified     Disorder of sacrum     Essential (primary) hypertension     Gammopathy     GERD (gastroesophageal reflux disease)     Heart murmur     Hyperlipidemia     Hyperparathyroidism     Lumbosacral radiculopathy     Lumbosacral spondylosis     Osteopenia 11/22/2022    See DEXA on 11/22/2022    Other long term (current) drug therapy     Pernicious anemia     Sciatica     Spinal  "stenosis of lumbar region     L4-5    Type 2 diabetes mellitus         Current Medications:  Current Medications[1]       Review of Systems   Constitutional:  Negative for fever.   Respiratory:  Negative for cough and shortness of breath.    Cardiovascular:  Negative for chest pain, palpitations and leg swelling.   Gastrointestinal:  Positive for reflux. Negative for abdominal pain, constipation, diarrhea, nausea and vomiting.   Genitourinary:  Negative for dysuria.   Musculoskeletal:  Positive for arthralgias and gait problem.   Neurological:  Negative for headaches.          Objective:    /62 (BP Location: Left arm, Patient Position: Sitting)   Pulse 63   Temp 98 °F (36.7 °C) (Oral)   Resp 18   Ht 5' 5" (1.651 m)   Wt 59.4 kg (131 lb)   SpO2 98%   BMI 21.80 kg/m²      Physical Exam  Constitutional:       Appearance: Normal appearance.   Eyes:      Extraocular Movements: Extraocular movements intact.   Cardiovascular:      Rate and Rhythm: Normal rate and regular rhythm.      Pulses: Normal pulses.      Heart sounds: Normal heart sounds.   Pulmonary:      Effort: Pulmonary effort is normal.      Breath sounds: Normal breath sounds.   Abdominal:      Palpations: Abdomen is soft.      Tenderness: There is abdominal tenderness in the epigastric area. There is no guarding or rebound.   Musculoskeletal:      Right lower leg: No edema.      Left lower leg: No edema.      Comments: walker   Skin:     General: Skin is warm and dry.   Neurological:      Mental Status: She is alert and oriented to person, place, and time.      Gait: Gait abnormal.   Psychiatric:         Mood and Affect: Mood normal.          Assessment and Plan:    1. Primary hypertension    2. Gastroesophageal reflux disease, unspecified whether esophagitis present    3. Arthritis    4. Type 2 diabetes mellitus without complication, without long-term current use of insulin         Primary hypertension  -     CBC Auto Differential; Future; " Expected date: 06/25/2025  -     Comprehensive Metabolic Panel; Future; Expected date: 06/25/2025  -     Lipid Panel; Future; Expected date: 06/25/2025    Gastroesophageal reflux disease, unspecified whether esophagitis present  -     omeprazole (PRILOSEC) 20 MG capsule; Take 1 capsule (20 mg total) by mouth once daily.  Dispense: 90 capsule; Refill: 1  -     H. pylori Breath Test; Future; Expected date: 06/25/2025    Arthritis  -f/u with specialist as scheduled    Type 2 diabetes mellitus without complication, without long-term current use of insulin  -     Hemoglobin A1C; Future; Expected date: 06/25/2025  -     Microalbumin/Creatinine Ratio, Urine; Future; Expected date: 06/25/2025      -again encouraged patient to always bring medications to appointment    There are no Patient Instructions on file for this visit.   Follow up in about 4 months (around 10/25/2025), or if symptoms worsen or fail to improve.          [1]   Current Outpatient Medications:     amLODIPine (NORVASC) 5 MG tablet, Take 1 tablet by mouth once daily., Disp: , Rfl:     aspirin (ECOTRIN) 81 MG EC tablet, Take 81 mg by mouth once daily., Disp: , Rfl:     blood sugar diagnostic (ONETOUCH VERIO TEST STRIPS MISC), by Misc.(Non-Drug; Combo Route) route. Use 1 strip to check blood glucose every morning, fasting for E11.9, Disp: , Rfl:     blood-glucose meter (ONETOUCH VERIO METER MISC), by Misc.(Non-Drug; Combo Route) route. Use for glucose checks once daily, E11.9, Disp: , Rfl:     cyanocobalamin (VITAMIN B-12) 1000 MCG tablet, Take 2,500 mcg by mouth once daily., Disp: , Rfl:     dapagliflozin propanediol (FARXIGA) 5 mg Tab tablet, Take 1 tablet (5 mg total) by mouth once daily., Disp: 90 tablet, Rfl: 1    denosumab (PROLIA) 60 mg/mL Syrg, Inject 1 mL into the skin., Disp: , Rfl:     diclofenac sodium (VOLTAREN) 1 % Gel, APPLY 1 APPLICATION TO  AFFECTED AREA(S) TOPICALLY  TWICE DAILY, Disp: 300 g, Rfl: 2    ELIQUIS 5 mg Tab, Take 5 mg by mouth 2  (two) times daily., Disp: , Rfl:     estradioL (ESTRACE) 0.01 % (0.1 mg/gram) vaginal cream, 1/2 applicatorful vaginally once or twice monthly as needed for vaginal dryness, Disp: 42.5 g, Rfl: 1    ferrous sulfate (FEOSOL) 325 mg (65 mg iron) Tab tablet, Take 1 tablet (325 mg total) by mouth daily with breakfast., Disp: 90 tablet, Rfl: 1    folic acid (FOLVITE) 1 MG tablet, Take 1 mg by mouth once daily., Disp: , Rfl:     gabapentin (NEURONTIN) 300 MG capsule, Take 1 capsule (300 mg total) by mouth 3 (three) times daily., Disp: 90 capsule, Rfl: 2    HYDROcodone-acetaminophen (NORCO)  mg per tablet, Take 1 tablet by mouth every 6 (six) hours as needed for Pain., Disp: 120 tablet, Rfl: 0    [START ON 7/5/2025] HYDROcodone-acetaminophen (NORCO)  mg per tablet, Take 1 tablet by mouth every 6 (six) hours as needed for Pain., Disp: 120 tablet, Rfl: 0    [START ON 8/4/2025] HYDROcodone-acetaminophen (NORCO)  mg per tablet, Take 1 tablet by mouth every 6 (six) hours as needed for Pain., Disp: 120 tablet, Rfl: 0    latanoprost 0.005 % ophthalmic solution, Place 1 drop into both eyes every evening., Disp: , Rfl:     methotrexate 2.5 MG Tab, 2.5 mg. Take 10 tablets by mouth every week, Disp: , Rfl:     metoprolol succinate (TOPROL-XL) 25 MG 24 hr tablet, Take 1/2 tablet (12.5 mg) by mouth every day., Disp: 45 tablet, Rfl: 1    metroNIDAZOLE (METROGEL) 0.75 % (37.5mg/5 gram) vaginal gel, 1 applicator full per vagina nightly for 5 nights., Disp: 70 g, Rfl: 0    omeprazole (PRILOSEC) 20 MG capsule, Take 1 capsule (20 mg total) by mouth once daily., Disp: 90 capsule, Rfl: 1    rosuvastatin (CRESTOR) 20 MG tablet, Take 1 tablet (20 mg total) by mouth every evening., Disp: 90 tablet, Rfl: 1    SITagliptin phosphate (JANUVIA) 100 MG Tab, Take 1 tablet (100 mg total) by mouth once daily., Disp: 90 tablet, Rfl: 1    vitamin D (VITAMIN D3) 1000 units Tab, Take 5,000 Units by mouth once daily., Disp: , Rfl:

## 2025-06-26 ENCOUNTER — RESULTS FOLLOW-UP (OUTPATIENT)
Dept: FAMILY MEDICINE | Facility: CLINIC | Age: 75
End: 2025-06-26

## 2025-06-30 ENCOUNTER — EXTERNAL CHRONIC CARE MANAGEMENT (OUTPATIENT)
Dept: FAMILY MEDICINE | Facility: CLINIC | Age: 75
End: 2025-06-30
Payer: MEDICARE

## 2025-06-30 PROCEDURE — 99490 CHRNC CARE MGMT STAFF 1ST 20: CPT | Mod: ,,, | Performed by: FAMILY MEDICINE

## 2025-07-11 ENCOUNTER — OFFICE VISIT (OUTPATIENT)
Dept: NEUROLOGY | Facility: CLINIC | Age: 75
End: 2025-07-11
Payer: MEDICARE

## 2025-07-11 VITALS
OXYGEN SATURATION: 99 % | RESPIRATION RATE: 18 BRPM | DIASTOLIC BLOOD PRESSURE: 60 MMHG | BODY MASS INDEX: 23.74 KG/M2 | SYSTOLIC BLOOD PRESSURE: 126 MMHG | WEIGHT: 134 LBS | HEART RATE: 76 BPM | HEIGHT: 63 IN

## 2025-07-11 DIAGNOSIS — M54.40 CHRONIC LOW BACK PAIN WITH SCIATICA, SCIATICA LATERALITY UNSPECIFIED, UNSPECIFIED BACK PAIN LATERALITY: ICD-10-CM

## 2025-07-11 DIAGNOSIS — I65.21 OCCLUSION OF RIGHT INTERNAL CAROTID ARTERY: ICD-10-CM

## 2025-07-11 DIAGNOSIS — M54.12 CERVICAL RADICULOPATHY: Primary | Chronic | ICD-10-CM

## 2025-07-11 DIAGNOSIS — G89.29 CHRONIC LOW BACK PAIN WITH SCIATICA, SCIATICA LATERALITY UNSPECIFIED, UNSPECIFIED BACK PAIN LATERALITY: ICD-10-CM

## 2025-07-11 DIAGNOSIS — M96.1 POSTLAMINECTOMY SYNDROME OF CERVICAL REGION: Chronic | ICD-10-CM

## 2025-07-11 PROCEDURE — 1101F PT FALLS ASSESS-DOCD LE1/YR: CPT | Mod: CPTII,,, | Performed by: PSYCHIATRY & NEUROLOGY

## 2025-07-11 PROCEDURE — 3074F SYST BP LT 130 MM HG: CPT | Mod: CPTII,,, | Performed by: PSYCHIATRY & NEUROLOGY

## 2025-07-11 PROCEDURE — 99215 OFFICE O/P EST HI 40 MIN: CPT | Mod: PBBFAC | Performed by: PSYCHIATRY & NEUROLOGY

## 2025-07-11 PROCEDURE — 3078F DIAST BP <80 MM HG: CPT | Mod: CPTII,,, | Performed by: PSYCHIATRY & NEUROLOGY

## 2025-07-11 PROCEDURE — 1160F RVW MEDS BY RX/DR IN RCRD: CPT | Mod: CPTII,,, | Performed by: PSYCHIATRY & NEUROLOGY

## 2025-07-11 PROCEDURE — 3288F FALL RISK ASSESSMENT DOCD: CPT | Mod: CPTII,,, | Performed by: PSYCHIATRY & NEUROLOGY

## 2025-07-11 PROCEDURE — 1125F AMNT PAIN NOTED PAIN PRSNT: CPT | Mod: CPTII,,, | Performed by: PSYCHIATRY & NEUROLOGY

## 2025-07-11 PROCEDURE — 99999 PR PBB SHADOW E&M-EST. PATIENT-LVL V: CPT | Mod: PBBFAC,,, | Performed by: PSYCHIATRY & NEUROLOGY

## 2025-07-11 PROCEDURE — 99214 OFFICE O/P EST MOD 30 MIN: CPT | Mod: S$PBB,,, | Performed by: PSYCHIATRY & NEUROLOGY

## 2025-07-11 PROCEDURE — 1159F MED LIST DOCD IN RCRD: CPT | Mod: CPTII,,, | Performed by: PSYCHIATRY & NEUROLOGY

## 2025-07-11 RX ORDER — PREDNISONE 2.5 MG/1
2.5 TABLET ORAL
COMMUNITY
Start: 2025-07-10 | End: 2026-01-06

## 2025-07-11 RX ORDER — HYDROCHLOROTHIAZIDE 12.5 MG/1
12.5 CAPSULE ORAL
COMMUNITY

## 2025-07-11 RX ORDER — HYDROCHLOROTHIAZIDE 12.5 MG/1
TABLET ORAL
COMMUNITY
Start: 2025-05-07

## 2025-07-11 NOTE — PROGRESS NOTES
Subjective:       Patient ID: Kathryn Marie is a 74 y.o. female     Chief Complaint:    Chief Complaint   Patient presents with    cervical radiculopathy     Pt. States neck pain.        Allergies:  Patient has no known allergies.    Current Medications:  Encounter Medications[1]    History of Present Illness  F/u eval for MRI Cspine showed:    C2-C3: Severe degenerative disc height loss and broad-based disc osteophyte complex mildly narrows the spinal canal.  Uncovertebral spurring and facet arthrosis contributes to moderate left and mild-to-moderate right foraminal narrowing..     C3-C4: Severe degenerative disc height loss and broad-based disc protrusion/osteophyte complex.  Evidence of previous right-sided posterior decompression which can be correlated with patient's clinical history.  Moderate to severe residual spinal canal narrowing asymmetric to the right.  Uncovertebral spurring and facet arthrosis contribute to severe bilateral foraminal narrowing..     C4-C5: Severe degenerative disc height loss.  Evidence of previous right hemilaminectomy changes.  Broad-based disc protrusion/osteophyte complex contributes to moderate to severe narrowing of the spinal canal asymmetric to the right.  Uncovertebral spurring and facet arthrosis produces severe left and moderate right foraminal narrowing.     C5-C6: Severe degenerative disc height loss and evidence of previous right hemilaminectomy.  Broad-based disc protrusion/osteophyte complex contributes to residual moderate to severe narrowing of the spinal canal.  Uncovertebral spurring and facet arthrosis produces severe bilateral foraminal narrowing..     C6-C7: Severe degenerative disc height loss and broad-based disc osteophyte complex/protrusion with decompressive laminectomy changes.  Mild to moderate residual spinal canal narrowing.  Uncovertebral spurring contributes to severe left and moderate to severe right foraminal narrowing..     C7-T1: Shallow disc  osteophyte complex and ligamentum flavum thickening producing mild narrowing of the spinal canal with evidence of previous right hemilaminectomy changes.  Uncovertebral spurring and facet arthrosis produce severe left and moderate right foraminal narrowing.               Past Medical History:   Diagnosis Date    Anemia     Atherosclerotic heart disease of native coronary artery without angina pectoris     Carotid artery stenosis 01/15/2014    CHARO  LICA stenosis    Causalgia of lower limb     Cervical radiculopathy     Chronic low back pain     Chronic pain syndrome     CVA (cerebral vascular accident)     right cerebellar    Degenerative joint disease involving multiple joints     Disorder of parathyroid gland, unspecified     Disorder of sacrum     Essential (primary) hypertension     Gammopathy     GERD (gastroesophageal reflux disease)     Heart murmur     Hyperlipidemia     Hyperparathyroidism     Lumbosacral radiculopathy     Lumbosacral spondylosis     Osteopenia 11/22/2022    See DEXA on 11/22/2022    Other long term (current) drug therapy     Pernicious anemia     Sciatica     Spinal stenosis of lumbar region     L4-5    Type 2 diabetes mellitus        Past Surgical History:   Procedure Laterality Date    CARPAL TUNNEL RELEASE Right     caudal MOIZ  07/03/2018    CHOLECYSTECTOMY  1975    CORONARY ARTERY BYPASS GRAFT      CORONARY ARTERY BYPASS GRAFT (CABG)      triple    HIP SURGERY Right     INJECTION OF ANESTHETIC AGENT AROUND MEDIAL BRANCH NERVES INNERVATING LUMBAR FACET JOINT Bilateral 1/19/2023    Procedure: Block-nerve-medial branch-lumbar, bilateral L4 through S1;  Surgeon: Ashley Piña MD;  Location: [facility];  Service: Pain Management;  Laterality: Bilateral;    L4-S1 Caudal cath guided MOIZ  07/03/2018    Dr Orta    L5-S1 Caudal cath guided MOIZ  09/26/2014 6/26/2014 4/11/2014    Dr Orta    left shoulder repair Left     NECK SURGERY  2018    does not know what kind    right sacral RF  "Right 12/26/2013 1/24/2012    Dr Orta    right FAITH Right 10/24/2013    Dr Orta       Social History  Ms. Marie  reports that she has never smoked. She has never been exposed to tobacco smoke. She has never used smokeless tobacco. She reports that she does not currently use alcohol. She reports current drug use. Drug: Hydrocodone.    Family History  Ms.'s Marie family history includes Cancer in her brother; Diabetes in her mother; Heart attack in her mother; Heart disease in her mother; Hypertension in her father, mother, and sister; Stroke in her father and sister.    Review of Systems  ROS   Objective:   /60 (BP Location: Right arm, Patient Position: Sitting)   Pulse 76   Resp 18   Ht 5' 3" (1.6 m)   Wt 60.8 kg (134 lb)   SpO2 99%   BMI 23.74 kg/m²          Physical Exam     Assessment:     Cervical radiculopathy  -     Ambulatory referral/consult to Neurosurgery; Future; Expected date: 07/18/2025    Occlusion of right internal carotid artery    Chronic low back pain with sciatica, sciatica laterality unspecified, unspecified back pain laterality    Postlaminectomy syndrome of cervical region  -     Ambulatory referral/consult to Neurosurgery; Future; Expected date: 07/18/2025         Primary Diagnosis and ICD10  Cervical radiculopathy [M54.12]    Plan:     There are no Patient Instructions on file for this visit.    There are no discontinued medications.    Requested Prescriptions      No prescriptions requested or ordered in this encounter            [1]   Outpatient Encounter Medications as of 7/11/2025   Medication Sig Dispense Refill    amLODIPine (NORVASC) 5 MG tablet Take 1 tablet by mouth once daily.      aspirin (ECOTRIN) 81 MG EC tablet Take 81 mg by mouth once daily.      blood sugar diagnostic (ONETOUCH VERIO TEST STRIPS MISC) by Misc.(Non-Drug; Combo Route) route. Use 1 strip to check blood glucose every morning, fasting for E11.9      blood-glucose meter (ONETOUCH VERIO METER MISC) by " Misc.(Non-Drug; Combo Route) route. Use for glucose checks once daily, E11.9      cyanocobalamin (VITAMIN B-12) 1000 MCG tablet Take 2,500 mcg by mouth once daily.      dapagliflozin propanediol (FARXIGA) 5 mg Tab tablet Take 1 tablet (5 mg total) by mouth once daily. 90 tablet 1    denosumab (PROLIA) 60 mg/mL Syrg Inject 1 mL into the skin.      diclofenac sodium (VOLTAREN) 1 % Gel APPLY 1 APPLICATION TO  AFFECTED AREA(S) TOPICALLY  TWICE DAILY 300 g 2    ELIQUIS 5 mg Tab Take 5 mg by mouth 2 (two) times daily.      estradioL (ESTRACE) 0.01 % (0.1 mg/gram) vaginal cream 1/2 applicatorful vaginally once or twice monthly as needed for vaginal dryness 42.5 g 1    ferrous sulfate (FEOSOL) 325 mg (65 mg iron) Tab tablet Take 1 tablet (325 mg total) by mouth daily with breakfast. 90 tablet 1    folic acid (FOLVITE) 1 MG tablet Take 1 mg by mouth once daily.      gabapentin (NEURONTIN) 300 MG capsule Take 1 capsule (300 mg total) by mouth 3 (three) times daily. 90 capsule 2    hydroCHLOROthiazide (MICROZIDE) 12.5 mg capsule Take 12.5 mg by mouth.      hydroCHLOROthiazide 12.5 MG Tab       HYDROcodone-acetaminophen (NORCO)  mg per tablet Take 1 tablet by mouth every 6 (six) hours as needed for Pain. 120 tablet 0    [START ON 8/4/2025] HYDROcodone-acetaminophen (NORCO)  mg per tablet Take 1 tablet by mouth every 6 (six) hours as needed for Pain. 120 tablet 0    latanoprost 0.005 % ophthalmic solution Place 1 drop into both eyes every evening.      methotrexate 2.5 MG Tab 2.5 mg. Take 10 tablets by mouth every week      metoprolol succinate (TOPROL-XL) 25 MG 24 hr tablet Take 1/2 tablet (12.5 mg) by mouth every day. 45 tablet 1    metroNIDAZOLE (METROGEL) 0.75 % (37.5mg/5 gram) vaginal gel 1 applicator full per vagina nightly for 5 nights. 70 g 0    omeprazole (PRILOSEC) 20 MG capsule Take 1 capsule (20 mg total) by mouth once daily. 90 capsule 1    predniSONE (DELTASONE) 2.5 MG tablet Take 2.5 mg by mouth.       rosuvastatin (CRESTOR) 20 MG tablet Take 1 tablet (20 mg total) by mouth every evening. 90 tablet 1    SITagliptin phosphate (JANUVIA) 100 MG Tab Take 1 tablet (100 mg total) by mouth once daily. 90 tablet 1    vitamin D (VITAMIN D3) 1000 units Tab Take 5,000 Units by mouth once daily.      [] HYDROcodone-acetaminophen (NORCO)  mg per tablet Take 1 tablet by mouth every 6 (six) hours as needed for Pain. 120 tablet 0    [DISCONTINUED] omeprazole (PRILOSEC) 20 MG capsule Take 20 mg by mouth once daily.       No facility-administered encounter medications on file as of 2025.

## 2025-07-31 ENCOUNTER — EXTERNAL CHRONIC CARE MANAGEMENT (OUTPATIENT)
Dept: FAMILY MEDICINE | Facility: CLINIC | Age: 75
End: 2025-07-31
Payer: MEDICARE

## 2025-07-31 PROCEDURE — 99490 CHRNC CARE MGMT STAFF 1ST 20: CPT | Mod: ,,, | Performed by: FAMILY MEDICINE

## 2025-08-18 ENCOUNTER — OFFICE VISIT (OUTPATIENT)
Dept: FAMILY MEDICINE | Facility: CLINIC | Age: 75
End: 2025-08-18
Payer: MEDICARE

## 2025-08-18 VITALS
RESPIRATION RATE: 20 BRPM | HEIGHT: 65 IN | WEIGHT: 130 LBS | TEMPERATURE: 98 F | DIASTOLIC BLOOD PRESSURE: 70 MMHG | OXYGEN SATURATION: 100 % | SYSTOLIC BLOOD PRESSURE: 130 MMHG | BODY MASS INDEX: 21.66 KG/M2 | HEART RATE: 70 BPM

## 2025-08-18 DIAGNOSIS — S60.322A: Primary | ICD-10-CM

## 2025-08-18 DIAGNOSIS — L84 CORN OF FOOT: ICD-10-CM

## 2025-08-18 PROCEDURE — 3078F DIAST BP <80 MM HG: CPT | Mod: ,,, | Performed by: NURSE PRACTITIONER

## 2025-08-18 PROCEDURE — 99213 OFFICE O/P EST LOW 20 MIN: CPT | Mod: ,,, | Performed by: NURSE PRACTITIONER

## 2025-08-18 PROCEDURE — 1101F PT FALLS ASSESS-DOCD LE1/YR: CPT | Mod: ,,, | Performed by: NURSE PRACTITIONER

## 2025-08-18 PROCEDURE — 3075F SYST BP GE 130 - 139MM HG: CPT | Mod: ,,, | Performed by: NURSE PRACTITIONER

## 2025-08-18 PROCEDURE — 3288F FALL RISK ASSESSMENT DOCD: CPT | Mod: ,,, | Performed by: NURSE PRACTITIONER

## 2025-08-18 PROCEDURE — 1160F RVW MEDS BY RX/DR IN RCRD: CPT | Mod: ,,, | Performed by: NURSE PRACTITIONER

## 2025-08-18 PROCEDURE — 1159F MED LIST DOCD IN RCRD: CPT | Mod: ,,, | Performed by: NURSE PRACTITIONER

## 2025-08-18 RX ORDER — SULFAMETHOXAZOLE AND TRIMETHOPRIM 800; 160 MG/1; MG/1
1 TABLET ORAL 2 TIMES DAILY
Qty: 10 TABLET | Refills: 0 | Status: SHIPPED | OUTPATIENT
Start: 2025-08-18

## 2025-09-03 ENCOUNTER — EXTERNAL HOSPITAL ADMISSION (OUTPATIENT)
Dept: ADMINISTRATIVE | Facility: CLINIC | Age: 75
End: 2025-09-03
Payer: MEDICARE

## 2025-09-03 ENCOUNTER — PATIENT OUTREACH (OUTPATIENT)
Dept: ADMINISTRATIVE | Facility: CLINIC | Age: 75
End: 2025-09-03
Payer: MEDICARE

## 2025-09-05 ENCOUNTER — OFFICE VISIT (OUTPATIENT)
Dept: FAMILY MEDICINE | Facility: CLINIC | Age: 75
End: 2025-09-05
Payer: MEDICARE

## 2025-09-05 VITALS
RESPIRATION RATE: 18 BRPM | WEIGHT: 130 LBS | OXYGEN SATURATION: 98 % | HEIGHT: 65 IN | DIASTOLIC BLOOD PRESSURE: 68 MMHG | SYSTOLIC BLOOD PRESSURE: 129 MMHG | TEMPERATURE: 98 F | HEART RATE: 60 BPM | BODY MASS INDEX: 21.66 KG/M2

## 2025-09-05 DIAGNOSIS — R53.1 WEAKNESS: Primary | ICD-10-CM

## 2025-09-05 DIAGNOSIS — K92.2 GASTROINTESTINAL HEMORRHAGE, UNSPECIFIED GASTROINTESTINAL HEMORRHAGE TYPE: ICD-10-CM

## 2025-09-05 LAB
BASOPHILS # BLD AUTO: 0.02 K/UL (ref 0–0.2)
BASOPHILS NFR BLD AUTO: 0.5 % (ref 0–1)
DIFFERENTIAL METHOD BLD: ABNORMAL
EOSINOPHIL # BLD AUTO: 0.01 K/UL (ref 0–0.5)
EOSINOPHIL NFR BLD AUTO: 0.2 % (ref 1–4)
ERYTHROCYTE [DISTWIDTH] IN BLOOD BY AUTOMATED COUNT: 18.7 % (ref 11.5–14.5)
HCT VFR BLD AUTO: 32.4 % (ref 38–47)
HGB BLD-MCNC: 10.7 G/DL (ref 12–16)
IMM GRANULOCYTES # BLD AUTO: 0.06 K/UL (ref 0–0.04)
IMM GRANULOCYTES NFR BLD: 1.4 % (ref 0–0.4)
LYMPHOCYTES # BLD AUTO: 0.99 K/UL (ref 1–4.8)
LYMPHOCYTES NFR BLD AUTO: 23.2 % (ref 27–41)
MCH RBC QN AUTO: 30.8 PG (ref 27–31)
MCHC RBC AUTO-ENTMCNC: 33 G/DL (ref 32–36)
MCV RBC AUTO: 93.4 FL (ref 80–96)
MONOCYTES # BLD AUTO: 0.97 K/UL (ref 0–0.8)
MONOCYTES NFR BLD AUTO: 22.7 % (ref 2–6)
MPC BLD CALC-MCNC: 11.6 FL (ref 9.4–12.4)
NEUTROPHILS # BLD AUTO: 2.22 K/UL (ref 1.8–7.7)
NEUTROPHILS NFR BLD AUTO: 52 % (ref 53–65)
NRBC # BLD AUTO: 0 X10E3/UL
NRBC, AUTO (.00): 0 %
PLATELET # BLD AUTO: 312 K/UL (ref 150–400)
RBC # BLD AUTO: 3.47 M/UL (ref 4.2–5.4)
WBC # BLD AUTO: 4.27 K/UL (ref 4.5–11)

## 2025-09-06 LAB
ANION GAP SERPL CALCULATED.3IONS-SCNC: 18 MMOL/L (ref 7–16)
BUN SERPL-MCNC: 6 MG/DL (ref 10–20)
BUN/CREAT SERPL: 8 (ref 6–20)
CALCIUM SERPL-MCNC: 9 MG/DL (ref 8.4–10.2)
CHLORIDE SERPL-SCNC: 106 MMOL/L (ref 98–107)
CO2 SERPL-SCNC: 20 MMOL/L (ref 23–31)
CREAT SERPL-MCNC: 0.78 MG/DL (ref 0.55–1.02)
EGFR (NO RACE VARIABLE) (RUSH/TITUS): 79 ML/MIN/1.73M2
GLUCOSE SERPL-MCNC: 58 MG/DL (ref 82–115)
POTASSIUM SERPL-SCNC: 3.8 MMOL/L (ref 3.5–5.1)
SODIUM SERPL-SCNC: 140 MMOL/L (ref 136–145)

## (undated) DEVICE — CATH IV 22G X 1 AUTOGUARD

## (undated) DEVICE — SOL CONTINU-FLO SET 2 LAV

## (undated) DEVICE — APPLICATOR CHLORAPREP ORN 26ML

## (undated) DEVICE — TRAY NERVE BLOCK UNIV 10/CA

## (undated) DEVICE — KIT IV START RUSH

## (undated) DEVICE — GLOVE PROTEXIS PI SYN SURG 6.5

## (undated) DEVICE — NDL SPINAL 25GX3.5 SPINOCAN